# Patient Record
Sex: FEMALE | Race: WHITE | Employment: OTHER | URBAN - METROPOLITAN AREA
[De-identification: names, ages, dates, MRNs, and addresses within clinical notes are randomized per-mention and may not be internally consistent; named-entity substitution may affect disease eponyms.]

---

## 2017-03-01 ENCOUNTER — HOSPITAL ENCOUNTER (EMERGENCY)
Age: 37
Discharge: HOME | End: 2017-03-01
Attending: EMERGENCY MEDICINE
Payer: MEDICARE

## 2017-03-01 ENCOUNTER — APPOINTMENT (OUTPATIENT)
Dept: CT IMAGING | Age: 37
End: 2017-03-01
Payer: MEDICARE

## 2017-03-01 ENCOUNTER — APPOINTMENT (OUTPATIENT)
Dept: GENERAL RADIOLOGY | Age: 37
End: 2017-03-01
Payer: MEDICARE

## 2017-03-01 VITALS
RESPIRATION RATE: 15 BRPM | HEIGHT: 69 IN | OXYGEN SATURATION: 99 % | SYSTOLIC BLOOD PRESSURE: 110 MMHG | HEART RATE: 69 BPM | BODY MASS INDEX: 23.7 KG/M2 | WEIGHT: 160 LBS | TEMPERATURE: 97.6 F | DIASTOLIC BLOOD PRESSURE: 68 MMHG

## 2017-03-01 DIAGNOSIS — R07.9 CHEST PAIN, UNSPECIFIED TYPE: Primary | ICD-10-CM

## 2017-03-01 LAB
ABSOLUTE EOS #: 0 K/UL (ref 0–0.4)
ABSOLUTE LYMPH #: 2.5 K/UL (ref 1–4.8)
ABSOLUTE MONO #: 0.4 K/UL (ref 0.1–1.3)
ANION GAP SERPL CALCULATED.3IONS-SCNC: 12 MMOL/L (ref 9–17)
BASOPHILS # BLD: 1 % (ref 0–2)
BASOPHILS ABSOLUTE: 0 K/UL (ref 0–0.2)
BNP INTERPRETATION: NORMAL
BUN BLDV-MCNC: 8 MG/DL (ref 6–20)
BUN/CREAT BLD: ABNORMAL (ref 9–20)
CALCIUM SERPL-MCNC: 9.5 MG/DL (ref 8.6–10.4)
CHLORIDE BLD-SCNC: 101 MMOL/L (ref 98–107)
CO2: 26 MMOL/L (ref 20–31)
CREAT SERPL-MCNC: 0.44 MG/DL (ref 0.5–0.9)
DIFFERENTIAL TYPE: NORMAL
EOSINOPHILS RELATIVE PERCENT: 1 % (ref 0–4)
GFR AFRICAN AMERICAN: >60 ML/MIN
GFR NON-AFRICAN AMERICAN: >60 ML/MIN
GFR SERPL CREATININE-BSD FRML MDRD: ABNORMAL ML/MIN/{1.73_M2}
GFR SERPL CREATININE-BSD FRML MDRD: ABNORMAL ML/MIN/{1.73_M2}
GLUCOSE BLD-MCNC: 84 MG/DL (ref 70–99)
HCT VFR BLD CALC: 38.8 % (ref 36–46)
HEMOGLOBIN: 12.9 G/DL (ref 12–16)
INR BLD: 1
LYMPHOCYTES # BLD: 33 % (ref 24–44)
MCH RBC QN AUTO: 28.8 PG (ref 26–34)
MCHC RBC AUTO-ENTMCNC: 33.2 G/DL (ref 31–37)
MCV RBC AUTO: 86.6 FL (ref 80–100)
MONOCYTES # BLD: 6 % (ref 1–7)
MYOGLOBIN: 52 NG/ML (ref 25–58)
PARTIAL THROMBOPLASTIN TIME: 26.8 SEC (ref 23–31)
PDW BLD-RTO: 13.6 % (ref 11.5–14.9)
PLATELET # BLD: 327 K/UL (ref 150–450)
PLATELET ESTIMATE: NORMAL
PMV BLD AUTO: 7.7 FL (ref 6–12)
POTASSIUM SERPL-SCNC: 3.7 MMOL/L (ref 3.7–5.3)
PRO-BNP: 78 PG/ML
PROTHROMBIN TIME: 10.4 SEC (ref 9.7–12)
RBC # BLD: 4.47 M/UL (ref 4–5.2)
RBC # BLD: NORMAL 10*6/UL
SEG NEUTROPHILS: 59 % (ref 36–66)
SEGMENTED NEUTROPHILS ABSOLUTE COUNT: 4.5 K/UL (ref 1.3–9.1)
SODIUM BLD-SCNC: 139 MMOL/L (ref 135–144)
TROPONIN INTERP: NORMAL
TROPONIN T: <0.03 NG/ML
WBC # BLD: 7.6 K/UL (ref 3.5–11)
WBC # BLD: NORMAL 10*3/UL

## 2017-03-01 PROCEDURE — 85025 COMPLETE CBC W/AUTO DIFF WBC: CPT

## 2017-03-01 PROCEDURE — 71020 XR CHEST STANDARD TWO VW: CPT

## 2017-03-01 PROCEDURE — 85610 PROTHROMBIN TIME: CPT

## 2017-03-01 PROCEDURE — 99285 EMERGENCY DEPT VISIT HI MDM: CPT

## 2017-03-01 PROCEDURE — 96374 THER/PROPH/DIAG INJ IV PUSH: CPT

## 2017-03-01 PROCEDURE — 85730 THROMBOPLASTIN TIME PARTIAL: CPT

## 2017-03-01 PROCEDURE — 93005 ELECTROCARDIOGRAM TRACING: CPT

## 2017-03-01 PROCEDURE — 80048 BASIC METABOLIC PNL TOTAL CA: CPT

## 2017-03-01 PROCEDURE — 96375 TX/PRO/DX INJ NEW DRUG ADDON: CPT

## 2017-03-01 PROCEDURE — 84484 ASSAY OF TROPONIN QUANT: CPT

## 2017-03-01 PROCEDURE — 83880 ASSAY OF NATRIURETIC PEPTIDE: CPT

## 2017-03-01 PROCEDURE — 6360000002 HC RX W HCPCS: Performed by: EMERGENCY MEDICINE

## 2017-03-01 PROCEDURE — 6360000004 HC RX CONTRAST MEDICATION: Performed by: EMERGENCY MEDICINE

## 2017-03-01 PROCEDURE — 2580000003 HC RX 258: Performed by: EMERGENCY MEDICINE

## 2017-03-01 PROCEDURE — 71260 CT THORAX DX C+: CPT

## 2017-03-01 PROCEDURE — 36415 COLL VENOUS BLD VENIPUNCTURE: CPT

## 2017-03-01 PROCEDURE — 83874 ASSAY OF MYOGLOBIN: CPT

## 2017-03-01 RX ORDER — ONDANSETRON 2 MG/ML
4 INJECTION INTRAMUSCULAR; INTRAVENOUS ONCE
Status: COMPLETED | OUTPATIENT
Start: 2017-03-01 | End: 2017-03-01

## 2017-03-01 RX ORDER — FENTANYL CITRATE 50 UG/ML
100 INJECTION, SOLUTION INTRAMUSCULAR; INTRAVENOUS ONCE
Status: COMPLETED | OUTPATIENT
Start: 2017-03-01 | End: 2017-03-01

## 2017-03-01 RX ORDER — 0.9 % SODIUM CHLORIDE 0.9 %
100 INTRAVENOUS SOLUTION INTRAVENOUS ONCE
Status: COMPLETED | OUTPATIENT
Start: 2017-03-01 | End: 2017-03-01

## 2017-03-01 RX ORDER — SODIUM CHLORIDE 0.9 % (FLUSH) 0.9 %
10 SYRINGE (ML) INJECTION PRN
Status: DISCONTINUED | OUTPATIENT
Start: 2017-03-01 | End: 2017-03-01 | Stop reason: HOSPADM

## 2017-03-01 RX ADMIN — ONDANSETRON 4 MG: 2 INJECTION INTRAMUSCULAR; INTRAVENOUS at 19:25

## 2017-03-01 RX ADMIN — Medication 10 ML: at 18:40

## 2017-03-01 RX ADMIN — SODIUM CHLORIDE 100 ML: 9 INJECTION, SOLUTION INTRAVENOUS at 18:39

## 2017-03-01 RX ADMIN — FENTANYL CITRATE 100 MCG: 50 INJECTION INTRAMUSCULAR; INTRAVENOUS at 19:25

## 2017-03-01 RX ADMIN — IOVERSOL 100 ML: 741 INJECTION INTRA-ARTERIAL; INTRAVENOUS at 18:39

## 2017-03-01 ASSESSMENT — ENCOUNTER SYMPTOMS
COLOR CHANGE: 0
BACK PAIN: 0
RHINORRHEA: 0
ABDOMINAL PAIN: 0
EYE DISCHARGE: 0
FACIAL SWELLING: 0
CHEST TIGHTNESS: 0
CONSTIPATION: 0
NAUSEA: 0
SINUS PRESSURE: 0
SHORTNESS OF BREATH: 0
TROUBLE SWALLOWING: 0
COUGH: 0
EYE PAIN: 0
VOMITING: 0
WHEEZING: 0
BLOOD IN STOOL: 0
EYE REDNESS: 0
SORE THROAT: 0
DIARRHEA: 0

## 2017-03-01 ASSESSMENT — PAIN SCALES - GENERAL
PAINLEVEL_OUTOF10: 7
PAINLEVEL_OUTOF10: 7

## 2017-03-02 LAB
EKG ATRIAL RATE: 52 BPM
EKG P AXIS: 60 DEGREES
EKG P-R INTERVAL: 138 MS
EKG Q-T INTERVAL: 404 MS
EKG QRS DURATION: 80 MS
EKG QTC CALCULATION (BAZETT): 375 MS
EKG R AXIS: 80 DEGREES
EKG T AXIS: 84 DEGREES
EKG VENTRICULAR RATE: 52 BPM

## 2017-04-28 ENCOUNTER — HOSPITAL ENCOUNTER (EMERGENCY)
Age: 37
Discharge: HOME | End: 2017-04-29
Attending: EMERGENCY MEDICINE
Payer: MEDICARE

## 2017-04-28 ENCOUNTER — APPOINTMENT (OUTPATIENT)
Dept: CT IMAGING | Age: 37
End: 2017-04-28
Payer: MEDICARE

## 2017-04-28 DIAGNOSIS — M62.838 CERVICAL PARASPINAL MUSCLE SPASM: Primary | ICD-10-CM

## 2017-04-28 LAB
ABSOLUTE BANDS #: 0.16 K/UL (ref 0–1)
ABSOLUTE EOS #: 0.16 K/UL (ref 0–0.4)
ABSOLUTE LYMPH #: 3.42 K/UL (ref 1–4.8)
ABSOLUTE MONO #: 1.3 K/UL (ref 0.1–1.3)
ALBUMIN SERPL-MCNC: 4.2 G/DL (ref 3.5–5.2)
ALBUMIN/GLOBULIN RATIO: ABNORMAL (ref 1–2.5)
ALP BLD-CCNC: 72 U/L (ref 35–104)
ALT SERPL-CCNC: 13 U/L (ref 5–33)
ANION GAP SERPL CALCULATED.3IONS-SCNC: 11 MMOL/L (ref 9–17)
AST SERPL-CCNC: 9 U/L
ATYPICAL LYMPHOCYTE ABSOLUTE COUNT: 0.16 K/UL
ATYPICAL LYMPHOCYTES: 1 %
BANDS: 1 %
BASOPHILS # BLD: 0 %
BASOPHILS ABSOLUTE: 0 K/UL (ref 0–0.2)
BILIRUB SERPL-MCNC: 0.37 MG/DL (ref 0.3–1.2)
BUN BLDV-MCNC: 20 MG/DL (ref 6–20)
BUN/CREAT BLD: ABNORMAL (ref 9–20)
CALCIUM SERPL-MCNC: 9.8 MG/DL (ref 8.6–10.4)
CHLORIDE BLD-SCNC: 96 MMOL/L (ref 98–107)
CO2: 32 MMOL/L (ref 20–31)
CREAT SERPL-MCNC: 0.77 MG/DL (ref 0.5–0.9)
D-DIMER QUANTITATIVE: <0.19 MG/L FEU
DIFFERENTIAL TYPE: ABNORMAL
EOSINOPHILS RELATIVE PERCENT: 1 %
GFR AFRICAN AMERICAN: >60 ML/MIN
GFR NON-AFRICAN AMERICAN: >60 ML/MIN
GFR SERPL CREATININE-BSD FRML MDRD: ABNORMAL ML/MIN/{1.73_M2}
GFR SERPL CREATININE-BSD FRML MDRD: ABNORMAL ML/MIN/{1.73_M2}
GLUCOSE BLD-MCNC: 83 MG/DL (ref 70–99)
HCG(URINE) PREGNANCY TEST: NEGATIVE
HCT VFR BLD CALC: 40.2 % (ref 36–46)
HEMOGLOBIN: 13.2 G/DL (ref 12–16)
LYMPHOCYTES # BLD: 21 %
MAGNESIUM: 1.9 MG/DL (ref 1.6–2.6)
MCH RBC QN AUTO: 27.9 PG (ref 26–34)
MCHC RBC AUTO-ENTMCNC: 32.9 G/DL (ref 31–37)
MCV RBC AUTO: 84.8 FL (ref 80–100)
MONOCYTES # BLD: 8 %
MORPHOLOGY: ABNORMAL
PDW BLD-RTO: 15.6 % (ref 11.5–14.9)
PLATELET # BLD: 382 K/UL (ref 150–450)
PLATELET ESTIMATE: ABNORMAL
PMV BLD AUTO: 7.4 FL (ref 6–12)
POTASSIUM SERPL-SCNC: 4.4 MMOL/L (ref 3.7–5.3)
RBC # BLD: 4.74 M/UL (ref 4–5.2)
RBC # BLD: ABNORMAL 10*6/UL
SEG NEUTROPHILS: 68 %
SEGMENTED NEUTROPHILS ABSOLUTE COUNT: 11.1 K/UL (ref 1.3–9.1)
SODIUM BLD-SCNC: 139 MMOL/L (ref 135–144)
TOTAL PROTEIN: 6.8 G/DL (ref 6.4–8.3)
WBC # BLD: 16.3 K/UL (ref 3.5–11)
WBC # BLD: ABNORMAL 10*3/UL

## 2017-04-28 PROCEDURE — 70498 CT ANGIOGRAPHY NECK: CPT

## 2017-04-28 PROCEDURE — 80053 COMPREHEN METABOLIC PANEL: CPT

## 2017-04-28 PROCEDURE — 72125 CT NECK SPINE W/O DYE: CPT

## 2017-04-28 PROCEDURE — 6360000004 HC RX CONTRAST MEDICATION: Performed by: EMERGENCY MEDICINE

## 2017-04-28 PROCEDURE — 85379 FIBRIN DEGRADATION QUANT: CPT

## 2017-04-28 PROCEDURE — 36415 COLL VENOUS BLD VENIPUNCTURE: CPT

## 2017-04-28 PROCEDURE — 2580000003 HC RX 258: Performed by: EMERGENCY MEDICINE

## 2017-04-28 PROCEDURE — 70496 CT ANGIOGRAPHY HEAD: CPT

## 2017-04-28 PROCEDURE — 83735 ASSAY OF MAGNESIUM: CPT

## 2017-04-28 PROCEDURE — 99284 EMERGENCY DEPT VISIT MOD MDM: CPT

## 2017-04-28 PROCEDURE — 93005 ELECTROCARDIOGRAM TRACING: CPT

## 2017-04-28 PROCEDURE — 85025 COMPLETE CBC W/AUTO DIFF WBC: CPT

## 2017-04-28 PROCEDURE — 84703 CHORIONIC GONADOTROPIN ASSAY: CPT

## 2017-04-28 RX ORDER — 0.9 % SODIUM CHLORIDE 0.9 %
100 INTRAVENOUS SOLUTION INTRAVENOUS ONCE
Status: COMPLETED | OUTPATIENT
Start: 2017-04-28 | End: 2017-04-29

## 2017-04-28 RX ORDER — SODIUM CHLORIDE 0.9 % (FLUSH) 0.9 %
10 SYRINGE (ML) INJECTION PRN
Status: DISCONTINUED | OUTPATIENT
Start: 2017-04-28 | End: 2017-04-29 | Stop reason: HOSPADM

## 2017-04-28 RX ADMIN — IOVERSOL 100 ML: 741 INJECTION INTRA-ARTERIAL; INTRAVENOUS at 23:25

## 2017-04-28 RX ADMIN — SODIUM CHLORIDE 100 ML: 9 INJECTION, SOLUTION INTRAVENOUS at 23:31

## 2017-04-28 RX ADMIN — Medication 10 ML: at 23:31

## 2017-04-28 ASSESSMENT — PAIN SCALES - GENERAL: PAINLEVEL_OUTOF10: 8

## 2017-04-28 ASSESSMENT — PAIN DESCRIPTION - PAIN TYPE: TYPE: ACUTE PAIN

## 2017-04-28 ASSESSMENT — PAIN DESCRIPTION - LOCATION: LOCATION: NECK

## 2017-04-29 VITALS
TEMPERATURE: 98.2 F | RESPIRATION RATE: 16 BRPM | BODY MASS INDEX: 24.44 KG/M2 | HEIGHT: 69 IN | HEART RATE: 74 BPM | DIASTOLIC BLOOD PRESSURE: 106 MMHG | OXYGEN SATURATION: 93 % | SYSTOLIC BLOOD PRESSURE: 161 MMHG | WEIGHT: 165 LBS

## 2017-04-29 PROCEDURE — 6370000000 HC RX 637 (ALT 250 FOR IP): Performed by: EMERGENCY MEDICINE

## 2017-04-29 RX ORDER — DIAZEPAM 5 MG/1
5 TABLET ORAL EVERY 8 HOURS PRN
Qty: 3 TABLET | Refills: 0 | Status: SHIPPED | OUTPATIENT
Start: 2017-04-29 | End: 2017-07-28

## 2017-04-29 RX ORDER — DIAZEPAM 5 MG/1
5 TABLET ORAL ONCE
Status: COMPLETED | OUTPATIENT
Start: 2017-04-29 | End: 2017-04-29

## 2017-04-29 RX ADMIN — DIAZEPAM 5 MG: 5 TABLET ORAL at 00:59

## 2017-05-02 LAB
EKG ATRIAL RATE: 62 BPM
EKG P AXIS: 54 DEGREES
EKG P-R INTERVAL: 132 MS
EKG Q-T INTERVAL: 404 MS
EKG QRS DURATION: 70 MS
EKG QTC CALCULATION (BAZETT): 410 MS
EKG R AXIS: 73 DEGREES
EKG T AXIS: 81 DEGREES
EKG VENTRICULAR RATE: 62 BPM

## 2017-06-21 ENCOUNTER — HOSPITAL ENCOUNTER (EMERGENCY)
Age: 37
Discharge: HOME | End: 2017-06-21
Attending: EMERGENCY MEDICINE
Payer: MEDICARE

## 2017-06-21 ENCOUNTER — APPOINTMENT (OUTPATIENT)
Dept: GENERAL RADIOLOGY | Age: 37
End: 2017-06-21
Payer: MEDICARE

## 2017-06-21 VITALS
RESPIRATION RATE: 25 BRPM | HEIGHT: 69 IN | HEART RATE: 92 BPM | WEIGHT: 165 LBS | DIASTOLIC BLOOD PRESSURE: 96 MMHG | OXYGEN SATURATION: 100 % | BODY MASS INDEX: 24.44 KG/M2 | TEMPERATURE: 97.9 F | SYSTOLIC BLOOD PRESSURE: 148 MMHG

## 2017-06-21 DIAGNOSIS — J40 BRONCHITIS: Primary | ICD-10-CM

## 2017-06-21 PROCEDURE — 99285 EMERGENCY DEPT VISIT HI MDM: CPT

## 2017-06-21 PROCEDURE — 6370000000 HC RX 637 (ALT 250 FOR IP): Performed by: EMERGENCY MEDICINE

## 2017-06-21 PROCEDURE — 94664 DEMO&/EVAL PT USE INHALER: CPT

## 2017-06-21 PROCEDURE — 71020 XR CHEST STANDARD TWO VW: CPT

## 2017-06-21 PROCEDURE — 94640 AIRWAY INHALATION TREATMENT: CPT

## 2017-06-21 RX ORDER — PREDNISONE 20 MG/1
60 TABLET ORAL DAILY
Qty: 15 TABLET | Refills: 0 | Status: SHIPPED | OUTPATIENT
Start: 2017-06-21 | End: 2017-06-26

## 2017-06-21 RX ORDER — IPRATROPIUM BROMIDE AND ALBUTEROL SULFATE 2.5; .5 MG/3ML; MG/3ML
1 SOLUTION RESPIRATORY (INHALATION) ONCE
Status: COMPLETED | OUTPATIENT
Start: 2017-06-21 | End: 2017-06-21

## 2017-06-21 RX ORDER — PREDNISONE 20 MG/1
60 TABLET ORAL ONCE
Status: COMPLETED | OUTPATIENT
Start: 2017-06-21 | End: 2017-06-21

## 2017-06-21 RX ADMIN — PREDNISONE 60 MG: 20 TABLET ORAL at 11:10

## 2017-06-21 RX ADMIN — IPRATROPIUM BROMIDE AND ALBUTEROL SULFATE 1 AMPULE: .5; 3 SOLUTION RESPIRATORY (INHALATION) at 10:23

## 2017-06-21 ASSESSMENT — ENCOUNTER SYMPTOMS
COUGH: 1
EYE REDNESS: 0
NAUSEA: 0
SHORTNESS OF BREATH: 1
ABDOMINAL PAIN: 0
EYE PAIN: 0
VOMITING: 0
BACK PAIN: 0
WHEEZING: 1
RHINORRHEA: 0

## 2017-07-27 ENCOUNTER — APPOINTMENT (OUTPATIENT)
Dept: GENERAL RADIOLOGY | Age: 37
End: 2017-07-27
Payer: MEDICARE

## 2017-07-27 ENCOUNTER — HOSPITAL ENCOUNTER (EMERGENCY)
Age: 37
Discharge: HOME | End: 2017-07-27
Attending: EMERGENCY MEDICINE
Payer: MEDICARE

## 2017-07-27 VITALS
SYSTOLIC BLOOD PRESSURE: 147 MMHG | BODY MASS INDEX: 23.62 KG/M2 | HEIGHT: 70 IN | HEART RATE: 77 BPM | OXYGEN SATURATION: 98 % | WEIGHT: 165 LBS | DIASTOLIC BLOOD PRESSURE: 78 MMHG | RESPIRATION RATE: 16 BRPM | TEMPERATURE: 98.2 F

## 2017-07-27 DIAGNOSIS — M25.512 LEFT SHOULDER PAIN, UNSPECIFIED CHRONICITY: Primary | ICD-10-CM

## 2017-07-27 PROCEDURE — 6360000002 HC RX W HCPCS: Performed by: NURSE PRACTITIONER

## 2017-07-27 PROCEDURE — 99283 EMERGENCY DEPT VISIT LOW MDM: CPT

## 2017-07-27 PROCEDURE — 73030 X-RAY EXAM OF SHOULDER: CPT

## 2017-07-27 PROCEDURE — 96372 THER/PROPH/DIAG INJ SC/IM: CPT

## 2017-07-27 RX ORDER — CYCLOBENZAPRINE HCL 10 MG
10 TABLET ORAL 3 TIMES DAILY PRN
Qty: 15 TABLET | Refills: 0 | Status: SHIPPED | OUTPATIENT
Start: 2017-07-27 | End: 2017-07-28

## 2017-07-27 RX ORDER — TIZANIDINE 2 MG/1
2 TABLET ORAL EVERY 8 HOURS PRN
Qty: 15 TABLET | Refills: 0 | Status: SHIPPED | OUTPATIENT
Start: 2017-07-27 | End: 2017-07-28

## 2017-07-27 RX ORDER — KETOROLAC TROMETHAMINE 30 MG/ML
30 INJECTION, SOLUTION INTRAMUSCULAR; INTRAVENOUS ONCE
Status: COMPLETED | OUTPATIENT
Start: 2017-07-27 | End: 2017-07-27

## 2017-07-27 RX ORDER — NAPROXEN 500 MG/1
500 TABLET ORAL 2 TIMES DAILY
Qty: 20 TABLET | Refills: 0 | Status: SHIPPED | OUTPATIENT
Start: 2017-07-27 | End: 2017-07-28

## 2017-07-27 RX ADMIN — KETOROLAC TROMETHAMINE 30 MG: 30 INJECTION, SOLUTION INTRAMUSCULAR at 12:55

## 2017-07-27 ASSESSMENT — ENCOUNTER SYMPTOMS
COUGH: 0
VOMITING: 0
SHORTNESS OF BREATH: 0
TROUBLE SWALLOWING: 0
NAUSEA: 0

## 2017-07-27 ASSESSMENT — PAIN SCALES - GENERAL
PAINLEVEL_OUTOF10: 6
PAINLEVEL_OUTOF10: 6

## 2017-08-14 ENCOUNTER — OFFICE VISIT (OUTPATIENT)
Dept: GASTROENTEROLOGY | Age: 37
End: 2017-08-14
Payer: MEDICARE

## 2017-08-14 VITALS
OXYGEN SATURATION: 98 % | WEIGHT: 169.7 LBS | TEMPERATURE: 98.1 F | DIASTOLIC BLOOD PRESSURE: 73 MMHG | HEIGHT: 69 IN | HEART RATE: 55 BPM | BODY MASS INDEX: 25.13 KG/M2 | RESPIRATION RATE: 14 BRPM | SYSTOLIC BLOOD PRESSURE: 117 MMHG

## 2017-08-14 DIAGNOSIS — R09.89 GLOBUS SENSATION: ICD-10-CM

## 2017-08-14 DIAGNOSIS — Q87.40 MARFAN SYNDROME: Primary | ICD-10-CM

## 2017-08-14 DIAGNOSIS — R13.10 DYSPHAGIA, UNSPECIFIED TYPE: ICD-10-CM

## 2017-08-14 DIAGNOSIS — R07.89 ATYPICAL CHEST PAIN: ICD-10-CM

## 2017-08-14 PROCEDURE — 1036F TOBACCO NON-USER: CPT | Performed by: INTERNAL MEDICINE

## 2017-08-14 PROCEDURE — 99204 OFFICE O/P NEW MOD 45 MIN: CPT | Performed by: INTERNAL MEDICINE

## 2017-08-14 PROCEDURE — G8419 CALC BMI OUT NRM PARAM NOF/U: HCPCS | Performed by: INTERNAL MEDICINE

## 2017-08-14 PROCEDURE — G8427 DOCREV CUR MEDS BY ELIG CLIN: HCPCS | Performed by: INTERNAL MEDICINE

## 2017-08-14 ASSESSMENT — ENCOUNTER SYMPTOMS
VOMITING: 0
ANAL BLEEDING: 0
NAUSEA: 0
CONSTIPATION: 0
ALLERGIC/IMMUNOLOGIC NEGATIVE: 1
CHOKING: 1
ABDOMINAL DISTENTION: 0
TROUBLE SWALLOWING: 1
RECTAL PAIN: 0
BLOOD IN STOOL: 0
DIARRHEA: 0
BACK PAIN: 1
ABDOMINAL PAIN: 0

## 2017-08-16 ENCOUNTER — HOSPITAL ENCOUNTER (OUTPATIENT)
Dept: PREADMISSION TESTING | Age: 37
Discharge: HOME | End: 2017-08-16
Payer: MEDICARE

## 2017-08-16 VITALS
TEMPERATURE: 97.2 F | OXYGEN SATURATION: 99 % | RESPIRATION RATE: 14 BRPM | HEART RATE: 60 BPM | DIASTOLIC BLOOD PRESSURE: 81 MMHG | BODY MASS INDEX: 25.18 KG/M2 | SYSTOLIC BLOOD PRESSURE: 110 MMHG | HEIGHT: 69 IN | WEIGHT: 170 LBS

## 2017-08-16 LAB
ABSOLUTE EOS #: 0.1 K/UL (ref 0–0.4)
ABSOLUTE LYMPH #: 1.9 K/UL (ref 1–4.8)
ABSOLUTE MONO #: 0.5 K/UL (ref 0.1–1.3)
ANION GAP SERPL CALCULATED.3IONS-SCNC: 11 MMOL/L (ref 9–17)
BASOPHILS # BLD: 1 %
BASOPHILS ABSOLUTE: 0 K/UL (ref 0–0.2)
BUN BLDV-MCNC: 10 MG/DL (ref 6–20)
BUN/CREAT BLD: NORMAL (ref 9–20)
CALCIUM SERPL-MCNC: 9.6 MG/DL (ref 8.6–10.4)
CHLORIDE BLD-SCNC: 101 MMOL/L (ref 98–107)
CO2: 30 MMOL/L (ref 20–31)
CREAT SERPL-MCNC: 0.52 MG/DL (ref 0.5–0.9)
DIFFERENTIAL TYPE: ABNORMAL
EOSINOPHILS RELATIVE PERCENT: 1 %
GFR AFRICAN AMERICAN: >60 ML/MIN
GFR NON-AFRICAN AMERICAN: >60 ML/MIN
GFR SERPL CREATININE-BSD FRML MDRD: NORMAL ML/MIN/{1.73_M2}
GFR SERPL CREATININE-BSD FRML MDRD: NORMAL ML/MIN/{1.73_M2}
GLUCOSE BLD-MCNC: 87 MG/DL (ref 70–99)
HCT VFR BLD CALC: 42.3 % (ref 36–46)
HEMOGLOBIN: 14.2 G/DL (ref 12–16)
LYMPHOCYTES # BLD: 27 %
MCH RBC QN AUTO: 28.7 PG (ref 26–34)
MCHC RBC AUTO-ENTMCNC: 33.5 G/DL (ref 31–37)
MCV RBC AUTO: 85.7 FL (ref 80–100)
MONOCYTES # BLD: 7 %
PDW BLD-RTO: 15.4 % (ref 11.5–14.9)
PLATELET # BLD: 351 K/UL (ref 150–450)
PLATELET ESTIMATE: ABNORMAL
PMV BLD AUTO: 8.1 FL (ref 6–12)
POTASSIUM SERPL-SCNC: 4.7 MMOL/L (ref 3.7–5.3)
RBC # BLD: 4.93 M/UL (ref 4–5.2)
RBC # BLD: ABNORMAL 10*6/UL
SEG NEUTROPHILS: 64 %
SEGMENTED NEUTROPHILS ABSOLUTE COUNT: 4.4 K/UL (ref 1.3–9.1)
SODIUM BLD-SCNC: 142 MMOL/L (ref 135–144)
WBC # BLD: 6.8 K/UL (ref 3.5–11)
WBC # BLD: ABNORMAL 10*3/UL

## 2017-08-16 PROCEDURE — 36415 COLL VENOUS BLD VENIPUNCTURE: CPT

## 2017-08-16 PROCEDURE — 85025 COMPLETE CBC W/AUTO DIFF WBC: CPT

## 2017-08-16 PROCEDURE — 80048 BASIC METABOLIC PNL TOTAL CA: CPT

## 2017-08-16 RX ORDER — TIZANIDINE 2 MG/1
2 TABLET ORAL NIGHTLY PRN
COMMUNITY
End: 2017-09-05

## 2017-08-17 ENCOUNTER — ANESTHESIA EVENT (OUTPATIENT)
Dept: OPERATING ROOM | Age: 37
End: 2017-08-17
Payer: MEDICARE

## 2017-08-17 RX ORDER — SODIUM CHLORIDE 0.9 % (FLUSH) 0.9 %
10 SYRINGE (ML) INJECTION EVERY 12 HOURS SCHEDULED
Status: CANCELLED | OUTPATIENT
Start: 2017-08-17

## 2017-08-17 RX ORDER — SODIUM CHLORIDE 0.9 % (FLUSH) 0.9 %
10 SYRINGE (ML) INJECTION PRN
Status: CANCELLED | OUTPATIENT
Start: 2017-08-17

## 2017-08-17 RX ORDER — LIDOCAINE HYDROCHLORIDE 10 MG/ML
1 INJECTION, SOLUTION EPIDURAL; INFILTRATION; INTRACAUDAL; PERINEURAL
Status: CANCELLED | OUTPATIENT
Start: 2017-08-17 | End: 2017-08-17

## 2017-08-17 RX ORDER — SODIUM CHLORIDE, SODIUM LACTATE, POTASSIUM CHLORIDE, CALCIUM CHLORIDE 600; 310; 30; 20 MG/100ML; MG/100ML; MG/100ML; MG/100ML
INJECTION, SOLUTION INTRAVENOUS CONTINUOUS
Status: CANCELLED | OUTPATIENT
Start: 2017-08-17

## 2017-08-29 ENCOUNTER — ANESTHESIA (OUTPATIENT)
Dept: OPERATING ROOM | Age: 37
End: 2017-08-29
Payer: MEDICARE

## 2017-08-29 ENCOUNTER — HOSPITAL ENCOUNTER (OUTPATIENT)
Age: 37
Setting detail: OUTPATIENT SURGERY
Discharge: HOME | End: 2017-08-29
Attending: INTERNAL MEDICINE | Admitting: INTERNAL MEDICINE
Payer: MEDICARE

## 2017-08-29 VITALS
WEIGHT: 170 LBS | TEMPERATURE: 97.4 F | HEIGHT: 69 IN | DIASTOLIC BLOOD PRESSURE: 88 MMHG | OXYGEN SATURATION: 99 % | BODY MASS INDEX: 25.18 KG/M2 | SYSTOLIC BLOOD PRESSURE: 136 MMHG | RESPIRATION RATE: 16 BRPM | HEART RATE: 59 BPM

## 2017-08-29 VITALS — SYSTOLIC BLOOD PRESSURE: 140 MMHG | OXYGEN SATURATION: 98 % | DIASTOLIC BLOOD PRESSURE: 93 MMHG

## 2017-08-29 PROCEDURE — 7100000030 HC ASPR PHASE II RECOVERY - FIRST 15 MIN: Performed by: INTERNAL MEDICINE

## 2017-08-29 PROCEDURE — 88305 TISSUE EXAM BY PATHOLOGIST: CPT

## 2017-08-29 PROCEDURE — 2500000003 HC RX 250 WO HCPCS: Performed by: ANESTHESIOLOGY

## 2017-08-29 PROCEDURE — 7100000000 HC PACU RECOVERY - FIRST 15 MIN: Performed by: INTERNAL MEDICINE

## 2017-08-29 PROCEDURE — 2580000003 HC RX 258: Performed by: ANESTHESIOLOGY

## 2017-08-29 PROCEDURE — 3700000001 HC ADD 15 MINUTES (ANESTHESIA): Performed by: INTERNAL MEDICINE

## 2017-08-29 PROCEDURE — 3700000000 HC ANESTHESIA ATTENDED CARE: Performed by: INTERNAL MEDICINE

## 2017-08-29 PROCEDURE — 3609012400 HC EGD TRANSORAL BIOPSY SINGLE/MULTIPLE: Performed by: INTERNAL MEDICINE

## 2017-08-29 PROCEDURE — 6360000002 HC RX W HCPCS: Performed by: ANESTHESIOLOGY

## 2017-08-29 PROCEDURE — 7100000001 HC PACU RECOVERY - ADDTL 15 MIN: Performed by: INTERNAL MEDICINE

## 2017-08-29 RX ORDER — DEXAMETHASONE SODIUM PHOSPHATE 4 MG/ML
INJECTION, SOLUTION INTRA-ARTICULAR; INTRALESIONAL; INTRAMUSCULAR; INTRAVENOUS; SOFT TISSUE PRN
Status: DISCONTINUED | OUTPATIENT
Start: 2017-08-29 | End: 2017-08-29 | Stop reason: SDUPTHER

## 2017-08-29 RX ORDER — DIPHENHYDRAMINE HYDROCHLORIDE 50 MG/ML
12.5 INJECTION INTRAMUSCULAR; INTRAVENOUS
Status: DISCONTINUED | OUTPATIENT
Start: 2017-08-29 | End: 2017-08-29 | Stop reason: HOSPADM

## 2017-08-29 RX ORDER — LIDOCAINE HYDROCHLORIDE 10 MG/ML
1 INJECTION, SOLUTION EPIDURAL; INFILTRATION; INTRACAUDAL; PERINEURAL
Status: DISCONTINUED | OUTPATIENT
Start: 2017-08-29 | End: 2017-08-29 | Stop reason: HOSPADM

## 2017-08-29 RX ORDER — PROPOFOL 10 MG/ML
INJECTION, EMULSION INTRAVENOUS PRN
Status: DISCONTINUED | OUTPATIENT
Start: 2017-08-29 | End: 2017-08-29 | Stop reason: SDUPTHER

## 2017-08-29 RX ORDER — SODIUM CHLORIDE, SODIUM LACTATE, POTASSIUM CHLORIDE, CALCIUM CHLORIDE 600; 310; 30; 20 MG/100ML; MG/100ML; MG/100ML; MG/100ML
INJECTION, SOLUTION INTRAVENOUS CONTINUOUS
Status: DISCONTINUED | OUTPATIENT
Start: 2017-08-29 | End: 2017-08-29 | Stop reason: HOSPADM

## 2017-08-29 RX ORDER — OXYCODONE HYDROCHLORIDE AND ACETAMINOPHEN 5; 325 MG/1; MG/1
1 TABLET ORAL PRN
Status: DISCONTINUED | OUTPATIENT
Start: 2017-08-29 | End: 2017-08-29 | Stop reason: HOSPADM

## 2017-08-29 RX ORDER — MEPERIDINE HYDROCHLORIDE 25 MG/ML
12.5 INJECTION INTRAMUSCULAR; INTRAVENOUS; SUBCUTANEOUS EVERY 5 MIN PRN
Status: DISCONTINUED | OUTPATIENT
Start: 2017-08-29 | End: 2017-08-29 | Stop reason: HOSPADM

## 2017-08-29 RX ORDER — ONDANSETRON 2 MG/ML
INJECTION INTRAMUSCULAR; INTRAVENOUS PRN
Status: DISCONTINUED | OUTPATIENT
Start: 2017-08-29 | End: 2017-08-29 | Stop reason: SDUPTHER

## 2017-08-29 RX ORDER — OXYCODONE HYDROCHLORIDE AND ACETAMINOPHEN 5; 325 MG/1; MG/1
2 TABLET ORAL PRN
Status: DISCONTINUED | OUTPATIENT
Start: 2017-08-29 | End: 2017-08-29 | Stop reason: HOSPADM

## 2017-08-29 RX ORDER — LIDOCAINE HYDROCHLORIDE 10 MG/ML
INJECTION, SOLUTION EPIDURAL; INFILTRATION; INTRACAUDAL; PERINEURAL PRN
Status: DISCONTINUED | OUTPATIENT
Start: 2017-08-29 | End: 2017-08-29 | Stop reason: SDUPTHER

## 2017-08-29 RX ORDER — MIDAZOLAM HYDROCHLORIDE 1 MG/ML
INJECTION INTRAMUSCULAR; INTRAVENOUS PRN
Status: DISCONTINUED | OUTPATIENT
Start: 2017-08-29 | End: 2017-08-29 | Stop reason: SDUPTHER

## 2017-08-29 RX ORDER — LABETALOL HYDROCHLORIDE 5 MG/ML
5 INJECTION, SOLUTION INTRAVENOUS EVERY 10 MIN PRN
Status: DISCONTINUED | OUTPATIENT
Start: 2017-08-29 | End: 2017-08-29 | Stop reason: HOSPADM

## 2017-08-29 RX ORDER — PROMETHAZINE HYDROCHLORIDE 25 MG/ML
6.25 INJECTION, SOLUTION INTRAMUSCULAR; INTRAVENOUS
Status: DISCONTINUED | OUTPATIENT
Start: 2017-08-29 | End: 2017-08-29 | Stop reason: HOSPADM

## 2017-08-29 RX ORDER — SODIUM CHLORIDE 0.9 % (FLUSH) 0.9 %
10 SYRINGE (ML) INJECTION EVERY 12 HOURS SCHEDULED
Status: DISCONTINUED | OUTPATIENT
Start: 2017-08-29 | End: 2017-08-29 | Stop reason: HOSPADM

## 2017-08-29 RX ORDER — SODIUM CHLORIDE 0.9 % (FLUSH) 0.9 %
10 SYRINGE (ML) INJECTION PRN
Status: DISCONTINUED | OUTPATIENT
Start: 2017-08-29 | End: 2017-08-29 | Stop reason: HOSPADM

## 2017-08-29 RX ADMIN — MIDAZOLAM 2 MG: 1 INJECTION INTRAMUSCULAR; INTRAVENOUS at 09:38

## 2017-08-29 RX ADMIN — DEXAMETHASONE SODIUM PHOSPHATE 4 MG: 4 INJECTION, SOLUTION INTRAMUSCULAR; INTRAVENOUS at 09:34

## 2017-08-29 RX ADMIN — LIDOCAINE HYDROCHLORIDE 50 MG: 10 INJECTION, SOLUTION EPIDURAL; INFILTRATION; INTRACAUDAL; PERINEURAL at 09:38

## 2017-08-29 RX ADMIN — ONDANSETRON 4 MG: 2 INJECTION INTRAMUSCULAR; INTRAVENOUS at 09:34

## 2017-08-29 RX ADMIN — PROPOFOL 50 MG: 10 INJECTION, EMULSION INTRAVENOUS at 09:38

## 2017-08-29 RX ADMIN — SODIUM CHLORIDE, POTASSIUM CHLORIDE, SODIUM LACTATE AND CALCIUM CHLORIDE: 600; 310; 30; 20 INJECTION, SOLUTION INTRAVENOUS at 08:49

## 2017-08-29 RX ADMIN — PROPOFOL 20 MG: 10 INJECTION, EMULSION INTRAVENOUS at 09:43

## 2017-08-29 ASSESSMENT — PAIN SCALES - GENERAL
PAINLEVEL_OUTOF10: 0

## 2017-08-29 ASSESSMENT — PAIN - FUNCTIONAL ASSESSMENT: PAIN_FUNCTIONAL_ASSESSMENT: 0-10

## 2017-08-30 LAB — SURGICAL PATHOLOGY REPORT: NORMAL

## 2017-10-10 PROBLEM — M25.359 HIP INSTABILITY: Status: ACTIVE | Noted: 2017-10-10

## 2017-10-10 PROBLEM — M25.311 INSTABILITY OF BOTH SHOULDER JOINTS: Status: ACTIVE | Noted: 2017-10-10

## 2017-10-10 PROBLEM — M25.312 INSTABILITY OF BOTH SHOULDER JOINTS: Status: ACTIVE | Noted: 2017-10-10

## 2017-12-18 ENCOUNTER — APPOINTMENT (OUTPATIENT)
Dept: GENERAL RADIOLOGY | Age: 37
End: 2017-12-18
Payer: MEDICARE

## 2017-12-18 ENCOUNTER — HOSPITAL ENCOUNTER (EMERGENCY)
Age: 37
Discharge: HOME | End: 2017-12-18
Attending: EMERGENCY MEDICINE
Payer: MEDICARE

## 2017-12-18 VITALS
HEART RATE: 66 BPM | BODY MASS INDEX: 26.66 KG/M2 | RESPIRATION RATE: 16 BRPM | HEIGHT: 69 IN | TEMPERATURE: 98.2 F | WEIGHT: 180 LBS | SYSTOLIC BLOOD PRESSURE: 109 MMHG | DIASTOLIC BLOOD PRESSURE: 69 MMHG | OXYGEN SATURATION: 94 %

## 2017-12-18 DIAGNOSIS — J20.9 ACUTE BRONCHITIS, UNSPECIFIED ORGANISM: Primary | ICD-10-CM

## 2017-12-18 PROCEDURE — 71020 XR CHEST STANDARD TWO VW: CPT

## 2017-12-18 PROCEDURE — 94640 AIRWAY INHALATION TREATMENT: CPT

## 2017-12-18 PROCEDURE — 99283 EMERGENCY DEPT VISIT LOW MDM: CPT

## 2017-12-18 PROCEDURE — 6370000000 HC RX 637 (ALT 250 FOR IP): Performed by: NURSE PRACTITIONER

## 2017-12-18 RX ORDER — ALBUTEROL SULFATE 90 UG/1
2 AEROSOL, METERED RESPIRATORY (INHALATION) EVERY 6 HOURS PRN
Qty: 1 INHALER | Refills: 0 | Status: SHIPPED | OUTPATIENT
Start: 2017-12-18 | End: 2018-08-10

## 2017-12-18 RX ORDER — IPRATROPIUM BROMIDE AND ALBUTEROL SULFATE 2.5; .5 MG/3ML; MG/3ML
1 SOLUTION RESPIRATORY (INHALATION) ONCE
Status: COMPLETED | OUTPATIENT
Start: 2017-12-18 | End: 2017-12-18

## 2017-12-18 RX ORDER — PREDNISONE 50 MG/1
50 TABLET ORAL DAILY
Qty: 5 TABLET | Refills: 0 | Status: SHIPPED | OUTPATIENT
Start: 2017-12-18 | End: 2017-12-23

## 2017-12-18 RX ADMIN — IPRATROPIUM BROMIDE AND ALBUTEROL SULFATE 1 AMPULE: .5; 3 SOLUTION RESPIRATORY (INHALATION) at 10:34

## 2017-12-18 ASSESSMENT — ENCOUNTER SYMPTOMS
NAUSEA: 0
VOMITING: 0
TROUBLE SWALLOWING: 0
COUGH: 1
SHORTNESS OF BREATH: 0

## 2017-12-18 ASSESSMENT — PAIN DESCRIPTION - PAIN TYPE: TYPE: ACUTE PAIN

## 2017-12-18 ASSESSMENT — PAIN DESCRIPTION - LOCATION: LOCATION: CHEST

## 2017-12-18 NOTE — ED PROVIDER NOTES
[address]  eMERGENCY dEPARTMENT eNCOUnter      Pt Name: Latha Good  MRN: [id number]  [place] 1980  Date of evaluation: 12/18/2017  Provider: Rupal Alberts 2414       Chief Complaint   Patient presents with    Cough         HISTORY OF PRESENT ILLNESS  (Location/Symptom, Timing/Onset, Context/Setting, Quality, Duration, Modifying Factors, Severity.)   Latha Good is a 30 y.o. female who presents to the emergency department Cough, congestion, runny nose and left ear fullness. Patient relates that symptoms have been present for 1 week. She was seen by PCP and started on Augmentin but did not take the medication. States that she started taking it yesterday. Reports that her left ear feels really full but it does not hurt. Denies sore throat. States that she has nonproductive cough. Denies wheezing or shortness of breath. Was concerned that she may have pneumonia. Nursing Notes were reviewed and I agree. REVIEW OF SYSTEMS    (2-9 systems for level 4, 10 or more for level 5)     Review of Systems   Constitutional: Negative for chills and fever. HENT: Positive for congestion. Negative for trouble swallowing. Respiratory: Positive for cough. Negative for shortness of breath. Cardiovascular: Negative for chest pain and palpitations. Gastrointestinal: Negative for nausea and vomiting. Except as noted above the remainder of the review of systems was reviewed and negative. PAST MEDICAL HISTORY         Diagnosis Date    Anxiety     Arthritis     O. A.    Bipolar depression ([facility] [address]     Dermatitis     Dysuria     Exposure to STD     Folliculitis     Hx of acute bronchitis     Hypertension     Marfan syndrome     MVP (mitral valve prolapse)     Nausea & vomiting     PONV (postoperative nausea and vomiting)     Scoliosis     Seasonal allergies     Staph infection     HX OF ALSO BACTEREMIA    UTI (lower urinary tract infection)

## 2017-12-19 ENCOUNTER — CARE COORDINATION (OUTPATIENT)
Dept: CARE COORDINATION | Age: 37
End: 2017-12-19

## 2018-01-08 ENCOUNTER — HOSPITAL ENCOUNTER (EMERGENCY)
Age: 38
Discharge: HOME | End: 2018-01-08
Attending: EMERGENCY MEDICINE
Payer: MEDICARE

## 2018-01-08 ENCOUNTER — APPOINTMENT (OUTPATIENT)
Dept: GENERAL RADIOLOGY | Age: 38
End: 2018-01-08
Payer: MEDICARE

## 2018-01-08 VITALS
DIASTOLIC BLOOD PRESSURE: 80 MMHG | BODY MASS INDEX: 26.96 KG/M2 | HEART RATE: 84 BPM | SYSTOLIC BLOOD PRESSURE: 166 MMHG | OXYGEN SATURATION: 100 % | WEIGHT: 182 LBS | HEIGHT: 69 IN | TEMPERATURE: 98.2 F | RESPIRATION RATE: 16 BRPM

## 2018-01-08 DIAGNOSIS — S29.019A THORACIC MYOFASCIAL STRAIN, INITIAL ENCOUNTER: Primary | ICD-10-CM

## 2018-01-08 LAB
-: ABNORMAL
AMORPHOUS: ABNORMAL
BACTERIA: ABNORMAL
BILIRUBIN URINE: NEGATIVE
CASTS UA: ABNORMAL /LPF
COLOR: YELLOW
COMMENT UA: ABNORMAL
CRYSTALS, UA: ABNORMAL /HPF
EPITHELIAL CELLS UA: ABNORMAL /HPF
GLUCOSE URINE: NEGATIVE
HCG(URINE) PREGNANCY TEST: NEGATIVE
KETONES, URINE: NEGATIVE
LEUKOCYTE ESTERASE, URINE: ABNORMAL
MUCUS: ABNORMAL
NITRITE, URINE: NEGATIVE
OTHER OBSERVATIONS UA: ABNORMAL
PH UA: 7 (ref 5–8)
PROTEIN UA: NEGATIVE
RBC UA: ABNORMAL /HPF
RENAL EPITHELIAL, UA: ABNORMAL /HPF
SPECIFIC GRAVITY UA: 1.02 (ref 1–1.03)
TRICHOMONAS: ABNORMAL
TURBIDITY: CLEAR
URINE HGB: NEGATIVE
UROBILINOGEN, URINE: NORMAL
WBC UA: ABNORMAL /HPF
YEAST: ABNORMAL

## 2018-01-08 PROCEDURE — 99283 EMERGENCY DEPT VISIT LOW MDM: CPT

## 2018-01-08 PROCEDURE — 84703 CHORIONIC GONADOTROPIN ASSAY: CPT

## 2018-01-08 PROCEDURE — 6360000002 HC RX W HCPCS: Performed by: PHYSICIAN ASSISTANT

## 2018-01-08 PROCEDURE — 87086 URINE CULTURE/COLONY COUNT: CPT

## 2018-01-08 PROCEDURE — 72072 X-RAY EXAM THORAC SPINE 3VWS: CPT

## 2018-01-08 PROCEDURE — 81001 URINALYSIS AUTO W/SCOPE: CPT

## 2018-01-08 PROCEDURE — 96372 THER/PROPH/DIAG INJ SC/IM: CPT

## 2018-01-08 RX ORDER — CYCLOBENZAPRINE HCL 5 MG
5 TABLET ORAL 2 TIMES DAILY PRN
Qty: 10 TABLET | Refills: 0 | Status: SHIPPED | OUTPATIENT
Start: 2018-01-08 | End: 2018-01-13

## 2018-01-08 RX ORDER — ORPHENADRINE CITRATE 30 MG/ML
60 INJECTION INTRAMUSCULAR; INTRAVENOUS ONCE
Status: COMPLETED | OUTPATIENT
Start: 2018-01-08 | End: 2018-01-08

## 2018-01-08 RX ORDER — KETOROLAC TROMETHAMINE 30 MG/ML
60 INJECTION, SOLUTION INTRAMUSCULAR; INTRAVENOUS ONCE
Status: COMPLETED | OUTPATIENT
Start: 2018-01-08 | End: 2018-01-08

## 2018-01-08 RX ORDER — PREDNISONE 20 MG/1
40 TABLET ORAL DAILY
Qty: 5 TABLET | Refills: 0 | Status: SHIPPED | OUTPATIENT
Start: 2018-01-08 | End: 2018-01-13

## 2018-01-08 RX ORDER — PREDNISONE 20 MG/1
40 TABLET ORAL DAILY
Qty: 10 TABLET | Refills: 0 | Status: SHIPPED | OUTPATIENT
Start: 2018-01-08 | End: 2018-01-13

## 2018-01-08 RX ADMIN — KETOROLAC TROMETHAMINE 60 MG: 30 INJECTION, SOLUTION INTRAMUSCULAR at 22:12

## 2018-01-08 RX ADMIN — ORPHENADRINE CITRATE 60 MG: 30 INJECTION INTRAMUSCULAR; INTRAVENOUS at 22:11

## 2018-01-08 ASSESSMENT — ENCOUNTER SYMPTOMS
BACK PAIN: 1
BOWEL INCONTINENCE: 0

## 2018-01-08 ASSESSMENT — PAIN DESCRIPTION - PAIN TYPE: TYPE: ACUTE PAIN

## 2018-01-08 ASSESSMENT — PAIN DESCRIPTION - ORIENTATION: ORIENTATION: LEFT;LOWER

## 2018-01-08 ASSESSMENT — PAIN DESCRIPTION - LOCATION: LOCATION: BACK

## 2018-01-08 ASSESSMENT — PAIN SCALES - GENERAL: PAINLEVEL_OUTOF10: 7

## 2018-01-09 LAB
CULTURE: NORMAL
CULTURE: NORMAL
Lab: NORMAL
SPECIMEN DESCRIPTION: NORMAL
SPECIMEN DESCRIPTION: NORMAL
STATUS: NORMAL

## 2018-01-09 NOTE — ED PROVIDER NOTES
[address]  eMERGENCY dEPARTMENT eNCOUnter      Pt Name: Gay Mccollum  MRN: [id number]  [place] 1980  Date of evaluation: 1/8/18      CHIEF COMPLAINT       Chief Complaint   Patient presents with    Back Pain     lower left side since thursday         Albin [address] Dinora James is a 30 y.o. female who presents complaining of back pain. The history is provided by the patient. Back Pain   Location:  Lumbar spine  Quality:  Aching  Radiates to:  Does not radiate  Pain severity:  Mild  Onset quality:  Gradual  Duration:  4 days  Timing:  Constant (woke up with left lower back pain)  Progression:  Unchanged  Chronicity:  Recurrent  Relieved by:  Nothing  Worsened by:  Bending  Ineffective treatments: went to chiropractor today. Associated symptoms: tingling (left hand and fingers)    Associated symptoms: no bladder incontinence, no bowel incontinence, no dysuria, no headaches, no leg pain, no numbness, no paresthesias and no pelvic pain        REVIEW OF SYSTEMS       Review of Systems   Gastrointestinal: Negative for bowel incontinence. Genitourinary: Negative for bladder incontinence, dysuria and pelvic pain. Musculoskeletal: Positive for back pain. Neurological: Positive for tingling (left hand and fingers). Negative for numbness, headaches and paresthesias. All other systems reviewed and are negative. PAST MEDICAL HISTORY     Past Medical History:   Diagnosis Date    Anxiety     Arthritis     O. A.    Bipolar depression ([facility] [address]     Dermatitis     Dysuria     Exposure to STD     Folliculitis     Hx of acute bronchitis     Hypertension     Marfan syndrome     MVP (mitral valve prolapse)     Nausea & vomiting     PONV (postoperative nausea and vomiting)     Scoliosis     Seasonal allergies     Staph infection     HX OF ALSO BACTEREMIA    UTI (lower urinary tract infection)        SURGICAL HISTORY       Past Surgical History:   Procedure Laterality Date    BREAST REDUCTION SURGERY Bilateral     2012    [facility]    FOOT SURGERY Bilateral     RT HAS 2 SCREWS, LT HAD FUSION IN DEC WITH 2 SCREWS    HIP ARTHROSCOPY Left     KNEE ARTHROSCOPY Right     X 2    [place] EGD TRANSORAL BIOPSY SINGLE/MULTIPLE N/A 8/29/2017    EGD BIOPSY WITH DILATATION WITH MALONEYS performed by Dee Peraza MD at [facility] Bilateral     harware in place bilat    TUBAL LIGATION      clips placed on fallopian tubes    UPPER GASTROINTESTINAL ENDOSCOPY  08/29/2017     EXTENSIVE SPASMATIC CONTRACTIONS; NO LUMINAL OBSTRUCTIONS; CLOSER TO THE DISTAL ESOPHAGUS AND DEVELOPING DIVERTICULUM IS NOTED AND BELIEVED TO BE SEC TO DSYSMOTILITY;  DALEY DIATIONS WERE DONE EMPIRICALLY WITH 56 FR       CURRENT MEDICATIONS       Previous Medications    ALBUTEROL SULFATE HFA (PROAIR HFA) 108 (90 BASE) MCG/ACT INHALER    Inhale 2 puffs into the lungs every 6 hours as needed for Wheezing or Shortness of Breath    ALBUTEROL SULFATE HFA (PROVENTIL HFA) 108 (90 BASE) MCG/ACT INHALER    Inhale 2 puffs into the lungs every 6 hours as needed for Wheezing    METHADONE 10 MG/5ML SOLUTION    Takes 73mg once daily    MULTIPLE VITAMINS-MINERALS (THERAPEUTIC MULTIVITAMIN-MINERALS) TABLET    Take 1 tablet by mouth daily    NEBIVOLOL (BYSTOLIC) 2.5 MG TABLET    Take 2.5 mg by mouth every evening     ONDANSETRON (ZOFRAN) 4 MG TABLET    Take 1 tablet by mouth every 8 hours as needed for Nausea or Vomiting    PREGABALIN (LYRICA) 75 MG CAPSULE    Take 1 capsule by mouth 3 times daily . ALLERGIES     is allergic to morphine; neurontin [gabapentin]; and ultram [tramadol]. FAMILY HISTORY     indicated that her mother is alive. She indicated that her father is alive. She indicated that the status of her brother is unknown. She indicated that the status of her maternal grandmother is unknown.         SOCIAL HISTORY      reports that she quit smoking about 2 years ago. She quit after 1.50 years of use. She has never used smokeless tobacco. She reports that she does not drink alcohol or use drugs. PHYSICAL EXAM     INITIAL VITALS: BP (!) 166/80   Pulse 84   Temp 98.2 °F (36.8 °C) (Oral)   Resp 16   Ht 5' 9\" (1.753 m)   Wt 182 lb (82.6 kg)   SpO2 100%   BMI 26.88 kg/m²      Physical Exam   Constitutional: She is oriented to person, place, and time. She appears well-developed. No distress. HENT:   Head: Normocephalic. Right Ear: Hearing, tympanic membrane, external ear and ear canal normal.   Left Ear: Hearing, tympanic membrane, external ear and ear canal normal.   Mouth/Throat: Uvula is midline, oropharynx is clear and moist and mucous membranes are normal. No oropharyngeal exudate, posterior oropharyngeal edema, posterior oropharyngeal erythema or tonsillar abscesses. Neck: Normal range of motion. Cardiovascular: Normal rate, regular rhythm and normal heart sounds. Pulmonary/Chest: Effort normal. No respiratory distress. Abdominal: Soft. Musculoskeletal: She exhibits no edema, tenderness or deformity. Thoracic back: She exhibits decreased range of motion and pain. She exhibits no tenderness, no bony tenderness, no swelling and no spasm. Back:    Lymphadenopathy:     She has no cervical adenopathy. Neurological: She is alert and oriented to person, place, and time. She has normal strength and normal reflexes. No sensory deficit. Skin: Skin is warm and dry. She is not diaphoretic. Nursing note and vitals reviewed. MEDICAL DECISION MAKING:         DIAGNOSTIC RESULTS     EKG: All EKG's are interpreted by the Emergency Department Physician who either signs or Co-signs this chart in the absence of a cardiologist.      RADIOLOGY:All plain film, CT, MRI, and formal ultrasound images (except ED bedside ultrasound) are read by the radiologist and the images and interpretations are directly viewed by the emergency physician. LABS: All lab results were reviewed by myself, and all abnormals are listed below. Labs Reviewed   UA W/REFLEX CULTURE - Abnormal; Notable for the following:        Result Value    Leukocyte Esterase, Urine SMALL (*)     All other components within normal limits   MICROSCOPIC URINALYSIS - Abnormal; Notable for the following:     Bacteria, UA FEW (*)     All other components within normal limits   URINE CULTURE CLEAN CATCH   PREGNANCY, URINE   POCT URINE PREGNANCY         EMERGENCY DEPARTMENT COURSE:   Vitals:    Vitals:    01/08/18 1945   BP: (!) 166/80   Pulse: 84   Resp: 16   Temp: 98.2 °F (36.8 °C)   TempSrc: Oral   SpO2: 100%   Weight: 182 lb (82.6 kg)   Height: 5' 9\" (1.753 m)       The patient was given the following medications while in the emergency department:  Orders Placed This Encounter   Medications    ketorolac (TORADOL) injection 60 mg    orphenadrine (NORFLEX) injection 60 mg    predniSONE (DELTASONE) 20 MG tablet     Sig: Take 2 tablets by mouth daily for 5 days     Dispense:  5 tablet     Refill:  0    cyclobenzaprine (FLEXERIL) 5 MG tablet     Sig: Take 1 tablet by mouth 2 times daily as needed for Muscle spasms     Dispense:  10 tablet     Refill:  0       -------------------------      CRITICAL CARE:       CONSULTS:  None    PROCEDURES:  Procedures    FINAL IMPRESSION      1. Thoracic myofascial strain, initial encounter          DISPOSITION/PLAN   DISPOSITION Decision To Discharge 01/08/2018 09:55:39 PM      PATIENT REFERRED TO:  No follow-up provider specified.     DISCHARGE MEDICATIONS:  New Prescriptions    CYCLOBENZAPRINE (FLEXERIL) 5 MG TABLET    Take 1 tablet by mouth 2 times daily as needed for Muscle spasms    PREDNISONE (DELTASONE) 20 MG TABLET    Take 2 tablets by mouth daily for 5 days       (Please note that portions of this note were completed with a voice recognition program.  Efforts were made to edit the dictations but occasionally words are

## 2018-01-10 ENCOUNTER — CARE COORDINATION (OUTPATIENT)
Dept: CARE COORDINATION | Age: 38
End: 2018-01-10

## 2018-01-19 ENCOUNTER — APPOINTMENT (OUTPATIENT)
Dept: CT IMAGING | Age: 38
End: 2018-01-19
Payer: MEDICARE

## 2018-01-19 ENCOUNTER — HOSPITAL ENCOUNTER (EMERGENCY)
Age: 38
Discharge: HOME | End: 2018-01-20
Attending: EMERGENCY MEDICINE
Payer: MEDICARE

## 2018-01-19 DIAGNOSIS — R09.81 NASAL SINUS CONGESTION: ICD-10-CM

## 2018-01-19 DIAGNOSIS — R05.3 PERSISTENT COUGH: ICD-10-CM

## 2018-01-19 DIAGNOSIS — R07.9 CHEST PAIN, UNSPECIFIED TYPE: Primary | ICD-10-CM

## 2018-01-19 LAB
ABSOLUTE EOS #: 0 K/UL (ref 0–0.4)
ABSOLUTE IMMATURE GRANULOCYTE: ABNORMAL K/UL (ref 0–0.3)
ABSOLUTE LYMPH #: 2.3 K/UL (ref 1–4.8)
ABSOLUTE MONO #: 0.5 K/UL (ref 0.1–1.3)
ALBUMIN SERPL-MCNC: 3.9 G/DL (ref 3.5–5.2)
ALBUMIN/GLOBULIN RATIO: NORMAL (ref 1–2.5)
ALP BLD-CCNC: 75 U/L (ref 35–104)
ALT SERPL-CCNC: 8 U/L (ref 5–33)
ANION GAP SERPL CALCULATED.3IONS-SCNC: 14 MMOL/L (ref 9–17)
AST SERPL-CCNC: 15 U/L
BASOPHILS # BLD: 1 % (ref 0–2)
BASOPHILS ABSOLUTE: 0.1 K/UL (ref 0–0.2)
BILIRUB SERPL-MCNC: 0.4 MG/DL (ref 0.3–1.2)
BUN BLDV-MCNC: 12 MG/DL (ref 6–20)
BUN/CREAT BLD: NORMAL (ref 9–20)
CALCIUM SERPL-MCNC: 9.4 MG/DL (ref 8.6–10.4)
CHLORIDE BLD-SCNC: 98 MMOL/L (ref 98–107)
CO2: 27 MMOL/L (ref 20–31)
CREAT SERPL-MCNC: 0.55 MG/DL (ref 0.5–0.9)
DIFFERENTIAL TYPE: ABNORMAL
EOSINOPHILS RELATIVE PERCENT: 0 % (ref 0–4)
GFR AFRICAN AMERICAN: >60 ML/MIN
GFR NON-AFRICAN AMERICAN: >60 ML/MIN
GFR SERPL CREATININE-BSD FRML MDRD: NORMAL ML/MIN/{1.73_M2}
GFR SERPL CREATININE-BSD FRML MDRD: NORMAL ML/MIN/{1.73_M2}
GLUCOSE BLD-MCNC: 92 MG/DL (ref 70–99)
HCT VFR BLD CALC: 43 % (ref 36–46)
HEMOGLOBIN: 14.4 G/DL (ref 12–16)
IMMATURE GRANULOCYTES: ABNORMAL %
LYMPHOCYTES # BLD: 22 % (ref 24–44)
MCH RBC QN AUTO: 30.3 PG (ref 26–34)
MCHC RBC AUTO-ENTMCNC: 33.5 G/DL (ref 31–37)
MCV RBC AUTO: 90.6 FL (ref 80–100)
MONOCYTES # BLD: 5 % (ref 1–7)
NRBC AUTOMATED: ABNORMAL PER 100 WBC
PDW BLD-RTO: 12.9 % (ref 11.5–14.9)
PLATELET # BLD: 338 K/UL (ref 150–450)
PLATELET ESTIMATE: ABNORMAL
PMV BLD AUTO: 8.1 FL (ref 6–12)
POTASSIUM SERPL-SCNC: 3.8 MMOL/L (ref 3.7–5.3)
RBC # BLD: 4.75 M/UL (ref 4–5.2)
RBC # BLD: ABNORMAL 10*6/UL
SEG NEUTROPHILS: 72 % (ref 36–66)
SEGMENTED NEUTROPHILS ABSOLUTE COUNT: 7.3 K/UL (ref 1.3–9.1)
SODIUM BLD-SCNC: 139 MMOL/L (ref 135–144)
TOTAL PROTEIN: 7.2 G/DL (ref 6.4–8.3)
TROPONIN INTERP: NORMAL
TROPONIN INTERP: NORMAL
TROPONIN T: <0.03 NG/ML
TROPONIN T: <0.03 NG/ML
WBC # BLD: 10.2 K/UL (ref 3.5–11)
WBC # BLD: ABNORMAL 10*3/UL

## 2018-01-19 PROCEDURE — 93005 ELECTROCARDIOGRAM TRACING: CPT

## 2018-01-19 PROCEDURE — 94640 AIRWAY INHALATION TREATMENT: CPT

## 2018-01-19 PROCEDURE — 6360000004 HC RX CONTRAST MEDICATION: Performed by: EMERGENCY MEDICINE

## 2018-01-19 PROCEDURE — 84484 ASSAY OF TROPONIN QUANT: CPT

## 2018-01-19 PROCEDURE — 99285 EMERGENCY DEPT VISIT HI MDM: CPT

## 2018-01-19 PROCEDURE — 36415 COLL VENOUS BLD VENIPUNCTURE: CPT

## 2018-01-19 PROCEDURE — 71260 CT THORAX DX C+: CPT

## 2018-01-19 PROCEDURE — 80053 COMPREHEN METABOLIC PANEL: CPT

## 2018-01-19 PROCEDURE — 2580000003 HC RX 258: Performed by: EMERGENCY MEDICINE

## 2018-01-19 PROCEDURE — 85025 COMPLETE CBC W/AUTO DIFF WBC: CPT

## 2018-01-19 PROCEDURE — 96374 THER/PROPH/DIAG INJ IV PUSH: CPT

## 2018-01-19 PROCEDURE — 94664 DEMO&/EVAL PT USE INHALER: CPT

## 2018-01-19 PROCEDURE — 6370000000 HC RX 637 (ALT 250 FOR IP): Performed by: EMERGENCY MEDICINE

## 2018-01-19 PROCEDURE — 6360000002 HC RX W HCPCS: Performed by: EMERGENCY MEDICINE

## 2018-01-19 RX ORDER — ALBUTEROL SULFATE 90 UG/1
2 AEROSOL, METERED RESPIRATORY (INHALATION)
Status: DISCONTINUED | OUTPATIENT
Start: 2018-01-19 | End: 2018-01-19

## 2018-01-19 RX ORDER — 0.9 % SODIUM CHLORIDE 0.9 %
100 INTRAVENOUS SOLUTION INTRAVENOUS ONCE
Status: COMPLETED | OUTPATIENT
Start: 2018-01-19 | End: 2018-01-19

## 2018-01-19 RX ORDER — FLUTICASONE PROPIONATE 50 MCG
1 SPRAY, SUSPENSION (ML) NASAL DAILY
Qty: 1 BOTTLE | Refills: 0 | Status: ON HOLD | OUTPATIENT
Start: 2018-01-19 | End: 2018-06-19 | Stop reason: HOSPADM

## 2018-01-19 RX ORDER — ONDANSETRON 2 MG/ML
4 INJECTION INTRAMUSCULAR; INTRAVENOUS ONCE
Status: COMPLETED | OUTPATIENT
Start: 2018-01-19 | End: 2018-01-19

## 2018-01-19 RX ORDER — CETIRIZINE HYDROCHLORIDE 10 MG/1
10 TABLET ORAL ONCE
Status: COMPLETED | OUTPATIENT
Start: 2018-01-19 | End: 2018-01-19

## 2018-01-19 RX ORDER — ALBUTEROL SULFATE 2.5 MG/3ML
5 SOLUTION RESPIRATORY (INHALATION)
Status: DISCONTINUED | OUTPATIENT
Start: 2018-01-19 | End: 2018-01-20 | Stop reason: HOSPADM

## 2018-01-19 RX ORDER — 0.9 % SODIUM CHLORIDE 0.9 %
500 INTRAVENOUS SOLUTION INTRAVENOUS ONCE
Status: COMPLETED | OUTPATIENT
Start: 2018-01-19 | End: 2018-01-19

## 2018-01-19 RX ORDER — SODIUM CHLORIDE 0.9 % (FLUSH) 0.9 %
10 SYRINGE (ML) INJECTION PRN
Status: DISCONTINUED | OUTPATIENT
Start: 2018-01-19 | End: 2018-01-20 | Stop reason: HOSPADM

## 2018-01-19 RX ORDER — IPRATROPIUM BROMIDE AND ALBUTEROL SULFATE 2.5; .5 MG/3ML; MG/3ML
1 SOLUTION RESPIRATORY (INHALATION)
Status: DISCONTINUED | OUTPATIENT
Start: 2018-01-19 | End: 2018-01-20 | Stop reason: HOSPADM

## 2018-01-19 RX ORDER — LORATADINE 10 MG/1
10 TABLET ORAL DAILY
Qty: 30 TABLET | Refills: 0 | Status: ON HOLD | OUTPATIENT
Start: 2018-01-19 | End: 2018-06-19 | Stop reason: HOSPADM

## 2018-01-19 RX ADMIN — ONDANSETRON 4 MG: 2 INJECTION INTRAMUSCULAR; INTRAVENOUS at 20:48

## 2018-01-19 RX ADMIN — SODIUM CHLORIDE 100 ML: 9 INJECTION, SOLUTION INTRAVENOUS at 20:59

## 2018-01-19 RX ADMIN — IOPAMIDOL 100 ML: 755 INJECTION, SOLUTION INTRAVENOUS at 20:59

## 2018-01-19 RX ADMIN — Medication 10 ML: at 20:59

## 2018-01-19 RX ADMIN — SODIUM CHLORIDE 500 ML: 9 INJECTION, SOLUTION INTRAVENOUS at 20:23

## 2018-01-19 RX ADMIN — CETIRIZINE HYDROCHLORIDE 10 MG: 10 TABLET, FILM COATED ORAL at 22:32

## 2018-01-19 RX ADMIN — IPRATROPIUM BROMIDE AND ALBUTEROL SULFATE 1 AMPULE: .5; 3 SOLUTION RESPIRATORY (INHALATION) at 19:51

## 2018-01-19 ASSESSMENT — ENCOUNTER SYMPTOMS
ABDOMINAL PAIN: 0
COUGH: 1
BACK PAIN: 0
SHORTNESS OF BREATH: 1
EYES NEGATIVE: 1
GASTROINTESTINAL NEGATIVE: 1

## 2018-01-19 ASSESSMENT — PAIN DESCRIPTION - LOCATION: LOCATION: CHEST

## 2018-01-19 ASSESSMENT — PAIN DESCRIPTION - PAIN TYPE: TYPE: ACUTE PAIN

## 2018-01-19 ASSESSMENT — PAIN SCALES - GENERAL: PAINLEVEL_OUTOF10: 7

## 2018-01-20 VITALS
SYSTOLIC BLOOD PRESSURE: 135 MMHG | BODY MASS INDEX: 26.96 KG/M2 | WEIGHT: 182 LBS | RESPIRATION RATE: 16 BRPM | HEART RATE: 62 BPM | OXYGEN SATURATION: 98 % | DIASTOLIC BLOOD PRESSURE: 78 MMHG | HEIGHT: 69 IN | TEMPERATURE: 97.8 F

## 2018-01-20 LAB
EKG ATRIAL RATE: 241 BPM
EKG ATRIAL RATE: 65 BPM
EKG P AXIS: 38 DEGREES
EKG P AXIS: 47 DEGREES
EKG P-R INTERVAL: 132 MS
EKG P-R INTERVAL: 144 MS
EKG Q-T INTERVAL: 392 MS
EKG Q-T INTERVAL: 416 MS
EKG QRS DURATION: 68 MS
EKG QRS DURATION: 72 MS
EKG QTC CALCULATION (BAZETT): 420 MS
EKG QTC CALCULATION (BAZETT): 432 MS
EKG R AXIS: 56 DEGREES
EKG R AXIS: 59 DEGREES
EKG T AXIS: 73 DEGREES
EKG T AXIS: 76 DEGREES
EKG VENTRICULAR RATE: 65 BPM
EKG VENTRICULAR RATE: 69 BPM

## 2018-01-20 NOTE — ED NOTES
Writer attempted X2 to straight stick Pt for her Troponin without success. Will have a co-worker draw for same. PCT doing an EKG at this time.       Kelsey Rebolledo RN  01/19/18 4285

## 2018-01-20 NOTE — ED NOTES
Pt said that she's been having respiratory issues/symptoms X1 full month now. Pt said that she did have a chest x-ray that revealed that she had bronchitis on Tuesday.      Rand Mclean RN  01/19/18 4983

## 2018-01-20 NOTE — ED NOTES
Report given to [facility].       Geo Herrmann RN  01/19/18 3716

## 2018-01-20 NOTE — ED PROVIDER NOTES
 PONV (postoperative nausea and vomiting)     Scoliosis     Seasonal allergies     Staph infection     HX OF ALSO BACTEREMIA    UTI (lower urinary tract infection)      SURGICAL HISTORY       Past Surgical History:   Procedure Laterality Date    BREAST REDUCTION SURGERY Bilateral     2012    [facility]    FOOT SURGERY Bilateral     RT HAS 2 SCREWS, LT HAD FUSION IN DEC WITH 2 SCREWS    HIP ARTHROSCOPY Left     KNEE ARTHROSCOPY Right     X 2    [place] EGD TRANSORAL BIOPSY SINGLE/MULTIPLE N/A 8/29/2017    EGD BIOPSY WITH DILATATION WITH MALONEYS performed by Zulema Feliciano MD at [facility] Bilateral     harware in place bilat    TUBAL LIGATION      clips placed on fallopian tubes    UPPER GASTROINTESTINAL ENDOSCOPY  08/29/2017     EXTENSIVE SPASMATIC CONTRACTIONS; NO LUMINAL OBSTRUCTIONS; CLOSER TO THE DISTAL ESOPHAGUS AND DEVELOPING DIVERTICULUM IS NOTED AND BELIEVED TO BE SEC TO DSYSMOTILITY;  DALEY DIATIONS WERE DONE EMPIRICALLY WITH 56 FR     CURRENT MEDICATIONS       Previous Medications    ALBUTEROL SULFATE HFA (PROAIR HFA) 108 (90 BASE) MCG/ACT INHALER    Inhale 2 puffs into the lungs every 6 hours as needed for Wheezing or Shortness of Breath    ALBUTEROL SULFATE HFA (PROVENTIL HFA) 108 (90 BASE) MCG/ACT INHALER    Inhale 2 puffs into the lungs every 6 hours as needed for Wheezing    METHADONE 10 MG/5ML SOLUTION    Takes 73mg once daily    MULTIPLE VITAMINS-MINERALS (THERAPEUTIC MULTIVITAMIN-MINERALS) TABLET    Take 1 tablet by mouth daily    NEBIVOLOL (BYSTOLIC) 2.5 MG TABLET    Take 2.5 mg by mouth every evening     PREDNISONE (DELTASONE) 20 MG TABLET    Take 1 tablet by mouth 2 times daily for 5 days    PREGABALIN (LYRICA) 75 MG CAPSULE    Take 1 capsule by mouth 3 times daily . ALLERGIES     is allergic to morphine. FAMILY HISTORY     indicated that her mother is alive. She indicated that her father is alive.  She indicated that the
Score  History   Highly suspicious    2            Moderately suspicious   1    = 0    Slightly or non suspicious   0      ECG   Significant STD    2        Nonspecific repolarization   1     = 0    Normal (no change from previous)  0      Age   >64      2      > 45 - <65     1     = 0   < 46      0        Risk Factors (Risk factors include: Hypercholest, HTN, DM, Smoking, Family Hx, Obesity)  >2 risk factors    2     I  2 risk factors   1     = 0  No risk factors    0     Troponin   >3times normal limit    2      >1 time - <3 times normal limit  1   = 0    Normal trop     0     -----------------------------------------------------------------------------------------      TOTAL RISK SCORE =  0    Score 0  3 =  2.5% MACE over next 6 wks = Discharge home  Score 4  6 =  20.3% MACE over next 6 wks = Obs admit  Score 7 - 10 = 72.7% MACE over next 6 wks = Early invasive Rx          Disposition     DISPOSITION:    Discharge    CLINICAL IMPRESSION:  1. Chest pain, unspecified type    2. Nasal sinus congestion    3. Persistent cough        [facility]   [address] [place] [address]  [phone]    Call in 3 days  Re-Evaluation      DISCHARGE MEDICATIONS:  New Prescriptions    FLUTICASONE (FLONASE) 50 MCG/ACT NASAL SPRAY    1 spray by Nasal route daily    LORATADINE (CLARITIN) 10 MG TABLET    Take 1 tablet by mouth daily             (Please note that portions of this note were completed with a voice recognition program.  Efforts were made to edit the dictations but occasionally words are mis-transcribed.)          Mariah Jefferson MD  01/20/18 0000

## 2018-01-20 NOTE — ED NOTES
Back from CT Scan. Notified per their staff that the IV infiltrated on the last flush.       Shaylee Tierney RN  01/19/18 4712

## 2018-01-22 ENCOUNTER — CARE COORDINATION (OUTPATIENT)
Dept: CARE COORDINATION | Age: 38
End: 2018-01-22

## 2018-02-20 PROBLEM — I10 ESSENTIAL HYPERTENSION: Status: ACTIVE | Noted: 2018-02-20

## 2018-02-21 ENCOUNTER — HOSPITAL ENCOUNTER (OUTPATIENT)
Age: 38
Discharge: HOME | End: 2018-02-21
Payer: MEDICARE

## 2018-02-21 DIAGNOSIS — Z13.6 ENCOUNTER FOR LIPID SCREENING FOR CARDIOVASCULAR DISEASE: ICD-10-CM

## 2018-02-21 DIAGNOSIS — R63.5 ABNORMAL WEIGHT GAIN: ICD-10-CM

## 2018-02-21 DIAGNOSIS — Z13.220 ENCOUNTER FOR LIPID SCREENING FOR CARDIOVASCULAR DISEASE: ICD-10-CM

## 2018-02-21 DIAGNOSIS — Z13.1 ENCOUNTER FOR SCREENING EXAMINATION FOR IMPAIRED GLUCOSE REGULATION AND DIABETES MELLITUS: ICD-10-CM

## 2018-02-21 LAB
CHOLESTEROL/HDL RATIO: 3.5
CHOLESTEROL: 206 MG/DL
GLUCOSE FASTING: 86 MG/DL (ref 70–99)
HDLC SERPL-MCNC: 59 MG/DL
LDL CHOLESTEROL: 124 MG/DL (ref 0–130)
THYROXINE, FREE: 1.13 NG/DL (ref 0.93–1.7)
TRIGL SERPL-MCNC: 113 MG/DL
TSH SERPL DL<=0.05 MIU/L-ACNC: 1.52 MIU/L (ref 0.3–5)
VLDLC SERPL CALC-MCNC: ABNORMAL MG/DL (ref 1–30)

## 2018-02-21 PROCEDURE — 36415 COLL VENOUS BLD VENIPUNCTURE: CPT

## 2018-02-21 PROCEDURE — 82947 ASSAY GLUCOSE BLOOD QUANT: CPT

## 2018-02-21 PROCEDURE — 84439 ASSAY OF FREE THYROXINE: CPT

## 2018-02-21 PROCEDURE — 84443 ASSAY THYROID STIM HORMONE: CPT

## 2018-02-21 PROCEDURE — 80061 LIPID PANEL: CPT

## 2018-03-15 ENCOUNTER — HOSPITAL ENCOUNTER (EMERGENCY)
Age: 38
Discharge: HOME | End: 2018-03-15
Attending: EMERGENCY MEDICINE
Payer: MEDICARE

## 2018-03-15 ENCOUNTER — APPOINTMENT (OUTPATIENT)
Dept: GENERAL RADIOLOGY | Age: 38
End: 2018-03-15
Payer: MEDICARE

## 2018-03-15 VITALS
HEIGHT: 69 IN | DIASTOLIC BLOOD PRESSURE: 97 MMHG | RESPIRATION RATE: 16 BRPM | SYSTOLIC BLOOD PRESSURE: 139 MMHG | OXYGEN SATURATION: 97 % | TEMPERATURE: 98 F | HEART RATE: 98 BPM | WEIGHT: 182 LBS | BODY MASS INDEX: 26.96 KG/M2

## 2018-03-15 DIAGNOSIS — S39.012A STRAIN OF LUMBAR REGION, INITIAL ENCOUNTER: Primary | ICD-10-CM

## 2018-03-15 DIAGNOSIS — M62.838 SPASM OF MUSCLE: ICD-10-CM

## 2018-03-15 PROCEDURE — 6360000002 HC RX W HCPCS: Performed by: EMERGENCY MEDICINE

## 2018-03-15 PROCEDURE — 99283 EMERGENCY DEPT VISIT LOW MDM: CPT

## 2018-03-15 PROCEDURE — 96374 THER/PROPH/DIAG INJ IV PUSH: CPT

## 2018-03-15 PROCEDURE — 72100 X-RAY EXAM L-S SPINE 2/3 VWS: CPT

## 2018-03-15 RX ORDER — ORPHENADRINE CITRATE 30 MG/ML
60 INJECTION INTRAMUSCULAR; INTRAVENOUS ONCE
Status: DISCONTINUED | OUTPATIENT
Start: 2018-03-15 | End: 2018-03-15 | Stop reason: HOSPADM

## 2018-03-15 RX ORDER — KETOROLAC TROMETHAMINE 30 MG/ML
30 INJECTION, SOLUTION INTRAMUSCULAR; INTRAVENOUS ONCE
Status: DISCONTINUED | OUTPATIENT
Start: 2018-03-15 | End: 2018-03-15

## 2018-03-15 RX ORDER — KETOROLAC TROMETHAMINE 30 MG/ML
30 INJECTION, SOLUTION INTRAMUSCULAR; INTRAVENOUS ONCE
Status: COMPLETED | OUTPATIENT
Start: 2018-03-15 | End: 2018-03-15

## 2018-03-15 RX ORDER — OXYCODONE HYDROCHLORIDE AND ACETAMINOPHEN 5; 325 MG/1; MG/1
1 TABLET ORAL ONCE
Status: DISCONTINUED | OUTPATIENT
Start: 2018-03-15 | End: 2018-03-15

## 2018-03-15 RX ORDER — PREDNISONE 20 MG/1
20 TABLET ORAL 2 TIMES DAILY
Qty: 10 TABLET | Refills: 0 | Status: SHIPPED | OUTPATIENT
Start: 2018-03-15 | End: 2018-03-25

## 2018-03-15 RX ADMIN — KETOROLAC TROMETHAMINE 30 MG: 30 INJECTION, SOLUTION INTRAMUSCULAR; INTRAVENOUS at 19:58

## 2018-03-15 ASSESSMENT — ENCOUNTER SYMPTOMS
BACK PAIN: 1
GASTROINTESTINAL NEGATIVE: 1
RESPIRATORY NEGATIVE: 1
ALLERGIC/IMMUNOLOGIC NEGATIVE: 1
EYES NEGATIVE: 1

## 2018-03-15 ASSESSMENT — PAIN SCALES - GENERAL
PAINLEVEL_OUTOF10: 7
PAINLEVEL_OUTOF10: 7

## 2018-03-15 ASSESSMENT — PAIN DESCRIPTION - DESCRIPTORS: DESCRIPTORS: ACHING

## 2018-03-15 ASSESSMENT — PAIN DESCRIPTION - LOCATION: LOCATION: BACK

## 2018-03-15 ASSESSMENT — PAIN DESCRIPTION - FREQUENCY: FREQUENCY: CONTINUOUS

## 2018-03-15 ASSESSMENT — PAIN DESCRIPTION - ORIENTATION: ORIENTATION: LOWER

## 2018-03-15 NOTE — ED PROVIDER NOTES
[address]  eMERGENCY dEPARTMENT eNCOUnter   Attending Attestation     Pt Name: Shelby Becerril  MRN: [id number]  [place] 1980  Date of evaluation: 3/15/18       Shelby Becerril is a 41 y.o. female who presents with Back Pain      MDM:     This is a 41 showed similar presents with complaints of acute on chronic back pain. The patient states that she has been having worsening back pain over the past few weeks, she states that her symptoms are severe. The patient is a history of Marfan syndrome, she has a previous history also of opioid abuse and abusing prescription pills. She does take methadone. Patient presented to the ER tonight asking for a prescription for narcotic pain medications, I discussed that I was uncomfortable doing this given her history of opioid abuse and her current use of methadone, the patient was happy with this. She denies any urinary incontinence or stool incontinence. She has diminished reflexes bilaterally which she states is chronic for her. We did offer treatments that are nonnarcotic in nature, the patient accepted this. There is nothing to suggest an acute aortic dissection, epidural abscess or epidural hematoma. Impression: Chronic back pain, history of opiate abuse    Vitals:   Vitals:    03/15/18 1907   BP: (!) 139/97   Pulse: 98   Resp: 16   Temp: 98 °F (36.7 °C)   SpO2: 97%   Weight: 182 lb (82.6 kg)   Height: 5' 9\" (1.753 m)         I personally evaluated and examined the patient in conjunction with the resident and agree with the assessment, treatment plan, and disposition of the patient as recorded by the resident. I performed a history and physical examination of the patient and discussed management with the resident. I reviewed the residents note and agree with the documented findings and plan of care. Any areas of disagreement are noted on the chart. I was personally present for the key portions of any procedures.  I have documented in the chart

## 2018-03-16 ENCOUNTER — CARE COORDINATION (OUTPATIENT)
Dept: CARE COORDINATION | Age: 38
End: 2018-03-16

## 2018-03-16 NOTE — ED PROVIDER NOTES
[address]  Emergency Department Encounter  Emergency Medicine Resident     Pt Name: Shelby Becerril  MRN: [id number]  [place] 1980  Date of evaluation: 3/15/18  PCP:  Siobhan Burnett MD    [address] [place] [address]       Chief Complaint   Patient presents with    Back Pain       HISTORY OF PRESENT ILLNESS  (Location/Symptom, Timing/Onset, Context/Setting, Quality, Duration, Modifying Factors, Severity.)      Shelby Becerril is a 41 y.o. female who presents with complaints of back pain. Patient describes back pain as located in the  Lower lumbar region, Sharp in nature, nonradiating. Patient has a history of Marfan's disease, states that she has had chronic back pain but that symptoms have worsened over the past 1 month. Patient reports that back pain has been constant, is relieved by Percocets, worsened by movement of any kind. Patient states that back pain has been worsening since it started and endorses a previous episode of similar back pain. Patient states that she has tried multiple medications including muscle relaxants, chiropractor without much relief. Patient denies any trauma, fever, weight loss, midline back pain, difficulty urinating, urinary incontinence, bowel incontinence, weakness, numbness    PAST MEDICAL / SURGICAL / SOCIAL / FAMILY HISTORY      has a past medical history of Anxiety; Arthritis; Bipolar depression ([address] Dermatitis; Dysuria; Exposure to STD; Folliculitis; Hx of acute bronchitis; Hypertension; Marfan syndrome; MVP (mitral valve prolapse); Nausea & vomiting; PONV (postoperative nausea and vomiting); Scoliosis; Seasonal allergies; Staph infection; and UTI (lower urinary tract infection). has a past surgical history that includes Foot surgery (Bilateral); Hip arthroscopy (Left); Knee arthroscopy (Right); Tubal ligation; Breast reduction surgery (Bilateral); Foot Capsulotomy (Left, 9/17/14);  Shoulder Arthroplasty (Bilateral); pr egd transoral biopsy typical lumbar vertebra present maintain normal height and alignment. Degenerative changes of mild severity predominate in the lower lumbar spine. Mild upper lumbar levorotatory scoliosis. To the extent of visualization, the sacrum appears unremarkable. To the extent of visualization, the sacrum appears unremarkable. Unremarkable SI joints. 1. Degenerative changes lumbar spine as described. 2. No lumbar fracture or listhesis. 3. Mild, upper lumbar levorotatory scoliosis. EMERGENCY DEPARTMENT COURSE:  9:54 PM  Patient rejected Norflex, Toradol, was asking for Percocets. Patient was informed that she will not be prescribed Percocets at this time as she is on methadone at home. FINAL IMPRESSION      1. Strain of lumbar region, initial encounter    2. Spasm of muscle          DISPOSITION / PLAN     DISPOSITION Decision To Discharge 03/15/2018 09:33:53 PM      PATIENT REFERRED TO:  Lonzell Castleman, MD  [address] [facility]  [phone]    Schedule an appointment as soon as possible for a visit   For Follow up appointment    [facility] ED  [place] [address]  [phone]  Go to   As needed, If symptoms worsen      DISCHARGE MEDICATIONS:  Discharge Medication List as of 3/15/2018  9:34 PM      START taking these medications    Details   predniSONE (DELTASONE) 20 MG tablet Take 1 tablet by mouth 2 times daily for 10 days, Disp-10 tablet, R-0Print             . Julio Cesar Monique MD  Emergency Medicine Resident    (Please note that portions of this note were completed with a voice recognition program.  Efforts were made to edit the dictations but occasionally words are mis-transcribed.)       Puma Keller MD  Resident  03/15/18 4242

## 2018-04-28 ENCOUNTER — HOSPITAL ENCOUNTER (EMERGENCY)
Age: 38
Discharge: HOME | End: 2018-04-29
Attending: EMERGENCY MEDICINE
Payer: MEDICARE

## 2018-04-28 DIAGNOSIS — R07.89 ATYPICAL CHEST PAIN: Primary | ICD-10-CM

## 2018-04-28 DIAGNOSIS — F41.1 ANXIETY STATE: ICD-10-CM

## 2018-04-28 LAB
EKG ATRIAL RATE: 73 BPM
EKG P AXIS: 50 DEGREES
EKG P-R INTERVAL: 160 MS
EKG Q-T INTERVAL: 380 MS
EKG QRS DURATION: 72 MS
EKG QTC CALCULATION (BAZETT): 418 MS
EKG R AXIS: 60 DEGREES
EKG T AXIS: 74 DEGREES
EKG VENTRICULAR RATE: 73 BPM

## 2018-04-28 PROCEDURE — 99285 EMERGENCY DEPT VISIT HI MDM: CPT

## 2018-04-29 ENCOUNTER — APPOINTMENT (OUTPATIENT)
Dept: CT IMAGING | Age: 38
End: 2018-04-29
Payer: MEDICARE

## 2018-04-29 ENCOUNTER — APPOINTMENT (OUTPATIENT)
Dept: GENERAL RADIOLOGY | Age: 38
End: 2018-04-29
Payer: MEDICARE

## 2018-04-29 VITALS
HEART RATE: 59 BPM | HEIGHT: 69 IN | OXYGEN SATURATION: 93 % | WEIGHT: 189 LBS | SYSTOLIC BLOOD PRESSURE: 114 MMHG | BODY MASS INDEX: 27.99 KG/M2 | TEMPERATURE: 98 F | DIASTOLIC BLOOD PRESSURE: 81 MMHG | RESPIRATION RATE: 12 BRPM

## 2018-04-29 LAB
ABSOLUTE EOS #: 0.1 K/UL (ref 0–0.4)
ABSOLUTE IMMATURE GRANULOCYTE: ABNORMAL K/UL (ref 0–0.3)
ABSOLUTE LYMPH #: 2.1 K/UL (ref 1–4.8)
ABSOLUTE MONO #: 0.5 K/UL (ref 0.1–1.3)
ANION GAP SERPL CALCULATED.3IONS-SCNC: 6 MMOL/L (ref 9–17)
BASOPHILS # BLD: 0 % (ref 0–2)
BASOPHILS ABSOLUTE: 0 K/UL (ref 0–0.2)
BUN BLDV-MCNC: 14 MG/DL (ref 6–20)
BUN/CREAT BLD: ABNORMAL (ref 9–20)
CALCIUM SERPL-MCNC: 8.8 MG/DL (ref 8.6–10.4)
CHLORIDE BLD-SCNC: 103 MMOL/L (ref 98–107)
CO2: 31 MMOL/L (ref 20–31)
CREAT SERPL-MCNC: 0.5 MG/DL (ref 0.5–0.9)
DIFFERENTIAL TYPE: ABNORMAL
EOSINOPHILS RELATIVE PERCENT: 1 % (ref 0–4)
GFR AFRICAN AMERICAN: >60 ML/MIN
GFR NON-AFRICAN AMERICAN: >60 ML/MIN
GFR SERPL CREATININE-BSD FRML MDRD: ABNORMAL ML/MIN/{1.73_M2}
GFR SERPL CREATININE-BSD FRML MDRD: ABNORMAL ML/MIN/{1.73_M2}
GLUCOSE BLD-MCNC: 100 MG/DL (ref 70–99)
HCG QUALITATIVE: NEGATIVE
HCT VFR BLD CALC: 39.2 % (ref 36–46)
HEMOGLOBIN: 13 G/DL (ref 12–16)
IMMATURE GRANULOCYTES: ABNORMAL %
LYMPHOCYTES # BLD: 36 % (ref 24–44)
MCH RBC QN AUTO: 29.2 PG (ref 26–34)
MCHC RBC AUTO-ENTMCNC: 33.2 G/DL (ref 31–37)
MCV RBC AUTO: 87.8 FL (ref 80–100)
MONOCYTES # BLD: 9 % (ref 1–7)
NRBC AUTOMATED: ABNORMAL PER 100 WBC
PDW BLD-RTO: 13.1 % (ref 11.5–14.9)
PLATELET # BLD: 256 K/UL (ref 150–450)
PLATELET ESTIMATE: ABNORMAL
PMV BLD AUTO: 8.3 FL (ref 6–12)
POTASSIUM SERPL-SCNC: 4 MMOL/L (ref 3.7–5.3)
RBC # BLD: 4.46 M/UL (ref 4–5.2)
RBC # BLD: ABNORMAL 10*6/UL
SEG NEUTROPHILS: 54 % (ref 36–66)
SEGMENTED NEUTROPHILS ABSOLUTE COUNT: 3.1 K/UL (ref 1.3–9.1)
SODIUM BLD-SCNC: 140 MMOL/L (ref 135–144)
TROPONIN INTERP: NORMAL
TROPONIN INTERP: NORMAL
TROPONIN T: <0.03 NG/ML
TROPONIN T: <0.03 NG/ML
WBC # BLD: 5.9 K/UL (ref 3.5–11)
WBC # BLD: ABNORMAL 10*3/UL

## 2018-04-29 PROCEDURE — 96375 TX/PRO/DX INJ NEW DRUG ADDON: CPT

## 2018-04-29 PROCEDURE — 96374 THER/PROPH/DIAG INJ IV PUSH: CPT

## 2018-04-29 PROCEDURE — 2580000003 HC RX 258: Performed by: EMERGENCY MEDICINE

## 2018-04-29 PROCEDURE — 84703 CHORIONIC GONADOTROPIN ASSAY: CPT

## 2018-04-29 PROCEDURE — 93005 ELECTROCARDIOGRAM TRACING: CPT

## 2018-04-29 PROCEDURE — 36415 COLL VENOUS BLD VENIPUNCTURE: CPT

## 2018-04-29 PROCEDURE — 80048 BASIC METABOLIC PNL TOTAL CA: CPT

## 2018-04-29 PROCEDURE — 85025 COMPLETE CBC W/AUTO DIFF WBC: CPT

## 2018-04-29 PROCEDURE — 6360000004 HC RX CONTRAST MEDICATION: Performed by: EMERGENCY MEDICINE

## 2018-04-29 PROCEDURE — 84484 ASSAY OF TROPONIN QUANT: CPT

## 2018-04-29 PROCEDURE — 71045 X-RAY EXAM CHEST 1 VIEW: CPT

## 2018-04-29 PROCEDURE — 6360000002 HC RX W HCPCS: Performed by: EMERGENCY MEDICINE

## 2018-04-29 PROCEDURE — 71275 CT ANGIOGRAPHY CHEST: CPT

## 2018-04-29 RX ORDER — ONDANSETRON 2 MG/ML
4 INJECTION INTRAMUSCULAR; INTRAVENOUS ONCE
Status: COMPLETED | OUTPATIENT
Start: 2018-04-29 | End: 2018-04-29

## 2018-04-29 RX ORDER — LORAZEPAM 1 MG/1
1 TABLET ORAL EVERY 8 HOURS PRN
Qty: 6 TABLET | Refills: 0 | Status: SHIPPED | OUTPATIENT
Start: 2018-04-29 | End: 2018-05-01

## 2018-04-29 RX ORDER — FENTANYL CITRATE 50 UG/ML
100 INJECTION, SOLUTION INTRAMUSCULAR; INTRAVENOUS ONCE
Status: COMPLETED | OUTPATIENT
Start: 2018-04-29 | End: 2018-04-29

## 2018-04-29 RX ORDER — SODIUM CHLORIDE 0.9 % (FLUSH) 0.9 %
10 SYRINGE (ML) INJECTION PRN
Status: DISCONTINUED | OUTPATIENT
Start: 2018-04-29 | End: 2018-04-29 | Stop reason: HOSPADM

## 2018-04-29 RX ORDER — 0.9 % SODIUM CHLORIDE 0.9 %
100 INTRAVENOUS SOLUTION INTRAVENOUS ONCE
Status: COMPLETED | OUTPATIENT
Start: 2018-04-29 | End: 2018-04-29

## 2018-04-29 RX ORDER — LORAZEPAM 2 MG/ML
1 INJECTION INTRAMUSCULAR ONCE
Status: COMPLETED | OUTPATIENT
Start: 2018-04-29 | End: 2018-04-29

## 2018-04-29 RX ADMIN — LORAZEPAM 1 MG: 2 INJECTION, SOLUTION INTRAMUSCULAR; INTRAVENOUS at 00:27

## 2018-04-29 RX ADMIN — ONDANSETRON 4 MG: 2 INJECTION INTRAMUSCULAR; INTRAVENOUS at 00:27

## 2018-04-29 RX ADMIN — Medication 10 ML: at 01:01

## 2018-04-29 RX ADMIN — FENTANYL CITRATE 100 MCG: 50 INJECTION INTRAMUSCULAR; INTRAVENOUS at 00:27

## 2018-04-29 RX ADMIN — IOPAMIDOL 100 ML: 755 INJECTION, SOLUTION INTRAVENOUS at 01:01

## 2018-04-29 RX ADMIN — SODIUM CHLORIDE 100 ML: 9 INJECTION, SOLUTION INTRAVENOUS at 01:01

## 2018-04-29 ASSESSMENT — ENCOUNTER SYMPTOMS
NAUSEA: 1
VOMITING: 0
COUGH: 0
EYE DISCHARGE: 0
SHORTNESS OF BREATH: 0
SORE THROAT: 0
DIARRHEA: 0
WHEEZING: 0
CONSTIPATION: 0
BLURRED VISION: 0
ABDOMINAL PAIN: 0

## 2018-04-29 ASSESSMENT — PAIN SCALES - GENERAL
PAINLEVEL_OUTOF10: 1
PAINLEVEL_OUTOF10: 7
PAINLEVEL_OUTOF10: 5

## 2018-04-29 ASSESSMENT — PAIN DESCRIPTION - DESCRIPTORS: DESCRIPTORS: POUNDING

## 2018-04-29 ASSESSMENT — PAIN DESCRIPTION - ORIENTATION: ORIENTATION: LEFT;UPPER

## 2018-04-29 ASSESSMENT — PAIN DESCRIPTION - LOCATION: LOCATION: CHEST

## 2018-06-09 ENCOUNTER — HOSPITAL ENCOUNTER (INPATIENT)
Age: 38
LOS: 10 days | Discharge: HOME | DRG: 885 | End: 2018-06-19
Attending: EMERGENCY MEDICINE | Admitting: PSYCHIATRY & NEUROLOGY
Payer: MEDICARE

## 2018-06-09 DIAGNOSIS — R45.851 DEPRESSION WITH SUICIDAL IDEATION: Primary | ICD-10-CM

## 2018-06-09 DIAGNOSIS — F32.A DEPRESSION WITH SUICIDAL IDEATION: Primary | ICD-10-CM

## 2018-06-09 DIAGNOSIS — S40.812A ABRASION OF LEFT UPPER EXTREMITY, INITIAL ENCOUNTER: ICD-10-CM

## 2018-06-09 DIAGNOSIS — F11.10 OPIATE ABUSE, EPISODIC (HCC): ICD-10-CM

## 2018-06-09 PROBLEM — F32.2 SEVERE MAJOR DEPRESSION WITHOUT PSYCHOTIC FEATURES (HCC): Status: ACTIVE | Noted: 2018-06-09

## 2018-06-09 LAB
-: ABNORMAL
ABSOLUTE EOS #: 0 K/UL (ref 0–0.4)
ABSOLUTE IMMATURE GRANULOCYTE: NORMAL K/UL (ref 0–0.3)
ABSOLUTE LYMPH #: 1.9 K/UL (ref 1–4.8)
ABSOLUTE MONO #: 0.4 K/UL (ref 0.1–1.3)
ACETAMINOPHEN LEVEL: 15 UG/ML (ref 10–30)
ALBUMIN SERPL-MCNC: 4.2 G/DL (ref 3.5–5.2)
ALBUMIN/GLOBULIN RATIO: NORMAL (ref 1–2.5)
ALP BLD-CCNC: 76 U/L (ref 35–104)
ALT SERPL-CCNC: 14 U/L (ref 5–33)
AMORPHOUS: ABNORMAL
AMPHETAMINE SCREEN URINE: NEGATIVE
ANION GAP SERPL CALCULATED.3IONS-SCNC: 10 MMOL/L (ref 9–17)
AST SERPL-CCNC: 15 U/L
BACTERIA: ABNORMAL
BARBITURATE SCREEN URINE: NEGATIVE
BASOPHILS # BLD: 1 % (ref 0–2)
BASOPHILS ABSOLUTE: 0 K/UL (ref 0–0.2)
BENZODIAZEPINE SCREEN, URINE: NEGATIVE
BILIRUB SERPL-MCNC: 0.69 MG/DL (ref 0.3–1.2)
BILIRUBIN URINE: NEGATIVE
BUN BLDV-MCNC: 10 MG/DL (ref 6–20)
BUN/CREAT BLD: NORMAL (ref 9–20)
BUPRENORPHINE URINE: ABNORMAL
CALCIUM SERPL-MCNC: 9.4 MG/DL (ref 8.6–10.4)
CANNABINOID SCREEN URINE: NEGATIVE
CASTS UA: ABNORMAL /LPF
CHLORIDE BLD-SCNC: 102 MMOL/L (ref 98–107)
CO2: 28 MMOL/L (ref 20–31)
COCAINE METABOLITE, URINE: NEGATIVE
COLOR: YELLOW
COMMENT UA: ABNORMAL
CREAT SERPL-MCNC: 0.5 MG/DL (ref 0.5–0.9)
CRYSTALS, UA: ABNORMAL /HPF
DIFFERENTIAL TYPE: NORMAL
EOSINOPHILS RELATIVE PERCENT: 1 % (ref 0–4)
EPITHELIAL CELLS UA: ABNORMAL /HPF
ETHANOL PERCENT: <0.01 %
ETHANOL: <10 MG/DL
GFR AFRICAN AMERICAN: >60 ML/MIN
GFR NON-AFRICAN AMERICAN: >60 ML/MIN
GFR SERPL CREATININE-BSD FRML MDRD: NORMAL ML/MIN/{1.73_M2}
GFR SERPL CREATININE-BSD FRML MDRD: NORMAL ML/MIN/{1.73_M2}
GLUCOSE BLD-MCNC: 82 MG/DL (ref 70–99)
GLUCOSE URINE: NEGATIVE
HCG(URINE) PREGNANCY TEST: NEGATIVE
HCT VFR BLD CALC: 43.3 % (ref 36–46)
HEMOGLOBIN: 14.8 G/DL (ref 12–16)
IMMATURE GRANULOCYTES: NORMAL %
KETONES, URINE: NEGATIVE
LEUKOCYTE ESTERASE, URINE: NEGATIVE
LYMPHOCYTES # BLD: 30 % (ref 24–44)
MCH RBC QN AUTO: 29.8 PG (ref 26–34)
MCHC RBC AUTO-ENTMCNC: 34.2 G/DL (ref 31–37)
MCV RBC AUTO: 86.9 FL (ref 80–100)
MDMA URINE: ABNORMAL
METHADONE SCREEN, URINE: NEGATIVE
METHAMPHETAMINE, URINE: ABNORMAL
MONOCYTES # BLD: 7 % (ref 1–7)
MUCUS: ABNORMAL
NITRITE, URINE: NEGATIVE
NRBC AUTOMATED: NORMAL PER 100 WBC
OPIATES, URINE: POSITIVE
OTHER OBSERVATIONS UA: ABNORMAL
OXYCODONE SCREEN URINE: POSITIVE
PDW BLD-RTO: 12.5 % (ref 11.5–14.9)
PH UA: 6 (ref 5–8)
PHENCYCLIDINE, URINE: NEGATIVE
PLATELET # BLD: 321 K/UL (ref 150–450)
PLATELET ESTIMATE: NORMAL
PMV BLD AUTO: 8 FL (ref 6–12)
POTASSIUM SERPL-SCNC: 3.8 MMOL/L (ref 3.7–5.3)
PROPOXYPHENE, URINE: ABNORMAL
PROTEIN UA: NEGATIVE
RBC # BLD: 4.98 M/UL (ref 4–5.2)
RBC # BLD: NORMAL 10*6/UL
RBC UA: ABNORMAL /HPF
RENAL EPITHELIAL, UA: ABNORMAL /HPF
SALICYLATE LEVEL: <1 MG/DL (ref 3–10)
SEG NEUTROPHILS: 61 % (ref 36–66)
SEGMENTED NEUTROPHILS ABSOLUTE COUNT: 3.8 K/UL (ref 1.3–9.1)
SODIUM BLD-SCNC: 140 MMOL/L (ref 135–144)
SPECIFIC GRAVITY UA: 1.03 (ref 1–1.03)
TEST INFORMATION: ABNORMAL
TOTAL PROTEIN: 7.3 G/DL (ref 6.4–8.3)
TRICHOMONAS: ABNORMAL
TRICYCLIC ANTIDEP,URINE: NEGATIVE
TRICYCLIC ANTIDEPRESSANTS, UR: ABNORMAL
TURBIDITY: ABNORMAL
URINE HGB: NEGATIVE
UROBILINOGEN, URINE: NORMAL
WBC # BLD: 6.2 K/UL (ref 3.5–11)
WBC # BLD: NORMAL 10*3/UL
WBC UA: ABNORMAL /HPF
YEAST: ABNORMAL

## 2018-06-09 PROCEDURE — 36415 COLL VENOUS BLD VENIPUNCTURE: CPT

## 2018-06-09 PROCEDURE — 6370000000 HC RX 637 (ALT 250 FOR IP): Performed by: EMERGENCY MEDICINE

## 2018-06-09 PROCEDURE — 80307 DRUG TEST PRSMV CHEM ANLYZR: CPT

## 2018-06-09 PROCEDURE — 80053 COMPREHEN METABOLIC PANEL: CPT

## 2018-06-09 PROCEDURE — 81001 URINALYSIS AUTO W/SCOPE: CPT

## 2018-06-09 PROCEDURE — 99285 EMERGENCY DEPT VISIT HI MDM: CPT

## 2018-06-09 PROCEDURE — G0480 DRUG TEST DEF 1-7 CLASSES: HCPCS

## 2018-06-09 PROCEDURE — 1240000000 HC EMOTIONAL WELLNESS R&B

## 2018-06-09 PROCEDURE — 85025 COMPLETE CBC W/AUTO DIFF WBC: CPT

## 2018-06-09 PROCEDURE — 84703 CHORIONIC GONADOTROPIN ASSAY: CPT

## 2018-06-09 RX ORDER — PREGABALIN 50 MG/1
50 CAPSULE ORAL ONCE
Status: COMPLETED | OUTPATIENT
Start: 2018-06-09 | End: 2018-06-09

## 2018-06-09 RX ORDER — NEBIVOLOL 10 MG/1
5 TABLET ORAL ONCE
Status: COMPLETED | OUTPATIENT
Start: 2018-06-09 | End: 2018-06-09

## 2018-06-09 RX ORDER — FENTANYL 75 UG/H
1 PATCH TRANSDERMAL ONCE
Status: DISCONTINUED | OUTPATIENT
Start: 2018-06-09 | End: 2018-06-12

## 2018-06-09 RX ADMIN — PREGABALIN 50 MG: 50 CAPSULE ORAL at 23:20

## 2018-06-09 RX ADMIN — NEBIVOLOL HYDROCHLORIDE 5 MG: 10 TABLET ORAL at 23:20

## 2018-06-09 ASSESSMENT — PAIN SCALES - GENERAL
PAINLEVEL_OUTOF10: 4
PAINLEVEL_OUTOF10: 7

## 2018-06-10 LAB
ABSOLUTE EOS #: 0.1 K/UL (ref 0–0.4)
ABSOLUTE IMMATURE GRANULOCYTE: ABNORMAL K/UL (ref 0–0.3)
ABSOLUTE LYMPH #: 2 K/UL (ref 1–4.8)
ABSOLUTE MONO #: 0.5 K/UL (ref 0.1–1.3)
ALBUMIN SERPL-MCNC: 3.4 G/DL (ref 3.5–5.2)
ALBUMIN/GLOBULIN RATIO: ABNORMAL (ref 1–2.5)
ALP BLD-CCNC: 63 U/L (ref 35–104)
ALT SERPL-CCNC: 11 U/L (ref 5–33)
ANION GAP SERPL CALCULATED.3IONS-SCNC: 9 MMOL/L (ref 9–17)
AST SERPL-CCNC: 12 U/L
BASOPHILS # BLD: 1 % (ref 0–2)
BASOPHILS ABSOLUTE: 0 K/UL (ref 0–0.2)
BILIRUB SERPL-MCNC: 0.58 MG/DL (ref 0.3–1.2)
BUN BLDV-MCNC: 10 MG/DL (ref 6–20)
BUN/CREAT BLD: ABNORMAL (ref 9–20)
CALCIUM SERPL-MCNC: 8.9 MG/DL (ref 8.6–10.4)
CHLORIDE BLD-SCNC: 104 MMOL/L (ref 98–107)
CHOLESTEROL/HDL RATIO: 3.9
CHOLESTEROL: 152 MG/DL
CO2: 28 MMOL/L (ref 20–31)
CREAT SERPL-MCNC: 0.51 MG/DL (ref 0.5–0.9)
DIFFERENTIAL TYPE: ABNORMAL
EOSINOPHILS RELATIVE PERCENT: 1 % (ref 0–4)
GFR AFRICAN AMERICAN: >60 ML/MIN
GFR NON-AFRICAN AMERICAN: >60 ML/MIN
GFR SERPL CREATININE-BSD FRML MDRD: ABNORMAL ML/MIN/{1.73_M2}
GFR SERPL CREATININE-BSD FRML MDRD: ABNORMAL ML/MIN/{1.73_M2}
GLUCOSE BLD-MCNC: 91 MG/DL (ref 70–99)
HCT VFR BLD CALC: 39.2 % (ref 36–46)
HDLC SERPL-MCNC: 39 MG/DL
HEMOGLOBIN: 13.2 G/DL (ref 12–16)
IMMATURE GRANULOCYTES: ABNORMAL %
LDL CHOLESTEROL: 85 MG/DL (ref 0–130)
LYMPHOCYTES # BLD: 35 % (ref 24–44)
MCH RBC QN AUTO: 29.4 PG (ref 26–34)
MCHC RBC AUTO-ENTMCNC: 33.8 G/DL (ref 31–37)
MCV RBC AUTO: 87.2 FL (ref 80–100)
MONOCYTES # BLD: 8 % (ref 1–7)
NRBC AUTOMATED: ABNORMAL PER 100 WBC
PDW BLD-RTO: 12.5 % (ref 11.5–14.9)
PLATELET # BLD: 273 K/UL (ref 150–450)
PLATELET ESTIMATE: ABNORMAL
PMV BLD AUTO: 7.9 FL (ref 6–12)
POTASSIUM SERPL-SCNC: 4.3 MMOL/L (ref 3.7–5.3)
RBC # BLD: 4.49 M/UL (ref 4–5.2)
RBC # BLD: ABNORMAL 10*6/UL
SEG NEUTROPHILS: 55 % (ref 36–66)
SEGMENTED NEUTROPHILS ABSOLUTE COUNT: 3.1 K/UL (ref 1.3–9.1)
SODIUM BLD-SCNC: 141 MMOL/L (ref 135–144)
THYROXINE, FREE: 1.34 NG/DL (ref 0.93–1.7)
TOTAL PROTEIN: 6.1 G/DL (ref 6.4–8.3)
TRIGL SERPL-MCNC: 141 MG/DL
TSH SERPL DL<=0.05 MIU/L-ACNC: 1.6 MIU/L (ref 0.3–5)
VLDLC SERPL CALC-MCNC: ABNORMAL MG/DL (ref 1–30)
WBC # BLD: 5.7 K/UL (ref 3.5–11)
WBC # BLD: ABNORMAL 10*3/UL

## 2018-06-10 PROCEDURE — 83036 HEMOGLOBIN GLYCOSYLATED A1C: CPT

## 2018-06-10 PROCEDURE — 85025 COMPLETE CBC W/AUTO DIFF WBC: CPT

## 2018-06-10 PROCEDURE — 94664 DEMO&/EVAL PT USE INHALER: CPT

## 2018-06-10 PROCEDURE — 6370000000 HC RX 637 (ALT 250 FOR IP): Performed by: NURSE PRACTITIONER

## 2018-06-10 PROCEDURE — 80061 LIPID PANEL: CPT

## 2018-06-10 PROCEDURE — 84443 ASSAY THYROID STIM HORMONE: CPT

## 2018-06-10 PROCEDURE — 80053 COMPREHEN METABOLIC PANEL: CPT

## 2018-06-10 PROCEDURE — 90792 PSYCH DIAG EVAL W/MED SRVCS: CPT | Performed by: PSYCHIATRY & NEUROLOGY

## 2018-06-10 PROCEDURE — 1240000000 HC EMOTIONAL WELLNESS R&B

## 2018-06-10 PROCEDURE — 84439 ASSAY OF FREE THYROXINE: CPT

## 2018-06-10 PROCEDURE — 36415 COLL VENOUS BLD VENIPUNCTURE: CPT

## 2018-06-10 RX ORDER — HYDROXYZINE HYDROCHLORIDE 25 MG/1
50 TABLET, FILM COATED ORAL 3 TIMES DAILY PRN
Status: DISCONTINUED | OUTPATIENT
Start: 2018-06-10 | End: 2018-06-19 | Stop reason: HOSPADM

## 2018-06-10 RX ORDER — CETIRIZINE HYDROCHLORIDE 10 MG/1
10 TABLET ORAL DAILY
Status: DISCONTINUED | OUTPATIENT
Start: 2018-06-10 | End: 2018-06-13

## 2018-06-10 RX ORDER — NEBIVOLOL 10 MG/1
5 TABLET ORAL DAILY
Status: DISCONTINUED | OUTPATIENT
Start: 2018-06-10 | End: 2018-06-19 | Stop reason: HOSPADM

## 2018-06-10 RX ORDER — FENTANYL 75 UG/H
1 PATCH TRANSDERMAL
Status: DISCONTINUED | OUTPATIENT
Start: 2018-06-10 | End: 2018-06-11

## 2018-06-10 RX ORDER — ALBUTEROL SULFATE 90 UG/1
2 AEROSOL, METERED RESPIRATORY (INHALATION) EVERY 6 HOURS PRN
Status: DISCONTINUED | OUTPATIENT
Start: 2018-06-10 | End: 2018-06-19 | Stop reason: SDUPTHER

## 2018-06-10 RX ORDER — BENZTROPINE MESYLATE 1 MG/ML
2 INJECTION INTRAMUSCULAR; INTRAVENOUS 2 TIMES DAILY PRN
Status: DISCONTINUED | OUTPATIENT
Start: 2018-06-10 | End: 2018-06-19 | Stop reason: HOSPADM

## 2018-06-10 RX ORDER — PREGABALIN 50 MG/1
50 CAPSULE ORAL 2 TIMES DAILY
Status: DISCONTINUED | OUTPATIENT
Start: 2018-06-10 | End: 2018-06-19 | Stop reason: HOSPADM

## 2018-06-10 RX ORDER — FLUTICASONE PROPIONATE 50 MCG
1 SPRAY, SUSPENSION (ML) NASAL DAILY
Status: DISCONTINUED | OUTPATIENT
Start: 2018-06-10 | End: 2018-06-13

## 2018-06-10 RX ORDER — ALBUTEROL SULFATE 90 UG/1
2 AEROSOL, METERED RESPIRATORY (INHALATION) EVERY 6 HOURS PRN
Status: DISCONTINUED | OUTPATIENT
Start: 2018-06-10 | End: 2018-06-19 | Stop reason: HOSPADM

## 2018-06-10 RX ORDER — MAGNESIUM HYDROXIDE/ALUMINUM HYDROXICE/SIMETHICONE 120; 1200; 1200 MG/30ML; MG/30ML; MG/30ML
30 SUSPENSION ORAL EVERY 6 HOURS PRN
Status: DISCONTINUED | OUTPATIENT
Start: 2018-06-10 | End: 2018-06-19 | Stop reason: HOSPADM

## 2018-06-10 RX ORDER — LORAZEPAM 1 MG/1
1 TABLET ORAL EVERY 6 HOURS PRN
Status: DISCONTINUED | OUTPATIENT
Start: 2018-06-10 | End: 2018-06-12

## 2018-06-10 RX ORDER — M-VIT,TX,IRON,MINS/CALC/FOLIC 27MG-0.4MG
1 TABLET ORAL DAILY
Status: DISCONTINUED | OUTPATIENT
Start: 2018-06-10 | End: 2018-06-19 | Stop reason: HOSPADM

## 2018-06-10 RX ORDER — TRAZODONE HYDROCHLORIDE 50 MG/1
50 TABLET ORAL NIGHTLY PRN
Status: DISCONTINUED | OUTPATIENT
Start: 2018-06-10 | End: 2018-06-19 | Stop reason: HOSPADM

## 2018-06-10 RX ORDER — PROMETHAZINE HYDROCHLORIDE 25 MG/1
25 TABLET ORAL EVERY 6 HOURS PRN
Status: DISCONTINUED | OUTPATIENT
Start: 2018-06-10 | End: 2018-06-19 | Stop reason: HOSPADM

## 2018-06-10 RX ORDER — ACETAMINOPHEN 325 MG/1
650 TABLET ORAL EVERY 4 HOURS PRN
Status: DISCONTINUED | OUTPATIENT
Start: 2018-06-10 | End: 2018-06-19 | Stop reason: HOSPADM

## 2018-06-10 RX ADMIN — PREGABALIN 50 MG: 50 CAPSULE ORAL at 20:41

## 2018-06-10 RX ADMIN — MULTIPLE VITAMINS W/ MINERALS TAB 1 TABLET: TAB at 08:23

## 2018-06-10 RX ADMIN — LORAZEPAM 1 MG: 1 TABLET ORAL at 08:24

## 2018-06-10 RX ADMIN — NEBIVOLOL HYDROCHLORIDE 5 MG: 10 TABLET ORAL at 08:23

## 2018-06-10 RX ADMIN — LORAZEPAM 1 MG: 1 TABLET ORAL at 02:14

## 2018-06-10 RX ADMIN — CETIRIZINE HYDROCHLORIDE 10 MG: 10 TABLET, FILM COATED ORAL at 08:23

## 2018-06-10 RX ADMIN — LORAZEPAM 1 MG: 1 TABLET ORAL at 18:11

## 2018-06-10 RX ADMIN — TRAZODONE HYDROCHLORIDE 50 MG: 50 TABLET ORAL at 20:41

## 2018-06-10 RX ADMIN — PREGABALIN 50 MG: 50 CAPSULE ORAL at 08:23

## 2018-06-10 ASSESSMENT — SLEEP AND FATIGUE QUESTIONNAIRES
DIFFICULTY STAYING ASLEEP: NO
DO YOU HAVE DIFFICULTY SLEEPING: NO
AVERAGE NUMBER OF SLEEP HOURS: 8
DIFFICULTY ARISING: NO
DIFFICULTY FALLING ASLEEP: YES
DO YOU USE A SLEEP AID: YES
AVERAGE NUMBER OF SLEEP HOURS: 8
DO YOU HAVE DIFFICULTY SLEEPING: NO
DIFFICULTY STAYING ASLEEP: NO
DIFFICULTY ARISING: NO
RESTFUL SLEEP: YES
RESTFUL SLEEP: YES
DIFFICULTY FALLING ASLEEP: YES
DO YOU USE A SLEEP AID: YES

## 2018-06-10 ASSESSMENT — LIFESTYLE VARIABLES
HISTORY_ALCOHOL_USE: NO
HISTORY_ALCOHOL_USE: NO

## 2018-06-10 ASSESSMENT — PAIN SCALES - GENERAL
PAINLEVEL_OUTOF10: 6
PAINLEVEL_OUTOF10: 1

## 2018-06-10 ASSESSMENT — PATIENT HEALTH QUESTIONNAIRE - PHQ9
SUM OF ALL RESPONSES TO PHQ QUESTIONS 1-9: 3
SUM OF ALL RESPONSES TO PHQ QUESTIONS 1-9: 18

## 2018-06-10 ASSESSMENT — PAIN - FUNCTIONAL ASSESSMENT: PAIN_FUNCTIONAL_ASSESSMENT: 0-10

## 2018-06-11 LAB
ESTIMATED AVERAGE GLUCOSE: 94 MG/DL
HBA1C MFR BLD: 4.9 % (ref 4–6)

## 2018-06-11 PROCEDURE — 6370000000 HC RX 637 (ALT 250 FOR IP): Performed by: NURSE PRACTITIONER

## 2018-06-11 PROCEDURE — 99232 SBSQ HOSP IP/OBS MODERATE 35: CPT | Performed by: NURSE PRACTITIONER

## 2018-06-11 PROCEDURE — 1240000000 HC EMOTIONAL WELLNESS R&B

## 2018-06-11 PROCEDURE — 6360000002 HC RX W HCPCS: Performed by: NURSE PRACTITIONER

## 2018-06-11 PROCEDURE — 6370000000 HC RX 637 (ALT 250 FOR IP): Performed by: PSYCHIATRY & NEUROLOGY

## 2018-06-11 RX ORDER — FENTANYL 75 UG/H
1 PATCH TRANSDERMAL
Status: DISCONTINUED | OUTPATIENT
Start: 2018-06-11 | End: 2018-06-12

## 2018-06-11 RX ORDER — FENTANYL 75 UG/H
1 PATCH TRANSDERMAL
Status: DISCONTINUED | OUTPATIENT
Start: 2018-06-12 | End: 2018-06-11

## 2018-06-11 RX ADMIN — PREGABALIN 50 MG: 50 CAPSULE ORAL at 08:34

## 2018-06-11 RX ADMIN — PROMETHAZINE HYDROCHLORIDE 25 MG: 25 TABLET ORAL at 12:51

## 2018-06-11 RX ADMIN — LEVOMILNACIPRAN HYDROCHLORIDE 20 MG: 20 CAPSULE, EXTENDED RELEASE ORAL at 08:34

## 2018-06-11 RX ADMIN — LORAZEPAM 1 MG: 1 TABLET ORAL at 12:51

## 2018-06-11 RX ADMIN — LORAZEPAM 1 MG: 1 TABLET ORAL at 20:40

## 2018-06-11 RX ADMIN — PROMETHAZINE HYDROCHLORIDE 25 MG: 25 TABLET ORAL at 20:40

## 2018-06-11 RX ADMIN — ACETAMINOPHEN 650 MG: 325 TABLET ORAL at 12:51

## 2018-06-11 RX ADMIN — PREGABALIN 50 MG: 50 CAPSULE ORAL at 20:40

## 2018-06-11 RX ADMIN — MULTIPLE VITAMINS W/ MINERALS TAB 1 TABLET: TAB at 08:34

## 2018-06-11 RX ADMIN — NEBIVOLOL HYDROCHLORIDE 5 MG: 10 TABLET ORAL at 08:34

## 2018-06-11 RX ADMIN — ACETAMINOPHEN 650 MG: 325 TABLET ORAL at 20:40

## 2018-06-11 ASSESSMENT — PAIN SCALES - GENERAL
PAINLEVEL_OUTOF10: 0
PAINLEVEL_OUTOF10: 6
PAINLEVEL_OUTOF10: 3
PAINLEVEL_OUTOF10: 6
PAINLEVEL_OUTOF10: 6
PAINLEVEL_OUTOF10: 3
PAINLEVEL_OUTOF10: 0

## 2018-06-11 ASSESSMENT — PAIN DESCRIPTION - LOCATION: LOCATION: BACK;HEAD;HIP

## 2018-06-11 ASSESSMENT — PAIN DESCRIPTION - PAIN TYPE: TYPE: ACUTE PAIN

## 2018-06-12 PROCEDURE — 99232 SBSQ HOSP IP/OBS MODERATE 35: CPT | Performed by: NURSE PRACTITIONER

## 2018-06-12 PROCEDURE — 1240000000 HC EMOTIONAL WELLNESS R&B

## 2018-06-12 PROCEDURE — 6370000000 HC RX 637 (ALT 250 FOR IP): Performed by: NURSE PRACTITIONER

## 2018-06-12 PROCEDURE — 6360000002 HC RX W HCPCS: Performed by: NURSE PRACTITIONER

## 2018-06-12 PROCEDURE — 6370000000 HC RX 637 (ALT 250 FOR IP): Performed by: PSYCHIATRY & NEUROLOGY

## 2018-06-12 RX ORDER — LOPERAMIDE HYDROCHLORIDE 2 MG/1
2 CAPSULE ORAL 4 TIMES DAILY PRN
Status: DISPENSED | OUTPATIENT
Start: 2018-06-12 | End: 2018-06-13

## 2018-06-12 RX ORDER — LORAZEPAM 1 MG/1
1 TABLET ORAL EVERY 8 HOURS PRN
Status: DISCONTINUED | OUTPATIENT
Start: 2018-06-12 | End: 2018-06-19 | Stop reason: HOSPADM

## 2018-06-12 RX ORDER — FENTANYL 75 UG/H
1 PATCH TRANSDERMAL
Status: DISCONTINUED | OUTPATIENT
Start: 2018-06-13 | End: 2018-06-19

## 2018-06-12 RX ADMIN — PROMETHAZINE HYDROCHLORIDE 25 MG: 25 TABLET ORAL at 16:21

## 2018-06-12 RX ADMIN — LOPERAMIDE HYDROCHLORIDE 2 MG: 2 CAPSULE ORAL at 16:21

## 2018-06-12 RX ADMIN — PREGABALIN 50 MG: 50 CAPSULE ORAL at 08:59

## 2018-06-12 RX ADMIN — ACETAMINOPHEN 650 MG: 325 TABLET ORAL at 21:48

## 2018-06-12 RX ADMIN — PREGABALIN 50 MG: 50 CAPSULE ORAL at 21:41

## 2018-06-12 RX ADMIN — LOPERAMIDE HYDROCHLORIDE 2 MG: 2 CAPSULE ORAL at 21:45

## 2018-06-12 RX ADMIN — PROMETHAZINE HYDROCHLORIDE 25 MG: 25 TABLET ORAL at 07:03

## 2018-06-12 RX ADMIN — LEVOMILNACIPRAN HYDROCHLORIDE 20 MG: 20 CAPSULE, EXTENDED RELEASE ORAL at 08:59

## 2018-06-12 RX ADMIN — LORAZEPAM 1 MG: 1 TABLET ORAL at 21:41

## 2018-06-12 RX ADMIN — LORAZEPAM 1 MG: 1 TABLET ORAL at 13:22

## 2018-06-12 RX ADMIN — ACETAMINOPHEN 650 MG: 325 TABLET ORAL at 16:21

## 2018-06-12 RX ADMIN — NEBIVOLOL HYDROCHLORIDE 5 MG: 10 TABLET ORAL at 08:59

## 2018-06-12 ASSESSMENT — PAIN SCALES - GENERAL
PAINLEVEL_OUTOF10: 1
PAINLEVEL_OUTOF10: 0
PAINLEVEL_OUTOF10: 6
PAINLEVEL_OUTOF10: 3

## 2018-06-13 PROCEDURE — 6370000000 HC RX 637 (ALT 250 FOR IP): Performed by: NURSE PRACTITIONER

## 2018-06-13 PROCEDURE — 1240000000 HC EMOTIONAL WELLNESS R&B

## 2018-06-13 PROCEDURE — 99222 1ST HOSP IP/OBS MODERATE 55: CPT | Performed by: INTERNAL MEDICINE

## 2018-06-13 PROCEDURE — 6360000002 HC RX W HCPCS: Performed by: NURSE PRACTITIONER

## 2018-06-13 PROCEDURE — 99232 SBSQ HOSP IP/OBS MODERATE 35: CPT | Performed by: NURSE PRACTITIONER

## 2018-06-13 RX ORDER — VENLAFAXINE HYDROCHLORIDE 75 MG/1
75 CAPSULE, EXTENDED RELEASE ORAL
Status: DISCONTINUED | OUTPATIENT
Start: 2018-06-14 | End: 2018-06-19 | Stop reason: HOSPADM

## 2018-06-13 RX ORDER — LAMOTRIGINE 25 MG/1
25 TABLET ORAL DAILY
Status: DISCONTINUED | OUTPATIENT
Start: 2018-06-13 | End: 2018-06-19 | Stop reason: HOSPADM

## 2018-06-13 RX ADMIN — NEBIVOLOL HYDROCHLORIDE 5 MG: 10 TABLET ORAL at 08:35

## 2018-06-13 RX ADMIN — LOPERAMIDE HYDROCHLORIDE 2 MG: 2 CAPSULE ORAL at 08:32

## 2018-06-13 RX ADMIN — LAMOTRIGINE 25 MG: 25 TABLET ORAL at 12:54

## 2018-06-13 RX ADMIN — LORAZEPAM 1 MG: 1 TABLET ORAL at 16:39

## 2018-06-13 RX ADMIN — ACETAMINOPHEN 650 MG: 325 TABLET ORAL at 16:39

## 2018-06-13 RX ADMIN — PREGABALIN 50 MG: 50 CAPSULE ORAL at 08:32

## 2018-06-13 RX ADMIN — PROMETHAZINE HYDROCHLORIDE 25 MG: 25 TABLET ORAL at 08:32

## 2018-06-13 RX ADMIN — LORAZEPAM 1 MG: 1 TABLET ORAL at 08:32

## 2018-06-13 RX ADMIN — PREGABALIN 50 MG: 50 CAPSULE ORAL at 22:45

## 2018-06-13 RX ADMIN — PROMETHAZINE HYDROCHLORIDE 25 MG: 25 TABLET ORAL at 16:40

## 2018-06-13 ASSESSMENT — PAIN SCALES - GENERAL
PAINLEVEL_OUTOF10: 5
PAINLEVEL_OUTOF10: 6
PAINLEVEL_OUTOF10: 0

## 2018-06-14 PROCEDURE — 6370000000 HC RX 637 (ALT 250 FOR IP): Performed by: NURSE PRACTITIONER

## 2018-06-14 PROCEDURE — 99232 SBSQ HOSP IP/OBS MODERATE 35: CPT | Performed by: NURSE PRACTITIONER

## 2018-06-14 PROCEDURE — 6360000002 HC RX W HCPCS: Performed by: NURSE PRACTITIONER

## 2018-06-14 PROCEDURE — 97161 PT EVAL LOW COMPLEX 20 MIN: CPT

## 2018-06-14 PROCEDURE — G8979 MOBILITY GOAL STATUS: HCPCS

## 2018-06-14 PROCEDURE — 6370000000 HC RX 637 (ALT 250 FOR IP): Performed by: INTERNAL MEDICINE

## 2018-06-14 PROCEDURE — G8978 MOBILITY CURRENT STATUS: HCPCS

## 2018-06-14 PROCEDURE — 1240000000 HC EMOTIONAL WELLNESS R&B

## 2018-06-14 PROCEDURE — 97110 THERAPEUTIC EXERCISES: CPT

## 2018-06-14 PROCEDURE — 99231 SBSQ HOSP IP/OBS SF/LOW 25: CPT | Performed by: INTERNAL MEDICINE

## 2018-06-14 RX ORDER — PANTOPRAZOLE SODIUM 40 MG/1
40 TABLET, DELAYED RELEASE ORAL
Status: DISCONTINUED | OUTPATIENT
Start: 2018-06-14 | End: 2018-06-19 | Stop reason: HOSPADM

## 2018-06-14 RX ADMIN — LORAZEPAM 1 MG: 1 TABLET ORAL at 00:56

## 2018-06-14 RX ADMIN — VENLAFAXINE HYDROCHLORIDE 75 MG: 75 CAPSULE, EXTENDED RELEASE ORAL at 08:46

## 2018-06-14 RX ADMIN — NEBIVOLOL HYDROCHLORIDE 5 MG: 10 TABLET ORAL at 08:50

## 2018-06-14 RX ADMIN — PANTOPRAZOLE SODIUM 40 MG: 40 TABLET, DELAYED RELEASE ORAL at 13:06

## 2018-06-14 RX ADMIN — PREGABALIN 50 MG: 50 CAPSULE ORAL at 08:46

## 2018-06-14 RX ADMIN — TRAZODONE HYDROCHLORIDE 50 MG: 50 TABLET ORAL at 21:21

## 2018-06-14 RX ADMIN — LAMOTRIGINE 25 MG: 25 TABLET ORAL at 08:46

## 2018-06-14 RX ADMIN — ACETAMINOPHEN 650 MG: 325 TABLET ORAL at 19:28

## 2018-06-14 RX ADMIN — PROMETHAZINE HYDROCHLORIDE 25 MG: 25 TABLET ORAL at 08:46

## 2018-06-14 RX ADMIN — HYDROXYZINE HYDROCHLORIDE 50 MG: 25 TABLET, FILM COATED ORAL at 21:21

## 2018-06-14 RX ADMIN — PREGABALIN 50 MG: 50 CAPSULE ORAL at 21:21

## 2018-06-14 RX ADMIN — LORAZEPAM 1 MG: 1 TABLET ORAL at 19:28

## 2018-06-14 RX ADMIN — LORAZEPAM 1 MG: 1 TABLET ORAL at 08:47

## 2018-06-14 ASSESSMENT — PAIN DESCRIPTION - LOCATION
LOCATION: SHOULDER
LOCATION: SHOULDER;HIP

## 2018-06-14 ASSESSMENT — PAIN SCALES - GENERAL
PAINLEVEL_OUTOF10: 3
PAINLEVEL_OUTOF10: 5
PAINLEVEL_OUTOF10: 4
PAINLEVEL_OUTOF10: 0
PAINLEVEL_OUTOF10: 3

## 2018-06-14 ASSESSMENT — PAIN DESCRIPTION - PAIN TYPE
TYPE: ACUTE PAIN
TYPE: ACUTE PAIN

## 2018-06-14 ASSESSMENT — PAIN DESCRIPTION - ORIENTATION
ORIENTATION: RIGHT;LEFT
ORIENTATION: RIGHT;LEFT

## 2018-06-15 LAB
LV EF: 60 %
LVEF MODALITY: NORMAL

## 2018-06-15 PROCEDURE — 1240000000 HC EMOTIONAL WELLNESS R&B

## 2018-06-15 PROCEDURE — 6370000000 HC RX 637 (ALT 250 FOR IP): Performed by: INTERNAL MEDICINE

## 2018-06-15 PROCEDURE — 6370000000 HC RX 637 (ALT 250 FOR IP): Performed by: NURSE PRACTITIONER

## 2018-06-15 PROCEDURE — 99231 SBSQ HOSP IP/OBS SF/LOW 25: CPT | Performed by: INTERNAL MEDICINE

## 2018-06-15 PROCEDURE — 93306 TTE W/DOPPLER COMPLETE: CPT

## 2018-06-15 PROCEDURE — 99232 SBSQ HOSP IP/OBS MODERATE 35: CPT | Performed by: NURSE PRACTITIONER

## 2018-06-15 RX ADMIN — HYDROXYZINE HYDROCHLORIDE 50 MG: 25 TABLET, FILM COATED ORAL at 22:51

## 2018-06-15 RX ADMIN — NEBIVOLOL HYDROCHLORIDE 5 MG: 10 TABLET ORAL at 08:42

## 2018-06-15 RX ADMIN — LAMOTRIGINE 25 MG: 25 TABLET ORAL at 08:42

## 2018-06-15 RX ADMIN — VENLAFAXINE HYDROCHLORIDE 75 MG: 75 CAPSULE, EXTENDED RELEASE ORAL at 08:42

## 2018-06-15 RX ADMIN — LORAZEPAM 1 MG: 1 TABLET ORAL at 08:42

## 2018-06-15 RX ADMIN — LORAZEPAM 1 MG: 1 TABLET ORAL at 17:29

## 2018-06-15 RX ADMIN — PREGABALIN 50 MG: 50 CAPSULE ORAL at 08:42

## 2018-06-15 RX ADMIN — TRAZODONE HYDROCHLORIDE 50 MG: 50 TABLET ORAL at 22:51

## 2018-06-15 RX ADMIN — PANTOPRAZOLE SODIUM 40 MG: 40 TABLET, DELAYED RELEASE ORAL at 08:42

## 2018-06-15 RX ADMIN — PREGABALIN 50 MG: 50 CAPSULE ORAL at 22:51

## 2018-06-15 ASSESSMENT — PAIN SCALES - GENERAL
PAINLEVEL_OUTOF10: 5
PAINLEVEL_OUTOF10: 5

## 2018-06-16 PROCEDURE — 6370000000 HC RX 637 (ALT 250 FOR IP): Performed by: NURSE PRACTITIONER

## 2018-06-16 PROCEDURE — 1240000000 HC EMOTIONAL WELLNESS R&B

## 2018-06-16 PROCEDURE — 6370000000 HC RX 637 (ALT 250 FOR IP): Performed by: INTERNAL MEDICINE

## 2018-06-16 RX ADMIN — MULTIPLE VITAMINS W/ MINERALS TAB 1 TABLET: TAB at 08:20

## 2018-06-16 RX ADMIN — NEBIVOLOL HYDROCHLORIDE 5 MG: 10 TABLET ORAL at 08:19

## 2018-06-16 RX ADMIN — ACETAMINOPHEN 650 MG: 325 TABLET ORAL at 11:11

## 2018-06-16 RX ADMIN — LORAZEPAM 1 MG: 1 TABLET ORAL at 13:28

## 2018-06-16 RX ADMIN — VENLAFAXINE HYDROCHLORIDE 75 MG: 75 CAPSULE, EXTENDED RELEASE ORAL at 08:20

## 2018-06-16 RX ADMIN — PREGABALIN 50 MG: 50 CAPSULE ORAL at 08:19

## 2018-06-16 RX ADMIN — PANTOPRAZOLE SODIUM 40 MG: 40 TABLET, DELAYED RELEASE ORAL at 08:19

## 2018-06-16 RX ADMIN — LAMOTRIGINE 25 MG: 25 TABLET ORAL at 08:19

## 2018-06-16 RX ADMIN — PREGABALIN 50 MG: 50 CAPSULE ORAL at 22:13

## 2018-06-16 RX ADMIN — TRAZODONE HYDROCHLORIDE 50 MG: 50 TABLET ORAL at 22:13

## 2018-06-16 ASSESSMENT — PAIN DESCRIPTION - PAIN TYPE: TYPE: ACUTE PAIN

## 2018-06-16 ASSESSMENT — PAIN SCALES - GENERAL
PAINLEVEL_OUTOF10: 6
PAINLEVEL_OUTOF10: 4

## 2018-06-16 ASSESSMENT — PAIN DESCRIPTION - DESCRIPTORS: DESCRIPTORS: ACHING

## 2018-06-16 ASSESSMENT — PAIN DESCRIPTION - LOCATION: LOCATION: HEAD

## 2018-06-17 PROCEDURE — 6370000000 HC RX 637 (ALT 250 FOR IP): Performed by: NURSE PRACTITIONER

## 2018-06-17 PROCEDURE — 6370000000 HC RX 637 (ALT 250 FOR IP): Performed by: INTERNAL MEDICINE

## 2018-06-17 PROCEDURE — 1240000000 HC EMOTIONAL WELLNESS R&B

## 2018-06-17 PROCEDURE — 99232 SBSQ HOSP IP/OBS MODERATE 35: CPT | Performed by: PSYCHIATRY & NEUROLOGY

## 2018-06-17 RX ADMIN — LORAZEPAM 1 MG: 1 TABLET ORAL at 07:40

## 2018-06-17 RX ADMIN — NEBIVOLOL HYDROCHLORIDE 5 MG: 10 TABLET ORAL at 07:41

## 2018-06-17 RX ADMIN — PANTOPRAZOLE SODIUM 40 MG: 40 TABLET, DELAYED RELEASE ORAL at 07:40

## 2018-06-17 RX ADMIN — HYDROXYZINE HYDROCHLORIDE 50 MG: 25 TABLET, FILM COATED ORAL at 21:10

## 2018-06-17 RX ADMIN — PREGABALIN 50 MG: 50 CAPSULE ORAL at 07:40

## 2018-06-17 RX ADMIN — VENLAFAXINE HYDROCHLORIDE 75 MG: 75 CAPSULE, EXTENDED RELEASE ORAL at 07:41

## 2018-06-17 RX ADMIN — LAMOTRIGINE 25 MG: 25 TABLET ORAL at 07:40

## 2018-06-17 RX ADMIN — PREGABALIN 50 MG: 50 CAPSULE ORAL at 21:10

## 2018-06-17 RX ADMIN — MULTIPLE VITAMINS W/ MINERALS TAB 1 TABLET: TAB at 07:41

## 2018-06-17 RX ADMIN — LORAZEPAM 1 MG: 1 TABLET ORAL at 16:33

## 2018-06-17 ASSESSMENT — PAIN DESCRIPTION - LOCATION
LOCATION: BACK
LOCATION: BACK

## 2018-06-17 ASSESSMENT — PAIN DESCRIPTION - PAIN TYPE
TYPE: CHRONIC PAIN
TYPE: CHRONIC PAIN

## 2018-06-17 ASSESSMENT — PAIN SCALES - GENERAL
PAINLEVEL_OUTOF10: 0
PAINLEVEL_OUTOF10: 5
PAINLEVEL_OUTOF10: 5

## 2018-06-18 PROCEDURE — 6370000000 HC RX 637 (ALT 250 FOR IP): Performed by: NURSE PRACTITIONER

## 2018-06-18 PROCEDURE — 1240000000 HC EMOTIONAL WELLNESS R&B

## 2018-06-18 PROCEDURE — 6370000000 HC RX 637 (ALT 250 FOR IP): Performed by: INTERNAL MEDICINE

## 2018-06-18 PROCEDURE — 99232 SBSQ HOSP IP/OBS MODERATE 35: CPT | Performed by: PSYCHIATRY & NEUROLOGY

## 2018-06-18 RX ADMIN — PREGABALIN 50 MG: 50 CAPSULE ORAL at 08:17

## 2018-06-18 RX ADMIN — LORAZEPAM 1 MG: 1 TABLET ORAL at 15:41

## 2018-06-18 RX ADMIN — NEBIVOLOL HYDROCHLORIDE 5 MG: 10 TABLET ORAL at 08:18

## 2018-06-18 RX ADMIN — MULTIPLE VITAMINS W/ MINERALS TAB 1 TABLET: TAB at 08:18

## 2018-06-18 RX ADMIN — ACETAMINOPHEN 650 MG: 325 TABLET ORAL at 03:21

## 2018-06-18 RX ADMIN — HYDROXYZINE HYDROCHLORIDE 50 MG: 25 TABLET, FILM COATED ORAL at 21:35

## 2018-06-18 RX ADMIN — PREGABALIN 50 MG: 50 CAPSULE ORAL at 21:35

## 2018-06-18 RX ADMIN — VENLAFAXINE HYDROCHLORIDE 75 MG: 75 CAPSULE, EXTENDED RELEASE ORAL at 08:18

## 2018-06-18 RX ADMIN — LAMOTRIGINE 25 MG: 25 TABLET ORAL at 08:18

## 2018-06-18 RX ADMIN — PANTOPRAZOLE SODIUM 40 MG: 40 TABLET, DELAYED RELEASE ORAL at 07:03

## 2018-06-18 ASSESSMENT — PAIN DESCRIPTION - LOCATION: LOCATION: BACK

## 2018-06-18 ASSESSMENT — PAIN DESCRIPTION - PAIN TYPE: TYPE: CHRONIC PAIN

## 2018-06-18 ASSESSMENT — PAIN SCALES - GENERAL
PAINLEVEL_OUTOF10: 0
PAINLEVEL_OUTOF10: 3

## 2018-06-19 VITALS
HEART RATE: 95 BPM | HEIGHT: 69 IN | WEIGHT: 180 LBS | OXYGEN SATURATION: 98 % | RESPIRATION RATE: 14 BRPM | BODY MASS INDEX: 26.66 KG/M2 | DIASTOLIC BLOOD PRESSURE: 73 MMHG | SYSTOLIC BLOOD PRESSURE: 127 MMHG | TEMPERATURE: 98 F

## 2018-06-19 PROCEDURE — 5130000000 HC BRIDGE APPOINTMENT: Performed by: COUNSELOR

## 2018-06-19 PROCEDURE — 99238 HOSP IP/OBS DSCHRG MGMT 30/<: CPT | Performed by: PSYCHIATRY & NEUROLOGY

## 2018-06-19 PROCEDURE — 6370000000 HC RX 637 (ALT 250 FOR IP): Performed by: PSYCHIATRY & NEUROLOGY

## 2018-06-19 PROCEDURE — 6370000000 HC RX 637 (ALT 250 FOR IP): Performed by: NURSE PRACTITIONER

## 2018-06-19 PROCEDURE — 6370000000 HC RX 637 (ALT 250 FOR IP): Performed by: INTERNAL MEDICINE

## 2018-06-19 RX ORDER — VENLAFAXINE HYDROCHLORIDE 75 MG/1
75 CAPSULE, EXTENDED RELEASE ORAL
Qty: 30 CAPSULE | Refills: 0 | Status: SHIPPED | OUTPATIENT
Start: 2018-06-20 | End: 2018-07-16 | Stop reason: SDUPTHER

## 2018-06-19 RX ORDER — FENTANYL 75 UG/H
1 PATCH TRANSDERMAL
Status: DISCONTINUED | OUTPATIENT
Start: 2018-06-19 | End: 2018-06-19 | Stop reason: HOSPADM

## 2018-06-19 RX ORDER — LAMOTRIGINE 25 MG/1
25 TABLET ORAL DAILY
Qty: 30 TABLET | Refills: 0 | Status: SHIPPED | OUTPATIENT
Start: 2018-06-20 | End: 2018-07-16 | Stop reason: SDUPTHER

## 2018-06-19 RX ADMIN — NEBIVOLOL HYDROCHLORIDE 5 MG: 10 TABLET ORAL at 08:41

## 2018-06-19 RX ADMIN — PANTOPRAZOLE SODIUM 40 MG: 40 TABLET, DELAYED RELEASE ORAL at 08:42

## 2018-06-19 RX ADMIN — MULTIPLE VITAMINS W/ MINERALS TAB 1 TABLET: TAB at 08:42

## 2018-06-19 RX ADMIN — VENLAFAXINE HYDROCHLORIDE 75 MG: 75 CAPSULE, EXTENDED RELEASE ORAL at 08:42

## 2018-06-19 RX ADMIN — LAMOTRIGINE 25 MG: 25 TABLET ORAL at 08:42

## 2018-06-19 RX ADMIN — LORAZEPAM 1 MG: 1 TABLET ORAL at 15:58

## 2018-06-19 RX ADMIN — LORAZEPAM 1 MG: 1 TABLET ORAL at 08:41

## 2018-06-19 RX ADMIN — PREGABALIN 50 MG: 50 CAPSULE ORAL at 08:42

## 2018-06-19 ASSESSMENT — PAIN SCALES - GENERAL
PAINLEVEL_OUTOF10: 5
PAINLEVEL_OUTOF10: 8

## 2018-07-30 ENCOUNTER — APPOINTMENT (OUTPATIENT)
Dept: CT IMAGING | Age: 38
End: 2018-07-30
Payer: MEDICARE

## 2018-07-30 ENCOUNTER — HOSPITAL ENCOUNTER (EMERGENCY)
Age: 38
Discharge: HOME | End: 2018-07-30
Attending: EMERGENCY MEDICINE
Payer: MEDICARE

## 2018-07-30 VITALS
BODY MASS INDEX: 25.77 KG/M2 | WEIGHT: 174 LBS | HEIGHT: 69 IN | DIASTOLIC BLOOD PRESSURE: 99 MMHG | OXYGEN SATURATION: 100 % | TEMPERATURE: 97.5 F | SYSTOLIC BLOOD PRESSURE: 158 MMHG | RESPIRATION RATE: 16 BRPM | HEART RATE: 59 BPM

## 2018-07-30 DIAGNOSIS — R07.9 CHEST PAIN, UNSPECIFIED TYPE: Primary | ICD-10-CM

## 2018-07-30 LAB
ABSOLUTE EOS #: 0.1 K/UL (ref 0–0.4)
ABSOLUTE IMMATURE GRANULOCYTE: ABNORMAL K/UL (ref 0–0.3)
ABSOLUTE LYMPH #: 1.9 K/UL (ref 1–4.8)
ABSOLUTE MONO #: 0.6 K/UL (ref 0.1–1.3)
ALBUMIN SERPL-MCNC: 4.4 G/DL (ref 3.5–5.2)
ALBUMIN/GLOBULIN RATIO: NORMAL (ref 1–2.5)
ALP BLD-CCNC: 86 U/L (ref 35–104)
ALT SERPL-CCNC: 12 U/L (ref 5–33)
ANION GAP SERPL CALCULATED.3IONS-SCNC: 11 MMOL/L (ref 9–17)
AST SERPL-CCNC: 17 U/L
BASOPHILS # BLD: 1 % (ref 0–2)
BASOPHILS ABSOLUTE: 0 K/UL (ref 0–0.2)
BILIRUB SERPL-MCNC: 0.58 MG/DL (ref 0.3–1.2)
BUN BLDV-MCNC: 8 MG/DL (ref 6–20)
BUN/CREAT BLD: NORMAL (ref 9–20)
CALCIUM SERPL-MCNC: 9.6 MG/DL (ref 8.6–10.4)
CHLORIDE BLD-SCNC: 100 MMOL/L (ref 98–107)
CO2: 30 MMOL/L (ref 20–31)
CREAT SERPL-MCNC: 0.51 MG/DL (ref 0.5–0.9)
DIFFERENTIAL TYPE: ABNORMAL
EKG ATRIAL RATE: 67 BPM
EKG P AXIS: 48 DEGREES
EKG P-R INTERVAL: 140 MS
EKG Q-T INTERVAL: 382 MS
EKG QRS DURATION: 74 MS
EKG QTC CALCULATION (BAZETT): 403 MS
EKG R AXIS: 57 DEGREES
EKG T AXIS: 71 DEGREES
EKG VENTRICULAR RATE: 67 BPM
EOSINOPHILS RELATIVE PERCENT: 1 % (ref 0–4)
GFR AFRICAN AMERICAN: >60 ML/MIN
GFR NON-AFRICAN AMERICAN: >60 ML/MIN
GFR SERPL CREATININE-BSD FRML MDRD: NORMAL ML/MIN/{1.73_M2}
GFR SERPL CREATININE-BSD FRML MDRD: NORMAL ML/MIN/{1.73_M2}
GLUCOSE BLD-MCNC: 76 MG/DL (ref 70–99)
HCT VFR BLD CALC: 41.4 % (ref 36–46)
HEMOGLOBIN: 14.3 G/DL (ref 12–16)
IMMATURE GRANULOCYTES: ABNORMAL %
INR BLD: 1
LYMPHOCYTES # BLD: 30 % (ref 24–44)
MCH RBC QN AUTO: 29.9 PG (ref 26–34)
MCHC RBC AUTO-ENTMCNC: 34.6 G/DL (ref 31–37)
MCV RBC AUTO: 86.6 FL (ref 80–100)
MONOCYTES # BLD: 9 % (ref 1–7)
NRBC AUTOMATED: ABNORMAL PER 100 WBC
PARTIAL THROMBOPLASTIN TIME: 28.5 SEC (ref 23–31)
PDW BLD-RTO: 13.1 % (ref 11.5–14.9)
PLATELET # BLD: 327 K/UL (ref 150–450)
PLATELET ESTIMATE: ABNORMAL
PMV BLD AUTO: 8.4 FL (ref 6–12)
POTASSIUM SERPL-SCNC: 4.5 MMOL/L (ref 3.7–5.3)
PROTHROMBIN TIME: 10.1 SEC (ref 9.7–12)
RBC # BLD: 4.78 M/UL (ref 4–5.2)
RBC # BLD: ABNORMAL 10*6/UL
SEG NEUTROPHILS: 59 % (ref 36–66)
SEGMENTED NEUTROPHILS ABSOLUTE COUNT: 3.7 K/UL (ref 1.3–9.1)
SODIUM BLD-SCNC: 141 MMOL/L (ref 135–144)
TOTAL PROTEIN: 7.4 G/DL (ref 6.4–8.3)
TROPONIN INTERP: NORMAL
TROPONIN T: <0.03 NG/ML
WBC # BLD: 6.3 K/UL (ref 3.5–11)
WBC # BLD: ABNORMAL 10*3/UL

## 2018-07-30 PROCEDURE — 84484 ASSAY OF TROPONIN QUANT: CPT

## 2018-07-30 PROCEDURE — 93005 ELECTROCARDIOGRAM TRACING: CPT

## 2018-07-30 PROCEDURE — 36415 COLL VENOUS BLD VENIPUNCTURE: CPT

## 2018-07-30 PROCEDURE — 96374 THER/PROPH/DIAG INJ IV PUSH: CPT

## 2018-07-30 PROCEDURE — 6360000002 HC RX W HCPCS: Performed by: EMERGENCY MEDICINE

## 2018-07-30 PROCEDURE — 80053 COMPREHEN METABOLIC PANEL: CPT

## 2018-07-30 PROCEDURE — 99285 EMERGENCY DEPT VISIT HI MDM: CPT

## 2018-07-30 PROCEDURE — 85025 COMPLETE CBC W/AUTO DIFF WBC: CPT

## 2018-07-30 PROCEDURE — 6360000004 HC RX CONTRAST MEDICATION: Performed by: EMERGENCY MEDICINE

## 2018-07-30 PROCEDURE — 85610 PROTHROMBIN TIME: CPT

## 2018-07-30 PROCEDURE — 96375 TX/PRO/DX INJ NEW DRUG ADDON: CPT

## 2018-07-30 PROCEDURE — 2580000003 HC RX 258: Performed by: EMERGENCY MEDICINE

## 2018-07-30 PROCEDURE — 85730 THROMBOPLASTIN TIME PARTIAL: CPT

## 2018-07-30 PROCEDURE — 74174 CTA ABD&PLVS W/CONTRAST: CPT

## 2018-07-30 PROCEDURE — 71275 CT ANGIOGRAPHY CHEST: CPT

## 2018-07-30 RX ORDER — SODIUM CHLORIDE 0.9 % (FLUSH) 0.9 %
10 SYRINGE (ML) INJECTION PRN
Status: DISCONTINUED | OUTPATIENT
Start: 2018-07-30 | End: 2018-07-31 | Stop reason: HOSPADM

## 2018-07-30 RX ORDER — 0.9 % SODIUM CHLORIDE 0.9 %
1000 INTRAVENOUS SOLUTION INTRAVENOUS ONCE
Status: COMPLETED | OUTPATIENT
Start: 2018-07-30 | End: 2018-07-30

## 2018-07-30 RX ORDER — LORAZEPAM 2 MG/ML
1 INJECTION INTRAMUSCULAR ONCE
Status: COMPLETED | OUTPATIENT
Start: 2018-07-30 | End: 2018-07-30

## 2018-07-30 RX ORDER — 0.9 % SODIUM CHLORIDE 0.9 %
80 INTRAVENOUS SOLUTION INTRAVENOUS ONCE
Status: COMPLETED | OUTPATIENT
Start: 2018-07-30 | End: 2018-07-30

## 2018-07-30 RX ORDER — FENTANYL CITRATE 50 UG/ML
50 INJECTION, SOLUTION INTRAMUSCULAR; INTRAVENOUS ONCE
Status: COMPLETED | OUTPATIENT
Start: 2018-07-30 | End: 2018-07-30

## 2018-07-30 RX ADMIN — FENTANYL CITRATE 50 MCG: 50 INJECTION, SOLUTION INTRAMUSCULAR; INTRAVENOUS at 21:22

## 2018-07-30 RX ADMIN — SODIUM CHLORIDE 80 ML: 9 INJECTION, SOLUTION INTRAVENOUS at 22:30

## 2018-07-30 RX ADMIN — LORAZEPAM 1 MG: 2 INJECTION INTRAMUSCULAR; INTRAVENOUS at 22:11

## 2018-07-30 RX ADMIN — Medication 10 ML: at 22:30

## 2018-07-30 RX ADMIN — SODIUM CHLORIDE 1000 ML: 9 INJECTION, SOLUTION INTRAVENOUS at 21:21

## 2018-07-30 RX ADMIN — IOPAMIDOL 100 ML: 755 INJECTION, SOLUTION INTRAVENOUS at 22:30

## 2018-07-30 ASSESSMENT — PAIN SCALES - GENERAL
PAINLEVEL_OUTOF10: 6
PAINLEVEL_OUTOF10: 6

## 2018-07-30 ASSESSMENT — PAIN DESCRIPTION - LOCATION
LOCATION: CHEST
LOCATION: CHEST

## 2018-07-30 ASSESSMENT — ENCOUNTER SYMPTOMS
COUGH: 0
SHORTNESS OF BREATH: 0
RESPIRATORY NEGATIVE: 1
EYES NEGATIVE: 1
ABDOMINAL PAIN: 0
GASTROINTESTINAL NEGATIVE: 1
BACK PAIN: 0

## 2018-07-30 ASSESSMENT — PAIN DESCRIPTION - PAIN TYPE
TYPE: ACUTE PAIN
TYPE: ACUTE PAIN

## 2018-07-31 ENCOUNTER — CARE COORDINATION (OUTPATIENT)
Dept: CARE COORDINATION | Age: 38
End: 2018-07-31

## 2018-07-31 NOTE — ED PROVIDER NOTES
Past Surgical History:   Procedure Laterality Date    BREAST REDUCTION SURGERY Bilateral     2012    [facility]    FOOT SURGERY Bilateral     RT HAS 2 SCREWS, LT HAD FUSION IN DEC WITH 2 SCREWS    HIP ARTHROSCOPY Left     KNEE ARTHROSCOPY Right     X 2    [place] EGD TRANSORAL BIOPSY SINGLE/MULTIPLE N/A 8/29/2017    EGD BIOPSY WITH DILATATION WITH MALONEYS performed by Rich Arreola MD at [address] [facility] Bilateral     harware in place bilat    TUBAL LIGATION      clips placed on fallopian tubes    UPPER GASTROINTESTINAL ENDOSCOPY  08/29/2017     EXTENSIVE SPASMATIC CONTRACTIONS; NO LUMINAL OBSTRUCTIONS; CLOSER TO THE DISTAL ESOPHAGUS AND DEVELOPING DIVERTICULUM IS NOTED AND BELIEVED TO BE SEC TO DSYSMOTILITY;  DALEY DIATIONS WERE DONE EMPIRICALLY WITH 56 FR     CURRENT MEDICATIONS       Previous Medications    ALBUTEROL SULFATE HFA (PROVENTIL HFA) 108 (90 BASE) MCG/ACT INHALER    Inhale 2 puffs into the lungs every 6 hours as needed for Wheezing    FLUCONAZOLE (DIFLUCAN) 150 MG TABLET    Take 1 tablet by mouth once a week    HYDROXYZINE (VISTARIL) 25 MG CAPSULE    Take 1 capsule by mouth 4 times daily as needed for Anxiety    LAMOTRIGINE (LAMICTAL) 25 MG TABLET    Take 1 tablet by mouth daily    MULTIPLE VITAMINS-MINERALS (THERAPEUTIC MULTIVITAMIN-MINERALS) TABLET    Take 1 tablet by mouth daily    NEBIVOLOL (BYSTOLIC) 2.5 MG TABLET    take 1 tablet by mouth once daily    PREGABALIN (LYRICA) 50 MG CAPSULE    Take 1 capsule by mouth 2 times daily for 30 days. .    VENLAFAXINE (EFFEXOR XR) 75 MG EXTENDED RELEASE CAPSULE    Take 1 capsule by mouth daily (with breakfast)     ALLERGIES     is allergic to morphine. FAMILY HISTORY     indicated that her mother is alive. She indicated that her father is alive. She indicated that the status of her brother is unknown. She indicated that the status of her maternal grandmother is unknown.       SOCIAL HISTORY reports that she quit smoking about 3 years ago. She quit after 1.50 years of use. She has never used smokeless tobacco. She reports that she does not drink alcohol or use drugs. PHYSICAL EXAM     INITIAL VITALS: BP (!) 144/78   Pulse 71   Temp 97.8 °F (36.6 °C) (Oral)   Resp 14   Ht 5' 9\" (1.753 m)   Wt 174 lb (78.9 kg)   SpO2 100%   BMI 25.70 kg/m²    Physical Exam   Constitutional: She is oriented to person, place, and time. No distress. HENT:   Head: Normocephalic and atraumatic. Eyes: Conjunctivae are normal.   Neck: Normal range of motion. Neck supple. Cardiovascular: Normal rate, regular rhythm and normal heart sounds. No murmur heard. Pulmonary/Chest: Effort normal and breath sounds normal.   Abdominal: Soft. There is no tenderness. Musculoskeletal: She exhibits no deformity. Neurological: She is alert and oriented to person, place, and time. Skin: Skin is warm and dry. No rash noted. She is not diaphoretic. Nursing note and vitals reviewed. MEDICAL DECISION MAKING:   Chest pain. Given risk, ordered labs and imaging, will continue to monitor closely. Reviewed results, CTA is unchanged from baseline.  ekg and trop negative. Labs otherwise nonacute. Symptoms resolved with ativan. Overall, I do not suspect acs, dissection, pneumo, pna, pe or other emergency pathology. Discussed results and plan with the pt. They expressed appropriate understanding. Pt given close follow up, supportive care instructions and strict return instructions at the bedside. CRITICAL CARE:       PROCEDURES:    Procedures    DIAGNOSTIC RESULTS   EKG: All EKG's are interpreted by the Emergency Department Physician who either signs or Co-signs this chart in the absence of a cardiologist.  Ekg, rate of 67, nsr, no st seg changes, normal pr and qrs, no acute ischemic changes.     RADIOLOGY:All plain film, CT, MRI, and formal ultrasound images (except ED bedside ultrasound) are read by the Catrina Friend  [facility] [address]  [phone]    Call in 1 day      DISCHARGE MEDICATIONS:  New Prescriptions    No medications on file     Funmilayo Gonzalez MD  Attending Emergency Physician  Andrew voice recognition software used in portions of this document.                     Darren Shannon MD  07/30/18 5479

## 2018-07-31 NOTE — CARE COORDINATION
Patient was called to follow up with most recent ER visit. There was no answer. A message was left on voicemail to have patient call back regarding ER visit. Office number given [phone].

## 2018-08-10 ENCOUNTER — APPOINTMENT (OUTPATIENT)
Dept: CT IMAGING | Age: 38
End: 2018-08-10
Payer: MEDICARE

## 2018-08-10 ENCOUNTER — HOSPITAL ENCOUNTER (EMERGENCY)
Age: 38
Discharge: HOME | End: 2018-08-10
Attending: EMERGENCY MEDICINE
Payer: MEDICARE

## 2018-08-10 ENCOUNTER — APPOINTMENT (OUTPATIENT)
Dept: GENERAL RADIOLOGY | Age: 38
End: 2018-08-10
Payer: MEDICARE

## 2018-08-10 VITALS
RESPIRATION RATE: 12 BRPM | BODY MASS INDEX: 25.18 KG/M2 | OXYGEN SATURATION: 98 % | TEMPERATURE: 97.4 F | DIASTOLIC BLOOD PRESSURE: 104 MMHG | HEIGHT: 69 IN | HEART RATE: 77 BPM | SYSTOLIC BLOOD PRESSURE: 149 MMHG | WEIGHT: 170 LBS

## 2018-08-10 DIAGNOSIS — S01.81XA FACIAL LACERATION, INITIAL ENCOUNTER: ICD-10-CM

## 2018-08-10 DIAGNOSIS — T40.1X1A ACCIDENTAL HEROIN OVERDOSE, INITIAL ENCOUNTER (HCC): Primary | ICD-10-CM

## 2018-08-10 PROCEDURE — 70450 CT HEAD/BRAIN W/O DYE: CPT

## 2018-08-10 PROCEDURE — 71046 X-RAY EXAM CHEST 2 VIEWS: CPT

## 2018-08-10 PROCEDURE — 73030 X-RAY EXAM OF SHOULDER: CPT

## 2018-08-10 PROCEDURE — 99285 EMERGENCY DEPT VISIT HI MDM: CPT

## 2018-08-10 PROCEDURE — 72125 CT NECK SPINE W/O DYE: CPT

## 2018-08-10 PROCEDURE — 6360000002 HC RX W HCPCS: Performed by: EMERGENCY MEDICINE

## 2018-08-10 RX ORDER — NALOXONE HYDROCHLORIDE 1 MG/ML
INJECTION INTRAMUSCULAR; INTRAVENOUS; SUBCUTANEOUS
Status: DISCONTINUED
Start: 2018-08-10 | End: 2018-08-10 | Stop reason: HOSPADM

## 2018-08-10 RX ORDER — FENTANYL 75 UG/H
1 PATCH TRANSDERMAL
Status: ON HOLD | COMMUNITY
End: 2018-08-15 | Stop reason: HOSPADM

## 2018-08-10 RX ORDER — ONDANSETRON 4 MG/1
4 TABLET, ORALLY DISINTEGRATING ORAL ONCE
Status: COMPLETED | OUTPATIENT
Start: 2018-08-10 | End: 2018-08-10

## 2018-08-10 RX ADMIN — ONDANSETRON 4 MG: 4 TABLET, ORALLY DISINTEGRATING ORAL at 02:43

## 2018-08-10 ASSESSMENT — ENCOUNTER SYMPTOMS
EYE PAIN: 0
SORE THROAT: 0
NAUSEA: 0
BACK PAIN: 0
DIARRHEA: 0
VOMITING: 0
ABDOMINAL PAIN: 0
COUGH: 0
SHORTNESS OF BREATH: 0

## 2018-08-10 ASSESSMENT — PAIN DESCRIPTION - LOCATION: LOCATION: HEAD;SHOULDER;HIP

## 2018-08-10 ASSESSMENT — PAIN DESCRIPTION - DESCRIPTORS: DESCRIPTORS: ACHING;THROBBING;SORE

## 2018-08-10 ASSESSMENT — PAIN SCALES - GENERAL: PAINLEVEL_OUTOF10: 6

## 2018-08-10 ASSESSMENT — PAIN DESCRIPTION - PAIN TYPE: TYPE: ACUTE PAIN

## 2018-08-10 ASSESSMENT — PAIN DESCRIPTION - ORIENTATION: ORIENTATION: RIGHT

## 2018-08-10 NOTE — ED PROVIDER NOTES
vomiting; PONV (postoperative nausea and vomiting); Scoliosis; Seasonal allergies; Staph infection; and UTI (lower urinary tract infection). Surgical History:  has a past surgical history that includes Foot surgery (Bilateral); Hip arthroscopy (Left); Knee arthroscopy (Right); Tubal ligation; Breast reduction surgery (Bilateral); Foot Capsulotomy (Left, 9/17/14); Shoulder Arthroplasty (Bilateral); pr egd transoral biopsy single/multiple (N/A, 8/29/2017); and Upper gastrointestinal endoscopy (08/29/2017). Social History:  reports that she quit smoking about 3 years ago. She quit after 1.50 years of use. She has never used smokeless tobacco. She reports that she does not drink alcohol or use drugs. Family History: Noncontributory at this time  Psychiatric History: Noncontributory at this time  Allergies:is allergic to morphine. PHYSICAL EXAM     INITIAL VITALS: BP (!) 149/104   Pulse 77   Temp 97.4 °F (36.3 °C) (Oral)   Resp 12   Ht 5' 9\" (1.753 m)   Wt 170 lb (77.1 kg)   SpO2 98%   BMI 25.10 kg/m²     Physical Exam   Constitutional: She is oriented to person, place, and time. She appears well-developed and well-nourished. HENT:   Head: Normocephalic. Right Ear: External ear normal.   Left Ear: External ear normal.   Nose: Nose normal.   Mouth/Throat: Oropharynx is clear and moist.   Eyes: Conjunctivae and EOM are normal. Pupils are equal, round, and reactive to light. Right eye exhibits no discharge. Left eye exhibits no discharge. Neck: Normal range of motion. Neck supple. No tracheal deviation present. Cardiovascular: Normal rate, regular rhythm, normal heart sounds and intact distal pulses. Exam reveals no gallop and no friction rub. No murmur heard. Pulmonary/Chest: Effort normal and breath sounds normal. No respiratory distress. She has no wheezes. She has no rales. She exhibits no tenderness. Abdominal: Soft. Bowel sounds are normal. She exhibits no distension and no mass. Anesthesia method:  None  Laceration details:     Location:  Face    Length (cm):  1    Depth (mm):  2  Repair type:     Repair type:  Simple  Pre-procedure details:     Preparation:  Imaging obtained to evaluate for foreign bodies  Exploration:     Hemostasis achieved with:  Direct pressure    Wound exploration: entire depth of wound probed and visualized      Wound extent: no areolar tissue violation noted, no fascia violation noted, no foreign bodies/material noted, no muscle damage noted, no nerve damage noted, no tendon damage noted, no underlying fracture noted and no vascular damage noted      Contaminated: no    Treatment:     Area cleansed with:  Saline    Amount of cleaning:  Standard    Irrigation solution:  Sterile saline    Irrigation volume:  10    Irrigation method:  Syringe    Visualized foreign bodies/material removed: no    Skin repair:     Repair method:  Tissue adhesive  Approximation:     Approximation:  Close    Vermilion border: well-aligned    Post-procedure details:     Dressing:  Open (no dressing)    Patient tolerance of procedure: Tolerated well, no immediate complications  Comments: There is superficial laceration, that has minimal separation, no skin adhesive used. CONSULTS:  None      FINAL IMPRESSION      1. Accidental heroin overdose, initial encounter    2. Facial laceration, initial encounter          DISPOSITION/PLAN:  DISCHARGE    PATIENT REFERRED TO:  Daniela Macias MD  [address]   [address] [facility] [address]  [phone]    Call today  Re-Evaluation    [address] [phone]  Call today  Re-Evaluation    [facility] ED  [place] [id number]  [phone]  Go to   If symptoms worsen, As needed      DISCHARGE MEDICATIONS:  Discharge Medication List as of 8/10/2018  3:07 AM          (Please note that portions of this note were completed with a voice recognition program. Efforts were made to edit the dictations but occasionally words are mis-transcribed.)    Hiren Multani MD  Attending Emergency Physician              Hiren Multani MD  08/10/18 5976

## 2018-08-12 ENCOUNTER — HOSPITAL ENCOUNTER (INPATIENT)
Age: 38
LOS: 3 days | Discharge: HOME | DRG: 885 | End: 2018-08-15
Attending: EMERGENCY MEDICINE | Admitting: PSYCHIATRY & NEUROLOGY
Payer: MEDICARE

## 2018-08-12 DIAGNOSIS — Z87.898 HISTORY OF CHRONIC PAIN: ICD-10-CM

## 2018-08-12 DIAGNOSIS — R45.851 SUICIDAL IDEATION: Primary | ICD-10-CM

## 2018-08-12 DIAGNOSIS — F11.93 OPIATE WITHDRAWAL (HCC): ICD-10-CM

## 2018-08-12 PROBLEM — F33.3 SEVERE RECURRENT MAJOR DEPRESSION WITH PSYCHOTIC FEATURES (HCC): Status: ACTIVE | Noted: 2018-08-12

## 2018-08-12 LAB
-: ABNORMAL
ABSOLUTE EOS #: 0 K/UL (ref 0–0.4)
ABSOLUTE IMMATURE GRANULOCYTE: ABNORMAL K/UL (ref 0–0.3)
ABSOLUTE LYMPH #: 1.1 K/UL (ref 1–4.8)
ABSOLUTE MONO #: 0.4 K/UL (ref 0.1–1.3)
ALBUMIN SERPL-MCNC: 4.6 G/DL (ref 3.5–5.2)
ALBUMIN/GLOBULIN RATIO: ABNORMAL (ref 1–2.5)
ALP BLD-CCNC: 83 U/L (ref 35–104)
ALT SERPL-CCNC: 10 U/L (ref 5–33)
AMORPHOUS: ABNORMAL
AMPHETAMINE SCREEN URINE: NEGATIVE
ANION GAP SERPL CALCULATED.3IONS-SCNC: 13 MMOL/L (ref 9–17)
AST SERPL-CCNC: 12 U/L
BACTERIA: ABNORMAL
BARBITURATE SCREEN URINE: NEGATIVE
BASOPHILS # BLD: 0 % (ref 0–2)
BASOPHILS ABSOLUTE: 0 K/UL (ref 0–0.2)
BENZODIAZEPINE SCREEN, URINE: NEGATIVE
BILIRUB SERPL-MCNC: 0.71 MG/DL (ref 0.3–1.2)
BILIRUBIN URINE: NEGATIVE
BUN BLDV-MCNC: 8 MG/DL (ref 6–20)
BUN/CREAT BLD: ABNORMAL (ref 9–20)
BUPRENORPHINE URINE: NORMAL
CALCIUM SERPL-MCNC: 9.7 MG/DL (ref 8.6–10.4)
CANNABINOID SCREEN URINE: NEGATIVE
CASTS UA: ABNORMAL /LPF
CHLORIDE BLD-SCNC: 102 MMOL/L (ref 98–107)
CO2: 26 MMOL/L (ref 20–31)
COCAINE METABOLITE, URINE: NEGATIVE
COLOR: YELLOW
COMMENT UA: ABNORMAL
CREAT SERPL-MCNC: 0.56 MG/DL (ref 0.5–0.9)
CRYSTALS, UA: ABNORMAL /HPF
DIFFERENTIAL TYPE: ABNORMAL
EOSINOPHILS RELATIVE PERCENT: 0 % (ref 0–4)
EPITHELIAL CELLS UA: ABNORMAL /HPF
GFR AFRICAN AMERICAN: >60 ML/MIN
GFR NON-AFRICAN AMERICAN: >60 ML/MIN
GFR SERPL CREATININE-BSD FRML MDRD: ABNORMAL ML/MIN/{1.73_M2}
GFR SERPL CREATININE-BSD FRML MDRD: ABNORMAL ML/MIN/{1.73_M2}
GLUCOSE BLD-MCNC: 102 MG/DL (ref 70–99)
GLUCOSE URINE: ABNORMAL
HCG(URINE) PREGNANCY TEST: NEGATIVE
HCT VFR BLD CALC: 45.8 % (ref 36–46)
HEMOGLOBIN: 15.6 G/DL (ref 12–16)
IMMATURE GRANULOCYTES: ABNORMAL %
KETONES, URINE: NEGATIVE
LEUKOCYTE ESTERASE, URINE: NEGATIVE
LYMPHOCYTES # BLD: 12 % (ref 24–44)
MCH RBC QN AUTO: 28.9 PG (ref 26–34)
MCHC RBC AUTO-ENTMCNC: 34 G/DL (ref 31–37)
MCV RBC AUTO: 84.8 FL (ref 80–100)
MDMA URINE: NORMAL
METHADONE SCREEN, URINE: NEGATIVE
METHAMPHETAMINE, URINE: NORMAL
MONOCYTES # BLD: 4 % (ref 1–7)
MUCUS: ABNORMAL
NITRITE, URINE: NEGATIVE
NRBC AUTOMATED: ABNORMAL PER 100 WBC
OPIATES, URINE: NEGATIVE
OTHER OBSERVATIONS UA: ABNORMAL
OXYCODONE SCREEN URINE: NEGATIVE
PDW BLD-RTO: 13.5 % (ref 11.5–14.9)
PH UA: 6 (ref 5–8)
PHENCYCLIDINE, URINE: NEGATIVE
PLATELET # BLD: 336 K/UL (ref 150–450)
PLATELET ESTIMATE: ABNORMAL
PMV BLD AUTO: 8 FL (ref 6–12)
POTASSIUM SERPL-SCNC: 4.1 MMOL/L (ref 3.7–5.3)
PROPOXYPHENE, URINE: NORMAL
PROTEIN UA: NEGATIVE
RBC # BLD: 5.4 M/UL (ref 4–5.2)
RBC # BLD: ABNORMAL 10*6/UL
RBC UA: ABNORMAL /HPF
RENAL EPITHELIAL, UA: ABNORMAL /HPF
SEG NEUTROPHILS: 84 % (ref 36–66)
SEGMENTED NEUTROPHILS ABSOLUTE COUNT: 7.4 K/UL (ref 1.3–9.1)
SODIUM BLD-SCNC: 141 MMOL/L (ref 135–144)
SPECIFIC GRAVITY UA: 1.01 (ref 1–1.03)
TEST INFORMATION: NORMAL
TOTAL PROTEIN: 7.6 G/DL (ref 6.4–8.3)
TRICHOMONAS: ABNORMAL
TRICYCLIC ANTIDEPRESSANTS, UR: NORMAL
TURBIDITY: ABNORMAL
URINE HGB: NEGATIVE
UROBILINOGEN, URINE: NORMAL
WBC # BLD: 8.9 K/UL (ref 3.5–11)
WBC # BLD: ABNORMAL 10*3/UL
WBC UA: ABNORMAL /HPF
YEAST: ABNORMAL

## 2018-08-12 PROCEDURE — 84703 CHORIONIC GONADOTROPIN ASSAY: CPT

## 2018-08-12 PROCEDURE — 85025 COMPLETE CBC W/AUTO DIFF WBC: CPT

## 2018-08-12 PROCEDURE — 6370000000 HC RX 637 (ALT 250 FOR IP): Performed by: PSYCHIATRY & NEUROLOGY

## 2018-08-12 PROCEDURE — 80053 COMPREHEN METABOLIC PANEL: CPT

## 2018-08-12 PROCEDURE — 99285 EMERGENCY DEPT VISIT HI MDM: CPT

## 2018-08-12 PROCEDURE — 80307 DRUG TEST PRSMV CHEM ANLYZR: CPT

## 2018-08-12 PROCEDURE — 87086 URINE CULTURE/COLONY COUNT: CPT

## 2018-08-12 PROCEDURE — 81001 URINALYSIS AUTO W/SCOPE: CPT

## 2018-08-12 PROCEDURE — 36415 COLL VENOUS BLD VENIPUNCTURE: CPT

## 2018-08-12 PROCEDURE — 1240000000 HC EMOTIONAL WELLNESS R&B

## 2018-08-12 PROCEDURE — 6370000000 HC RX 637 (ALT 250 FOR IP): Performed by: EMERGENCY MEDICINE

## 2018-08-12 RX ORDER — LAMOTRIGINE 25 MG/1
25 TABLET ORAL DAILY
Status: DISCONTINUED | OUTPATIENT
Start: 2018-08-13 | End: 2018-08-15 | Stop reason: HOSPADM

## 2018-08-12 RX ORDER — CLONIDINE HYDROCHLORIDE 0.1 MG/1
0.1 TABLET ORAL 3 TIMES DAILY
Qty: 6 TABLET | Refills: 0 | Status: SHIPPED | OUTPATIENT
Start: 2018-08-12 | End: 2018-08-28

## 2018-08-12 RX ORDER — BENZTROPINE MESYLATE 1 MG/ML
2 INJECTION INTRAMUSCULAR; INTRAVENOUS 2 TIMES DAILY PRN
Status: DISCONTINUED | OUTPATIENT
Start: 2018-08-12 | End: 2018-08-15 | Stop reason: HOSPADM

## 2018-08-12 RX ORDER — NEBIVOLOL 2.5 MG/1
2.5 TABLET ORAL DAILY
Status: DISCONTINUED | OUTPATIENT
Start: 2018-08-13 | End: 2018-08-15 | Stop reason: HOSPADM

## 2018-08-12 RX ORDER — ACETAMINOPHEN 325 MG/1
650 TABLET ORAL EVERY 4 HOURS PRN
Status: DISCONTINUED | OUTPATIENT
Start: 2018-08-12 | End: 2018-08-15 | Stop reason: HOSPADM

## 2018-08-12 RX ORDER — CLONIDINE HYDROCHLORIDE 0.1 MG/1
0.1 TABLET ORAL ONCE
Status: COMPLETED | OUTPATIENT
Start: 2018-08-12 | End: 2018-08-12

## 2018-08-12 RX ORDER — HYDROXYZINE HYDROCHLORIDE 25 MG/1
50 TABLET, FILM COATED ORAL 3 TIMES DAILY PRN
Status: DISCONTINUED | OUTPATIENT
Start: 2018-08-12 | End: 2018-08-15 | Stop reason: HOSPADM

## 2018-08-12 RX ORDER — VENLAFAXINE HYDROCHLORIDE 75 MG/1
75 CAPSULE, EXTENDED RELEASE ORAL
Status: DISCONTINUED | OUTPATIENT
Start: 2018-08-13 | End: 2018-08-15 | Stop reason: HOSPADM

## 2018-08-12 RX ORDER — NICOTINE 21 MG/24HR
1 PATCH, TRANSDERMAL 24 HOURS TRANSDERMAL DAILY
Status: DISCONTINUED | OUTPATIENT
Start: 2018-08-13 | End: 2018-08-13

## 2018-08-12 RX ORDER — TRAZODONE HYDROCHLORIDE 50 MG/1
50 TABLET ORAL NIGHTLY PRN
Status: DISCONTINUED | OUTPATIENT
Start: 2018-08-13 | End: 2018-08-12

## 2018-08-12 RX ORDER — ONDANSETRON 4 MG/1
4 TABLET, ORALLY DISINTEGRATING ORAL EVERY 8 HOURS PRN
Qty: 8 TABLET | Refills: 0 | Status: SHIPPED | OUTPATIENT
Start: 2018-08-12 | End: 2018-08-15 | Stop reason: HOSPADM

## 2018-08-12 RX ORDER — MAGNESIUM HYDROXIDE/ALUMINUM HYDROXICE/SIMETHICONE 120; 1200; 1200 MG/30ML; MG/30ML; MG/30ML
30 SUSPENSION ORAL EVERY 6 HOURS PRN
Status: DISCONTINUED | OUTPATIENT
Start: 2018-08-12 | End: 2018-08-15 | Stop reason: HOSPADM

## 2018-08-12 RX ORDER — PREGABALIN 50 MG/1
50 CAPSULE ORAL 2 TIMES DAILY
Status: DISCONTINUED | OUTPATIENT
Start: 2018-08-12 | End: 2018-08-15 | Stop reason: HOSPADM

## 2018-08-12 RX ORDER — TRAZODONE HYDROCHLORIDE 50 MG/1
50 TABLET ORAL NIGHTLY PRN
Status: DISCONTINUED | OUTPATIENT
Start: 2018-08-12 | End: 2018-08-15 | Stop reason: HOSPADM

## 2018-08-12 RX ORDER — DICYCLOMINE HYDROCHLORIDE 10 MG/1
10 CAPSULE ORAL EVERY 6 HOURS PRN
Qty: 12 CAPSULE | Refills: 0 | Status: SHIPPED | OUTPATIENT
Start: 2018-08-12 | End: 2018-08-28

## 2018-08-12 RX ORDER — DICYCLOMINE HYDROCHLORIDE 10 MG/1
10 CAPSULE ORAL ONCE
Status: COMPLETED | OUTPATIENT
Start: 2018-08-12 | End: 2018-08-12

## 2018-08-12 RX ADMIN — CLONIDINE HYDROCHLORIDE 0.1 MG: 0.1 TABLET ORAL at 21:27

## 2018-08-12 RX ADMIN — PREGABALIN 50 MG: 50 CAPSULE ORAL at 23:52

## 2018-08-12 RX ADMIN — DICYCLOMINE HYDROCHLORIDE 10 MG: 10 CAPSULE ORAL at 21:27

## 2018-08-12 RX ADMIN — TRAZODONE HYDROCHLORIDE 50 MG: 50 TABLET ORAL at 23:52

## 2018-08-12 ASSESSMENT — ENCOUNTER SYMPTOMS
DIARRHEA: 1
COUGH: 0
SHORTNESS OF BREATH: 0
BACK PAIN: 0
EYE PAIN: 0
VOMITING: 0
ABDOMINAL PAIN: 0
NAUSEA: 1
SORE THROAT: 0

## 2018-08-12 ASSESSMENT — PAIN SCALES - GENERAL
PAINLEVEL_OUTOF10: 8
PAINLEVEL_OUTOF10: 0

## 2018-08-12 ASSESSMENT — SLEEP AND FATIGUE QUESTIONNAIRES
DO YOU USE A SLEEP AID: NO
SLEEP PATTERN: DIFFICULTY FALLING ASLEEP;INSOMNIA;RESTLESSNESS
DO YOU HAVE DIFFICULTY SLEEPING: YES
AVERAGE NUMBER OF SLEEP HOURS: 4
DIFFICULTY ARISING: NO
DIFFICULTY STAYING ASLEEP: YES
DIFFICULTY FALLING ASLEEP: YES
RESTFUL SLEEP: NO

## 2018-08-12 ASSESSMENT — PAIN DESCRIPTION - PAIN TYPE: TYPE: CHRONIC PAIN

## 2018-08-12 ASSESSMENT — LIFESTYLE VARIABLES: HISTORY_ALCOHOL_USE: NO

## 2018-08-12 ASSESSMENT — PATIENT HEALTH QUESTIONNAIRE - PHQ9: SUM OF ALL RESPONSES TO PHQ QUESTIONS 1-9: 20

## 2018-08-12 ASSESSMENT — PAIN DESCRIPTION - DESCRIPTORS: DESCRIPTORS: ACHING

## 2018-08-12 NOTE — ED NOTES
Phone call to Macy Renteria at [facility] who reports that she has beds available. SW to fax all paperwork when resulted.

## 2018-08-12 NOTE — ED NOTES
Pt states she was in the the ED 2 nights ago d/t a heroine OD. When asked if pt intended to OD to end her life, pt states \"I didn't do heroine to try to kill myself, but if it happened, I wouldn't have mind. \"     Jose De Jesus Griffith RN  08/12/18 2281

## 2018-08-12 NOTE — ED PROVIDER NOTES
HISTORY   PMH:  has a past medical history of Anxiety; Arthritis; Bipolar depression ([address] Dermatitis; Dysuria; Exposure to STD; Folliculitis; Hx of acute bronchitis; Hypertension; Marfan syndrome; MVP (mitral valve prolapse); Nausea & vomiting; PONV (postoperative nausea and vomiting); Scoliosis; Seasonal allergies; Staph infection; and UTI (lower urinary tract infection). Surgical History:  has a past surgical history that includes Foot surgery (Bilateral); Hip arthroscopy (Left); Knee arthroscopy (Right); Tubal ligation; Breast reduction surgery (Bilateral); Foot Capsulotomy (Left, 9/17/14); Shoulder Arthroplasty (Bilateral); pr egd transoral biopsy single/multiple (N/A, 8/29/2017); and Upper gastrointestinal endoscopy (08/29/2017). Social History:  reports that she quit smoking about 3 years ago. She quit after 1.50 years of use. She has never used smokeless tobacco. She reports that she uses drugs, including Opiates . She reports that she does not drink alcohol. Family History: Noncontributory at this time  Psychiatric History: Noncontributory at this time  Allergies:is allergic to morphine. PHYSICAL EXAM     INITIAL VITALS: BP (!) 131/100   Pulse 101   Temp 98.2 °F (36.8 °C) (Oral)   Resp 14   Ht 5' 9\" (1.753 m)   Wt 166 lb 11.2 oz (75.6 kg)   SpO2 100%   BMI 24.62 kg/m²     Physical Exam   Constitutional: She is oriented to person, place, and time. She appears well-developed and well-nourished. HENT:   Head: Normocephalic and atraumatic. Right Ear: External ear normal.   Left Ear: External ear normal.   Nose: Nose normal.   Mouth/Throat: Oropharynx is clear and moist.   Eyes: Pupils are equal, round, and reactive to light. Conjunctivae and EOM are normal. Right eye exhibits no discharge. Left eye exhibits no discharge. Neck: Normal range of motion. Neck supple. No tracheal deviation present. Cardiovascular: Normal rate, regular rhythm, normal heart sounds and intact distal pulses. URINE   HEMOGLOBIN A1C   LIPID PANEL   T4, FREE   TSH WITHOUT REFLEX         EMERGENCY DEPARTMENT COURSE:   Medical Decision Makin-year-old female with opiate withdrawal symptoms of nausea and diarrhea. She has chronic pain and therefore has been taking chronic opiates for years, however recently she is resorted to heroin abuse that she claims is for her pain. Patient complains of suicidal ideation, and does have plan to overdose on heroin. Patient denies medical complaints, she is medically stable for further psychiatric inpatient evaluation and treatment. CRITICAL CARE:       CONSULTS:  IP CONSULT TO HOSPITALIST      FINAL IMPRESSION      1. Suicidal ideation    2. Opiate withdrawal (HCC)    3. History of chronic pain          DISPOSITION/PLAN:  DISPOSITION Admitted 2018 10:22:32 PM      PATIENT REFERRED TO:  Marlin Castellano MD  [address]   [address] [place] [phone]    Call in 1 day  Re-Evaluation    [facility] ED   [address] [id number]  [phone]  Go to   If symptoms worsen, As needed      DISCHARGE MEDICATIONS:  Current Discharge Medication List      START taking these medications    Details   cloNIDine (CATAPRES) 0.1 MG tablet Take 1 tablet by mouth 3 times daily  Qty: 6 tablet, Refills: 0      dicyclomine (BENTYL) 10 MG capsule Take 1 capsule by mouth every 6 hours as needed (cramps)  Qty: 12 capsule, Refills: 0      ondansetron (ZOFRAN ODT) 4 MG disintegrating tablet Take 1 tablet by mouth every 8 hours as needed for Nausea  Qty: 8 tablet, Refills: 0             (Please note that portions of this note were completed with a voice recognition program.  Efforts were made to edit the dictations but occasionally words are mis-transcribed.)    Tamara West MD  Attending Emergency Physician            Tamara West MD  18 [phone]

## 2018-08-13 LAB
CULTURE: NORMAL
Lab: NORMAL
SPECIMEN DESCRIPTION: NORMAL
STATUS: NORMAL

## 2018-08-13 PROCEDURE — 6370000000 HC RX 637 (ALT 250 FOR IP): Performed by: PSYCHIATRY & NEUROLOGY

## 2018-08-13 PROCEDURE — 90792 PSYCH DIAG EVAL W/MED SRVCS: CPT | Performed by: REGISTERED NURSE

## 2018-08-13 PROCEDURE — 6370000000 HC RX 637 (ALT 250 FOR IP): Performed by: REGISTERED NURSE

## 2018-08-13 PROCEDURE — 6360000002 HC RX W HCPCS: Performed by: REGISTERED NURSE

## 2018-08-13 PROCEDURE — 1240000000 HC EMOTIONAL WELLNESS R&B

## 2018-08-13 RX ORDER — CLONIDINE HYDROCHLORIDE 0.1 MG/1
0.1 TABLET ORAL 3 TIMES DAILY
Status: DISCONTINUED | OUTPATIENT
Start: 2018-08-13 | End: 2018-08-15 | Stop reason: HOSPADM

## 2018-08-13 RX ORDER — CYCLOBENZAPRINE HCL 10 MG
10 TABLET ORAL 3 TIMES DAILY PRN
Status: DISCONTINUED | OUTPATIENT
Start: 2018-08-13 | End: 2018-08-15 | Stop reason: HOSPADM

## 2018-08-13 RX ORDER — DICYCLOMINE HCL 20 MG
20 TABLET ORAL 4 TIMES DAILY PRN
Status: DISCONTINUED | OUTPATIENT
Start: 2018-08-13 | End: 2018-08-15 | Stop reason: HOSPADM

## 2018-08-13 RX ORDER — LOPERAMIDE HYDROCHLORIDE 2 MG/1
2 CAPSULE ORAL 4 TIMES DAILY PRN
Status: DISCONTINUED | OUTPATIENT
Start: 2018-08-13 | End: 2018-08-15 | Stop reason: HOSPADM

## 2018-08-13 RX ORDER — PROMETHAZINE HYDROCHLORIDE 25 MG/1
25 TABLET ORAL EVERY 4 HOURS PRN
Status: DISCONTINUED | OUTPATIENT
Start: 2018-08-13 | End: 2018-08-15 | Stop reason: HOSPADM

## 2018-08-13 RX ORDER — IBUPROFEN 800 MG/1
800 TABLET ORAL EVERY 8 HOURS PRN
Status: DISCONTINUED | OUTPATIENT
Start: 2018-08-13 | End: 2018-08-15 | Stop reason: HOSPADM

## 2018-08-13 RX ADMIN — CLONIDINE HYDROCHLORIDE 0.1 MG: 0.1 TABLET ORAL at 14:29

## 2018-08-13 RX ADMIN — LOPERAMIDE HYDROCHLORIDE 2 MG: 2 CAPSULE ORAL at 22:15

## 2018-08-13 RX ADMIN — LOPERAMIDE HYDROCHLORIDE 2 MG: 2 CAPSULE ORAL at 16:35

## 2018-08-13 RX ADMIN — HYDROXYZINE HYDROCHLORIDE 50 MG: 25 TABLET, FILM COATED ORAL at 12:23

## 2018-08-13 RX ADMIN — LOPERAMIDE HYDROCHLORIDE 2 MG: 2 CAPSULE ORAL at 12:23

## 2018-08-13 RX ADMIN — LAMOTRIGINE 25 MG: 25 TABLET ORAL at 08:01

## 2018-08-13 RX ADMIN — ALUMINUM HYDROXIDE, MAGNESIUM HYDROXIDE, AND SIMETHICONE 30 ML: 200; 200; 20 SUSPENSION ORAL at 08:04

## 2018-08-13 RX ADMIN — CLONIDINE HYDROCHLORIDE 0.1 MG: 0.1 TABLET ORAL at 21:34

## 2018-08-13 RX ADMIN — DICYCLOMINE HYDROCHLORIDE 20 MG: 20 TABLET ORAL at 12:23

## 2018-08-13 RX ADMIN — PREGABALIN 50 MG: 50 CAPSULE ORAL at 21:34

## 2018-08-13 RX ADMIN — CYCLOBENZAPRINE HYDROCHLORIDE 10 MG: 10 TABLET, FILM COATED ORAL at 21:34

## 2018-08-13 RX ADMIN — HYDROXYZINE HYDROCHLORIDE 50 MG: 25 TABLET, FILM COATED ORAL at 05:26

## 2018-08-13 RX ADMIN — HYDROXYZINE HYDROCHLORIDE 50 MG: 25 TABLET, FILM COATED ORAL at 21:34

## 2018-08-13 RX ADMIN — PROMETHAZINE HYDROCHLORIDE 25 MG: 25 TABLET ORAL at 18:27

## 2018-08-13 RX ADMIN — PROMETHAZINE HYDROCHLORIDE 25 MG: 25 TABLET ORAL at 12:23

## 2018-08-13 RX ADMIN — IBUPROFEN 800 MG: 800 TABLET ORAL at 21:34

## 2018-08-13 RX ADMIN — PREGABALIN 50 MG: 50 CAPSULE ORAL at 08:01

## 2018-08-13 RX ADMIN — ACETAMINOPHEN 650 MG: 325 TABLET, FILM COATED ORAL at 08:03

## 2018-08-13 RX ADMIN — TRAZODONE HYDROCHLORIDE 50 MG: 50 TABLET ORAL at 21:34

## 2018-08-13 RX ADMIN — CYCLOBENZAPRINE HYDROCHLORIDE 10 MG: 10 TABLET, FILM COATED ORAL at 12:23

## 2018-08-13 RX ADMIN — VENLAFAXINE HYDROCHLORIDE 75 MG: 75 CAPSULE, EXTENDED RELEASE ORAL at 08:01

## 2018-08-13 ASSESSMENT — PAIN SCALES - GENERAL
PAINLEVEL_OUTOF10: 0
PAINLEVEL_OUTOF10: 3
PAINLEVEL_OUTOF10: 3

## 2018-08-13 ASSESSMENT — PAIN DESCRIPTION - LOCATION: LOCATION: GENERALIZED

## 2018-08-13 ASSESSMENT — PAIN - FUNCTIONAL ASSESSMENT
PAIN_FUNCTIONAL_ASSESSMENT: 0-10
PAIN_FUNCTIONAL_ASSESSMENT: 0-10

## 2018-08-13 ASSESSMENT — PAIN DESCRIPTION - PAIN TYPE: TYPE: CHRONIC PAIN

## 2018-08-13 NOTE — ED NOTES
Update given to sister Matthew Coto [phone], with pt permission.       Linda Briscoe RN  08/12/18 5842

## 2018-08-13 NOTE — ED NOTES
Writer contacted [facility] and spoke with Rhys Keene who states that an intake needs to be done and that patient would not be able to do that until tomorrow.

## 2018-08-13 NOTE — PROGRESS NOTES
Psychiatric Admission Note         Jagruti Adhikari is a 41 y.o. female who was admitted from [address] [place] [address] ED with suicidal ideations to shoot herself due to her chronic pain. She states she continues with SI due to pain and withdrawal from opiates. She has been prescribed Fentanyl patches for pain from OA. She reports her boyfriend threw away three patches and so she has been without the pain medication. She was seen in the ED two days prior to admission following OD after snorting heroin. According to notes she has a history of misuse of opiates. She is very adamant that she requires opiates for pain control. She is advised that none will be prescribed as she has a history of misuse. She has been treated for bipolar disorder in the past.    Past Psychiatric History   Patient Reports noncompliance with outpatient psychiatric care. Reported history of psychiatric inpatient hospitalizations. Reported history of suicide attempts. History of Substance Abuse     She denies alcohol or orther drug use. Family History of psychiatric disorders    Family history: unable to obtain. .      Medical History   Allergies:  Morphine   Past Medical History:   Diagnosis Date    Anxiety     Arthritis     O. A.    Bipolar depression ([facility]     Dermatitis     Dysuria     Exposure to STD     Folliculitis     Hx of acute bronchitis     Hypertension     Marfan syndrome     MVP (mitral valve prolapse)     Nausea & vomiting     PONV (postoperative nausea and vomiting)     Scoliosis     Seasonal allergies     Staph infection     HX OF ALSO BACTEREMIA    UTI (lower urinary tract infection)       Past Surgical History:   Procedure Laterality Date    BREAST REDUCTION SURGERY Bilateral     2012    [facility]17[facility]    FOOT SURGERY Bilateral     RT HAS 2 SCREWS, LT HAD FUSION IN DEC WITH 2 SCREWS    HIP ARTHROSCOPY Left     KNEE ARTHROSCOPY Right     X 2    [place] EGD TRANSORAL BIOPSY

## 2018-08-13 NOTE — CARE COORDINATION
BHI Biopsychosocial Assessment    Current Level of Psychosocial Functioning     Independent   Dependent  X   Minimal Assist     Comments:  Pt reports that prior to admission she was living in a home in [place] prior to hospitalization, she reports a plan to return there following discharge. PT presents on admission with suicidal ideation with a plan to shoot herself with a gun. Psychosocial High Risk Factors (check all that apply)    Unable to obtain meds   Chronic illness/pain    Substance abuse X Opiates, Fentanyl,  Heroin   Lack of Family Support X   Financial stress X  Isolation X  Inadequate Community Resources  Suicide attempt(s)  Not taking medications X  Victim of crime   Developmental Delay  Unable to manage personal needs    Age 72 or older   Homeless  No transportation X  Readmission within 30 days  Unemployment  Traumatic Event    Psychiatric Advanced Directives: none reported     Family to Involve in Treatment: lack of family support     Sexual Orientation:  Heterosexual     Patient Strengths:adequate housing, income, insurance     Patient Barriers: chronic pain, abuses opiates including Fentanylpercocet, and Heroin. , poor judgement and coping skills. Opiate Education Provided:PT was provided with opiate addiction and relapse information     CMHC/mental health history: PT reports that she was linked to [facility] after her last hospitalization and did not attend her appointment, she is requesting to be re-linked with [facility] following discharge. Plan of Care   medication management, group/individual therapies, family meetings, psycho -education, treatment team meetings to assist with stabilization, referral to community resources. Initial Discharge Plan:  PT reports a plan to return to her home in [place] [place] following discharge. She reports a plan to follow up with [facility] for outpatient treatment.        Clinical Summary:  Pt is a 41year old  female who presents on admission with suicidal ideation with a plan to shoot herself with a gun. Pt reports that she wants to shoot herself with a gun because she is in withdrawal from opiates. Pt doesn't have access to a gun. Pt reports that she would not be suicidal if her pain was under control. Pt has been abusing Fentnyal patches, Percocet's and heroin, last use was yesterday morning  and was 2 Percocet 10's. Pt reports a history of Bi-Polar and is taking psychiatric medications prescribed by her PCP. Pt was suppose to link with [facility] at last [facility] discharge and never followed up. Pt reports medical issues which gives her a great deal of pain. Pt would like a Fentanyl patch. Pt reports poor sleep and appetite. Pt denies homicidal ideations. Pt denies all hallucinations.

## 2018-08-13 NOTE — ED NOTES
Writer consulted with Dr. Clem Bowers. Patient to be admitted to [facility] for safety and stabilization.

## 2018-08-13 NOTE — BH NOTE
`[facility]  Admission Note     Admission Type:   Admission Type: Voluntary    Reason for admission:  Reason for Admission: Suicidal thoughts to shoot self due to fentanyl withdrawal     PATIENT STRENGTHS:  Strengths: Motivated, Communication    Patient Strengths and Limitations:  Limitations: Inappropriate/potentially harmful leisure interests, General negative or hopeless attitude about future/recovery, Difficulty problem solving/relies on others to help solve problems    Addictive Behavior:   Addictive Behavior  In the past 3 months, have you felt or has someone told you that you have a problem with:  : None  Do you have a history of Chemical Use?: No  Do you have a history of Alcohol Use?: No  Do you have a history of Street Drug Abuse?: Yes  Histroy of Prescripton Drug Abuse?: No    Medical Problems:   Past Medical History:   Diagnosis Date    Anxiety     Arthritis     O. A.    B[facility] depression ([facility]     Dermatitis     Dysuria     Exposure to STD     Folliculitis     Hx of acute bronchitis     Hypertension     Marfan syndrome     MVP (mitral valve prolapse)     Nausea & vomiting     PONV (postoperative nausea and vomiting)     Scoliosis     Seasonal allergies     Staph infection     HX OF ALSO BACTEREMIA    UTI (lower urinary tract infection)        Status EXAM:  Status and Exam  Normal: No  Facial Expression: Avoids Gaze, Flat  Affect: Blunt  Level of Consciousness: Alert  Mood:Normal: No  Mood: Depressed, Anxious, Helpless, Sad, Guilty  Motor Activity:Normal: No  Motor Activity: Decreased  Interview Behavior: Cooperative, Evasive  Preception: [place] to Person, [place] to Time, [place] to Place, [place] to Situation  Attention:Normal: No  Attention: Distractible  Thought Processes: Circumstantial  Thought Content:Normal: No  Thought Content: Preoccupations  Hallucinations: None  Delusions: No  Memory:Normal: No  Memory: Poor Recent  Insight and Judgment: No  Insight and Judgment: Poor Judgment, Poor Insight  Present Suicidal Ideation: Yes  Present Homicidal Ideation: No    Tobacco Screening:  Practical Counseling, on admission, dimple X, if applicable and completed (first 3 are required if patient doesn't refuse):            ( )  Recognizing danger situations (included triggers and roadblocks)                    ( )  Coping skills (new ways to manage stress, exercise, relaxation techniques, changing routine, distraction)                                                           ( )  Basic information about quitting (benefits of quitting, techniques in how to quit, available resources  ( ) Referral for counseling faxed to [facility]                                           ( ) Patient refused counseling  (X ) Patient has not smoked in the last 30 days    Metabolic Screening:    Lab Results   Component Value Date    LABA1C 4.9 06/10/2018       No results for input(s): CHOL, TRIG, HDL, LDLCALC, LABVLDL in the last 72 hours. Body mass index is 24.62 kg/m². BP Readings from Last 2 Encounters:   08/12/18 (!) 131/100   08/10/18 (!) 149/104           Pt admitted with followings belongings:  Dentures: None  Vision - Corrective Lenses: None  Hearing Aid: None  Jewelry: None  Body Piercings Removed: N/A  Clothing: Footwear, Pants, Shirt  Were All Patient Medications Collected?: Not Applicable  Other Valuables: None     Valuables sent home with NA. Valuables placed in safe in security envelope, number:  NA. Patient's home medications were verified. Patient oriented to surroundings and program expectations and copy of patient rights given. Received admission packet:  yes. Consents reviewed, signed yes. Refused NA. Patient verbalize understanding:  yes.     Patient education on precautions: yes                   Gia Capone RN

## 2018-08-13 NOTE — ED NOTES
Writer contacted [facility] and they have availability. Writer will faxed over labs for consideration for admission.

## 2018-08-13 NOTE — BH NOTE
Patient did not attend 24 hour recovery group at 1600 due to refusal, despite many attempts from this staff.

## 2018-08-14 PROCEDURE — 6370000000 HC RX 637 (ALT 250 FOR IP): Performed by: PSYCHIATRY & NEUROLOGY

## 2018-08-14 PROCEDURE — 6360000002 HC RX W HCPCS: Performed by: REGISTERED NURSE

## 2018-08-14 PROCEDURE — 1240000000 HC EMOTIONAL WELLNESS R&B

## 2018-08-14 PROCEDURE — 6370000000 HC RX 637 (ALT 250 FOR IP): Performed by: REGISTERED NURSE

## 2018-08-14 PROCEDURE — 99232 SBSQ HOSP IP/OBS MODERATE 35: CPT | Performed by: REGISTERED NURSE

## 2018-08-14 RX ADMIN — CLONIDINE HYDROCHLORIDE 0.1 MG: 0.1 TABLET ORAL at 08:58

## 2018-08-14 RX ADMIN — HYDROXYZINE HYDROCHLORIDE 50 MG: 25 TABLET, FILM COATED ORAL at 12:20

## 2018-08-14 RX ADMIN — TRAZODONE HYDROCHLORIDE 50 MG: 50 TABLET ORAL at 21:11

## 2018-08-14 RX ADMIN — CYCLOBENZAPRINE HYDROCHLORIDE 10 MG: 10 TABLET, FILM COATED ORAL at 13:22

## 2018-08-14 RX ADMIN — PROMETHAZINE HYDROCHLORIDE 25 MG: 25 TABLET ORAL at 12:20

## 2018-08-14 RX ADMIN — CYCLOBENZAPRINE HYDROCHLORIDE 10 MG: 10 TABLET, FILM COATED ORAL at 21:12

## 2018-08-14 RX ADMIN — VENLAFAXINE HYDROCHLORIDE 75 MG: 75 CAPSULE, EXTENDED RELEASE ORAL at 08:58

## 2018-08-14 RX ADMIN — PROMETHAZINE HYDROCHLORIDE 25 MG: 25 TABLET ORAL at 07:12

## 2018-08-14 RX ADMIN — HYDROXYZINE HYDROCHLORIDE 50 MG: 25 TABLET, FILM COATED ORAL at 21:11

## 2018-08-14 RX ADMIN — DICYCLOMINE HYDROCHLORIDE 20 MG: 20 TABLET ORAL at 21:11

## 2018-08-14 RX ADMIN — HYDROXYZINE HYDROCHLORIDE 50 MG: 25 TABLET, FILM COATED ORAL at 05:25

## 2018-08-14 RX ADMIN — LAMOTRIGINE 25 MG: 25 TABLET ORAL at 08:58

## 2018-08-14 RX ADMIN — NEBIVOLOL 2.5 MG: 2.5 TABLET ORAL at 08:59

## 2018-08-14 RX ADMIN — CYCLOBENZAPRINE HYDROCHLORIDE 10 MG: 10 TABLET, FILM COATED ORAL at 05:25

## 2018-08-14 RX ADMIN — CLONIDINE HYDROCHLORIDE 0.1 MG: 0.1 TABLET ORAL at 14:36

## 2018-08-14 RX ADMIN — PREGABALIN 50 MG: 50 CAPSULE ORAL at 21:11

## 2018-08-14 RX ADMIN — IBUPROFEN 800 MG: 800 TABLET ORAL at 19:03

## 2018-08-14 RX ADMIN — CLONIDINE HYDROCHLORIDE 0.1 MG: 0.1 TABLET ORAL at 21:12

## 2018-08-14 RX ADMIN — ACETAMINOPHEN 650 MG: 325 TABLET, FILM COATED ORAL at 05:25

## 2018-08-14 RX ADMIN — PREGABALIN 50 MG: 50 CAPSULE ORAL at 08:58

## 2018-08-14 ASSESSMENT — PAIN SCALES - GENERAL
PAINLEVEL_OUTOF10: 3
PAINLEVEL_OUTOF10: 3
PAINLEVEL_OUTOF10: 0

## 2018-08-14 ASSESSMENT — PAIN - FUNCTIONAL ASSESSMENT
PAIN_FUNCTIONAL_ASSESSMENT: 0-10

## 2018-08-14 ASSESSMENT — LIFESTYLE VARIABLES: HISTORY_ALCOHOL_USE: NO

## 2018-08-14 NOTE — PROGRESS NOTES
PSYCHOEDUCATION GROUP NOTE    Date:   8/14/2018 Start Time: 1330  End Time: 1415    Number Participants in Group:  9    Goal:  Patient will demonstrate increased interpersonal interaction   Topic: skills group    Discipline Responsible:   OT  AT  [facility]. X RT [facility] Other       Participation Level:     None  Minimal   X Active Listener X Interactive    Monopolizing         Participation Quality:  X Appropriate  Inappropriate   X       Attentive        Intrusive   X       Sharing        Resistant   X       Supportive        Lethargic       Affective:    Congruent  Incongruent  Blunted  Flat    Constricted  Anxious  Elated  Angry    Labile  Depressed  Other x bright       Cognitive:  X Alert X Oriented PPTP     Concentration X G  F  P   Attention Span X G  F  P   Short-Term Memory  G  F  P   Long-Term Memory  G  F  P   ProblemSolving/  Decision Making X G  F  P   Ability to Process  Information X G  F  P      Contributing Factors             Delusional             Hallucinating             Flight of Ideas             Other:       Modes of Intervention:  x Education x Support x Exploration    Clarifying x Problem Solving  Confrontation   x Socialization  Limit Setting  Reality Testing   x Activity  Movement  Media    Other:            Response to Learning:  X Able to verbalize current knowledge/experience   X Able to verbalize/acknowledge new learning   X Able to retain information   X Capable of insight    Able to change behavior   X Progressing to goal    Other:

## 2018-08-14 NOTE — H&P
HISTORY and Treinta HASMUKH [address]       NAME:  Tramaine Manning  MRN: [id number]   YOB: 1980   Date: 8/14/2018   Age: 41 y.o. Gender: female     COMPLAINT AND PRESENT HISTORY:      Tramaine Manning is 41 y.o.,  female, admitted because of depression/ Bipolar Disorder. Pt has suicidal ideation, Pt has plans to Shoot self. Patient overdosed on heroin, patient also abuses fentanyl patches and the opioid pills such as Percocet. Patient was intoxicated and fell and hit her face, has a laceration over the bridge of the nose and multiple contusions over the arms and bilateral black eyes. Pt denies any homicidal ideation. This was her 1st suicide attempt. Pt feels hopeless, helpless, worthless, lack of interest, loss of energy. Poor insight. Pt has fair sleep, feeling that appetite, poor concentration and memory. No current auditory, visual or tactile hallucinations. Patient lives with her boyfriend  Pt has been compliant with the psychiatric medications. DIAGNOSTIC RESULTS   Labs:  CBC:   Recent Labs      08/12/18   1740   WBC  8.9   HGB  15.6   PLT  336     BMP:    Recent Labs      08/12/18   1740   NA  141   K  4.1   CL  102   CO2  26   BUN  8   CREATININE  0.56   GLUCOSE  102*     Hepatic:   Recent Labs      08/12/18   1740   AST  12   ALT  10   BILITOT  0.71   ALKPHOS  83     U/A:  Lab Results   Component Value Date    NITRITE Negative 12/01/2016    COLORU YELLOW 08/12/2018    WBCUA 5 TO 10 08/12/2018    RBCUA 2 TO 5 08/12/2018    MUCUS NOT REPORTED 08/12/2018    BACTERIA MODERATE 08/12/2018    CLARITYU clear 12/01/2016    SPECGRAV 1.009 08/12/2018    LEUKOCYTESUR NEGATIVE 08/12/2018    BLOODU Negative 12/01/2016    GLUCOSEU 1+ 08/12/2018    AMORPHOUS NOT REPORTED 08/12/2018       PAST MEDICAL HISTORY     Past Medical History:   Diagnosis Date    Anxiety     Arthritis     O. A.    Bipolar depression [address] [address]     Dermatitis     Dysuria     Exposure to STD     Folliculitis     Hx of acute bronchitis     Hypertension     Marfan syndrome     MVP (mitral valve prolapse)     Nausea & vomiting     PONV (postoperative nausea and vomiting)     Scoliosis     Seasonal allergies     Staph infection     HX OF ALSO BACTEREMIA    UTI (lower urinary tract infection)        Pt denies any history of Diabetes mellitus type 2, stroke, COPD, Asthma, GERD, HLD, Cancer, Seizures,Thyroid disease, Kidney Disease, Hepatitis, TB.    SURGICAL HISTORY       Past Surgical History:   Procedure Laterality Date    BREAST REDUCTION SURGERY Bilateral     2012    FOOT CAPSULOTOMY Left 9/17/14    1st mppj    FOOT SURGERY Bilateral     RT HAS 2 SCREWS, LT HAD FUSION IN DEC WITH 2 SCREWS    HIP ARTHROSCOPY Left     KNEE ARTHROSCOPY Right     X 2    SC EGD TRANSORAL BIOPSY SINGLE/MULTIPLE N/A 8/29/2017    EGD BIOPSY WITH DILATATION WITH MALONEYS performed by Kimber Agosto MD at [facility] [address] Bilateral     harware in place bilat    TUBAL LIGATION      clips placed on fallopian tubes    UPPER GASTROINTESTINAL ENDOSCOPY  08/29/2017     EXTENSIVE SPASMATIC CONTRACTIONS; NO LUMINAL OBSTRUCTIONS; CLOSER TO THE DISTAL ESOPHAGUS AND DEVELOPING DIVERTICULUM IS NOTED AND BELIEVED TO BE SEC TO DSYSMOTILITY;  DALEY DIATIONS WERE DONE EMPIRICALLY WITH 64 FR       FAMILY HISTORY       Family History   Problem Relation Age of Onset    Cancer Brother         testicular    Breast Cancer Maternal Grandmother     Hypertension Mother     Thyroid Disease Mother        SOCIAL HISTORY       Social History     Social History    Marital status: Legally      Spouse name: N/A    Number of children: N/A    Years of education: N/A     Social History Main Topics    Smoking status: Former Smoker     Years: 1.50     Quit date: 3/26/2015    Smokeless tobacco: Never Used    Alcohol use No    Drug use: Yes     Types: Opiates       Comment: hector     Sexual activity: Yes Partners: Male     Other Topics Concern    None     Social History Narrative    None           REVIEW OF SYSTEMS      Allergies   Allergen Reactions    Morphine Hives       No current facility-administered medications on file prior to encounter. Current Outpatient Prescriptions on File Prior to Encounter   Medication Sig Dispense Refill    fentaNYL (DURAGESIC) 75 MCG/HR Place 1 patch onto the skin every 72 hours. [place] venlafaxine (EFFEXOR XR) 75 MG extended release capsule Take 1 capsule by mouth daily (with breakfast) 30 capsule 0    lamoTRIgine (LAMICTAL) 25 MG tablet Take 1 tablet by mouth daily 30 tablet 0    nebivolol (BYSTOLIC) 2.5 MG tablet take 1 tablet by mouth once daily 30 tablet 3    pregabalin (LYRICA) 50 MG capsule Take 1 capsule by mouth 2 times daily for 30 days. . 60 capsule 2                      General health:  Fairly good. No fever or chills. Skin:  No itching, redness or rash. Head, eyes, ears, nose, throat:  No headache, epistaxis, rhinorrhea hearing loss or sore throat. Neck:  No pain, stiffness or masses. Cardiovascular/Respiratory system:  No chest pain, palpitation, shortness of breath, coughing or expectoration. Gastrointestinal tract: No abdominal pain, nausea, vomiting, diarrhea or constipation. Genitourinary:  No burning on micturition. No hesitancy, urgency, frequency or discoloration of urine. Locomotor:  No bone or joint pains. No swelling or deformities. Neuropsychiatric:  See HPI. GENERAL PHYSICAL EXAM:     Vitals: BP (!) 149/93   Pulse 68   Temp 98.1 °F (36.7 °C) (Oral)   Resp 14   Ht 5' 9\" (1.753 m)   Wt 166 lb 11.2 oz (75.6 kg)   SpO2 100%   BMI 24.62 kg/m²  Body mass index is 24.62 kg/m². Pt was examined with a nurse present in the room. GENERAL APPEARANCE:  Khris Yan is 41 y.o.,  female, mildly obese, nourished, conscious, alert.   Does not appear to be distress or pain

## 2018-08-14 NOTE — PROGRESS NOTES
Department of Psychiatry  Attending Progress Note  Chief Complaint: Severe recurrent major depression with psychotic features ([address]     SUBJECTIVE:  Mindy Quispe is seen in the treatment team meeting and for a lengthy visit in the day area. She reports she does not want to restart opiates and that she realizes she uses appropriately for awhile but will then abuse the Fentanyl. She did attend groups today and does not appear to be in terrific pain. She reports pain but again declines starting Tramadol. She continues with some depression, feeling hopeless and helpless. She reports she may be ready for discharge tomorrow.      OBJECTIVE    Physical  BP (!) 149/93   Pulse 68   Temp 98.1 °F (36.7 °C) (Oral)   Resp 14   Ht 5' 9\" (1.753 m)   Wt 166 lb 11.2 oz (75.6 kg)   SpO2 100%   BMI 24.62 kg/m²      Mental Status Evaluation:  Orientation: alertness: alert   Mood:. constricted      Affect:  constricted      Appearance:  disheveled   Activity:  Psychomotor Retardation   Gait/Posture: Limp   Speech:  normal pitch and normal volume   Thought Process:  circumstantial and goal directed   Thought Content:  WDL   Sensorium:  person, place and time/date   Cognition:  grossly intact   Memory: intact   Insight:  limited   Judgment: limited   Suicidal Ideations: denies suicidal ideation   Homicidal Ideations: Negative for homicidal ideation      Medication Side Effects: absent       Attention Span attention span appeared shorter than expected for age     Medications  Current Facility-Administered Medications   Medication Dose Route Frequency Provider Last Rate Last Dose    loperamide (IMODIUM) capsule 2 mg  2 mg Oral 4x Daily PRN James Butt, APRN - CNS   2 mg at 08/13/18 2215    dicyclomine (BENTYL) tablet 20 mg  20 mg Oral 4x Daily PRN James Butt, APRN - CNS   20 mg at 08/13/18 1223    cyclobenzaprine (FLEXERIL) tablet 10 mg  10 mg Oral TID PRN James Butt, APRN - CNS   10 mg at 08/14/18 1322    promethazine (PHENERGAN) groups  3.  Follow up at [facility] after symptoms stabilized      Electronically signed by GUIDO Raymundo on 8/14/2018 at 4:48 PM.

## 2018-08-15 VITALS
WEIGHT: 166.7 LBS | OXYGEN SATURATION: 100 % | RESPIRATION RATE: 14 BRPM | HEIGHT: 69 IN | HEART RATE: 112 BPM | SYSTOLIC BLOOD PRESSURE: 126 MMHG | BODY MASS INDEX: 24.69 KG/M2 | TEMPERATURE: 98.1 F | DIASTOLIC BLOOD PRESSURE: 89 MMHG

## 2018-08-15 PROCEDURE — 6370000000 HC RX 637 (ALT 250 FOR IP): Performed by: REGISTERED NURSE

## 2018-08-15 PROCEDURE — 5130000000 HC BRIDGE APPOINTMENT

## 2018-08-15 PROCEDURE — 99239 HOSP IP/OBS DSCHRG MGMT >30: CPT | Performed by: REGISTERED NURSE

## 2018-08-15 PROCEDURE — 6370000000 HC RX 637 (ALT 250 FOR IP): Performed by: PSYCHIATRY & NEUROLOGY

## 2018-08-15 PROCEDURE — 6360000002 HC RX W HCPCS: Performed by: REGISTERED NURSE

## 2018-08-15 RX ORDER — CLONIDINE HYDROCHLORIDE 0.1 MG/1
0.1 TABLET ORAL 2 TIMES DAILY
Qty: 28 TABLET | Refills: 0 | Status: SHIPPED | OUTPATIENT
Start: 2018-08-15 | End: 2018-08-28

## 2018-08-15 RX ORDER — HYDROXYZINE 50 MG/1
50 TABLET, FILM COATED ORAL 3 TIMES DAILY PRN
Qty: 30 TABLET | Refills: 0 | Status: SHIPPED | OUTPATIENT
Start: 2018-08-15 | End: 2018-08-25

## 2018-08-15 RX ORDER — VENLAFAXINE HYDROCHLORIDE 75 MG/1
75 CAPSULE, EXTENDED RELEASE ORAL
Qty: 14 CAPSULE | Refills: 0 | Status: SHIPPED | OUTPATIENT
Start: 2018-08-16 | End: 2018-09-04 | Stop reason: SDUPTHER

## 2018-08-15 RX ORDER — LAMOTRIGINE 25 MG/1
25 TABLET ORAL DAILY
Qty: 14 TABLET | Refills: 0 | Status: SHIPPED | OUTPATIENT
Start: 2018-08-16 | End: 2018-08-28 | Stop reason: SDUPTHER

## 2018-08-15 RX ADMIN — LAMOTRIGINE 25 MG: 25 TABLET ORAL at 08:37

## 2018-08-15 RX ADMIN — ACETAMINOPHEN 650 MG: 325 TABLET, FILM COATED ORAL at 05:43

## 2018-08-15 RX ADMIN — CLONIDINE HYDROCHLORIDE 0.1 MG: 0.1 TABLET ORAL at 08:41

## 2018-08-15 RX ADMIN — PROMETHAZINE HYDROCHLORIDE 25 MG: 25 TABLET ORAL at 07:47

## 2018-08-15 RX ADMIN — PREGABALIN 50 MG: 50 CAPSULE ORAL at 08:37

## 2018-08-15 RX ADMIN — CYCLOBENZAPRINE HYDROCHLORIDE 10 MG: 10 TABLET, FILM COATED ORAL at 05:43

## 2018-08-15 RX ADMIN — NEBIVOLOL 2.5 MG: 2.5 TABLET ORAL at 08:41

## 2018-08-15 RX ADMIN — VENLAFAXINE HYDROCHLORIDE 75 MG: 75 CAPSULE, EXTENDED RELEASE ORAL at 08:37

## 2018-08-15 RX ADMIN — HYDROXYZINE HYDROCHLORIDE 50 MG: 25 TABLET, FILM COATED ORAL at 05:43

## 2018-08-15 RX ADMIN — HYDROXYZINE HYDROCHLORIDE 50 MG: 25 TABLET, FILM COATED ORAL at 13:00

## 2018-08-15 RX ADMIN — DICYCLOMINE HYDROCHLORIDE 20 MG: 20 TABLET ORAL at 07:47

## 2018-08-15 RX ADMIN — CYCLOBENZAPRINE HYDROCHLORIDE 10 MG: 10 TABLET, FILM COATED ORAL at 13:00

## 2018-08-15 ASSESSMENT — PAIN SCALES - GENERAL: PAINLEVEL_OUTOF10: 3

## 2018-08-15 NOTE — PLAN OF CARE
Problem: Altered Mood, Depressive Behavior:  Goal: Able to verbalize and/or display a decrease in depressive symptoms  Able to verbalize and/or display a decrease in depressive symptoms   Outcome: Ongoing  Patient admits to  Depression and anxiety, over current medical condition. States she has been dealing with pain from her condition, but can't find anything non narcotic  that works. History of drug addiction. denies SI and HI,  Reports no audio or visual hallucinations, medication compliant,  Flat, helpless, affect, attending a couple groups out social with select peers. remains safe on 15 min checks.
Problem: Altered Mood, Depressive Behavior:  Goal: Able to verbalize and/or display a decrease in depressive symptoms  Able to verbalize and/or display a decrease in depressive symptoms   Outcome: Ongoing  Pt did not participate in Recreational Therapy group at 1430 despite staff encouragement to attend.
Problem: Altered Mood, Depressive Behavior:  Goal: Absence of self-harm  Absence of self-harm   Outcome: Ongoing  PT denies all. PT rates anx 9/10 but denies dep. PT states she maybe discharged tomorrow and states she is ready. Unsure if she has a f/u yet. States she is still w/d and having shakes and is yawning.
Problem: Substance Abuse:  Goal: Absence of drug withdrawal signs and symptoms  Absence of drug withdrawal signs and symptoms   Outcome: Ongoing  Pt appeared absent of current drug withdrawal signs and symptoms. Pt did report continued chronic pain due to her marfan's syndrome. Pt appeared brightened and reported doing well emotionally. Pt was encouraged to communicate with staff if symptoms worsen or needs arise. Pt independence was promoted. Pt did not verbalize thoughts to hurt self or others. Pt did not verbalize hallucinations.
Decreased  Interview Behavior: Cooperative, Evasive  Preception: [place] to Person, [place] to Time, [place] to Place, [place] to Situation  Attention:Normal: No  Attention: Distractible  Thought Processes: Circumstantial  Thought Content:Normal: No  Thought Content: Preoccupations  Hallucinations: None  Delusions: No  Memory:Normal: No  Memory: Poor Recent  Insight and Judgment: No  Insight and Judgment: Poor Judgment, Poor Insight  Present Suicidal Ideation: Yes  Present Homicidal Ideation: No    EDUCATION:   Learner Progress Toward Treatment Goals: reviewed group plans and strategies for care    Method:group therapy, medication compliance, individualized assessments and care planning    Outcome: needs reinforcement    PATIENT GOALS: to be discussed with patient within 72 hours    PLAN/TREATMENT RECOMMENDATIONS:     continue group therapy , medications compliance, goal setting, individualized assessments and care, continue to monitor pt on unit      SHORT-TERM GOALS:   Time frame for Short-Term Goals: 5-7 days    LONG-TERM GOALS:  Time frame for Long-Term Goals: 6 months  Members Present in Team Meeting: See Signature Sheet    SUSANNE Zazueta

## 2018-08-15 NOTE — BH NOTE
Psychoeducation Group Note    Date: 8/14  Start Time: 8:30  End Time: 9:00    Number Participants in Group:  13/18    Goal:  group  Topic:     Discipline Responsible:   OT  AT  [facility].  RT BHT2 Other       Participation Level:     None  Minimal   x Active Listener x Interactive    Monopolizing         Participation Quality:  x Appropriate  Inappropriate   x       Attentive        Intrusive   x       Sharing        Resistant   x       Supportive        Lethargic       Affective:   x Congruent  Incongruent  Blunted  Flat    Constricted  Anxious  Elated  Angry    Labile  Depressed  Other         Cognitive:  x Alert  Oriented PPTP     Concentration x G  F  P   Attention Span x G  F  P   Short-Term Memory x G  F  P   Long-Term Memory x G  F  P   ProblemSolving/  Decision Making x G  F  P   Ability to Process  Information x G  F  P      Contributing Factors             Delusional             Hallucinating             Flight of Ideas             Other:       Modes of Intervention:   Education  Support  Exploration    Clarifying  Problem Solving  Confrontation   x Socialization  Limit Setting  Reality Testing   x Activity  Movement  Media    Other:            Response to Learning:  x Able to verbalize current knowledge/experience   x Able to verbalize/acknowledge new learning   x Able to retain information   x Capable of insight   x Able to change behavior   x Progressing to goal    Other:        Comments:

## 2018-08-15 NOTE — DISCHARGE SUMMARY
DISCHARGE SUMMARY  Patient ID:  Jagruti Adhikari  [id number]  18 y.o.  1980    Admit date: 8/12/2018    Discharge date and time: 8/15/2018  3:34 PM     Admitting Physician: Zulma Raymond MD     Discharge Physician:  Sacha Fernández APRN - CNS    Admission Diagnoses: Severe recurrent major depression with psychotic features [facility]) [F33.3]    Discharge Diagnoses:   Severe recurrent major depression with psychotic features [facility])     Patient Active Problem List   Diagnosis Code    Bipolar disorder, mixed ([address] F31.60    Affective bipolar disorder ([address] F31.9    Polyarthritis M13.0    Scoliosis M41.9    Shoulder pain M25.519    H/O long-term treatment with high-risk medication Z79.899    Encounter for routine gynecological examination Z01.419    Chronic pain syndrome G89.4    Marfan's syndrome Q87.40    Dysphagia R13.10    Instability of both shoulder joints M25.311, M25.312    Hip instability M25.359    Benign essential HTN I10    Severe major depression without psychotic features ([address] F32.2    Depression with suicidal ideation F32.9, R45.851    Severe recurrent major depression with psychotic features ([address] F33.3    Bipolar disorder with moderate depression ([address] F31.32    Opiate withdrawal ([address] F11.23        Admission Condition: poor    Discharged Condition: stable    Indication for Admission: threat to self    History of Present Illnes (present tense wording indicates findings from admission exam on 8/12/2018 and are not necessarily indicative of current findings): Hospital Course:   Upon admission, Jagruti Adhikari was provided a safe secure environment, introduced to unit milieu. Patient participated in groups and individual therapies. Meds were adjusted. After few days of hospital care, patient began to feel improvement. Depression lifted, thoughts to harm self ceased. Sleep improved, appetite was good. On morning rounds 8/15/2018, patient endorsed feeling ready for discharge.   Patient denies suicidal or homicidal ideations, denies hallucinations or delusions. Denies SE's from meds. It was decided that pt had achieved maximum benefit from hospital care and can be discharged     Consults:   None    Significant Diagnostic Studies: Routine labs and diagnostics    Treatments: Psychotropic medications, therapy with group, milieu, and 1:1 with nurses, social workers, KAILEY/CNP, and Attending physician. Discharge Medications:  Discharge Medication List as of 8/15/2018  1:56 PM      START taking these medications    Details   hydrOXYzine (ATARAX) 50 MG tablet Take 1 tablet by mouth 3 times daily as needed for Anxiety, Disp-30 tablet, R-0Meds to bedsNormal      !! cloNIDine (CATAPRES) 0.1 MG tablet Take 1 tablet by mouth 2 times daily, Disp-28 tablet, R-0Meds to bedsNormal      !! cloNIDine (CATAPRES) 0.1 MG tablet Take 1 tablet by mouth 3 times daily, Disp-6 tablet, R-0Print      dicyclomine (BENTYL) 10 MG capsule Take 1 capsule by mouth every 6 hours as needed (cramps), Disp-12 capsule, R-0Print       !! - Potential duplicate medications found. Please discuss with provider. CONTINUE these medications which have CHANGED    Details   lamoTRIgine (LAMICTAL) 25 MG tablet Take 1 tablet by mouth daily, Disp-14 tablet, R-0Meds to bedsNormal      venlafaxine (EFFEXOR XR) 75 MG extended release capsule Take 1 capsule by mouth daily (with breakfast), Disp-14 capsule, R-0Meds to bedsNormal         CONTINUE these medications which have NOT CHANGED    Details   nebivolol (BYSTOLIC) 2.5 MG tablet take 1 tablet by mouth once daily, Disp-30 tablet, R-3Normal      pregabalin (LYRICA) 50 MG capsule Take 1 capsule by mouth 2 times daily for 30 days. ., Disp-60 capsule, R-2Normal         STOP taking these medications       cloNIDine (CATAPRES) 0.1 MG/24HR Comments:   Reason for Stopping:         fentaNYL (DURAGESIC) 75 MCG/HR Comments:   Reason for Stopping:              Discharge Exam:  Level of consciousness: Within normal limits  Appearance: Street clothes, seated, with good grooming  Behavior/Motor: No abnormalities noted  Attitude toward examiner:  Cooperative, attentive, good eye contact  Speech:  spontaneous, normal rate, normal volume and well articulated  Mood:  euthymic  Affect:  mood congruent  Thought processes:  linear, goal directed and coherent  Thought content:  Homocidal ideation denies  Suicidal Ideation:  denies suicidal ideation  Delusions:  no evidence of delusions  Perceptual Disturbance:  denies any perceptual disturbance  Cognition:  In tact  Memory: age appropriate  Insight & Judgement: fair  Medication side effects:  denies     Disposition: home    Patient Instructions: Activity: activity as tolerated    Follow-up as scheduled with [facility]. Time Spent: 35 minutes    Engagement: Patient displayed a good level of engagement with the treatments offered during this admission.        Discharge planning, findings, and recommendations were discussed with    Signed:  Navneet Mathias   8/15/2018  3:34 PM

## 2018-08-15 NOTE — CARE COORDINATION
Bridge Appointment completed: Reviewed Discharge Instructions with patient. Patient verbalizes understanding and agreement with the discharge plan using the teachback method. Discharge Arrangements:  [address], [address]  [phone]  fax [phone]   You have a scheduled appointment on Friday August 17th at 8:30 am for a diagnostic assessment.    Guardian notified: PT is her own guardian   Discharge destination/address:[address] [place] [address], [address]  Transported by:  Hesham Sapp

## 2018-08-15 NOTE — PROGRESS NOTES
Pt did not attend 1000 cognitive skills group d/t resting in room despite staff invitation to attend.

## 2018-08-15 NOTE — FLOWSHEET NOTE
*Patient participated in the [facility]       08/15/18 1411   Encounter Summary   Services provided to: Patient   Referral/Consult From: Rounding   Continue Visiting (8/15/18)   Complexity of Encounter Low   Length of Encounter 30 minutes   Spiritual Assessment Completed Yes   Spiritual/Protestant   Type Spiritual support   Assessment Calm   Intervention Active listening   Outcome Receptive

## 2018-11-10 ENCOUNTER — APPOINTMENT (OUTPATIENT)
Dept: GENERAL RADIOLOGY | Age: 38
End: 2018-11-10
Payer: MEDICARE

## 2018-11-10 ENCOUNTER — HOSPITAL ENCOUNTER (EMERGENCY)
Age: 38
Discharge: HOME | End: 2018-11-10
Attending: EMERGENCY MEDICINE
Payer: MEDICARE

## 2018-11-10 VITALS
BODY MASS INDEX: 23.25 KG/M2 | HEART RATE: 99 BPM | WEIGHT: 157 LBS | RESPIRATION RATE: 16 BRPM | HEIGHT: 69 IN | DIASTOLIC BLOOD PRESSURE: 92 MMHG | SYSTOLIC BLOOD PRESSURE: 142 MMHG | TEMPERATURE: 97.7 F | OXYGEN SATURATION: 99 %

## 2018-11-10 DIAGNOSIS — Q87.40 MARFAN'S SYNDROME: ICD-10-CM

## 2018-11-10 DIAGNOSIS — M25.551 RIGHT HIP PAIN: Primary | ICD-10-CM

## 2018-11-10 PROCEDURE — 99283 EMERGENCY DEPT VISIT LOW MDM: CPT

## 2018-11-10 PROCEDURE — 73502 X-RAY EXAM HIP UNI 2-3 VIEWS: CPT

## 2018-11-10 PROCEDURE — 6370000000 HC RX 637 (ALT 250 FOR IP): Performed by: PHYSICIAN ASSISTANT

## 2018-11-10 RX ORDER — HYDROCODONE BITARTRATE AND ACETAMINOPHEN 5; 325 MG/1; MG/1
1 TABLET ORAL EVERY 6 HOURS PRN
Qty: 10 TABLET | Refills: 0 | Status: SHIPPED | OUTPATIENT
Start: 2018-11-10 | End: 2019-09-01 | Stop reason: SDUPTHER

## 2018-11-10 RX ORDER — HYDROCODONE BITARTRATE AND ACETAMINOPHEN 5; 325 MG/1; MG/1
1 TABLET ORAL ONCE
Status: COMPLETED | OUTPATIENT
Start: 2018-11-10 | End: 2018-11-10

## 2018-11-10 RX ADMIN — HYDROCODONE BITARTRATE AND ACETAMINOPHEN 1 TABLET: 5; 325 TABLET ORAL at 12:19

## 2018-11-10 ASSESSMENT — PAIN DESCRIPTION - LOCATION
LOCATION: HIP
LOCATION: HIP

## 2018-11-10 ASSESSMENT — PAIN DESCRIPTION - ORIENTATION
ORIENTATION: RIGHT
ORIENTATION: RIGHT

## 2018-11-10 ASSESSMENT — PAIN DESCRIPTION - DESCRIPTORS: DESCRIPTORS: ACHING

## 2018-11-10 ASSESSMENT — PAIN DESCRIPTION - PAIN TYPE: TYPE: CHRONIC PAIN

## 2018-11-10 ASSESSMENT — PAIN SCALES - GENERAL
PAINLEVEL_OUTOF10: 8
PAINLEVEL_OUTOF10: 7
PAINLEVEL_OUTOF10: 3

## 2018-11-10 NOTE — ED PROVIDER NOTES
[address] ED  eMERGENCY dEPARTMENT eNCOUnter   Independent Attestation     Pt Name: Eva Hankins  MRN: [id number]  [place] 1980  Date of evaluation: 11/10/18       Eva Hankins is a 41 y.o. female who presents with Hip Pain        Based on the medical record, the care appears appropriate. I was personally available for consultation in the Emergency Department.     Carolyn Amado MD  Attending Emergency  Physician                  Carolyn Amado MD  11/10/18 0312 [id number]
place, and time and well-developed, well-nourished. Head: Normocephalic and atraumatic. Ear: External ears normal.   Nose: Nose normal and midline. Eyes: Conjunctivae and EOM are normal. Pupils are equal, round, and reactive to light. Neck: Normal range of motion. Neck supple. Throat: Posterior pharynx is without erythema or exudates, airway is patent, no swelling  Cardiovascular: Normal rate, regular rhythm, normal heart sounds and intact distal pulses. Pulmonary/Chest: Effort normal and breath sounds normal. No respiratory distress. No wheezes. No rales. No chest tenderness. Abdominal: Soft. Bowel sounds are normal. No distension and no mass. There is no tenderness. There is no rebound and no guarding. Musculoskeletal:  Examination of right hip reveals no visible deformities, swelling, bruising, abrasions. Tenderness over lateral side of hip. No buttock or groin pain. 5/5 strength. DROM. 2/2 dp and pt pulses. Distal sensation intact. Pt ambulatory. Neurological: Alert and oriented to person, place, and time. GCS score is 15. Skin: Skin is warm and dry. No rash noted. No erythema. No pallor. Psychiatric: Mood, memory, affect and judgment normal.           DIAGNOSTIC RESULTS     EKG: All EKG's are interpreted by the Emergency Department Physician who either signs or Co-signs this chart in the absence of a cardiologist.        RADIOLOGY:   All plain film, CT, MRI, and formal ultrasound images (except ED bedside ultrasound) are read by the radiologist, see reports below, unless otherwise noted in MDM or here. XR HIP RIGHT (2-3 VIEWS)   Final Result   1. No acute osseous abnormality involving the right hip. No results found. LABS:  Labs Reviewed - No data to display    All other labs were within normal range or not returned as of this dictation.     EMERGENCY DEPARTMENT COURSE and DIFFERENTIAL DIAGNOSIS/MDM:   Vitals:    Vitals:    11/10/18 1141   BP: (!) 142/92

## 2018-11-12 ENCOUNTER — CARE COORDINATION (OUTPATIENT)
Dept: CARE COORDINATION | Age: 38
End: 2018-11-12

## 2018-11-30 ENCOUNTER — OFFICE VISIT (OUTPATIENT)
Dept: PRIMARY CARE CLINIC | Age: 38
End: 2018-11-30
Payer: MEDICARE

## 2018-11-30 VITALS
BODY MASS INDEX: 23.36 KG/M2 | OXYGEN SATURATION: 97 % | TEMPERATURE: 98 F | RESPIRATION RATE: 16 BRPM | SYSTOLIC BLOOD PRESSURE: 130 MMHG | HEART RATE: 91 BPM | WEIGHT: 158.2 LBS | DIASTOLIC BLOOD PRESSURE: 98 MMHG

## 2018-11-30 DIAGNOSIS — I10 BENIGN ESSENTIAL HTN: ICD-10-CM

## 2018-11-30 DIAGNOSIS — F31.60 BIPOLAR DISORDER, MIXED (HCC): ICD-10-CM

## 2018-11-30 DIAGNOSIS — M13.0 POLYARTHRITIS: ICD-10-CM

## 2018-11-30 DIAGNOSIS — R11.2 NAUSEA AND VOMITING, INTRACTABILITY OF VOMITING NOT SPECIFIED, UNSPECIFIED VOMITING TYPE: Primary | ICD-10-CM

## 2018-11-30 PROCEDURE — 1036F TOBACCO NON-USER: CPT | Performed by: FAMILY MEDICINE

## 2018-11-30 PROCEDURE — G8482 FLU IMMUNIZE ORDER/ADMIN: HCPCS | Performed by: FAMILY MEDICINE

## 2018-11-30 PROCEDURE — 99213 OFFICE O/P EST LOW 20 MIN: CPT | Performed by: FAMILY MEDICINE

## 2018-11-30 PROCEDURE — G8420 CALC BMI NORM PARAMETERS: HCPCS | Performed by: FAMILY MEDICINE

## 2018-11-30 PROCEDURE — G8427 DOCREV CUR MEDS BY ELIG CLIN: HCPCS | Performed by: FAMILY MEDICINE

## 2018-11-30 RX ORDER — PROMETHAZINE HYDROCHLORIDE 25 MG/1
25 TABLET ORAL 4 TIMES DAILY PRN
Qty: 20 TABLET | Refills: 0 | Status: SHIPPED | OUTPATIENT
Start: 2018-11-30 | End: 2018-12-04 | Stop reason: SDUPTHER

## 2018-11-30 ASSESSMENT — ENCOUNTER SYMPTOMS
SHORTNESS OF BREATH: 0
SORE THROAT: 0
ABDOMINAL PAIN: 1
NAUSEA: 1
DIARRHEA: 1
VOMITING: 1
COUGH: 0
RHINORRHEA: 0

## 2018-11-30 NOTE — PROGRESS NOTES
History:   Procedure Laterality Date    BREAST REDUCTION SURGERY Bilateral     2012    [facility]    FOOT SURGERY Bilateral     RT HAS 2 SCREWS, LT HAD FUSION IN DEC WITH 2 SCREWS    HIP ARTHROSCOPY Left     KNEE ARTHROSCOPY Right     X 2    SD EGD TRANSORAL BIOPSY SINGLE/MULTIPLE N/A 8/29/2017    EGD BIOPSY WITH DILATATION WITH MALONEYS performed by Gabrielle Dunn MD at [facility] Bilateral     harware in place bilat    TUBAL LIGATION      clips placed on fallopian tubes    UPPER GASTROINTESTINAL ENDOSCOPY  08/29/2017     EXTENSIVE SPASMATIC CONTRACTIONS; NO LUMINAL OBSTRUCTIONS; CLOSER TO THE DISTAL ESOPHAGUS AND DEVELOPING DIVERTICULUM IS NOTED AND BELIEVED TO BE SEC TO DSYSMOTILITY;  Areatha Joselin WERE DONE EMPIRICALLY WITH 64 FR       Family History   Problem Relation Age of Onset    Cancer Brother         testicular    Breast Cancer Maternal Grandmother     Hypertension Mother     Thyroid Disease Mother        Social History   Substance Use Topics    Smoking status: Former Smoker     Years: 1.50     Quit date: 3/26/2015    Smokeless tobacco: Never Used    Alcohol use No      Current Outpatient Prescriptions   Medication Sig Dispense Refill    promethazine (PHENERGAN) 25 MG tablet Take 1 tablet by mouth 4 times daily as needed for Nausea 20 tablet 0    buprenorphine (BUTRANS) 5 MCG/HR PTWK Place 1 patch onto the skin once a week for 30 days. . 4 patch 2    LORazepam (ATIVAN) 1 MG tablet Take 1 tablet by mouth every 8 hours as needed for Anxiety for up to 30 days. . 90 tablet 2    venlafaxine (EFFEXOR XR) 75 MG extended release capsule Take 1 capsule by mouth daily 30 capsule 0    lamoTRIgine (LAMICTAL) 25 MG tablet Take 1 tablet by mouth 2 times daily 60 tablet 5    nebivolol (BYSTOLIC) 2.5 MG tablet take 1 tablet by mouth once daily 30 tablet 3     No current facility-administered medications for this visit.       Allergies   Allergen behavior is normal. Thought content normal.   Nursing note and vitals reviewed. Assessment:       Diagnosis Orders   1. Nausea and vomiting, intractability of vomiting not specified, unspecified vomiting type     2. Bipolar disorder, mixed ([address]     3. Polyarthritis     4. Benign essential HTN          Plan:      Return if symptoms worsen or fail to improve. No orders of the defined types were placed in this encounter. Orders Placed This Encounter   Medications    promethazine (PHENERGAN) 25 MG tablet     Sig: Take 1 tablet by mouth 4 times daily as needed for Nausea     Dispense:  20 tablet     Refill:  0       Patient given educationalmaterials - see patient instructions. Discussed use, benefit, and side effectsof prescribed medications. All patient questions answered. Pt voiced understanding. Reviewed health maintenance. Instructed to continue current medications, diet andexercise. Patient agreed with treatment plan. Follow up as directed.      Electronicallysigned by Kusum Ennis MD on 11/30/2018 at 12:03 PM

## 2018-12-04 DIAGNOSIS — M41.00 INFANTILE IDIOPATHIC SCOLIOSIS, UNSPECIFIED SPINAL REGION: ICD-10-CM

## 2018-12-04 RX ORDER — PROMETHAZINE HYDROCHLORIDE 25 MG/1
25 TABLET ORAL 4 TIMES DAILY PRN
Qty: 20 TABLET | Refills: 0 | Status: SHIPPED | OUTPATIENT
Start: 2018-12-04 | End: 2018-12-21 | Stop reason: SDUPTHER

## 2018-12-04 RX ORDER — BUPRENORPHINE 5 UG/H
1 PATCH TRANSDERMAL WEEKLY
Qty: 4 PATCH | Refills: 2 | Status: SHIPPED | OUTPATIENT
Start: 2018-12-04 | End: 2019-01-03

## 2018-12-05 ENCOUNTER — TELEPHONE (OUTPATIENT)
Dept: PRIMARY CARE CLINIC | Age: 38
End: 2018-12-05

## 2018-12-13 ENCOUNTER — TELEPHONE (OUTPATIENT)
Dept: PRIMARY CARE CLINIC | Age: 38
End: 2018-12-13

## 2018-12-21 RX ORDER — PROMETHAZINE HYDROCHLORIDE 25 MG/1
25 TABLET ORAL 4 TIMES DAILY PRN
Qty: 60 TABLET | Refills: 1 | Status: SHIPPED | OUTPATIENT
Start: 2018-12-21 | End: 2019-01-14

## 2019-01-08 ENCOUNTER — HOSPITAL ENCOUNTER (EMERGENCY)
Facility: CLINIC | Age: 39
Discharge: HOME | End: 2019-01-08
Attending: EMERGENCY MEDICINE
Payer: MEDICARE

## 2019-01-08 ENCOUNTER — APPOINTMENT (OUTPATIENT)
Dept: CT IMAGING | Facility: CLINIC | Age: 39
End: 2019-01-08
Payer: MEDICARE

## 2019-01-08 VITALS
RESPIRATION RATE: 18 BRPM | DIASTOLIC BLOOD PRESSURE: 97 MMHG | HEART RATE: 88 BPM | BODY MASS INDEX: 23.7 KG/M2 | SYSTOLIC BLOOD PRESSURE: 168 MMHG | WEIGHT: 160 LBS | OXYGEN SATURATION: 99 % | TEMPERATURE: 97.9 F | HEIGHT: 69 IN

## 2019-01-08 DIAGNOSIS — M54.2 NECK PAIN: Primary | ICD-10-CM

## 2019-01-08 PROCEDURE — 99283 EMERGENCY DEPT VISIT LOW MDM: CPT

## 2019-01-08 PROCEDURE — 72125 CT NECK SPINE W/O DYE: CPT

## 2019-01-08 RX ORDER — BUPRENORPHINE 5 UG/H
1 PATCH TRANSDERMAL WEEKLY
COMMUNITY
End: 2019-02-06

## 2019-01-08 RX ORDER — METAXALONE 800 MG/1
800 TABLET ORAL 3 TIMES DAILY PRN
Qty: 21 TABLET | Refills: 0 | Status: SHIPPED | OUTPATIENT
Start: 2019-01-08 | End: 2019-01-14

## 2019-01-08 ASSESSMENT — ENCOUNTER SYMPTOMS: SHORTNESS OF BREATH: 0

## 2019-01-08 ASSESSMENT — PAIN DESCRIPTION - DESCRIPTORS: DESCRIPTORS: SHARP

## 2019-01-08 ASSESSMENT — PAIN DESCRIPTION - ORIENTATION: ORIENTATION: MID;LOWER

## 2019-01-08 ASSESSMENT — PAIN DESCRIPTION - FREQUENCY: FREQUENCY: CONTINUOUS

## 2019-01-08 ASSESSMENT — PAIN SCALES - GENERAL: PAINLEVEL_OUTOF10: 7

## 2019-01-08 ASSESSMENT — PAIN DESCRIPTION - PAIN TYPE: TYPE: ACUTE PAIN

## 2019-01-08 ASSESSMENT — PAIN DESCRIPTION - LOCATION: LOCATION: NECK

## 2019-01-08 ASSESSMENT — PAIN DESCRIPTION - PROGRESSION: CLINICAL_PROGRESSION: NOT CHANGED

## 2019-01-14 ENCOUNTER — OFFICE VISIT (OUTPATIENT)
Dept: PRIMARY CARE CLINIC | Age: 39
End: 2019-01-14
Payer: MEDICARE

## 2019-01-14 ENCOUNTER — TELEPHONE (OUTPATIENT)
Dept: PRIMARY CARE CLINIC | Age: 39
End: 2019-01-14

## 2019-01-14 VITALS
BODY MASS INDEX: 23.49 KG/M2 | DIASTOLIC BLOOD PRESSURE: 78 MMHG | OXYGEN SATURATION: 98 % | HEIGHT: 69 IN | SYSTOLIC BLOOD PRESSURE: 128 MMHG | WEIGHT: 158.6 LBS | HEART RATE: 140 BPM

## 2019-01-14 DIAGNOSIS — F32.2 SEVERE MAJOR DEPRESSION WITHOUT PSYCHOTIC FEATURES (HCC): ICD-10-CM

## 2019-01-14 DIAGNOSIS — F33.3 SEVERE RECURRENT MAJOR DEPRESSION WITH PSYCHOTIC FEATURES (HCC): ICD-10-CM

## 2019-01-14 DIAGNOSIS — R45.851 DEPRESSION WITH SUICIDAL IDEATION: ICD-10-CM

## 2019-01-14 DIAGNOSIS — F31.32 BIPOLAR DISORDER WITH MODERATE DEPRESSION (HCC): ICD-10-CM

## 2019-01-14 DIAGNOSIS — F32.A DEPRESSION WITH SUICIDAL IDEATION: ICD-10-CM

## 2019-01-14 DIAGNOSIS — I10 BENIGN ESSENTIAL HTN: ICD-10-CM

## 2019-01-14 DIAGNOSIS — Z79.899 HIGH RISK MEDICATION USE: ICD-10-CM

## 2019-01-14 DIAGNOSIS — G89.4 CHRONIC PAIN SYNDROME: ICD-10-CM

## 2019-01-14 DIAGNOSIS — Q87.40 MARFAN'S SYNDROME: ICD-10-CM

## 2019-01-14 DIAGNOSIS — F31.70 BIPOLAR AFFECTIVE DISORDER IN REMISSION (HCC): Primary | ICD-10-CM

## 2019-01-14 PROCEDURE — 1036F TOBACCO NON-USER: CPT | Performed by: FAMILY MEDICINE

## 2019-01-14 PROCEDURE — 99214 OFFICE O/P EST MOD 30 MIN: CPT | Performed by: FAMILY MEDICINE

## 2019-01-14 PROCEDURE — G8420 CALC BMI NORM PARAMETERS: HCPCS | Performed by: FAMILY MEDICINE

## 2019-01-14 PROCEDURE — G8482 FLU IMMUNIZE ORDER/ADMIN: HCPCS | Performed by: FAMILY MEDICINE

## 2019-01-14 PROCEDURE — G8427 DOCREV CUR MEDS BY ELIG CLIN: HCPCS | Performed by: FAMILY MEDICINE

## 2019-01-14 RX ORDER — LORAZEPAM 1 MG/1
TABLET ORAL
Refills: 2 | COMMUNITY
Start: 2018-12-17 | End: 2019-01-14 | Stop reason: SDUPTHER

## 2019-01-14 RX ORDER — CYCLOBENZAPRINE HCL 10 MG
10 TABLET ORAL 3 TIMES DAILY PRN
COMMUNITY
Start: 2019-01-08 | End: 2019-04-19 | Stop reason: SDUPTHER

## 2019-01-14 RX ORDER — LORAZEPAM 1 MG/1
1 TABLET ORAL EVERY 8 HOURS PRN
Qty: 90 TABLET | Refills: 2 | Status: SHIPPED | OUTPATIENT
Start: 2019-01-14 | End: 2019-02-13

## 2019-01-14 RX ORDER — CYCLOBENZAPRINE HCL 10 MG
10 TABLET ORAL 3 TIMES DAILY PRN
Qty: 60 TABLET | Refills: 1 | Status: SHIPPED | OUTPATIENT
Start: 2019-01-14 | End: 2019-03-20 | Stop reason: SDUPTHER

## 2019-01-14 RX ORDER — LORAZEPAM 1 MG/1
1 TABLET ORAL EVERY 8 HOURS PRN
Qty: 90 TABLET | Refills: 2 | Status: SHIPPED | OUTPATIENT
Start: 2019-01-14 | End: 2019-01-14 | Stop reason: SDUPTHER

## 2019-01-14 RX ORDER — BUPRENORPHINE 7.5 UG/H
1 PATCH TRANSDERMAL WEEKLY
Qty: 5 PATCH | Refills: 0 | Status: SHIPPED | OUTPATIENT
Start: 2019-01-14 | End: 2019-01-14 | Stop reason: SDUPTHER

## 2019-01-14 RX ORDER — BUPRENORPHINE 7.5 UG/H
1 PATCH TRANSDERMAL WEEKLY
Qty: 5 PATCH | Refills: 0 | Status: SHIPPED | OUTPATIENT
Start: 2019-01-14 | End: 2019-02-11 | Stop reason: SDUPTHER

## 2019-01-14 ASSESSMENT — ENCOUNTER SYMPTOMS
DIARRHEA: 0
EYE DISCHARGE: 0
SHORTNESS OF BREATH: 0
COUGH: 0
WHEEZING: 0
NAUSEA: 0
ABDOMINAL PAIN: 0
EYE REDNESS: 0
SORE THROAT: 0
VOMITING: 0
RHINORRHEA: 0

## 2019-01-15 ENCOUNTER — TELEPHONE (OUTPATIENT)
Dept: PRIMARY CARE CLINIC | Age: 39
End: 2019-01-15

## 2019-01-15 DIAGNOSIS — R41.3 SHORT-TERM MEMORY LOSS: Primary | ICD-10-CM

## 2019-01-18 DIAGNOSIS — Z79.899 HIGH RISK MEDICATION USE: ICD-10-CM

## 2019-01-26 ENCOUNTER — TELEPHONE (OUTPATIENT)
Dept: PRIMARY CARE CLINIC | Age: 39
End: 2019-01-26

## 2019-01-26 DIAGNOSIS — R06.2 WHEEZING: ICD-10-CM

## 2019-01-26 DIAGNOSIS — J20.9 ACUTE BRONCHITIS, UNSPECIFIED ORGANISM: Primary | ICD-10-CM

## 2019-01-26 RX ORDER — ALBUTEROL SULFATE 90 UG/1
2 AEROSOL, METERED RESPIRATORY (INHALATION) EVERY 6 HOURS PRN
Qty: 1 INHALER | Refills: 0 | Status: SHIPPED | OUTPATIENT
Start: 2019-01-26 | End: 2019-09-12

## 2019-01-26 RX ORDER — PREDNISONE 20 MG/1
40 TABLET ORAL DAILY
Qty: 10 TABLET | Refills: 0 | Status: SHIPPED | OUTPATIENT
Start: 2019-01-26 | End: 2019-01-31

## 2019-02-06 ENCOUNTER — APPOINTMENT (OUTPATIENT)
Dept: GENERAL RADIOLOGY | Age: 39
End: 2019-02-06
Payer: MEDICARE

## 2019-02-06 ENCOUNTER — HOSPITAL ENCOUNTER (EMERGENCY)
Age: 39
Discharge: HOME | End: 2019-02-06
Attending: EMERGENCY MEDICINE
Payer: MEDICARE

## 2019-02-06 ENCOUNTER — APPOINTMENT (OUTPATIENT)
Dept: CT IMAGING | Age: 39
End: 2019-02-06
Payer: MEDICARE

## 2019-02-06 VITALS
SYSTOLIC BLOOD PRESSURE: 106 MMHG | DIASTOLIC BLOOD PRESSURE: 74 MMHG | TEMPERATURE: 97.7 F | BODY MASS INDEX: 21.62 KG/M2 | WEIGHT: 146 LBS | HEIGHT: 69 IN | HEART RATE: 69 BPM | OXYGEN SATURATION: 100 % | RESPIRATION RATE: 17 BRPM

## 2019-02-06 DIAGNOSIS — R07.9 CHEST PAIN, UNSPECIFIED TYPE: Primary | ICD-10-CM

## 2019-02-06 DIAGNOSIS — R03.0 ELEVATED BLOOD PRESSURE READING: ICD-10-CM

## 2019-02-06 DIAGNOSIS — R09.1 PLEURISY: ICD-10-CM

## 2019-02-06 LAB
ABSOLUTE EOS #: 0 K/UL (ref 0–0.4)
ABSOLUTE IMMATURE GRANULOCYTE: ABNORMAL K/UL (ref 0–0.3)
ABSOLUTE LYMPH #: 0.8 K/UL (ref 1–4.8)
ABSOLUTE MONO #: 0.2 K/UL (ref 0.2–0.8)
ALBUMIN SERPL-MCNC: 3.9 G/DL (ref 3.5–5.2)
ALBUMIN/GLOBULIN RATIO: ABNORMAL (ref 1–2.5)
ALP BLD-CCNC: 66 U/L (ref 35–104)
ALT SERPL-CCNC: 7 U/L (ref 5–33)
ANION GAP SERPL CALCULATED.3IONS-SCNC: 14 MMOL/L (ref 9–17)
APPEARANCE: CLEAR
AST SERPL-CCNC: 9 U/L
BASOPHILS # BLD: 0 % (ref 0–2)
BASOPHILS ABSOLUTE: 0 K/UL (ref 0–0.2)
BILIRUB SERPL-MCNC: 0.53 MG/DL (ref 0.3–1.2)
BILIRUBIN, POC: NORMAL
BLOOD URINE, POC: NORMAL
BUN BLDV-MCNC: 6 MG/DL (ref 6–20)
BUN/CREAT BLD: 12 (ref 9–20)
CALCIUM SERPL-MCNC: 9.3 MG/DL (ref 8.6–10.4)
CHLORIDE BLD-SCNC: 103 MMOL/L (ref 98–107)
CHP ED QC CHECK: NORMAL
CO2: 25 MMOL/L (ref 20–31)
COLOR, POC: YELLOW
CREAT SERPL-MCNC: 0.5 MG/DL (ref 0.5–0.9)
DIFFERENTIAL TYPE: ABNORMAL
EOSINOPHILS RELATIVE PERCENT: 0 % (ref 1–4)
GFR AFRICAN AMERICAN: >60 ML/MIN
GFR NON-AFRICAN AMERICAN: >60 ML/MIN
GFR SERPL CREATININE-BSD FRML MDRD: ABNORMAL ML/MIN/{1.73_M2}
GFR SERPL CREATININE-BSD FRML MDRD: ABNORMAL ML/MIN/{1.73_M2}
GLUCOSE BLD-MCNC: 114 MG/DL (ref 70–99)
GLUCOSE URINE, POC: NORMAL
HCG QUALITATIVE: NEGATIVE
HCT VFR BLD CALC: 39.2 % (ref 36–46)
HEMOGLOBIN: 13.5 G/DL (ref 12–16)
IMMATURE GRANULOCYTES: ABNORMAL %
KETONES, POC: NORMAL
LEUKOCYTE EST, POC: NORMAL
LIPASE: 12 U/L (ref 13–60)
LYMPHOCYTES # BLD: 6 % (ref 24–44)
MCH RBC QN AUTO: 30 PG (ref 26–34)
MCHC RBC AUTO-ENTMCNC: 34.4 G/DL (ref 31–37)
MCV RBC AUTO: 87 FL (ref 80–100)
MONOCYTES # BLD: 2 % (ref 1–7)
MYOGLOBIN: 32 NG/ML (ref 25–58)
MYOGLOBIN: <21 NG/ML (ref 25–58)
NITRITE, POC: NORMAL
NRBC AUTOMATED: ABNORMAL PER 100 WBC
PDW BLD-RTO: 12.8 % (ref 11.5–14.5)
PH, POC: 7
PLATELET # BLD: 380 K/UL (ref 130–400)
PLATELET ESTIMATE: ABNORMAL
PMV BLD AUTO: 7.2 FL (ref 6–12)
POTASSIUM SERPL-SCNC: 4 MMOL/L (ref 3.7–5.3)
PREGNANCY TEST URINE, POC: NORMAL
PROTEIN, POC: NORMAL
RBC # BLD: 4.5 M/UL (ref 4–5.2)
RBC # BLD: ABNORMAL 10*6/UL
SEG NEUTROPHILS: 92 % (ref 36–66)
SEGMENTED NEUTROPHILS ABSOLUTE COUNT: 11 K/UL (ref 1.8–7.7)
SODIUM BLD-SCNC: 142 MMOL/L (ref 135–144)
SPECIFIC GRAVITY, POC: 1.02
TOTAL PROTEIN: 6.8 G/DL (ref 6.4–8.3)
TROPONIN INTERP: ABNORMAL
TROPONIN INTERP: NORMAL
TROPONIN T: ABNORMAL NG/ML
TROPONIN T: NORMAL NG/ML
TROPONIN, HIGH SENSITIVITY: <6 NG/L (ref 0–14)
TROPONIN, HIGH SENSITIVITY: <6 NG/L (ref 0–14)
UROBILINOGEN, POC: 0.2
WBC # BLD: 12 K/UL (ref 3.5–11)
WBC # BLD: ABNORMAL 10*3/UL

## 2019-02-06 PROCEDURE — 84703 CHORIONIC GONADOTROPIN ASSAY: CPT

## 2019-02-06 PROCEDURE — 2580000003 HC RX 258: Performed by: PHYSICIAN ASSISTANT

## 2019-02-06 PROCEDURE — 83874 ASSAY OF MYOGLOBIN: CPT

## 2019-02-06 PROCEDURE — 80053 COMPREHEN METABOLIC PANEL: CPT

## 2019-02-06 PROCEDURE — 96374 THER/PROPH/DIAG INJ IV PUSH: CPT

## 2019-02-06 PROCEDURE — 84484 ASSAY OF TROPONIN QUANT: CPT

## 2019-02-06 PROCEDURE — 99285 EMERGENCY DEPT VISIT HI MDM: CPT

## 2019-02-06 PROCEDURE — 71046 X-RAY EXAM CHEST 2 VIEWS: CPT

## 2019-02-06 PROCEDURE — 85025 COMPLETE CBC W/AUTO DIFF WBC: CPT

## 2019-02-06 PROCEDURE — 81003 URINALYSIS AUTO W/O SCOPE: CPT

## 2019-02-06 PROCEDURE — 6370000000 HC RX 637 (ALT 250 FOR IP): Performed by: PHYSICIAN ASSISTANT

## 2019-02-06 PROCEDURE — 93005 ELECTROCARDIOGRAM TRACING: CPT

## 2019-02-06 PROCEDURE — 83690 ASSAY OF LIPASE: CPT

## 2019-02-06 PROCEDURE — 71275 CT ANGIOGRAPHY CHEST: CPT

## 2019-02-06 PROCEDURE — 6360000002 HC RX W HCPCS: Performed by: PHYSICIAN ASSISTANT

## 2019-02-06 PROCEDURE — 6360000004 HC RX CONTRAST MEDICATION: Performed by: PHYSICIAN ASSISTANT

## 2019-02-06 RX ORDER — SODIUM CHLORIDE 0.9 % (FLUSH) 0.9 %
10 SYRINGE (ML) INJECTION PRN
Status: DISCONTINUED | OUTPATIENT
Start: 2019-02-06 | End: 2019-02-06 | Stop reason: HOSPADM

## 2019-02-06 RX ORDER — OMEPRAZOLE 20 MG/1
20 CAPSULE, DELAYED RELEASE ORAL DAILY
Qty: 14 CAPSULE | Refills: 0 | Status: ON HOLD | OUTPATIENT
Start: 2019-02-06 | End: 2019-08-21 | Stop reason: DRUGHIGH

## 2019-02-06 RX ORDER — NAPROXEN 500 MG/1
500 TABLET ORAL 2 TIMES DAILY WITH MEALS
Qty: 20 TABLET | Refills: 0 | Status: SHIPPED | OUTPATIENT
Start: 2019-02-06 | End: 2019-08-21 | Stop reason: DRUGHIGH

## 2019-02-06 RX ORDER — KETOROLAC TROMETHAMINE 30 MG/ML
30 INJECTION, SOLUTION INTRAMUSCULAR; INTRAVENOUS ONCE
Status: COMPLETED | OUTPATIENT
Start: 2019-02-06 | End: 2019-02-06

## 2019-02-06 RX ORDER — ACETAMINOPHEN 500 MG
1000 TABLET ORAL ONCE
Status: COMPLETED | OUTPATIENT
Start: 2019-02-06 | End: 2019-02-06

## 2019-02-06 RX ORDER — ONDANSETRON 2 MG/ML
4 INJECTION INTRAMUSCULAR; INTRAVENOUS ONCE
Status: DISCONTINUED | OUTPATIENT
Start: 2019-02-06 | End: 2019-02-06

## 2019-02-06 RX ORDER — 0.9 % SODIUM CHLORIDE 0.9 %
80 INTRAVENOUS SOLUTION INTRAVENOUS ONCE
Status: COMPLETED | OUTPATIENT
Start: 2019-02-06 | End: 2019-02-06

## 2019-02-06 RX ORDER — 0.9 % SODIUM CHLORIDE 0.9 %
1000 INTRAVENOUS SOLUTION INTRAVENOUS ONCE
Status: COMPLETED | OUTPATIENT
Start: 2019-02-06 | End: 2019-02-06

## 2019-02-06 RX ADMIN — SODIUM CHLORIDE, PRESERVATIVE FREE 10 ML: 5 INJECTION INTRAVENOUS at 15:51

## 2019-02-06 RX ADMIN — IOPAMIDOL 100 ML: 755 INJECTION, SOLUTION INTRAVENOUS at 15:51

## 2019-02-06 RX ADMIN — KETOROLAC TROMETHAMINE 30 MG: 30 INJECTION, SOLUTION INTRAMUSCULAR at 16:56

## 2019-02-06 RX ADMIN — ACETAMINOPHEN 1000 MG: 500 TABLET ORAL at 14:51

## 2019-02-06 RX ADMIN — SODIUM CHLORIDE 1000 ML: 9 INJECTION, SOLUTION INTRAVENOUS at 14:46

## 2019-02-06 RX ADMIN — SODIUM CHLORIDE 80 ML: 0.9 INJECTION, SOLUTION INTRAVENOUS at 15:51

## 2019-02-06 ASSESSMENT — PAIN DESCRIPTION - LOCATION: LOCATION: CHEST

## 2019-02-06 ASSESSMENT — PAIN SCALES - GENERAL
PAINLEVEL_OUTOF10: 7
PAINLEVEL_OUTOF10: 7
PAINLEVEL_OUTOF10: 8

## 2019-02-06 ASSESSMENT — HEART SCORE: ECG: 0

## 2019-02-06 ASSESSMENT — PAIN DESCRIPTION - PAIN TYPE: TYPE: ACUTE PAIN

## 2019-02-06 ASSESSMENT — PAIN DESCRIPTION - ORIENTATION: ORIENTATION: LEFT;ANTERIOR

## 2019-02-07 LAB
EKG ATRIAL RATE: 104 BPM
EKG P AXIS: 62 DEGREES
EKG P-R INTERVAL: 136 MS
EKG Q-T INTERVAL: 326 MS
EKG QRS DURATION: 66 MS
EKG QTC CALCULATION (BAZETT): 428 MS
EKG R AXIS: 66 DEGREES
EKG T AXIS: 95 DEGREES
EKG VENTRICULAR RATE: 104 BPM
HCG, PREGNANCY URINE (POC): NEGATIVE

## 2019-02-11 DIAGNOSIS — Q87.40 MARFAN'S SYNDROME: ICD-10-CM

## 2019-02-11 DIAGNOSIS — G89.4 CHRONIC PAIN SYNDROME: ICD-10-CM

## 2019-02-11 RX ORDER — BUPRENORPHINE 7.5 UG/H
PATCH TRANSDERMAL
Qty: 4 PATCH | Refills: 1 | Status: SHIPPED | OUTPATIENT
Start: 2019-02-11 | End: 2019-02-12 | Stop reason: SDUPTHER

## 2019-02-11 RX ORDER — PROMETHAZINE HYDROCHLORIDE 25 MG/1
TABLET ORAL
Qty: 60 TABLET | Refills: 0 | Status: SHIPPED | OUTPATIENT
Start: 2019-02-11 | End: 2019-03-04 | Stop reason: SDUPTHER

## 2019-02-12 DIAGNOSIS — I10 BENIGN ESSENTIAL HTN: Primary | ICD-10-CM

## 2019-02-12 DIAGNOSIS — Q87.40 MARFAN'S SYNDROME: ICD-10-CM

## 2019-02-12 DIAGNOSIS — G89.4 CHRONIC PAIN SYNDROME: ICD-10-CM

## 2019-02-13 RX ORDER — NEBIVOLOL HYDROCHLORIDE 2.5 MG/1
TABLET ORAL
Qty: 30 TABLET | Refills: 2 | Status: SHIPPED | OUTPATIENT
Start: 2019-02-13 | End: 2019-04-10 | Stop reason: SDUPTHER

## 2019-02-13 RX ORDER — BUPRENORPHINE 7.5 UG/H
1 PATCH TRANSDERMAL WEEKLY
Qty: 4 PATCH | Refills: 1 | Status: SHIPPED | OUTPATIENT
Start: 2019-02-13 | End: 2019-03-13

## 2019-03-05 RX ORDER — PROMETHAZINE HYDROCHLORIDE 25 MG/1
TABLET ORAL
Qty: 60 TABLET | Refills: 2 | Status: SHIPPED | OUTPATIENT
Start: 2019-03-05 | End: 2019-04-15 | Stop reason: SDUPTHER

## 2019-03-12 ENCOUNTER — TELEPHONE (OUTPATIENT)
Dept: PRIMARY CARE CLINIC | Age: 39
End: 2019-03-12

## 2019-03-20 DIAGNOSIS — G89.4 CHRONIC PAIN SYNDROME: ICD-10-CM

## 2019-03-20 DIAGNOSIS — Q87.40 MARFAN'S SYNDROME: ICD-10-CM

## 2019-03-20 RX ORDER — BUPRENORPHINE 7.5 UG/H
PATCH TRANSDERMAL
Qty: 4 PATCH | Refills: 0 | Status: SHIPPED | OUTPATIENT
Start: 2019-03-20 | End: 2019-04-10 | Stop reason: SDUPTHER

## 2019-03-20 RX ORDER — CYCLOBENZAPRINE HCL 10 MG
TABLET ORAL
Qty: 60 TABLET | Refills: 0 | Status: SHIPPED | OUTPATIENT
Start: 2019-03-20 | End: 2019-04-19 | Stop reason: SDUPTHER

## 2019-04-09 DIAGNOSIS — F31.32 BIPOLAR DISORDER WITH MODERATE DEPRESSION (HCC): Primary | ICD-10-CM

## 2019-04-09 DIAGNOSIS — Q87.40 MARFAN'S SYNDROME: ICD-10-CM

## 2019-04-09 DIAGNOSIS — G89.4 CHRONIC PAIN SYNDROME: ICD-10-CM

## 2019-04-09 DIAGNOSIS — I10 BENIGN ESSENTIAL HTN: ICD-10-CM

## 2019-04-10 RX ORDER — NEBIVOLOL 2.5 MG/1
TABLET ORAL
Qty: 30 TABLET | Refills: 2 | Status: SHIPPED | OUTPATIENT
Start: 2019-04-10 | End: 2019-08-21

## 2019-04-10 RX ORDER — LORAZEPAM 1 MG/1
TABLET ORAL
Qty: 90 TABLET | Refills: 0 | Status: SHIPPED | OUTPATIENT
Start: 2019-04-10 | End: 2019-05-10 | Stop reason: SDUPTHER

## 2019-04-10 RX ORDER — BUPRENORPHINE 7.5 UG/H
1 PATCH TRANSDERMAL WEEKLY
Qty: 4 PATCH | Refills: 0 | Status: SHIPPED | OUTPATIENT
Start: 2019-04-10 | End: 2019-05-10 | Stop reason: SDUPTHER

## 2019-04-10 NOTE — TELEPHONE ENCOUNTER
Last visit: 1/14/19  Last Med refill: 1/14/19  Does patient have enough medication for 72 hours:   Next Visit Date:  No future appointments.     Health Maintenance   Topic Date Due    Varicella Vaccine (1 of 2 - 13+ 2-dose series) 03/13/1995    DTaP/Tdap/Td vaccine (1 - Tdap) 03/13/2001    Potassium monitoring  02/06/2020    Creatinine monitoring  02/06/2020    Cervical cancer screen  06/30/2021    Flu vaccine  Completed    HIV screen  Completed    Pneumococcal 0-64 years Vaccine  Aged Out       Hemoglobin A1C (%)   Date Value   06/10/2018 4.9             ( goal A1C is < 7)   No results found for: LABMICR  LDL Cholesterol (mg/dL)   Date Value   06/10/2018 85   02/21/2018 124       (goal LDL is <100)   AST (U/L)   Date Value   02/06/2019 9     ALT (U/L)   Date Value   02/06/2019 7     BUN (mg/dL)   Date Value   02/06/2019 6     BP Readings from Last 3 Encounters:   02/06/19 106/74   01/14/19 128/78   01/08/19 (!) 168/97          (goal 120/80)    All Future Testing planned in [facility]  Lab Frequency Next Occurrence               Patient Active Problem List:     Polyarthritis     Scoliosis     H/O long-term treatment with high-risk medication     Chronic pain syndrome     Marfan's syndrome     Dysphagia     Instability of both shoulder joints     Hip instability     Benign essential HTN     Severe major depression without psychotic features ([address]     Depression with suicidal ideation     Bipolar disorder with moderate depression ([address]     Opiate withdrawal ([address]

## 2019-04-15 RX ORDER — PROMETHAZINE HYDROCHLORIDE 25 MG/1
25 TABLET ORAL EVERY 8 HOURS PRN
Qty: 60 TABLET | Refills: 1 | Status: SHIPPED | OUTPATIENT
Start: 2019-04-15 | End: 2019-05-03

## 2019-04-19 ENCOUNTER — TELEPHONE (OUTPATIENT)
Dept: PRIMARY CARE CLINIC | Age: 39
End: 2019-04-19

## 2019-04-20 RX ORDER — CYCLOBENZAPRINE HCL 10 MG
10 TABLET ORAL 3 TIMES DAILY PRN
Qty: 60 TABLET | Refills: 0 | Status: SHIPPED | OUTPATIENT
Start: 2019-04-20 | End: 2019-05-26 | Stop reason: SDUPTHER

## 2019-04-20 RX ORDER — FLUCONAZOLE 150 MG/1
150 TABLET ORAL ONCE
Qty: 1 TABLET | Refills: 0 | Status: SHIPPED | OUTPATIENT
Start: 2019-04-20 | End: 2019-04-20

## 2019-05-03 ENCOUNTER — OFFICE VISIT (OUTPATIENT)
Dept: PRIMARY CARE CLINIC | Age: 39
End: 2019-05-03
Payer: COMMERCIAL

## 2019-05-03 VITALS
HEART RATE: 77 BPM | SYSTOLIC BLOOD PRESSURE: 128 MMHG | WEIGHT: 151.1 LBS | BODY MASS INDEX: 22.31 KG/M2 | OXYGEN SATURATION: 98 % | DIASTOLIC BLOOD PRESSURE: 78 MMHG

## 2019-05-03 DIAGNOSIS — G89.4 CHRONIC PAIN SYNDROME: ICD-10-CM

## 2019-05-03 DIAGNOSIS — Q87.40 MARFAN'S SYNDROME: ICD-10-CM

## 2019-05-03 DIAGNOSIS — F31.32 BIPOLAR DISORDER WITH MODERATE DEPRESSION (HCC): ICD-10-CM

## 2019-05-03 PROCEDURE — G8427 DOCREV CUR MEDS BY ELIG CLIN: HCPCS | Performed by: FAMILY MEDICINE

## 2019-05-03 PROCEDURE — 1036F TOBACCO NON-USER: CPT | Performed by: FAMILY MEDICINE

## 2019-05-03 PROCEDURE — G8420 CALC BMI NORM PARAMETERS: HCPCS | Performed by: FAMILY MEDICINE

## 2019-05-03 PROCEDURE — 99213 OFFICE O/P EST LOW 20 MIN: CPT | Performed by: FAMILY MEDICINE

## 2019-05-03 RX ORDER — PROMETHAZINE HYDROCHLORIDE 25 MG/1
25 TABLET ORAL EVERY 6 HOURS PRN
Qty: 120 TABLET | Refills: 1 | Status: SHIPPED | OUTPATIENT
Start: 2019-05-03 | End: 2019-07-03 | Stop reason: SDUPTHER

## 2019-05-03 ASSESSMENT — ENCOUNTER SYMPTOMS
SHORTNESS OF BREATH: 0
WHEEZING: 0
EYE REDNESS: 0
BACK PAIN: 1
VOMITING: 0
EYE DISCHARGE: 0
COUGH: 0
SORE THROAT: 0
DIARRHEA: 0
RHINORRHEA: 0
NAUSEA: 1
ABDOMINAL PAIN: 0

## 2019-05-03 NOTE — PROGRESS NOTES
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 female who presents today for her medical conditions/complaintsas noted below. Chief Complaint   Patient presents with    Medication Check       HPI:     HPI   Pt presents today for medication check. Pt is using buprenorphine patches for pain caused by Marfan's syndrome in her knees, shoulders, and neck. She states it is doing a decent job at controlling pain. Pt currently taking promethazine for nausea caused by pain. She states she has been taking 4 pills per day despite being prescribed 3x/day because her symptoms have been overwhelming. Pt currently taking flexeril for mid/low back spasms. She is taking 3 per day and states it does a good job of controlling symptoms. Pt notes the ativan is helping the anxiety but she notes that every time she is in a car she gets overly anxious. Pt has upcoming appt with pain management. Using marijuana couple times a week. LDL Cholesterol (mg/dL)   Date Value   06/10/2018 85   02/21/2018 124   09/14/2015 100       (goal LDL is <100)   AST (U/L)   Date Value   02/06/2019 9     ALT (U/L)   Date Value   02/06/2019 7     BUN (mg/dL)   Date Value   02/06/2019 6     BP Readings from Last 3 Encounters:   05/03/19 128/78   02/06/19 106/74   01/14/19 128/78          (goal 120/80)    Past Medical History:   Diagnosis Date    Anxiety     Arthritis     O. A.    Bipolar depression [facility]     Dermatitis     Dysuria     Exposure to STD     Folliculitis     Hx of acute bronchitis     Hypertension     Marfan syndrome     MVP (mitral valve prolapse)     Nausea & vomiting     PONV (postoperative nausea and vomiting)     Scoliosis     Seasonal allergies     Staph infection     HX OF ALSO BACTEREMIA    UTI (lower urinary tract infection)       Past Surgical History:   Procedure Laterality Date    BREAST REDUCTION SURGERY Bilateral         [facility]    FOOT SURGERY Bilateral     RT HAS 2 SCREWS, LT HAD FUSION IN DEC WITH 2 SCREWS    HIP ARTHROSCOPY Left     KNEE ARTHROSCOPY Right     X 2    [place] EGD TRANSORAL BIOPSY SINGLE/MULTIPLE N/A 2017    EGD BIOPSY WITH DILATATION WITH MALONEYS performed by Blaise Lorenz MD at [facility] Bilateral     harware in place bilat    TUBAL LIGATION      clips placed on fallopian tubes    UPPER GASTROINTESTINAL ENDOSCOPY  2017     EXTENSIVE SPASMATIC CONTRACTIONS; NO LUMINAL OBSTRUCTIONS; CLOSER TO THE DISTAL ESOPHAGUS AND DEVELOPING DIVERTICULUM IS NOTED AND BELIEVED TO BE SEC TO DSYSMOTILITY;  Zondra Quarry WERE DONE EMPIRICALLY WITH 64 FR       Family History   Problem Relation Age of Onset    Cancer Brother         testicular    Breast Cancer Maternal Grandmother     Hypertension Mother     Thyroid Disease Mother        Social History     Tobacco Use    Smoking status: Former Smoker     Years: 1.50     Last attempt to quit: 3/26/2015     Years since quittin.1    Smokeless tobacco: Never Used   Substance Use Topics    Alcohol use: No      Current Outpatient Medications   Medication Sig Dispense Refill    cyclobenzaprine (FLEXERIL) 10 MG tablet Take 1 tablet by mouth 3 times daily as needed for Muscle spasms 60 tablet 0    promethazine (PHENERGAN) 25 MG tablet Take 1 tablet by mouth every 8 hours as needed for Nausea 60 tablet 1    nebivolol (BYSTOLIC) 2.5 MG tablet TAKE 1 TABLET BY MOUTH ONE TIME A DAY 30 tablet 2    buprenorphine (BUPRENEX) 7.5 MCG/HR PTWK Place 1 patch onto the skin once a week for 30 days.  4 patch 0    naproxen (NAPROSYN) 500 MG tablet Take 1 tablet by mouth 2 times daily (with meals) 20 tablet 0    omeprazole (PRILOSEC) 20 MG delayed release capsule Take 1 capsule by mouth daily 14 capsule 0    albuterol sulfate  (90 Base) MCG/ACT inhaler Inhale 2 puffs into the lungs every 6 hours as needed for Wheezing or Shortness of Breath 1 Inhaler 0     No current facility-administered medications for this visit. Allergies   Allergen Reactions    Ketamine Other (See Comments)    Morphine Hives       Health Maintenance   Topic Date Due    Varicella Vaccine (1 of 2 - 13+ 2-dose series) 03/13/1995    DTaP/Tdap/Td vaccine (1 - Tdap) 03/13/2001    Potassium monitoring  02/06/2020    Creatinine monitoring  02/06/2020    Cervical cancer screen  06/30/2021    Flu vaccine  Completed    HIV screen  Completed    Pneumococcal 0-64 years Vaccine  Aged Out       Subjective:      Review of Systems   Constitutional: Negative for chills and fever. HENT: Negative for rhinorrhea and sore throat. Eyes: Negative for discharge and redness. Respiratory: Negative for cough, shortness of breath and wheezing. Cardiovascular: Negative for chest pain and palpitations. Gastrointestinal: Positive for nausea. Negative for abdominal pain, diarrhea and vomiting. Genitourinary: Negative for dysuria and frequency. Musculoskeletal: Positive for back pain and neck pain. Negative for arthralgias and myalgias. Neurological: Negative for dizziness, light-headedness and headaches. Psychiatric/Behavioral: Negative for sleep disturbance. The patient is not nervous/anxious. Objective:     /78   Pulse 77   Wt 151 lb 1.6 oz (68.5 kg)   SpO2 98%   BMI 22.31 kg/m²   Physical Exam   Constitutional: She is oriented to person, place, and time. She appears well-developed and well-nourished. No distress. HENT:   Head: Normocephalic and atraumatic. Mouth/Throat: Oropharynx is clear and moist.   Eyes: Pupils are equal, round, and reactive to light. Conjunctivae are normal. Right eye exhibits no discharge. Left eye exhibits no discharge. No scleral icterus. Neck: No tracheal deviation present. No thyromegaly present.    Cardiovascular: Normal rate, regular rhythm and normal heart sounds. No carotid bruits   Pulmonary/Chest: Effort normal and breath sounds normal. No respiratory distress. She has no wheezes. Abdominal: She exhibits no distension. There is no tenderness. Musculoskeletal: She exhibits no edema. Lymphadenopathy:     She has no cervical adenopathy. Neurological: She is alert and oriented to person, place, and time. Skin: Skin is warm. No rash noted. Psychiatric: She has a normal mood and affect. Her behavior is normal. Thought content normal.   Nursing note and vitals reviewed. Assessment:       Diagnosis Orders   1. Chronic pain syndrome     2. Marfan's syndrome     3. Bipolar disorder with moderate depression ([facility]          Plan:    keep appt with pain mangement  Pt to see psychiatry  Refill of phenergan. Believe nausea anxiety induced. Return in about 3 months (around 8/3/2019). No orders of the defined types were placed in this encounter. No orders of the defined types were placed in this encounter. Patient given educationalmaterials - see patient instructions. Discussed use, benefit, and side effectsof prescribed medications. All patient questions answered. Pt voiced understanding. Reviewed health maintenance. Instructed to continue current medications, diet andexercise. Patient agreed with treatment plan. Follow up as directed.      Electronicallysigned by Bianca Odell MD on 5/3/2019 at 1:27 PM

## 2019-05-10 DIAGNOSIS — G89.4 CHRONIC PAIN SYNDROME: ICD-10-CM

## 2019-05-10 DIAGNOSIS — Q87.40 MARFAN'S SYNDROME: ICD-10-CM

## 2019-05-10 DIAGNOSIS — F31.32 BIPOLAR DISORDER WITH MODERATE DEPRESSION (HCC): ICD-10-CM

## 2019-05-10 RX ORDER — LORAZEPAM 1 MG/1
TABLET ORAL
Qty: 90 TABLET | Refills: 0 | Status: SHIPPED | OUTPATIENT
Start: 2019-05-10 | End: 2019-06-11 | Stop reason: SDUPTHER

## 2019-05-10 RX ORDER — BUPRENORPHINE 7.5 UG/H
PATCH, EXTENDED RELEASE TRANSDERMAL
Qty: 4 PATCH | Refills: 1 | Status: SHIPPED | OUTPATIENT
Start: 2019-05-10 | End: 2019-07-05 | Stop reason: SDUPTHER

## 2019-05-28 ENCOUNTER — TELEPHONE (OUTPATIENT)
Dept: PRIMARY CARE CLINIC | Age: 39
End: 2019-05-28

## 2019-05-28 RX ORDER — CYCLOBENZAPRINE HCL 10 MG
TABLET ORAL
Qty: 60 TABLET | Refills: 0 | Status: SHIPPED | OUTPATIENT
Start: 2019-05-28 | End: 2019-07-03 | Stop reason: SDUPTHER

## 2019-06-11 DIAGNOSIS — F31.32 BIPOLAR DISORDER WITH MODERATE DEPRESSION (HCC): ICD-10-CM

## 2019-06-12 RX ORDER — LORAZEPAM 1 MG/1
TABLET ORAL
Qty: 90 TABLET | Refills: 0 | Status: SHIPPED | OUTPATIENT
Start: 2019-06-12 | End: 2019-07-11 | Stop reason: SDUPTHER

## 2019-07-03 RX ORDER — CYCLOBENZAPRINE HCL 10 MG
10 TABLET ORAL 2 TIMES DAILY PRN
Qty: 60 TABLET | Refills: 0 | Status: SHIPPED | OUTPATIENT
Start: 2019-07-03 | End: 2019-07-30 | Stop reason: SDUPTHER

## 2019-07-03 RX ORDER — PROMETHAZINE HYDROCHLORIDE 25 MG/1
25 TABLET ORAL EVERY 6 HOURS PRN
Qty: 120 TABLET | Refills: 1 | Status: SHIPPED | OUTPATIENT
Start: 2019-07-03 | End: 2019-08-02

## 2019-07-05 DIAGNOSIS — G89.4 CHRONIC PAIN SYNDROME: ICD-10-CM

## 2019-07-05 DIAGNOSIS — Q87.40 MARFAN'S SYNDROME: ICD-10-CM

## 2019-07-05 RX ORDER — BUPRENORPHINE 7.5 UG/H
1 PATCH TRANSDERMAL WEEKLY
Qty: 4 PATCH | Refills: 1 | Status: SHIPPED | OUTPATIENT
Start: 2019-07-05 | End: 2019-08-04

## 2019-07-11 DIAGNOSIS — F31.32 BIPOLAR DISORDER WITH MODERATE DEPRESSION (HCC): ICD-10-CM

## 2019-07-11 RX ORDER — LORAZEPAM 1 MG/1
TABLET ORAL
Qty: 90 TABLET | Refills: 0 | Status: SHIPPED | OUTPATIENT
Start: 2019-07-11 | End: 2019-08-08 | Stop reason: SDUPTHER

## 2019-07-31 RX ORDER — CYCLOBENZAPRINE HCL 10 MG
TABLET ORAL
Qty: 60 TABLET | Refills: 0 | Status: SHIPPED | OUTPATIENT
Start: 2019-07-31 | End: 2019-08-28 | Stop reason: SDUPTHER

## 2019-08-08 DIAGNOSIS — F31.32 BIPOLAR DISORDER WITH MODERATE DEPRESSION (HCC): ICD-10-CM

## 2019-08-09 RX ORDER — LORAZEPAM 1 MG/1
TABLET ORAL
Qty: 90 TABLET | Refills: 0 | Status: SHIPPED | OUTPATIENT
Start: 2019-08-09 | End: 2019-09-08

## 2019-08-17 ENCOUNTER — HOSPITAL ENCOUNTER (EMERGENCY)
Age: 39
Discharge: HOME | End: 2019-08-17
Attending: EMERGENCY MEDICINE
Payer: COMMERCIAL

## 2019-08-17 ENCOUNTER — APPOINTMENT (OUTPATIENT)
Dept: CT IMAGING | Age: 39
End: 2019-08-17
Payer: COMMERCIAL

## 2019-08-17 ENCOUNTER — TELEPHONE (OUTPATIENT)
Dept: PRIMARY CARE CLINIC | Age: 39
End: 2019-08-17

## 2019-08-17 VITALS
SYSTOLIC BLOOD PRESSURE: 139 MMHG | OXYGEN SATURATION: 100 % | RESPIRATION RATE: 16 BRPM | TEMPERATURE: 98.1 F | DIASTOLIC BLOOD PRESSURE: 91 MMHG | HEIGHT: 69 IN | WEIGHT: 135 LBS | HEART RATE: 108 BPM | BODY MASS INDEX: 19.99 KG/M2

## 2019-08-17 DIAGNOSIS — R44.3 HALLUCINATIONS: ICD-10-CM

## 2019-08-17 DIAGNOSIS — F11.29 OPIOID DEPENDENCE WITH OPIOID-INDUCED DISORDER (HCC): Primary | ICD-10-CM

## 2019-08-17 LAB
ABSOLUTE EOS #: 0.1 K/UL (ref 0–0.4)
ABSOLUTE IMMATURE GRANULOCYTE: ABNORMAL K/UL (ref 0–0.3)
ABSOLUTE LYMPH #: 1.7 K/UL (ref 1–4.8)
ABSOLUTE MONO #: 0.5 K/UL (ref 0.1–1.3)
ACETAMINOPHEN LEVEL: <5 UG/ML (ref 10–30)
ALBUMIN SERPL-MCNC: 3.9 G/DL (ref 3.5–5.2)
ALBUMIN/GLOBULIN RATIO: ABNORMAL (ref 1–2.5)
ALP BLD-CCNC: 75 U/L (ref 35–104)
ALT SERPL-CCNC: 7 U/L (ref 5–33)
AMPHETAMINE SCREEN URINE: NEGATIVE
ANION GAP SERPL CALCULATED.3IONS-SCNC: 12 MMOL/L (ref 9–17)
AST SERPL-CCNC: 10 U/L
BARBITURATE SCREEN URINE: NEGATIVE
BASOPHILS # BLD: 1 % (ref 0–2)
BASOPHILS ABSOLUTE: 0.1 K/UL (ref 0–0.2)
BENZODIAZEPINE SCREEN, URINE: NEGATIVE
BILIRUB SERPL-MCNC: 0.32 MG/DL (ref 0.3–1.2)
BUN BLDV-MCNC: 11 MG/DL (ref 6–20)
BUN/CREAT BLD: ABNORMAL (ref 9–20)
BUPRENORPHINE URINE: ABNORMAL
CALCIUM SERPL-MCNC: 9.2 MG/DL (ref 8.6–10.4)
CANNABINOID SCREEN URINE: POSITIVE
CHLORIDE BLD-SCNC: 103 MMOL/L (ref 98–107)
CO2: 24 MMOL/L (ref 20–31)
COCAINE METABOLITE, URINE: NEGATIVE
CREAT SERPL-MCNC: 0.49 MG/DL (ref 0.5–0.9)
DIFFERENTIAL TYPE: ABNORMAL
EOSINOPHILS RELATIVE PERCENT: 1 % (ref 0–4)
ETHANOL PERCENT: <0.01 %
ETHANOL: <10 MG/DL
GFR AFRICAN AMERICAN: >60 ML/MIN
GFR NON-AFRICAN AMERICAN: >60 ML/MIN
GFR SERPL CREATININE-BSD FRML MDRD: ABNORMAL ML/MIN/{1.73_M2}
GFR SERPL CREATININE-BSD FRML MDRD: ABNORMAL ML/MIN/{1.73_M2}
GLUCOSE BLD-MCNC: 123 MG/DL (ref 70–99)
HCT VFR BLD CALC: 38.5 % (ref 36–46)
HEMOGLOBIN: 12.9 G/DL (ref 12–16)
IMMATURE GRANULOCYTES: ABNORMAL %
LYMPHOCYTES # BLD: 16 % (ref 24–44)
MCH RBC QN AUTO: 28.6 PG (ref 26–34)
MCHC RBC AUTO-ENTMCNC: 33.5 G/DL (ref 31–37)
MCV RBC AUTO: 85.3 FL (ref 80–100)
MDMA URINE: ABNORMAL
METHADONE SCREEN, URINE: NEGATIVE
METHAMPHETAMINE, URINE: ABNORMAL
MONOCYTES # BLD: 5 % (ref 1–7)
NRBC AUTOMATED: ABNORMAL PER 100 WBC
OPIATES, URINE: POSITIVE
OXYCODONE SCREEN URINE: NEGATIVE
PDW BLD-RTO: 14 % (ref 11.5–14.9)
PHENCYCLIDINE, URINE: NEGATIVE
PLATELET # BLD: 498 K/UL (ref 150–450)
PLATELET ESTIMATE: ABNORMAL
PMV BLD AUTO: 6.8 FL (ref 6–12)
POTASSIUM SERPL-SCNC: 4.4 MMOL/L (ref 3.7–5.3)
PROPOXYPHENE, URINE: ABNORMAL
RBC # BLD: 4.51 M/UL (ref 4–5.2)
RBC # BLD: ABNORMAL 10*6/UL
SALICYLATE LEVEL: <1 MG/DL (ref 3–10)
SEG NEUTROPHILS: 77 % (ref 36–66)
SEGMENTED NEUTROPHILS ABSOLUTE COUNT: 7.9 K/UL (ref 1.3–9.1)
SODIUM BLD-SCNC: 139 MMOL/L (ref 135–144)
TEST INFORMATION: ABNORMAL
TOTAL PROTEIN: 7 G/DL (ref 6.4–8.3)
TOXIC TRICYCLIC SC,BLOOD: ABNORMAL
TRICYCLIC ANTIDEP,URINE: POSITIVE
TRICYCLIC ANTIDEPRESSANTS, UR: ABNORMAL
WBC # BLD: 10.3 K/UL (ref 3.5–11)
WBC # BLD: ABNORMAL 10*3/UL

## 2019-08-17 PROCEDURE — 80053 COMPREHEN METABOLIC PANEL: CPT

## 2019-08-17 PROCEDURE — 70450 CT HEAD/BRAIN W/O DYE: CPT

## 2019-08-17 PROCEDURE — 80307 DRUG TEST PRSMV CHEM ANLYZR: CPT

## 2019-08-17 PROCEDURE — 99285 EMERGENCY DEPT VISIT HI MDM: CPT

## 2019-08-17 PROCEDURE — 85025 COMPLETE CBC W/AUTO DIFF WBC: CPT

## 2019-08-17 PROCEDURE — G0480 DRUG TEST DEF 1-7 CLASSES: HCPCS

## 2019-08-17 PROCEDURE — 36415 COLL VENOUS BLD VENIPUNCTURE: CPT

## 2019-08-17 ASSESSMENT — ENCOUNTER SYMPTOMS
COUGH: 0
BACK PAIN: 1
ABDOMINAL PAIN: 0

## 2019-08-17 ASSESSMENT — PAIN DESCRIPTION - PAIN TYPE: TYPE: ACUTE PAIN

## 2019-08-21 ENCOUNTER — HOSPITAL ENCOUNTER (INPATIENT)
Age: 39
LOS: 5 days | Discharge: HOME | DRG: 885 | End: 2019-08-26
Attending: EMERGENCY MEDICINE | Admitting: PSYCHIATRY & NEUROLOGY
Payer: COMMERCIAL

## 2019-08-21 DIAGNOSIS — F32.A DEPRESSION WITH SUICIDAL IDEATION: Primary | ICD-10-CM

## 2019-08-21 DIAGNOSIS — R45.851 DEPRESSION WITH SUICIDAL IDEATION: Primary | ICD-10-CM

## 2019-08-21 PROBLEM — F33.2 SEVERE RECURRENT MAJOR DEPRESSION WITHOUT PSYCHOTIC FEATURES (HCC): Status: ACTIVE | Noted: 2019-08-21

## 2019-08-21 PROCEDURE — 99285 EMERGENCY DEPT VISIT HI MDM: CPT

## 2019-08-21 PROCEDURE — 1240000000 HC EMOTIONAL WELLNESS R&B

## 2019-08-21 RX ORDER — PROMETHAZINE HYDROCHLORIDE 25 MG/1
25 TABLET ORAL EVERY 6 HOURS PRN
Status: DISCONTINUED | OUTPATIENT
Start: 2019-08-21 | End: 2019-08-26 | Stop reason: HOSPADM

## 2019-08-21 RX ORDER — ACETAMINOPHEN 325 MG/1
650 TABLET ORAL EVERY 4 HOURS PRN
Status: DISCONTINUED | OUTPATIENT
Start: 2019-08-21 | End: 2019-08-26 | Stop reason: HOSPADM

## 2019-08-21 RX ORDER — NAPROXEN SODIUM 220 MG
220 TABLET ORAL 2 TIMES DAILY PRN
COMMUNITY
End: 2019-09-12

## 2019-08-21 RX ORDER — DICYCLOMINE HCL 20 MG
20 TABLET ORAL 4 TIMES DAILY PRN
Status: DISCONTINUED | OUTPATIENT
Start: 2019-08-21 | End: 2019-08-26 | Stop reason: HOSPADM

## 2019-08-21 RX ORDER — IBUPROFEN 800 MG/1
800 TABLET ORAL EVERY 8 HOURS PRN
Status: DISCONTINUED | OUTPATIENT
Start: 2019-08-21 | End: 2019-08-26 | Stop reason: HOSPADM

## 2019-08-21 RX ORDER — LOPERAMIDE HYDROCHLORIDE 2 MG/1
2 CAPSULE ORAL 4 TIMES DAILY PRN
Status: DISCONTINUED | OUTPATIENT
Start: 2019-08-21 | End: 2019-08-26 | Stop reason: HOSPADM

## 2019-08-21 RX ORDER — BUPRENORPHINE 7.5 UG/H
1 PATCH TRANSDERMAL WEEKLY
COMMUNITY
End: 2019-08-30 | Stop reason: SDUPTHER

## 2019-08-21 RX ORDER — PROMETHAZINE HYDROCHLORIDE 25 MG/1
25 TABLET ORAL EVERY 6 HOURS PRN
COMMUNITY
End: 2019-09-10 | Stop reason: SDUPTHER

## 2019-08-21 RX ORDER — TRAZODONE HYDROCHLORIDE 50 MG/1
50 TABLET ORAL NIGHTLY PRN
Status: DISCONTINUED | OUTPATIENT
Start: 2019-08-21 | End: 2019-08-26 | Stop reason: HOSPADM

## 2019-08-21 RX ORDER — HYDROXYZINE HYDROCHLORIDE 25 MG/1
25 TABLET, FILM COATED ORAL 3 TIMES DAILY PRN
Status: DISCONTINUED | OUTPATIENT
Start: 2019-08-21 | End: 2019-08-26 | Stop reason: HOSPADM

## 2019-08-21 RX ORDER — MAGNESIUM HYDROXIDE/ALUMINUM HYDROXICE/SIMETHICONE 120; 1200; 1200 MG/30ML; MG/30ML; MG/30ML
30 SUSPENSION ORAL EVERY 6 HOURS PRN
Status: DISCONTINUED | OUTPATIENT
Start: 2019-08-21 | End: 2019-08-26 | Stop reason: HOSPADM

## 2019-08-21 RX ORDER — M-VIT,TX,IRON,MINS/CALC/FOLIC 27MG-0.4MG
1 TABLET ORAL DAILY
COMMUNITY
End: 2019-09-12

## 2019-08-21 RX ORDER — CYCLOBENZAPRINE HCL 10 MG
10 TABLET ORAL 3 TIMES DAILY PRN
Status: DISCONTINUED | OUTPATIENT
Start: 2019-08-21 | End: 2019-08-26 | Stop reason: HOSPADM

## 2019-08-21 RX ORDER — CLONIDINE HYDROCHLORIDE 0.1 MG/1
0.1 TABLET ORAL 3 TIMES DAILY
Status: DISCONTINUED | OUTPATIENT
Start: 2019-08-21 | End: 2019-08-26 | Stop reason: HOSPADM

## 2019-08-21 RX ORDER — BENZTROPINE MESYLATE 1 MG/ML
2 INJECTION INTRAMUSCULAR; INTRAVENOUS 2 TIMES DAILY PRN
Status: DISCONTINUED | OUTPATIENT
Start: 2019-08-21 | End: 2019-08-26 | Stop reason: HOSPADM

## 2019-08-21 RX ORDER — NICOTINE 21 MG/24HR
1 PATCH, TRANSDERMAL 24 HOURS TRANSDERMAL DAILY
Status: DISCONTINUED | OUTPATIENT
Start: 2019-08-21 | End: 2019-08-26 | Stop reason: HOSPADM

## 2019-08-21 RX ORDER — OMEPRAZOLE 20 MG/1
20 CAPSULE, DELAYED RELEASE ORAL DAILY PRN
COMMUNITY
End: 2019-09-12

## 2019-08-21 ASSESSMENT — ENCOUNTER SYMPTOMS
SHORTNESS OF BREATH: 0
COUGH: 0
BACK PAIN: 0
GASTROINTESTINAL NEGATIVE: 1
EYES NEGATIVE: 1
RESPIRATORY NEGATIVE: 1
ABDOMINAL PAIN: 0

## 2019-08-21 ASSESSMENT — SLEEP AND FATIGUE QUESTIONNAIRES
SLEEP PATTERN: NORMAL
AVERAGE NUMBER OF SLEEP HOURS: 6
DIFFICULTY STAYING ASLEEP: NO
DIFFICULTY ARISING: NO
DO YOU HAVE DIFFICULTY SLEEPING: NO
DO YOU USE A SLEEP AID: YES
DIFFICULTY FALLING ASLEEP: NO
RESTFUL SLEEP: YES

## 2019-08-21 ASSESSMENT — PAIN SCALES - GENERAL
PAINLEVEL_OUTOF10: 6
PAINLEVEL_OUTOF10: 6

## 2019-08-21 ASSESSMENT — PATIENT HEALTH QUESTIONNAIRE - PHQ9: SUM OF ALL RESPONSES TO PHQ QUESTIONS 1-9: 21

## 2019-08-21 ASSESSMENT — LIFESTYLE VARIABLES: HISTORY_ALCOHOL_USE: NO

## 2019-08-21 NOTE — ED NOTES
Provisional Diagnosis:   Bipolar Disorder     Psychosocial and Contextual Factors:     Patient has Marfan syndrome. Patient has substance abuse issues. Patient has relationship issues.        C-SSRS Summary:       Patient: X  Family:   Agency:         Present Suicidal Behavior: X     Verbal: Patient reports a plan to shoot herself with her parents gun.      Attempt:     Past Suicidal Behavior: Patient denies.     Verbal:     Attempt:       Substance Abuse: Patient reports heroin use. Self-Injurious/Self-Mutilation: Patient denies.     Trauma Identified:  Patient reports unintentional overdose was traumatic for her.        Protective Factors:    Patient has housing. Patient has insurance. Patient has an income.     Risk Factors:    Patient is not currently linked with outpatient mental health services. Patient has substance abuse issues.      Clinical Summary:    Patient is a 42year old  female that is brought to the ER by a friend today for suicidal ideations. Patient reports she has she had an unintentional overdose 9 months ago in which she fell and was not found for 2 hours. Patient states since then she has not felt the same. Patient states she has been having difficulty remember things that she has done as well as having some visual hallucinations. Patient reports initially after her overdose her family was able to spend a lot of time with her to keep her safe and from using again. Patient reports over time that has lessened and she feels worse now. Patient reports she is now having suicidal ideations with a plan to shoot herself with her parents gun. Patient denies any previous attempts. Patient denies H/I at this time. Patient reports having auditory and visual hallucinations. Patient reports the hallucinations are not constant but did start after her overdose 9 months ago.     Faraz Kingsley states she has Patient has Marfan syndrome which leads to a lot of pain.  Patient reports in the past when she was in too much pain she would supplement her pain medications with heroin. Patient reports it started out by buying extra percocet pills but that lead to the heroin use. Patient states she had an unintentional overdose 9 months ago on heroin. Patient states after she had her unintentional overdose it has now been difficulty for doctors to help treat her for her pain. Patient reports she was diagnosed with Bipolar several years ago and briefly took medications. Patient reports she has not been linked with a mental health provider or taken any psych medications for several years. Patient reports her symptoms have got worse over the last month. Kimberleeent reports she has been using heroin daily in order to manage her pain. Patient states she attempted to detox from heroin about a week ago and states her symptoms got worse. Patient now reports her last use was yesterday and has minimal withdrawal symptoms at this time. Patient reports she does have safe housing with her boyfriend. Patient reports their relationship has been difficult since her overdose because he also has a history of substance abuse. Patient reports she is currently on disability. Patient reports poor sleep and poor appetite. Patient denies any current legal issues. Patient is voluntary.         Level of Care Disposition:    Writer consulted with Arti Hugo CNP, [facility]. Patient to be admitted under Dr. Carlos Xiong to the [facility] for safety and stabilization.

## 2019-08-21 NOTE — ED PROVIDER NOTES
EMERGENCY DEPARTMENT ENCOUNTER    Pt Name: Prabhakar Mcmahon  MRN: [id number]  [place] 1980  Date of evaluation: 8/21/19  CHIEF COMPLAINT       Chief Complaint   Patient presents with    Suicidal     HISTORY OF PRESENT ILLNESS   The pt presents for evaluation of mental health. She is depressed, suicidal.  No attempts at self harm. Pt denies any new medical complaints. She does admit to drug abuse. The history is provided by the patient. Mental Health Problem   Presenting symptoms: depression and suicidal thoughts    Degree of incapacity (severity): Moderate  Onset quality:  Gradual  Timing:  Constant  Progression:  Worsening  Chronicity:  Chronic  Context: drug abuse    Treatment compliance:  Untreated  Relieved by:  Nothing  Worsened by:  Drugs  Associated symptoms: no abdominal pain, no chest pain and no headaches        REVIEW OF SYSTEMS     Review of Systems   Constitutional: Negative. Negative for chills and fever. HENT: Negative. Negative for congestion. Eyes: Negative. Respiratory: Negative. Negative for cough and shortness of breath. Cardiovascular: Negative. Negative for chest pain. Gastrointestinal: Negative. Negative for abdominal pain. Genitourinary: Negative. Musculoskeletal: Negative. Negative for back pain. Skin: Negative. Negative for rash. Neurological: Negative. Negative for headaches. Psychiatric/Behavioral: Positive for suicidal ideas. All other systems reviewed and are negative. PASTMEDICAL HISTORY     Past Medical History:   Diagnosis Date    Anxiety     Arthritis     O. A.    Bipolar depression ([address]     Dermatitis     Dysuria     Exposure to STD     Folliculitis     Hx of acute bronchitis     Hypertension     Marfan syndrome     MVP (mitral valve prolapse)     Nausea & vomiting     PONV (postoperative nausea and vomiting)     Scoliosis     Seasonal allergies     Staph infection     HX OF ALSO BACTEREMIA    UTI (lower urinary hours as needed for Wheezing or Shortness of Breath  Qty: 1 Inhaler, Refills: 0    Associated Diagnoses: Acute bronchitis, unspecified organism; Wheezing           ALLERGIES     is allergic to ketamine and morphine. FAMILY HISTORY     She indicated that her mother is alive. She indicated that her father is alive. She indicated that the status of her brother is unknown. She indicated that the status of her maternal grandmother is unknown. SOCIAL HISTORY       Social History     Tobacco Use    Smoking status: Former Smoker     Years: 1.50     Last attempt to quit: 3/26/2015     Years since quittin.4    Smokeless tobacco: Never Used   Substance Use Topics    Alcohol use: No    Drug use: Yes     Types: Opiates , Marijuana, Cocaine     Comment: hector      PHYSICAL EXAM     INITIAL VITALS: BP 99/73   Pulse 90   Temp 98.6 °F (37 °C) (Oral)   Resp 14   Ht 5' 9\" (1.753 m)   Wt 130 lb (59 kg)   LMP 2019 (Approximate)   SpO2 98%   BMI 19.20 kg/m²    Physical Exam   Constitutional: She is oriented to person, place, and time. No distress. HENT:   Head: Normocephalic and atraumatic. Eyes: Conjunctivae are normal.   Neck: Normal range of motion. Neck supple. Cardiovascular: Normal rate, regular rhythm and normal heart sounds. No murmur heard. Pulmonary/Chest: Effort normal and breath sounds normal.   Abdominal: Soft. There is no tenderness. Musculoskeletal: She exhibits no deformity. Neurological: She is alert and oriented to person, place, and time. Skin: Skin is warm and dry. Capillary refill takes less than 2 seconds. No rash noted. She is not diaphoretic. Psychiatric: She exhibits a depressed mood. She expresses suicidal ideation. She expresses suicidal plans. Nursing note and vitals reviewed. MEDICAL DECISION MAKING:     Medically cleared. Seen by psych, will admit for further therapy and evaluation. Pt is ready for admission at this time.        CRITICAL CARE: MD  [address]   [address]  [place] [facility] [address]  [phone]          DISCHARGE MEDICATIONS:  Current Discharge Medication List        Gerhardt Burlington, MD  Attending Emergency Physician                    Les Mccollum MD  08/21/19 [id number]

## 2019-08-21 NOTE — BH NOTE
No  Memory: Poor Recent  Insight and Judgment: No  Insight and Judgment: Poor Judgment, Poor Insight  Present Suicidal Ideation: Yes  Present Homicidal Ideation: No    Tobacco Screening:  Practical Counseling, on admission, dimple X, if applicable and completed (first 3 are required if patient doesn't refuse):            ( )  Recognizing danger situations (included triggers and roadblocks)                    ( )  Coping skills (new ways to manage stress, exercise, relaxation techniques, changing routine, distraction)                                                           ( )  Basic information about quitting (benefits of quitting, techniques in how to quit, available resources  ( ) Referral for counseling faxed to [facility]                                           ( ) Patient refused counseling  (x ) Patient has not smoked in the last 30 days    Metabolic Screening:    Lab Results   Component Value Date    LABA1C 4.9 06/10/2018       Lab Results   Component Value Date    CHOL 152 06/10/2018    CHOL 206 (H) 02/21/2018    CHOL 182 09/14/2015    CHOL 132 09/07/2013     Lab Results   Component Value Date    TRIG 141 06/10/2018    TRIG 113 02/21/2018    TRIG 54 09/14/2015    TRIG 88 09/07/2013     Lab Results   Component Value Date    HDL 39 (L) 06/10/2018    HDL 59 02/21/2018    HDL 71 09/14/2015    HDL 53 09/07/2013     No components found for: LDLCAL  No results found for: LABVLDL      Body mass index is 19.2 kg/m². BP Readings from Last 2 Encounters:   08/21/19 112/82   08/17/19 (!) 139/91           Pt admitted with followings belongings:  Dentures: None  Vision - Corrective Lenses: None  Hearing Aid: None  Jewelry: Necklace  Body Piercings Removed: N/A  Clothing: Footwear, Shirt, Pants, Undergarments (Comment), Sweater  Were All Patient Medications Collected?: Yes  Other Valuables: Purse      Valuables placed in safe in security envelope, number:  [id number].  Patient's home medications were locked in safe.  Patient oriented to surroundings and program expectations and copy of patient rights given. Received admission packet:  yes. Consents reviewed, signed yes. Patient verbalize understanding:  yes. Patient education on precautions: yes    Pt [facility], pt has been using heroin and reports she smoked crack with her friend yesterday. Pt reports suicidal thoughts with a plan to shoot herself with her parent's gun, contracts for safety while on the unit. Pt reports difficulty with dealing with chronic pain issues. Will monitor for safety.                    Nafisa Martines RN

## 2019-08-21 NOTE — ED NOTES

## 2019-08-22 LAB
CHOLESTEROL/HDL RATIO: 3.2
CHOLESTEROL: 168 MG/DL
ESTIMATED AVERAGE GLUCOSE: 100 MG/DL
HBA1C MFR BLD: 5.1 % (ref 4–6)
HDLC SERPL-MCNC: 53 MG/DL
LDL CHOLESTEROL: 99 MG/DL (ref 0–130)
THYROXINE, FREE: 1.34 NG/DL (ref 0.93–1.7)
TRIGL SERPL-MCNC: 79 MG/DL
TSH SERPL DL<=0.05 MIU/L-ACNC: 1.35 MIU/L (ref 0.3–5)
VLDLC SERPL CALC-MCNC: NORMAL MG/DL (ref 1–30)

## 2019-08-22 PROCEDURE — 90833 PSYTX W PT W E/M 30 MIN: CPT | Performed by: REGISTERED NURSE

## 2019-08-22 PROCEDURE — 1240000000 HC EMOTIONAL WELLNESS R&B

## 2019-08-22 PROCEDURE — 36415 COLL VENOUS BLD VENIPUNCTURE: CPT

## 2019-08-22 PROCEDURE — 6370000000 HC RX 637 (ALT 250 FOR IP): Performed by: REGISTERED NURSE

## 2019-08-22 PROCEDURE — 99222 1ST HOSP IP/OBS MODERATE 55: CPT | Performed by: REGISTERED NURSE

## 2019-08-22 PROCEDURE — 80061 LIPID PANEL: CPT

## 2019-08-22 PROCEDURE — 84443 ASSAY THYROID STIM HORMONE: CPT

## 2019-08-22 PROCEDURE — 83036 HEMOGLOBIN GLYCOSYLATED A1C: CPT

## 2019-08-22 PROCEDURE — 84439 ASSAY OF FREE THYROXINE: CPT

## 2019-08-22 RX ORDER — M-VIT,TX,IRON,MINS/CALC/FOLIC 27MG-0.4MG
1 TABLET ORAL DAILY
Status: DISCONTINUED | OUTPATIENT
Start: 2019-08-22 | End: 2019-08-26 | Stop reason: HOSPADM

## 2019-08-22 RX ORDER — BUPRENORPHINE 7.5 UG/H
1 PATCH TRANSDERMAL WEEKLY
Status: DISCONTINUED | OUTPATIENT
Start: 2019-08-22 | End: 2019-08-26 | Stop reason: HOSPADM

## 2019-08-22 RX ORDER — PANTOPRAZOLE SODIUM 40 MG/1
40 TABLET, DELAYED RELEASE ORAL
Status: DISCONTINUED | OUTPATIENT
Start: 2019-08-23 | End: 2019-08-26

## 2019-08-22 RX ORDER — NAPROXEN 500 MG/1
500 TABLET ORAL 2 TIMES DAILY WITH MEALS
Status: DISCONTINUED | OUTPATIENT
Start: 2019-08-22 | End: 2019-08-26 | Stop reason: HOSPADM

## 2019-08-22 RX ORDER — LORAZEPAM 0.5 MG/1
0.5 TABLET ORAL EVERY 8 HOURS PRN
Status: DISCONTINUED | OUTPATIENT
Start: 2019-08-22 | End: 2019-08-26 | Stop reason: HOSPADM

## 2019-08-22 RX ORDER — ALBUTEROL SULFATE 90 UG/1
2 AEROSOL, METERED RESPIRATORY (INHALATION) EVERY 6 HOURS PRN
Status: DISCONTINUED | OUTPATIENT
Start: 2019-08-22 | End: 2019-08-26 | Stop reason: HOSPADM

## 2019-08-22 RX ORDER — NAPROXEN SODIUM 220 MG
220 TABLET ORAL 2 TIMES DAILY WITH MEALS
Status: DISCONTINUED | OUTPATIENT
Start: 2019-08-22 | End: 2019-08-22 | Stop reason: ALTCHOICE

## 2019-08-22 RX ORDER — DULOXETIN HYDROCHLORIDE 30 MG/1
30 CAPSULE, DELAYED RELEASE ORAL DAILY
Status: DISCONTINUED | OUTPATIENT
Start: 2019-08-22 | End: 2019-08-26 | Stop reason: HOSPADM

## 2019-08-22 RX ADMIN — CYCLOBENZAPRINE HYDROCHLORIDE 10 MG: 10 TABLET, FILM COATED ORAL at 20:37

## 2019-08-22 RX ADMIN — NAPROXEN 500 MG: 500 TABLET ORAL at 17:40

## 2019-08-22 RX ADMIN — DULOXETINE HYDROCHLORIDE 30 MG: 30 CAPSULE, DELAYED RELEASE ORAL at 12:19

## 2019-08-22 RX ADMIN — LORAZEPAM 0.5 MG: 0.5 TABLET ORAL at 12:19

## 2019-08-22 RX ADMIN — PROMETHAZINE HYDROCHLORIDE 25 MG: 25 TABLET ORAL at 16:55

## 2019-08-22 RX ADMIN — TRAZODONE HYDROCHLORIDE 50 MG: 50 TABLET ORAL at 20:37

## 2019-08-22 RX ADMIN — CYCLOBENZAPRINE HYDROCHLORIDE 10 MG: 10 TABLET, FILM COATED ORAL at 09:18

## 2019-08-22 RX ADMIN — PROMETHAZINE HYDROCHLORIDE 25 MG: 25 TABLET ORAL at 07:53

## 2019-08-22 RX ADMIN — ACETAMINOPHEN 650 MG: 325 TABLET, FILM COATED ORAL at 07:53

## 2019-08-22 RX ADMIN — PROMETHAZINE HYDROCHLORIDE 25 MG: 25 TABLET ORAL at 01:56

## 2019-08-22 RX ADMIN — CYCLOBENZAPRINE HYDROCHLORIDE 10 MG: 10 TABLET, FILM COATED ORAL at 01:56

## 2019-08-22 RX ADMIN — LURASIDONE HYDROCHLORIDE 60 MG: 60 TABLET, FILM COATED ORAL at 17:40

## 2019-08-22 RX ADMIN — MULTIPLE VITAMINS W/ MINERALS TAB 1 TABLET: TAB at 12:19

## 2019-08-22 RX ADMIN — LORAZEPAM 0.5 MG: 0.5 TABLET ORAL at 20:37

## 2019-08-22 RX ADMIN — CLONIDINE HYDROCHLORIDE 0.1 MG: 0.1 TABLET ORAL at 20:37

## 2019-08-22 RX ADMIN — HYDROXYZINE HYDROCHLORIDE 25 MG: 25 TABLET, FILM COATED ORAL at 01:56

## 2019-08-22 ASSESSMENT — ENCOUNTER SYMPTOMS
CHEST TIGHTNESS: 0
ABDOMINAL PAIN: 0
WHEEZING: 0
DIARRHEA: 0
TROUBLE SWALLOWING: 0
COUGH: 0
NAUSEA: 0
COLOR CHANGE: 0
CONSTIPATION: 0
SINUS PRESSURE: 0
SORE THROAT: 0
BACK PAIN: 1

## 2019-08-22 ASSESSMENT — PAIN SCALES - GENERAL
PAINLEVEL_OUTOF10: 6
PAINLEVEL_OUTOF10: 0
PAINLEVEL_OUTOF10: 5
PAINLEVEL_OUTOF10: 7
PAINLEVEL_OUTOF10: 7

## 2019-08-22 ASSESSMENT — LIFESTYLE VARIABLES: HISTORY_ALCOHOL_USE: NO

## 2019-08-22 NOTE — H&P
HISTORY and [facility] [address]       NAME:  Blanka Villasenor  MRN: [id number]   YOB: 1980   Date: 8/22/2019   Age: 42 y.o. Gender: female     COMPLAINT AND PRESENT HISTORY:      Blanka Villasenor is 42 y.o.,  female, admitted because of depression. Pt admitted after due to SI, plan to shoot self or overdose. Pt reports overdose last year. She has history of heroin abuse after becoming addicted to pain pills after a surgery at the age of 14. Pt states she has chronic pain due to orthopedic surgeries and her Marfan's Syndrome. She was snorting a gram of heroin per day. She was living with her boyfriend in [place], [place] but he made her leave to seek assistance getting off heroin. Pt states she has been depressed and very anxious because she has not been on her Ativan. Pt feels hopeless, helpless, worthless, lack of interest, loss of energy. Poor insight. Pt has poor sleep. Reports loss of appetite when using heroin and has had significant weight loss. She has poor concentration and memory. States she has voices in her head telling her to harm herself. She states she has been seeing men around her and they are \"hitting on me. \" She states she is afraid she will scare the neighbors because she does not know if the hallucinations are real people. Reports feelings of hypersexual nature after stopping one of her psych medications. Patients current stressors include heroin addiction, mental illness. Patient denies any current alcohol abuse. Patient has family in the [facility] area, and has been staying there intermittently. Pt states she has been intermittently taking her meds. She reports bilateral shoulder pain s/p shoulder surgeries. She states she gets chest pain with anxiety attacks, and becomes very paranoid. She has been vaping THC oil and states this helps with her pain.      DIAGNOSTIC RESULTS     PAST MEDICAL HISTORY     Past Medical History:   Diagnosis Date    Anxiety  (90 Base) MCG/ACT inhaler Inhale 2 puffs into the lungs every 6 hours as needed for Wheezing or Shortness of Breath 1 Inhaler 0        Review of Systems   Constitutional: Positive for appetite change and fatigue. Negative for chills and fever. HENT: Negative for congestion, sinus pressure, sore throat and trouble swallowing. Eyes: Negative for visual disturbance. Respiratory: Negative for cough, chest tightness and wheezing. Cardiovascular: Negative for chest pain, palpitations and leg swelling. Gastrointestinal: Negative for abdominal pain, constipation, diarrhea and nausea. Genitourinary: Negative for difficulty urinating and dysuria. Musculoskeletal: Positive for arthralgias (bilateral shoulders since surgery), back pain and myalgias. Skin: Negative for color change and pallor. Neurological: Negative for dizziness, seizures and weakness. Psychiatric/Behavioral: Positive for decreased concentration, hallucinations, sleep disturbance and suicidal ideas. The patient is nervous/anxious. GENERAL PHYSICAL EXAM:     Vitals: BP 99/73   Pulse 90   Temp 98.6 °F (37 °C) (Oral)   Resp 14   Ht 5' 9\" (1.753 m)   Wt 130 lb (59 kg)   LMP 08/04/2019 (Approximate)   SpO2 98%   BMI 19.20 kg/m²  Body mass index is 19.2 kg/m². Pt was examined with a nurse present in the room. GENERAL APPEARANCE:  Elmer Daugherty is 42 y.o.,  female, thin, nourished, conscious, alert. Does not appear to be distress or pain at this time. SKIN:  Warm, dry, no cyanosis or jaundice. HEAD:  Normocephalic, atraumatic, no swelling or tenderness. EYES:  Pupils equal, reactive to light, Conjunctiva is clear, EOMs intact galileo. eyelids WNL. EARS:  No discharge, no marked hearing loss. NOSE:  No rhinorrhea, epistaxis or septal deformity. THROAT: poor dentition. Not congested. No ulceration bleeding or discharge. NECK:  2 <2cm scars to the right medial anterior neck.  No

## 2019-08-22 NOTE — BH NOTE
valve prolapse)     Nausea & vomiting     PONV (postoperative nausea and vomiting)     Scoliosis     Seasonal allergies     Staph infection     HX OF ALSO BACTEREMIA    UTI (lower urinary tract infection)       Past Surgical History:   Procedure Laterality Date    BREAST REDUCTION SURGERY Bilateral     2012    FOOT CAPSULOTOMY Left 9/17/14    1st mppj    FOOT SURGERY Bilateral     RT HAS 2 SCREWS, LT HAD FUSION IN DEC WITH 2 SCREWS    HIP ARTHROSCOPY Left     KNEE ARTHROSCOPY Right     X 2    NC EGD TRANSORAL BIOPSY SINGLE/MULTIPLE N/A 8/29/2017    EGD BIOPSY WITH DILATATION WITH MALONEYS performed by Katie Mackey MD at [facility] Bilateral     harware in place bilat    TUBAL LIGATION      clips placed on fallopian tubes    UPPER GASTROINTESTINAL ENDOSCOPY  08/29/2017     EXTENSIVE SPASMATIC CONTRACTIONS; NO LUMINAL OBSTRUCTIONS; CLOSER TO THE DISTAL ESOPHAGUS AND DEVELOPING DIVERTICULUM IS NOTED AND BELIEVED TO BE SEC TO DSYSMOTILITY;  DALEY DIATIONS WERE DONE EMPIRICALLY WITH 56 FR     Neurologic Exam     Cranial Nerves   Cranial nerves II through XII intact. See H&P for further physical examination. SOCIAL HISTORY   She reports she was raised by both parents. She states she is supported by her mom and stepdad and also her sister and her biological father. She is single but is living with her boyfriend in [facility] [address]. She states he kicked her out due to her heroin use but once she gets help she will be able to come back. She denies any legal issues. She states she is on SSDI. She has an 25year-old son who is in his grandmother's custody. She reports she graduated high school and attended some college.   Social History     Socioeconomic History    Marital status: Legally      Spouse name: Not on file    Number of children: Not on file    Years of education: Not on file    Highest education level: Not on file   Occupational History    Not Nurse  Dragon voice recognition software used in portions of this document.  Occasionally words are mis-transcribed

## 2019-08-22 NOTE — GROUP NOTE
Group Therapy Note    Date: August 22    Group Start Time: 1100  Group End Time: 1130  Group Topic: Psychotherapy    [facility]    Álvaro Braden        Group Therapy Note    Attendees: 5/8           Patient's Goal:  PT will demonstrate increased interpersonal interaction and a clear understanding on multiple types of coping skills relating to the here-and-now therapeutic practice    Notes:  Patient is making progress, AEB participating in group discussion, actively listening, and supporting other group members    Status After Intervention:  Improved    Participation Level: Active Listener and Interactive    Participation Quality: Appropriate, Attentive, Sharing and Supportive      Speech:  normal      Thought Process/Content: Logical      Affective Functioning: Congruent      Mood: depressed      Level of consciousness:  Alert, Oriented x4 and Attentive      Response to Learning: Able to verbalize current knowledge/experience, Able to verbalize/acknowledge new learning and Progressing to goal      Endings: None Reported    Modes of Intervention: Support, Socialization, Exploration, Clarifying and Problem-solving      Discipline Responsible: /Counselor      Signature:   Álvaro Braden

## 2019-08-22 NOTE — PLAN OF CARE
[facility]  Initial Interdisciplinary Treatment Plan NO      Original treatment plan Date & Time: 8/22/2019  0815    Admission Type:  Admission Type: Voluntary    Reason for admission:   Reason for Admission: sucidal with a plan to shoot herself with parents gun    Estimated Length of Stay:  5-7days  Estimated Discharge Date: to be determined by physician    PATIENT STRENGTHS:  Patient Strengths:Strengths: Positive Support, Medication Compliance  Patient Strengths and Limitations:Limitations: Difficult relationships / poor social skills, General negative or hopeless attitude about future/recovery, Multiple barriers to leisure interests, Hopeless about future, Difficulty problem solving/relies on others to help solve problems, Lacks leisure interests  Addictive Behavior: Addictive Behavior  In the past 3 months, have you felt or has someone told you that you have a problem with:  : None  Do you have a history of Chemical Use?: No  Do you have a history of Alcohol Use?: No  Do you have a history of Street Drug Abuse?: Yes  Histroy of Prescripton Drug Abuse?: No  Medical Problems:  Past Medical History:   Diagnosis Date    Anxiety     Arthritis     O. A.     Bipolar depression ([facility] [address]     Dermatitis     Dysuria     Exposure to STD     Folliculitis     Hx of acute bronchitis     Hypertension     Marfan syndrome     MVP (mitral valve prolapse)     Nausea & vomiting     PONV (postoperative nausea and vomiting)     Scoliosis     Seasonal allergies     Staph infection     HX OF ALSO BACTEREMIA    UTI (lower urinary tract infection)      Status EXAM:Status and Exam  Normal: No  Facial Expression: Avoids Gaze, Expressionless  Affect: Blunt  Level of Consciousness: Alert  Mood:Normal: No  Mood: Sad, Empty  Motor Activity:Normal: No  Motor Activity: Decreased  Interview Behavior: Evasive  Preception: [place] to Person, [place] to Time, [place] to Place, [place] to Situation  Attention:Normal: No  Attention: Unable to

## 2019-08-23 PROBLEM — E43 SEVERE MALNUTRITION (HCC): Status: ACTIVE | Noted: 2019-08-23

## 2019-08-23 PROCEDURE — 6370000000 HC RX 637 (ALT 250 FOR IP): Performed by: REGISTERED NURSE

## 2019-08-23 PROCEDURE — 1240000000 HC EMOTIONAL WELLNESS R&B

## 2019-08-23 RX ADMIN — NAPROXEN 500 MG: 500 TABLET ORAL at 08:23

## 2019-08-23 RX ADMIN — PROMETHAZINE HYDROCHLORIDE 25 MG: 25 TABLET ORAL at 13:45

## 2019-08-23 RX ADMIN — CYCLOBENZAPRINE HYDROCHLORIDE 10 MG: 10 TABLET, FILM COATED ORAL at 21:43

## 2019-08-23 RX ADMIN — ACETAMINOPHEN 650 MG: 325 TABLET, FILM COATED ORAL at 21:43

## 2019-08-23 RX ADMIN — MULTIPLE VITAMINS W/ MINERALS TAB 1 TABLET: TAB at 08:23

## 2019-08-23 RX ADMIN — LORAZEPAM 0.5 MG: 0.5 TABLET ORAL at 21:44

## 2019-08-23 RX ADMIN — LURASIDONE HYDROCHLORIDE 60 MG: 60 TABLET, FILM COATED ORAL at 17:31

## 2019-08-23 RX ADMIN — PROMETHAZINE HYDROCHLORIDE 25 MG: 25 TABLET ORAL at 04:38

## 2019-08-23 RX ADMIN — PROMETHAZINE HYDROCHLORIDE 25 MG: 25 TABLET ORAL at 21:43

## 2019-08-23 RX ADMIN — PANTOPRAZOLE SODIUM 40 MG: 40 TABLET, DELAYED RELEASE ORAL at 06:11

## 2019-08-23 RX ADMIN — LORAZEPAM 0.5 MG: 0.5 TABLET ORAL at 13:45

## 2019-08-23 RX ADMIN — NAPROXEN 500 MG: 500 TABLET ORAL at 17:31

## 2019-08-23 RX ADMIN — IBUPROFEN 800 MG: 800 TABLET ORAL at 04:38

## 2019-08-23 RX ADMIN — CYCLOBENZAPRINE HYDROCHLORIDE 10 MG: 10 TABLET, FILM COATED ORAL at 05:23

## 2019-08-23 RX ADMIN — CLONIDINE HYDROCHLORIDE 0.1 MG: 0.1 TABLET ORAL at 21:44

## 2019-08-23 RX ADMIN — CYCLOBENZAPRINE HYDROCHLORIDE 10 MG: 10 TABLET, FILM COATED ORAL at 13:45

## 2019-08-23 RX ADMIN — LORAZEPAM 0.5 MG: 0.5 TABLET ORAL at 04:36

## 2019-08-23 RX ADMIN — DULOXETINE HYDROCHLORIDE 30 MG: 30 CAPSULE, DELAYED RELEASE ORAL at 08:23

## 2019-08-23 ASSESSMENT — PAIN SCALES - GENERAL
PAINLEVEL_OUTOF10: 0
PAINLEVEL_OUTOF10: 7
PAINLEVEL_OUTOF10: 6
PAINLEVEL_OUTOF10: 2
PAINLEVEL_OUTOF10: 3
PAINLEVEL_OUTOF10: 7
PAINLEVEL_OUTOF10: 7

## 2019-08-23 ASSESSMENT — PAIN DESCRIPTION - PAIN TYPE
TYPE: CHRONIC PAIN

## 2019-08-23 ASSESSMENT — PAIN DESCRIPTION - LOCATION
LOCATION: GENERALIZED
LOCATION: GENERALIZED

## 2019-08-23 NOTE — BH NOTE
BPA:    Patient has a BPA for suicide risk. Writer spoke with doctor who voices she does not need 1:1 precautions and our routine suicide precaution is sufficient.

## 2019-08-23 NOTE — PLAN OF CARE
Problem: Altered Mood, Depressive Behavior:  Goal: Able to verbalize acceptance of life and situations over which he or she has no control  Description  Able to verbalize acceptance of life and situations over which he or she has no control  8/23/2019 1121 by Marcelo Reynoso RN  Outcome: Ongoing  8/22/2019 2206 by Arleen Howe LPN  Outcome: Ongoing  Note:   Patient denies suicidal and homicidal ideations at this time. Reports anxiety and depression but does not elaborate. Social with peers. Medication compliant. Problem: Depressive Behavior With or Without Suicide Precautions:  Goal: Able to verbalize and/or display a decrease in depressive symptoms  Description  Able to verbalize and/or display a decrease in depressive symptoms  8/23/2019 1121 by Marcelo Reynoso RN  Outcome: Ongoing  8/22/2019 2206 by Arleen Howe LPN  Outcome: Ongoing     Problem: Substance Abuse:  Goal: Absence of drug withdrawal signs and symptoms  Description  Absence of drug withdrawal signs and symptoms  8/23/2019 1121 by Marcelo Reynoso RN  Outcome: Ongoing  8/22/2019 2206 by Arleen Howe LPN  Outcome: Ongoing  Note:   Patient denies withdrawal signs but reports anxiety. Problem: Pain:  Goal: Pain level will decrease  Description  Pain level will decrease  8/23/2019 1121 by Marcelo Reynoso RN  Outcome: Ongoing  8/22/2019 2206 by Arleen Howe LPN  Outcome: Ongoing  Note:   Patient reports muscle spasms and generalized discomfort. Reports medications are effective at times.   Goal: Control of acute pain  Description  Control of acute pain  8/23/2019 1121 by Marcelo Reynoso RN  Outcome: Ongoing  8/22/2019 2206 by Arleen Howe LPN  Outcome: Ongoing  Goal: Control of chronic pain  Description  Control of chronic pain  8/23/2019 1121 by Marcelo Reynoso RN  Outcome: Ongoing  8/22/2019 2206 by Arleen Howe LPN  Outcome: Ongoing     Problem: Suicide risk  Goal:

## 2019-08-23 NOTE — PLAN OF CARE
Problem: Altered Mood, Depressive Behavior:  Goal: Able to verbalize acceptance of life and situations over which he or she has no control  Description  Able to verbalize acceptance of life and situations over which he or she has no control  8/22/2019 2206 by Brannon Harman LPN  Outcome: Ongoing  Note:   Patient denies suicidal and homicidal ideations at this time. Reports anxiety and depression but does not elaborate. Social with peers. Medication compliant. Problem: Substance Abuse:  Goal: Absence of drug withdrawal signs and symptoms  Description  Absence of drug withdrawal signs and symptoms  8/22/2019 2206 by Brannon Harman LPN  Outcome: Ongoing  Note:   Patient denies withdrawal signs but reports anxiety. Problem: Pain:  Goal: Pain level will decrease  Description  Pain level will decrease  8/22/2019 2206 by Brannon Harman LPN  Outcome: Ongoing  Note:   Patient reports muscle spasms and generalized discomfort. Reports medications are effective at times.

## 2019-08-23 NOTE — PLAN OF CARE
Nutrition Problem: Inadequate oral intake  Intervention: Food and/or Nutrient Delivery: Continue current diet, Start ONS  Nutritional Goals: PO intakes are greater than 75% at meals

## 2019-08-24 PROCEDURE — 6370000000 HC RX 637 (ALT 250 FOR IP): Performed by: REGISTERED NURSE

## 2019-08-24 PROCEDURE — 99232 SBSQ HOSP IP/OBS MODERATE 35: CPT | Performed by: PSYCHIATRY & NEUROLOGY

## 2019-08-24 PROCEDURE — 1240000000 HC EMOTIONAL WELLNESS R&B

## 2019-08-24 RX ADMIN — CYCLOBENZAPRINE HYDROCHLORIDE 10 MG: 10 TABLET, FILM COATED ORAL at 07:12

## 2019-08-24 RX ADMIN — NAPROXEN 500 MG: 500 TABLET ORAL at 08:22

## 2019-08-24 RX ADMIN — PROMETHAZINE HYDROCHLORIDE 25 MG: 25 TABLET ORAL at 07:12

## 2019-08-24 RX ADMIN — IBUPROFEN 800 MG: 800 TABLET ORAL at 11:24

## 2019-08-24 RX ADMIN — CYCLOBENZAPRINE HYDROCHLORIDE 10 MG: 10 TABLET, FILM COATED ORAL at 23:37

## 2019-08-24 RX ADMIN — LORAZEPAM 0.5 MG: 0.5 TABLET ORAL at 23:37

## 2019-08-24 RX ADMIN — PROMETHAZINE HYDROCHLORIDE 25 MG: 25 TABLET ORAL at 23:42

## 2019-08-24 RX ADMIN — LORAZEPAM 0.5 MG: 0.5 TABLET ORAL at 15:31

## 2019-08-24 RX ADMIN — LORAZEPAM 0.5 MG: 0.5 TABLET ORAL at 07:12

## 2019-08-24 RX ADMIN — ACETAMINOPHEN 650 MG: 325 TABLET, FILM COATED ORAL at 07:12

## 2019-08-24 RX ADMIN — PANTOPRAZOLE SODIUM 40 MG: 40 TABLET, DELAYED RELEASE ORAL at 07:10

## 2019-08-24 RX ADMIN — MULTIPLE VITAMINS W/ MINERALS TAB 1 TABLET: TAB at 08:22

## 2019-08-24 RX ADMIN — DULOXETINE HYDROCHLORIDE 30 MG: 30 CAPSULE, DELAYED RELEASE ORAL at 08:22

## 2019-08-24 RX ADMIN — LURASIDONE HYDROCHLORIDE 60 MG: 60 TABLET, FILM COATED ORAL at 18:02

## 2019-08-24 RX ADMIN — PROMETHAZINE HYDROCHLORIDE 25 MG: 25 TABLET ORAL at 14:47

## 2019-08-24 RX ADMIN — NAPROXEN 500 MG: 500 TABLET ORAL at 18:02

## 2019-08-24 RX ADMIN — CLONIDINE HYDROCHLORIDE 0.1 MG: 0.1 TABLET ORAL at 22:22

## 2019-08-24 RX ADMIN — CYCLOBENZAPRINE HYDROCHLORIDE 10 MG: 10 TABLET, FILM COATED ORAL at 14:47

## 2019-08-24 ASSESSMENT — PAIN SCALES - GENERAL
PAINLEVEL_OUTOF10: 6
PAINLEVEL_OUTOF10: 3
PAINLEVEL_OUTOF10: 1
PAINLEVEL_OUTOF10: 3
PAINLEVEL_OUTOF10: 3
PAINLEVEL_OUTOF10: 2
PAINLEVEL_OUTOF10: 2

## 2019-08-24 ASSESSMENT — PAIN DESCRIPTION - PAIN TYPE: TYPE: CHRONIC PAIN

## 2019-08-24 ASSESSMENT — PAIN DESCRIPTION - LOCATION: LOCATION: GENERALIZED

## 2019-08-24 NOTE — PLAN OF CARE
Problem: Substance Abuse:  Goal: Absence of drug withdrawal signs and symptoms  Description  Absence of drug withdrawal signs and symptoms  Outcome: Ongoing  Note:   Patient denies any withdrawal symptoms. Problem: Pain:  Goal: Pain level will decrease  Description  Pain level will decrease  Outcome: Ongoing  Note:   Patient continues to report continuous pain, instructed on purpose/action of medication for relief and provided with non pharmalogical  interventions.

## 2019-08-24 NOTE — GROUP NOTE
Group Therapy Note    Date: August 23    Group Start Time: 2030  Group End Time: 2100  Group Topic: Wrap-Up    [facility]    Xiagn Peterson RN      Group Therapy Note    Attendees: 7/10       Patient's Goal:    1. To be able to reflect on daily unit activities/experiences. 2.  To review accomplished daily goals and encourage patient to set a new goal for the next day. 3.  To demonstrate increased interpersonal interaction      Notes:  Pt attended and actively participated in Wrap-Up/Relaxation groups this evening. Status After Intervention:  Improved     Participation Level:  Active Listener and Interactive     Participation Quality: Appropriate, Attentive and Sharing     Speech:  normal     Thought Process/Content: Logical     Affective Functioning: Congruent     Mood: euthymic     Level of consciousness:  Alert, Oriented x4 and Attentive     Response to Learning: Able to verbalize current knowledge/experience, Able to verbalize/acknowledge new learning, Able to retain information and Capable of insight     Endings: None Reported     Modes of Intervention: Education, Support and Socialization      Discipline Responsible: Registered Nurse        Signature:  Xiang Peterson RN

## 2019-08-24 NOTE — GROUP NOTE
Group Therapy Note     Date: August 24  Group Start Time: 900am  Group End Time: 930am  Group Topic: Community meeting     [address] [facility] RICHA MCMILLAN    [place], [place]           Group Therapy Note     Attendees: 8/13        Patient's Goal:  To increase social interaction and to explore and identify short term goals r/t wellness and recovery. RT discussed group schedule and unit structure/resources and encouraged pts to be engaged in their treatment. Pts were given opportunities to ask questions. Notes: Pt participated in group task and was able to identify short term goals r/t wellness and recovery . Pt was pleasant on approach and supportive of peers. Pt is focused on pain management        Status After Intervention:  Improved     Participation Level:  Active Listener and Interactive     Participation Quality: Appropriate, Attentive, Sharing         Speech:  Normal     Thought Process/Content: Logical, linear     Affective Functioning: Congruent     Mood: euthymic        Level of consciousness:  Alert, and Attentive        Response to Learning: Able to verbalize current knowledge/experience, Able to verbalize/acknowledge new learning, and Progressing to goal        Endings: None Reported     Modes of Intervention: Education, Support, Socialization, Exploration, Clarifying and Problem-solving        Discipline Responsible: Psychoeducational Specialist        Signature:  Ngoc Guidry

## 2019-08-24 NOTE — PLAN OF CARE
Problem: Altered Mood, Depressive Behavior:  Goal: Able to verbalize acceptance of life and situations over which he or she has no control  Description  Able to verbalize acceptance of life and situations over which he or she has no control  8/24/2019 0103 by Renato Acevedo RN  Outcome: Ongoing     Problem: Depressive Behavior With or Without Suicide Precautions:  Goal: Able to verbalize and/or display a decrease in depressive symptoms  Description  Able to verbalize and/or display a decrease in depressive symptoms  8/24/2019 0103 by Renato Acevedo RN  Outcome: Ongoing   Pt denies suicidal ideations at this time. Pt states \"I am done with drugs and that life. I want to be sober. \" pt denies withdrawal symptoms at this time. Pt is med compliant and attends unit programming.

## 2019-08-25 PROCEDURE — 99232 SBSQ HOSP IP/OBS MODERATE 35: CPT | Performed by: PSYCHIATRY & NEUROLOGY

## 2019-08-25 PROCEDURE — 1240000000 HC EMOTIONAL WELLNESS R&B

## 2019-08-25 PROCEDURE — 6370000000 HC RX 637 (ALT 250 FOR IP): Performed by: REGISTERED NURSE

## 2019-08-25 RX ADMIN — DULOXETINE HYDROCHLORIDE 30 MG: 30 CAPSULE, DELAYED RELEASE ORAL at 07:58

## 2019-08-25 RX ADMIN — PROMETHAZINE HYDROCHLORIDE 25 MG: 25 TABLET ORAL at 07:58

## 2019-08-25 RX ADMIN — PANTOPRAZOLE SODIUM 40 MG: 40 TABLET, DELAYED RELEASE ORAL at 07:58

## 2019-08-25 RX ADMIN — LORAZEPAM 0.5 MG: 0.5 TABLET ORAL at 16:48

## 2019-08-25 RX ADMIN — CYCLOBENZAPRINE HYDROCHLORIDE 10 MG: 10 TABLET, FILM COATED ORAL at 15:45

## 2019-08-25 RX ADMIN — LURASIDONE HYDROCHLORIDE 60 MG: 60 TABLET, FILM COATED ORAL at 16:48

## 2019-08-25 RX ADMIN — LORAZEPAM 0.5 MG: 0.5 TABLET ORAL at 07:58

## 2019-08-25 RX ADMIN — NAPROXEN 500 MG: 500 TABLET ORAL at 07:58

## 2019-08-25 RX ADMIN — NAPROXEN 500 MG: 500 TABLET ORAL at 16:48

## 2019-08-25 RX ADMIN — MULTIPLE VITAMINS W/ MINERALS TAB 1 TABLET: TAB at 07:58

## 2019-08-25 RX ADMIN — PROMETHAZINE HYDROCHLORIDE 25 MG: 25 TABLET ORAL at 15:45

## 2019-08-25 RX ADMIN — CYCLOBENZAPRINE HYDROCHLORIDE 10 MG: 10 TABLET, FILM COATED ORAL at 07:58

## 2019-08-25 ASSESSMENT — PAIN SCALES - GENERAL
PAINLEVEL_OUTOF10: 3
PAINLEVEL_OUTOF10: 4

## 2019-08-25 NOTE — GROUP NOTE
Group Therapy Note     Date: August 25  Group Start Time: 900am  Group End Time: 940am  Group Topic: Community meeting     ANYI [place]           Group Therapy Note     Attendees: 9/16        Patient's Goal:  To increase social interaction and to explore and identify short term goals r/t wellness and recovery. RT discussed group schedule and unit structure/resources and encouraged pts to be engaged in their treatment. Pts were given opportunities to ask questions. Notes: Pt participated in group task and was able to identify short term goals r/t wellness and recovery with encouragement. Pt was pleasant on approach and supportive of peers. Status After Intervention:  Improved     Participation Level:  Active Listener and Interactive     Participation Quality: Appropriate, Attentive, Sharing         Speech:   Normal    Thought Process/Content: Logical,linear     Affective Functioning: Congruent     Mood: euthymic        Level of consciousness:  Alert, and Attentive        Response to Learning: Able to verbalize current knowledge/experience, Able to verbalize/acknowledge new learning, and Progressing to goal        Endings: None Reported     Modes of Intervention: Education, Support, Socialization, Exploration, Clarifying and Problem-solving        Discipline Responsible: Psychoeducational Specialist        Signature:  Rose Mary Silva

## 2019-08-25 NOTE — BH NOTE
Department of Psychiatry  Attending Progress Note      SUBJECTIVE:  Found her in her room. Reports gradual improvement in her mood symptoms. Reports some improvement in her pain symptoms. Did sleep goo dlast night. Going to some groups. OBJECTIVE    Physical  VITALS:  /81   Pulse 82   Temp 98.6 °F (37 °C)   Resp 16   Ht 5' 9\" (1.753 m)   Wt 130 lb (59 kg)   LMP 08/04/2019 (Approximate)   SpO2 100%   BMI 19.20 kg/m²     Mental Status Examination:      Pt. was alert, fully oriented, and cooperative. Appearance and hygiene weredisheveled, poor hygiene . Mood was depressed. Affect was blunted\". Thought process was linear and well-organized. Patient Visual hallucinations . In sight and judgement poor.         Medications  Current Facility-Administered Medications: albuterol sulfate  (90 Base) MCG/ACT inhaler 2 puff, 2 puff, Inhalation, Q6H PRN  buprenorphine (BUPRENEX) 7.5 MCG/HR 1 patch, 1 patch, Transdermal, Weekly  LORazepam (ATIVAN) tablet 0.5 mg, 0.5 mg, Oral, Q8H PRN  therapeutic multivitamin-minerals 1 tablet, 1 tablet, Oral, Daily  pantoprazole (PROTONIX) tablet 40 mg, 40 mg, Oral, QAM AC  lurasidone (LATUDA) tablet 60 mg, 60 mg, Oral, Dinner  DULoxetine (CYMBALTA) extended release capsule 30 mg, 30 mg, Oral, Daily  naproxen (NAPROSYN) tablet 500 mg, 500 mg, Oral, BID WC  hydrOXYzine (ATARAX) tablet 25 mg, 25 mg, Oral, TID PRN  acetaminophen (TYLENOL) tablet 650 mg, 650 mg, Oral, Q4H PRN  traZODone (DESYREL) tablet 50 mg, 50 mg, Oral, Nightly PRN  benztropine mesylate (COGENTIN) injection 2 mg, 2 mg, Intramuscular, BID PRN  magnesium hydroxide (MILK OF MAGNESIA) 400 MG/5ML suspension 30 mL, 30 mL, Oral, Daily PRN  aluminum & magnesium hydroxide-simethicone (MAALOX) 200-200-20 MG/5ML suspension 30 mL, 30 mL, Oral, Q6H PRN  nicotine (NICODERM CQ) 14 MG/24HR 1 patch, 1 patch, Transdermal, Daily  nicotine polacrilex (NICORETTE) gum 2 mg, 2 mg, Oral, Q2H PRN  cyclobenzaprine (FLEXERIL) tablet 10

## 2019-08-26 VITALS
WEIGHT: 130 LBS | HEIGHT: 69 IN | BODY MASS INDEX: 19.26 KG/M2 | HEART RATE: 89 BPM | TEMPERATURE: 98.2 F | DIASTOLIC BLOOD PRESSURE: 80 MMHG | SYSTOLIC BLOOD PRESSURE: 127 MMHG | RESPIRATION RATE: 17 BRPM | OXYGEN SATURATION: 100 %

## 2019-08-26 PROCEDURE — 6370000000 HC RX 637 (ALT 250 FOR IP): Performed by: REGISTERED NURSE

## 2019-08-26 PROCEDURE — 5130000000 HC BRIDGE APPOINTMENT

## 2019-08-26 PROCEDURE — 6370000000 HC RX 637 (ALT 250 FOR IP): Performed by: PSYCHIATRY & NEUROLOGY

## 2019-08-26 PROCEDURE — 99239 HOSP IP/OBS DSCHRG MGMT >30: CPT | Performed by: NURSE PRACTITIONER

## 2019-08-26 RX ORDER — DULOXETIN HYDROCHLORIDE 30 MG/1
30 CAPSULE, DELAYED RELEASE ORAL DAILY
Qty: 15 CAPSULE | Refills: 0 | Status: SHIPPED | OUTPATIENT
Start: 2019-08-27 | End: 2019-09-09 | Stop reason: SDUPTHER

## 2019-08-26 RX ORDER — CLONIDINE HYDROCHLORIDE 0.1 MG/1
0.1 TABLET ORAL 3 TIMES DAILY
Qty: 45 TABLET | Refills: 0 | Status: SHIPPED | OUTPATIENT
Start: 2019-08-26 | End: 2021-05-25

## 2019-08-26 RX ORDER — PANTOPRAZOLE SODIUM 40 MG/1
40 TABLET, DELAYED RELEASE ORAL
Status: DISCONTINUED | OUTPATIENT
Start: 2019-08-26 | End: 2019-08-26 | Stop reason: HOSPADM

## 2019-08-26 RX ADMIN — LORAZEPAM 0.5 MG: 0.5 TABLET ORAL at 01:04

## 2019-08-26 RX ADMIN — LORAZEPAM 0.5 MG: 0.5 TABLET ORAL at 09:24

## 2019-08-26 RX ADMIN — MULTIPLE VITAMINS W/ MINERALS TAB 1 TABLET: TAB at 07:40

## 2019-08-26 RX ADMIN — PROMETHAZINE HYDROCHLORIDE 25 MG: 25 TABLET ORAL at 00:17

## 2019-08-26 RX ADMIN — PANTOPRAZOLE SODIUM 40 MG: 40 TABLET, DELAYED RELEASE ORAL at 07:40

## 2019-08-26 RX ADMIN — CYCLOBENZAPRINE HYDROCHLORIDE 10 MG: 10 TABLET, FILM COATED ORAL at 00:18

## 2019-08-26 RX ADMIN — CYCLOBENZAPRINE HYDROCHLORIDE 10 MG: 10 TABLET, FILM COATED ORAL at 07:41

## 2019-08-26 RX ADMIN — NAPROXEN 500 MG: 500 TABLET ORAL at 07:40

## 2019-08-26 RX ADMIN — CYCLOBENZAPRINE HYDROCHLORIDE 10 MG: 10 TABLET, FILM COATED ORAL at 15:38

## 2019-08-26 RX ADMIN — DULOXETINE HYDROCHLORIDE 30 MG: 30 CAPSULE, DELAYED RELEASE ORAL at 07:40

## 2019-08-26 RX ADMIN — PROMETHAZINE HYDROCHLORIDE 25 MG: 25 TABLET ORAL at 07:40

## 2019-08-26 ASSESSMENT — PAIN SCALES - GENERAL: PAINLEVEL_OUTOF10: 2

## 2019-08-26 NOTE — FLOWSHEET NOTE
*Patient participated in the Spirituality Group       08/26/19 1431   Encounter Summary   Services provided to: Patient   Referral/Consult From: Rounding   Continue Visiting   (8/26/19)   Complexity of Encounter Low   Length of Encounter 30 minutes   Spiritual/[facility]aritan   Type Spiritual support   Assessment Calm; Approachable   Intervention Active listening;Prayer;Sustaining presence/ Ministry of presence   Outcome Receptive; Expressed feelings/needs/concerns;Engaged in conversation;Expressed gratitude

## 2019-08-26 NOTE — CARE COORDINATION
Bridge Appointment completed: Reviewed Discharge Instructions with patient. Patient verbalizes understanding and agreement with the discharge plan using the teachback method. Discharge Arrangements: [facility] 8/29    Guardian notified: N/A  Discharge destination/address: home address  Transported by:   Insurance cab (Edita Nuno Dual)

## 2019-08-26 NOTE — GROUP NOTE
Group Therapy Note    Date: August 26    Group Start Time: 0900  Group End Time: 9246  Group Topic: Community Meeting    ANYI [place], [place]        Group Therapy Note    Attendees: 11/19         Patient's Goal:   Improve goal setting skills     Notes: pt is pleasant and participated well    Status After Intervention:  Improved    Participation Level:  Active Listener    Participation Quality: Appropriate and Attentive      Speech:  normal      Thought Process/Content: Logical      Affective Functioning: Congruent        Level of consciousness:  Alert and Oriented x4      Response to Learning: Able to verbalize current knowledge/experience, Able to verbalize/acknowledge new learning and Progressing to goal      Endings: none reported    Modes of Intervention: Education, Support and Problem-solving      Discipline Responsible: Psychoeducational Specialist      Signature:  Khloe Loco

## 2019-08-26 NOTE — DISCHARGE INSTR - COC
Continuity of Care Form    Patient Name: Tiara Davidson   :  1980  MRN:  [id number]    Admit date:  2019  Discharge date:  ***    Code Status Order: Full Code   Advance Directives:   Advance Care Flowsheet Documentation     Date/Time Healthcare Directive Type of Healthcare Directive Copy in [address] Agent's Name Healthcare Agent's Phone Number    19 1843  No, patient does not have an advance directive for healthcare treatment -- -- -- -- --          Admitting Physician:  Luther Domínguez MD  PCP: Kirti Reyna MD    Discharging Nurse: [facility] Unit/Room#: 7963/3672-01  Discharging Unit Phone Number: ***    Emergency Contact:   Extended Emergency Contact Information  Primary Emergency Contact: Bianca Dos Santos  Address: [address] [place] [address] [place]  Phone: [phone]  Relation: Parent  Hearing or visual needs: None  Other needs: None  Preferred language: English   needed?  No  Secondary Emergency Contact: [facility] [address] [place] [address] Phone: [phone]  Relation: Other    Past Surgical History:  Past Surgical History:   Procedure Laterality Date    BREAST REDUCTION SURGERY Bilateral     2012    [facility]    FOOT SURGERY Bilateral     RT HAS 2 SCREWS, LT HAD FUSION IN DEC WITH 2 SCREWS    HIP ARTHROSCOPY Left     KNEE ARTHROSCOPY Right     X 2    MS EGD TRANSORAL BIOPSY SINGLE/MULTIPLE N/A 2017    EGD BIOPSY WITH DILATATION WITH MALONEYS performed by Lindsey Sandhu MD at [facility] Bilateral     harware in place bilat    TUBAL LIGATION      clips placed on fallopian tubes    UPPER GASTROINTESTINAL ENDOSCOPY  2017     EXTENSIVE SPASMATIC CONTRACTIONS; NO LUMINAL OBSTRUCTIONS; CLOSER TO THE DISTAL ESOPHAGUS AND DEVELOPING DIVERTICULUM IS NOTED AND BELIEVED TO BE [address] TO DSYSMOTILITY;  Wilhemena Pina Catheter: {Urinary Catheter:068846528}   Colostomy/Ileostomy/Ileal Conduit: {YES / FP:17527}       Date of Last BM: ***  No intake or output data in the 24 hours ending 19 1606  No intake/output data recorded.     Safety Concerns:     [address] Safety Concerns:845830763}    Impairments/Disabilities:      [address] Impairments/Disabilities:424042591}    Nutrition Therapy:  Current Nutrition Therapy:   [address] [facility] Diet List:667905628}    Routes of Feeding: {CHP DME Other Feedings:532078822}  Liquids: {Slp liquid thickness:96523}  Daily Fluid Restriction: {CHP DME Yes amt example:435907308}  Last Modified Barium Swallow with Video (Video Swallowing Test): {Done Not Done GOGL:350313388}    Treatments at the Time of Hospital Discharge:   Respiratory Treatments: ***  Oxygen Therapy:  {Therapy; copd oxygen:56405}  Ventilator:    { CC Vent HGGV:738355513}    Rehab Therapies: {THERAPEUTIC INTERVENTION:2009577603}  Weight Bearing Status/Restrictions: [address]  Weight Bearin}  Other Medical Equipment (for information only, NOT a DME order):  {EQUIPMENT:894058490}  Other Treatments: ***    Patient's personal belongings (please select all that are sent with patient):  {[facility] DME Belongings:413814643}    RN SIGNATURE:  {Esignature:748707448}    CASE MANAGEMENT/SOCIAL WORK SECTION    Inpatient Status Date: ***    Readmission Risk Assessment Score:  Readmission Risk              Risk of Unplanned Readmission:        11           Discharging to Facility/ Agency   · Name:   · Address:  · Phone:  · Fax:    Dialysis Facility (if applicable)   · Name:  · Address:  · Dialysis Schedule:  · Phone:  · Fax:    / signature: {Esignature:526424276}    PHYSICIAN SECTION    Prognosis: {Prognosis:0327182127}    Condition at Discharge: [address] Patient Condition:069395792}    Rehab Potential (if transferring to Rehab): {Prognosis:5901304856}    Recommended Labs or Other Treatments After Discharge: ***    Physician

## 2019-08-26 NOTE — DISCHARGE SUMMARY
DISCHARGE SUMMARY  Patient ID:  Halina Jones  [id number]  30 y.o.  1980    Admit date: 8/21/2019    Discharge date and time: 8/26/2019  4:58 PM     Admitting Physician: Priti Schreiber MD     Discharge Physician:  GUIDO Newell CNP    Admission Diagnoses: Severe recurrent major depression without psychotic features [facility]) [F33.2]    Discharge Diagnoses:   Bipolar disorder with moderate depression ([address]     Patient Active Problem List   Diagnosis Code    Polyarthritis M13.0    Scoliosis M41.9    H/O long-term treatment with high-risk medication Z79.899    Chronic pain syndrome G89.4    Marfan's syndrome Q87.40    Dysphagia R13.10    Instability of both shoulder joints M25.311, M25.312    Hip instability M25.359    Benign essential HTN I10    Severe major depression without psychotic features ([address] F32.2    Depression with suicidal ideation F32.9, R45.851    Bipolar disorder with moderate depression ([address] F31.32    Opiate withdrawal ([address] F11.23    Severe recurrent major depression without psychotic features ([address] F33.2    Severe malnutrition ([address] E43        Admission Condition: poor    Discharged Condition: stable    Indication for Admission: threat to self    History of Present Illnes (present tense wording indicates findings from admission exam on 8/21/2019 and are not necessarily indicative of current findings): This is a 46-year-old female who was admitted to the [facility] for the purpose of safety and stabilization. The [facility] with suicidal ideation and worsening depression. The patient had been using 1 g of heroin daily. She had not been taking any psychotropic medications. In the past she had been on [place] which she reported helped stabilize her mood. She stated she stopped taking Latuda regularly as she did not get her period. In the past the patient had been diagnosed with bipolar disorder.   Since stopping her Latuda the

## 2019-08-26 NOTE — BH NOTE
[address] [facility]  Discharge Note     Pt belongings: Retrieved from room/safe, reviewed and packed to take with. Dentures: None  Vision - Corrective Lenses: None  Hearing Aid: None  Jewelry: Necklace  Body Piercings Removed: N/A  Clothing: Footwear, Shirt, Pants, Undergarments (Comment), Sweater  Were All Patient Medications Collected?: Yes  Other Valuables: Purse       Patient discharged to moms' home. Instructed on discharge instructions, pt verbalizes understanding and signs AVS. Pt in control at time of discharge. Pt ambulates to [facility] main entrance picked up by mom with psych staff. Rx meds filled and sent with. No complaints voiced at this time.         Status EXAM upon discharge:  Status and Exam  Normal: Yes  Facial Expression: Brightened  Affect: Appropriate  Level of Consciousness: Alert  Mood:Normal: Yes  Mood: Depressed, Sad  Motor Activity:Normal: Yes  Motor Activity: Other(See Comments)  Interview Behavior: Cooperative  Preception: [place] to Person, [place] to Time, [place] to Place  Attention:Normal: Yes  Attention: Distractible  Thought Processes: Blocking  Thought Content:Normal: Yes  Thought Content: Preoccupations  Hallucinations: None  Delusions: No  Memory:Normal: Yes  Memory: Poor Remote  Insight and Judgment: Yes  Insight and Judgment: Poor Judgment, Poor Insight  Present Suicidal Ideation: No  Present Homicidal Ideation: No    Ari Marti LPN

## 2019-08-26 NOTE — GROUP NOTE
Group Therapy Note    Date: August 26    Group Start Time: 1100  Group End Time: 0059  Group Topic: Psychotherapy    [facility]    Shannan Chappell        Group Therapy Note    Attendees: 7/9         Patient's Goal:  PT will demonstrate increased interpersonal interaction and a clear understanding on multiple types of coping skills relating to the here-and-now therapeutic practice. Notes: Patient is making progress, AEB participating in group discussion, actively listening, and supporting other group members. PT participates in group and encourages others to participate . Status After Intervention:  Improved    Participation Level: Active Listener and Interactive    Participation Quality: Appropriate, Attentive, Sharing and Supportive      Speech:  normal      Thought Process/Content: Logical      Affective Functioning: Congruent      Mood: stable      Level of consciousness:  Alert, Oriented x4 and Attentive      Response to Learning: Able to verbalize current knowledge/experience and Progressing to goal      Endings: None Reported    Modes of Intervention: Support, Socialization, Exploration and Clarifying      Discipline Responsible: /Counselor      Signature:   Shannan Chappell

## 2019-08-26 NOTE — PROGRESS NOTES
CLINICAL PHARMACY NOTE: MEDS TO [address] [place] [address] Drive Select Patient?: No  Total # of Prescriptions Filled: 3   The following medications were delivered to the patient:  · Clonidine  · Latuda  · Duloxetine  ·   Total # of Interventions Completed: 0  Time Spent (min): 30    Additional Documentation:
Medication History completed:    New medications: Butrans patch, multivitamin, promethazine    Medications discontinued: Bystolic    Changes to dosing:   Naproxen changed to OTC strength twice daily as needed  Omeprazole changed to 20 mg daily as needed    Stated allergies: As listed    Other pertinent information: Medications confirmed with [facility]. The patient states she uses medical marijuana, however I cannot confirm the product she uses with [facility]. She also uses kratom as a supplement to help manage pain. She states she used heroin yesterday.      Thank you,  Sabina Blackwell, PharmD, BCPS  [phone]
Information:  · Nutrition-Focused Physical Findings: Anxiety, withdrawn   · Current Nutrition Therapies:  · Oral Diet Orders: General   · Oral Diet intake: 26-50%, 1-25%  · Oral Nutrition Supplement (ONS) Orders: None  · Anthropometric Measures:  · Ht: 5' 9\" (175.3 cm)   · Current Body Wt: 130 lb (59 kg)  · Admission Body Wt: 130 lb (59 kg)  · Usual Body Wt: 160 lb (72.6 kg)  · % Weight Change:  ,  18.8% loss in 2 months  · Ideal Body Wt: 145 lb (65.8 kg), % Ideal Body 90%  · BMI Classification: BMI 18.5 - 24.9 Normal Weight    Nutrition Interventions:   Continue current diet, Start ONS  Continued Inpatient Monitoring    Nutrition Evaluation:   · Evaluation: Goals set   · Goals: PO intakes are greater than 75% at meals    · Monitoring: Weight, Pertinent Labs, Monitor Bowel Function, Diet Tolerance, Skin Integrity, I&O      Prabhu AYON, R.D, L.D,  Clinical Dietitian  Cell # [phone]  Office # [phone]

## 2019-08-26 NOTE — PLAN OF CARE
Problem: Depressive Behavior With or Without Suicide Precautions:  Goal: Able to verbalize and/or display a decrease in depressive symptoms  Description  Able to verbalize and/or display a decrease in depressive symptoms  8/26/2019 1019 by Gretel Villeda LPN  Outcome: Ongoing  Note:   Patient alert with a euphoric mood, good eye contact and positive thoughts for the future while denying any depression or suicidal/homicidal ideations. Patient is also able to identify 4 to 5 positive coping skills.

## 2019-08-28 ENCOUNTER — TELEPHONE (OUTPATIENT)
Dept: PRIMARY CARE CLINIC | Age: 39
End: 2019-08-28

## 2019-08-28 RX ORDER — CYCLOBENZAPRINE HCL 10 MG
TABLET ORAL
Qty: 30 TABLET | Refills: 0 | Status: SHIPPED | OUTPATIENT
Start: 2019-08-28 | End: 2019-09-10 | Stop reason: SDUPTHER

## 2019-08-28 RX ORDER — CYCLOBENZAPRINE HCL 10 MG
TABLET ORAL
Qty: 60 TABLET | Refills: 0 | Status: CANCELLED | OUTPATIENT
Start: 2019-08-28

## 2019-08-30 DIAGNOSIS — G89.4 CHRONIC PAIN SYNDROME: Primary | ICD-10-CM

## 2019-08-30 RX ORDER — BUPRENORPHINE 7.5 UG/H
1 PATCH TRANSDERMAL WEEKLY
Qty: 2 PATCH | Refills: 0 | Status: SHIPPED | OUTPATIENT
Start: 2019-08-30 | End: 2019-08-31

## 2019-08-30 RX ORDER — BUPRENORPHINE 7.5 UG/H
1 PATCH TRANSDERMAL WEEKLY
Qty: 4 PATCH | Refills: 0 | OUTPATIENT
Start: 2019-08-30 | End: 2019-09-29

## 2019-08-31 ENCOUNTER — TELEPHONE (OUTPATIENT)
Dept: PRIMARY CARE CLINIC | Age: 39
End: 2019-08-31

## 2019-09-01 ENCOUNTER — TELEPHONE (OUTPATIENT)
Dept: PRIMARY CARE CLINIC | Age: 39
End: 2019-09-01

## 2019-09-01 ENCOUNTER — HOSPITAL ENCOUNTER (EMERGENCY)
Age: 39
Discharge: HOME | End: 2019-09-01
Attending: EMERGENCY MEDICINE
Payer: COMMERCIAL

## 2019-09-01 VITALS
OXYGEN SATURATION: 99 % | HEIGHT: 69 IN | DIASTOLIC BLOOD PRESSURE: 100 MMHG | RESPIRATION RATE: 16 BRPM | HEART RATE: 105 BPM | SYSTOLIC BLOOD PRESSURE: 140 MMHG | WEIGHT: 130 LBS | TEMPERATURE: 98.2 F | BODY MASS INDEX: 19.26 KG/M2

## 2019-09-01 DIAGNOSIS — Q87.40 MARFAN'S SYNDROME: ICD-10-CM

## 2019-09-01 DIAGNOSIS — M25.551 RIGHT HIP PAIN: ICD-10-CM

## 2019-09-01 DIAGNOSIS — Z76.0 ENCOUNTER FOR MEDICATION REFILL: Primary | ICD-10-CM

## 2019-09-01 PROCEDURE — 99283 EMERGENCY DEPT VISIT LOW MDM: CPT

## 2019-09-01 RX ORDER — HYDROCODONE BITARTRATE AND ACETAMINOPHEN 5; 325 MG/1; MG/1
1 TABLET ORAL EVERY 6 HOURS PRN
Qty: 12 TABLET | Refills: 0 | Status: SHIPPED | OUTPATIENT
Start: 2019-09-01 | End: 2019-09-04

## 2019-09-01 ASSESSMENT — PAIN SCALES - GENERAL: PAINLEVEL_OUTOF10: 8

## 2019-09-01 ASSESSMENT — ENCOUNTER SYMPTOMS
COUGH: 0
VOMITING: 0
NAUSEA: 0
TROUBLE SWALLOWING: 0
SHORTNESS OF BREATH: 0
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
## 2019-09-04 DIAGNOSIS — G89.4 CHRONIC PAIN SYNDROME: ICD-10-CM

## 2019-09-04 DIAGNOSIS — F31.32 BIPOLAR DISORDER WITH MODERATE DEPRESSION (HCC): ICD-10-CM

## 2019-09-04 RX ORDER — LORAZEPAM 1 MG/1
1 TABLET ORAL EVERY 8 HOURS PRN
Qty: 90 TABLET | Refills: 0 | OUTPATIENT
Start: 2019-09-04 | End: 2019-10-04

## 2019-09-04 RX ORDER — BUPRENORPHINE 7.5 UG/H
1 PATCH TRANSDERMAL WEEKLY
Qty: 2 PATCH | Refills: 0 | OUTPATIENT
Start: 2019-09-04 | End: 2019-09-18

## 2019-09-05 ENCOUNTER — TELEPHONE (OUTPATIENT)
Dept: PRIMARY CARE CLINIC | Age: 39
End: 2019-09-05

## 2019-09-06 ENCOUNTER — TELEPHONE (OUTPATIENT)
Dept: PRIMARY CARE CLINIC | Age: 39
End: 2019-09-06

## 2019-09-09 RX ORDER — DULOXETIN HYDROCHLORIDE 30 MG/1
30 CAPSULE, DELAYED RELEASE ORAL DAILY
Qty: 15 CAPSULE | Refills: 0 | Status: SHIPPED | OUTPATIENT
Start: 2019-09-09 | End: 2021-05-25 | Stop reason: DRUGHIGH

## 2019-09-10 RX ORDER — CYCLOBENZAPRINE HCL 10 MG
TABLET ORAL
Qty: 30 TABLET | Refills: 0 | Status: SHIPPED | OUTPATIENT
Start: 2019-09-10 | End: 2019-09-12

## 2019-09-10 RX ORDER — PROMETHAZINE HYDROCHLORIDE 25 MG/1
25 TABLET ORAL EVERY 6 HOURS PRN
Qty: 30 TABLET | Refills: 0 | Status: SHIPPED | OUTPATIENT
Start: 2019-09-10 | End: 2019-09-12

## 2019-09-12 ENCOUNTER — OFFICE VISIT (OUTPATIENT)
Dept: PRIMARY CARE CLINIC | Age: 39
End: 2019-09-12
Payer: COMMERCIAL

## 2019-09-12 VITALS
SYSTOLIC BLOOD PRESSURE: 116 MMHG | OXYGEN SATURATION: 95 % | BODY MASS INDEX: 19.7 KG/M2 | WEIGHT: 133 LBS | HEART RATE: 130 BPM | DIASTOLIC BLOOD PRESSURE: 70 MMHG | HEIGHT: 69 IN

## 2019-09-12 DIAGNOSIS — Q87.40 MARFAN'S SYNDROME: ICD-10-CM

## 2019-09-12 DIAGNOSIS — F31.32 BIPOLAR DISORDER WITH MODERATE DEPRESSION (HCC): ICD-10-CM

## 2019-09-12 DIAGNOSIS — Z79.899 HIGH RISK MEDICATION USE: Primary | ICD-10-CM

## 2019-09-12 DIAGNOSIS — G89.4 CHRONIC PAIN SYNDROME: ICD-10-CM

## 2019-09-12 DIAGNOSIS — F11.93 OPIATE WITHDRAWAL (HCC): ICD-10-CM

## 2019-09-12 PROCEDURE — 1111F DSCHRG MED/CURRENT MED MERGE: CPT | Performed by: FAMILY MEDICINE

## 2019-09-12 PROCEDURE — 99214 OFFICE O/P EST MOD 30 MIN: CPT | Performed by: FAMILY MEDICINE

## 2019-09-12 PROCEDURE — G8427 DOCREV CUR MEDS BY ELIG CLIN: HCPCS | Performed by: FAMILY MEDICINE

## 2019-09-12 PROCEDURE — 1036F TOBACCO NON-USER: CPT | Performed by: FAMILY MEDICINE

## 2019-09-12 PROCEDURE — G8420 CALC BMI NORM PARAMETERS: HCPCS | Performed by: FAMILY MEDICINE

## 2019-09-12 RX ORDER — LORAZEPAM 1 MG/1
1 TABLET ORAL EVERY 8 HOURS PRN
Qty: 45 TABLET | Refills: 0 | Status: SHIPPED | OUTPATIENT
Start: 2019-09-12 | End: 2019-09-27

## 2019-09-12 RX ORDER — FENTANYL 50 UG/H
1 PATCH TRANSDERMAL
Qty: 4 PATCH | Refills: 0 | Status: SHIPPED | OUTPATIENT
Start: 2019-09-12 | End: 2019-10-12

## 2019-09-12 ASSESSMENT — ENCOUNTER SYMPTOMS
ABDOMINAL PAIN: 0
NAUSEA: 0
DIARRHEA: 0
SORE THROAT: 0
WHEEZING: 0
RHINORRHEA: 0
VOMITING: 0
EYE REDNESS: 0
SHORTNESS OF BREATH: 0
COUGH: 0
EYE DISCHARGE: 0
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
## 2019-09-19 DIAGNOSIS — Z79.899 HIGH RISK MEDICATION USE: ICD-10-CM

## 2019-09-20 DIAGNOSIS — G89.4 CHRONIC PAIN SYNDROME: ICD-10-CM

## 2019-09-20 DIAGNOSIS — Q87.40 MARFAN'S SYNDROME: ICD-10-CM

## 2019-09-20 RX ORDER — FENTANYL 50 UG/H
1 PATCH TRANSDERMAL
Qty: 4 PATCH | Refills: 0 | OUTPATIENT
Start: 2019-09-20 | End: 2019-10-20
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
## 2019-09-21 ENCOUNTER — HOSPITAL ENCOUNTER (EMERGENCY)
Age: 39
Discharge: HOME | End: 2019-09-21
Attending: EMERGENCY MEDICINE
Payer: COMMERCIAL

## 2019-09-21 VITALS
WEIGHT: 164 LBS | SYSTOLIC BLOOD PRESSURE: 124 MMHG | TEMPERATURE: 97.3 F | HEIGHT: 69 IN | DIASTOLIC BLOOD PRESSURE: 84 MMHG | RESPIRATION RATE: 10 BRPM | OXYGEN SATURATION: 94 % | HEART RATE: 87 BPM | BODY MASS INDEX: 24.29 KG/M2

## 2019-09-21 DIAGNOSIS — F11.10 OPIOID ABUSE (HCC): ICD-10-CM

## 2019-09-21 DIAGNOSIS — F32.A DEPRESSION, UNSPECIFIED DEPRESSION TYPE: Primary | ICD-10-CM

## 2019-09-21 PROCEDURE — 99285 EMERGENCY DEPT VISIT HI MDM: CPT

## 2019-09-21 ASSESSMENT — ENCOUNTER SYMPTOMS
EYE REDNESS: 0
EYE PAIN: 0
BACK PAIN: 0
NAUSEA: 0
SHORTNESS OF BREATH: 0
DIARRHEA: 0
COUGH: 0
VOMITING: 0
CHEST TIGHTNESS: 0
CONSTIPATION: 0
ABDOMINAL PAIN: 0
SORE THROAT: 0

## 2019-09-22 NOTE — ED NOTES
Pt given belongings to change into. Pt given instructions for follow-up and discharge. Pt verbalizes understanding. Pt is A&O x4, PWD, eupneic, and ambulatory with steady, even gait upon discharge.       Ami Hitchcock RN  09/21/19 1000

## 2019-10-01 ENCOUNTER — HOSPITAL ENCOUNTER (OUTPATIENT)
Age: 39
Discharge: HOME | End: 2019-10-01
Payer: COMMERCIAL

## 2019-10-01 LAB
ABSOLUTE EOS #: 0.03 K/UL (ref 0–0.44)
ABSOLUTE IMMATURE GRANULOCYTE: <0.03 K/UL (ref 0–0.3)
ABSOLUTE LYMPH #: 1.63 K/UL (ref 1.1–3.7)
ABSOLUTE MONO #: 0.37 K/UL (ref 0.1–1.2)
ALBUMIN SERPL-MCNC: 4.5 G/DL (ref 3.5–5.2)
ALBUMIN/GLOBULIN RATIO: 1.7 (ref 1–2.5)
ALP BLD-CCNC: 84 U/L (ref 35–104)
ALT SERPL-CCNC: 20 U/L (ref 5–33)
ANION GAP SERPL CALCULATED.3IONS-SCNC: 15 MMOL/L (ref 9–17)
AST SERPL-CCNC: 12 U/L
BASOPHILS # BLD: 1 % (ref 0–2)
BASOPHILS ABSOLUTE: 0.05 K/UL (ref 0–0.2)
BILIRUB SERPL-MCNC: 0.54 MG/DL (ref 0.3–1.2)
BILIRUBIN DIRECT: 0.14 MG/DL
BILIRUBIN, INDIRECT: 0.4 MG/DL (ref 0–1)
BUN BLDV-MCNC: 9 MG/DL (ref 6–20)
BUN/CREAT BLD: ABNORMAL (ref 9–20)
CALCIUM SERPL-MCNC: 9.6 MG/DL (ref 8.6–10.4)
CHLORIDE BLD-SCNC: 96 MMOL/L (ref 98–107)
CO2: 28 MMOL/L (ref 20–31)
CREAT SERPL-MCNC: 0.51 MG/DL (ref 0.5–0.9)
DIFFERENTIAL TYPE: ABNORMAL
EOSINOPHILS RELATIVE PERCENT: 0 % (ref 1–4)
GFR AFRICAN AMERICAN: >60 ML/MIN
GFR NON-AFRICAN AMERICAN: >60 ML/MIN
GFR SERPL CREATININE-BSD FRML MDRD: ABNORMAL ML/MIN/{1.73_M2}
GFR SERPL CREATININE-BSD FRML MDRD: ABNORMAL ML/MIN/{1.73_M2}
GLOBULIN: NORMAL G/DL (ref 1.5–3.8)
GLUCOSE BLD-MCNC: 90 MG/DL (ref 70–99)
HAV IGM SER IA-ACNC: NONREACTIVE
HCG QUANTITATIVE: <1 IU/L
HCT VFR BLD CALC: 40.2 % (ref 36.3–47.1)
HEMOGLOBIN: 12.9 G/DL (ref 11.9–15.1)
HEPATITIS B CORE IGM ANTIBODY: NONREACTIVE
HEPATITIS B SURFACE ANTIGEN: NONREACTIVE
HEPATITIS C ANTIBODY: NONREACTIVE
HIV AG/AB: NONREACTIVE
IMMATURE GRANULOCYTES: 0 %
LYMPHOCYTES # BLD: 18 % (ref 24–43)
MCH RBC QN AUTO: 28.5 PG (ref 25.2–33.5)
MCHC RBC AUTO-ENTMCNC: 32.1 G/DL (ref 28.4–34.8)
MCV RBC AUTO: 88.7 FL (ref 82.6–102.9)
MONOCYTES # BLD: 4 % (ref 3–12)
NRBC AUTOMATED: 0 PER 100 WBC
PDW BLD-RTO: 13.9 % (ref 11.8–14.4)
PLATELET # BLD: 466 K/UL (ref 138–453)
PLATELET ESTIMATE: ABNORMAL
PMV BLD AUTO: 9.5 FL (ref 8.1–13.5)
POTASSIUM SERPL-SCNC: 4.4 MMOL/L (ref 3.7–5.3)
RBC # BLD: 4.53 M/UL (ref 3.95–5.11)
RBC # BLD: ABNORMAL 10*6/UL
SEG NEUTROPHILS: 77 % (ref 36–65)
SEGMENTED NEUTROPHILS ABSOLUTE COUNT: 7.09 K/UL (ref 1.5–8.1)
SODIUM BLD-SCNC: 139 MMOL/L (ref 135–144)
TOTAL PROTEIN: 7.2 G/DL (ref 6.4–8.3)
WBC # BLD: 9.2 K/UL (ref 3.5–11.3)
WBC # BLD: ABNORMAL 10*3/UL

## 2019-10-01 PROCEDURE — 86480 TB TEST CELL IMMUN MEASURE: CPT

## 2019-10-01 PROCEDURE — 85025 COMPLETE CBC W/AUTO DIFF WBC: CPT

## 2019-10-01 PROCEDURE — 87389 HIV-1 AG W/HIV-1&-2 AB AG IA: CPT

## 2019-10-01 PROCEDURE — 80074 ACUTE HEPATITIS PANEL: CPT

## 2019-10-01 PROCEDURE — 80076 HEPATIC FUNCTION PANEL: CPT

## 2019-10-01 PROCEDURE — 36415 COLL VENOUS BLD VENIPUNCTURE: CPT

## 2019-10-01 PROCEDURE — 80048 BASIC METABOLIC PNL TOTAL CA: CPT

## 2019-10-01 PROCEDURE — 84702 CHORIONIC GONADOTROPIN TEST: CPT

## 2019-10-04 LAB
QUANTI TB GOLD PLUS: NEGATIVE
QUANTI TB1 MINUS NIL: 0.02 IU/ML (ref 0–0.34)
QUANTI TB2 MINUS NIL: 0.01 IU/ML (ref 0–0.34)
QUANTIFERON MITOGEN: >10 IU/ML
QUANTIFERON NIL: 0.02 IU/ML

## 2019-10-12 ENCOUNTER — APPOINTMENT (OUTPATIENT)
Dept: CT IMAGING | Age: 39
End: 2019-10-12
Payer: COMMERCIAL

## 2019-10-12 ENCOUNTER — HOSPITAL ENCOUNTER (EMERGENCY)
Age: 39
Discharge: HOME | End: 2019-10-12
Attending: EMERGENCY MEDICINE
Payer: COMMERCIAL

## 2019-10-12 ENCOUNTER — APPOINTMENT (OUTPATIENT)
Dept: GENERAL RADIOLOGY | Age: 39
End: 2019-10-12
Payer: COMMERCIAL

## 2019-10-12 VITALS
BODY MASS INDEX: 19.18 KG/M2 | HEIGHT: 70 IN | SYSTOLIC BLOOD PRESSURE: 127 MMHG | OXYGEN SATURATION: 97 % | TEMPERATURE: 97.9 F | RESPIRATION RATE: 18 BRPM | WEIGHT: 134 LBS | DIASTOLIC BLOOD PRESSURE: 88 MMHG | HEART RATE: 77 BPM

## 2019-10-12 DIAGNOSIS — M25.551 RIGHT HIP PAIN: Primary | ICD-10-CM

## 2019-10-12 PROCEDURE — 73502 X-RAY EXAM HIP UNI 2-3 VIEWS: CPT

## 2019-10-12 PROCEDURE — 73700 CT LOWER EXTREMITY W/O DYE: CPT

## 2019-10-12 PROCEDURE — 99284 EMERGENCY DEPT VISIT MOD MDM: CPT

## 2019-10-12 RX ORDER — IBUPROFEN 800 MG/1
800 TABLET ORAL EVERY 8 HOURS PRN
Qty: 20 TABLET | Refills: 0 | Status: SHIPPED | OUTPATIENT
Start: 2019-10-12 | End: 2021-05-25

## 2019-10-12 RX ORDER — BUPRENORPHINE AND NALOXONE 8; 2 MG/1; MG/1
1 FILM, SOLUBLE BUCCAL; SUBLINGUAL 2 TIMES DAILY
COMMUNITY
End: 2021-05-25 | Stop reason: DRUGHIGH

## 2019-10-12 RX ORDER — QUETIAPINE FUMARATE 100 MG/1
100 TABLET, FILM COATED ORAL NIGHTLY
COMMUNITY
End: 2021-05-25

## 2019-10-12 ASSESSMENT — ENCOUNTER SYMPTOMS
COUGH: 0
SHORTNESS OF BREATH: 0
TROUBLE SWALLOWING: 0
NAUSEA: 0
VOMITING: 0

## 2019-10-12 ASSESSMENT — PAIN SCALES - GENERAL: PAINLEVEL_OUTOF10: 7

## 2020-02-13 ENCOUNTER — HOSPITAL ENCOUNTER (EMERGENCY)
Age: 40
Discharge: HOME | End: 2020-02-13
Attending: EMERGENCY MEDICINE
Payer: COMMERCIAL

## 2020-02-13 ENCOUNTER — APPOINTMENT (OUTPATIENT)
Dept: CT IMAGING | Age: 40
End: 2020-02-13
Payer: COMMERCIAL

## 2020-02-13 VITALS
DIASTOLIC BLOOD PRESSURE: 85 MMHG | TEMPERATURE: 98.1 F | SYSTOLIC BLOOD PRESSURE: 118 MMHG | OXYGEN SATURATION: 90 % | BODY MASS INDEX: 21.76 KG/M2 | WEIGHT: 152 LBS | RESPIRATION RATE: 12 BRPM | HEIGHT: 70 IN | HEART RATE: 88 BPM

## 2020-02-13 LAB
ABSOLUTE EOS #: 0.29 K/UL (ref 0–0.44)
ABSOLUTE IMMATURE GRANULOCYTE: <0.03 K/UL (ref 0–0.3)
ABSOLUTE LYMPH #: 1.84 K/UL (ref 1.1–3.7)
ABSOLUTE MONO #: 0.43 K/UL (ref 0.1–1.2)
ANION GAP SERPL CALCULATED.3IONS-SCNC: 10 MMOL/L (ref 9–17)
BASOPHILS # BLD: 1 % (ref 0–2)
BASOPHILS ABSOLUTE: 0.04 K/UL (ref 0–0.2)
BUN BLDV-MCNC: 7 MG/DL (ref 6–20)
BUN/CREAT BLD: ABNORMAL (ref 9–20)
CALCIUM SERPL-MCNC: 9.2 MG/DL (ref 8.6–10.4)
CHLORIDE BLD-SCNC: 99 MMOL/L (ref 98–107)
CO2: 28 MMOL/L (ref 20–31)
CREAT SERPL-MCNC: 0.41 MG/DL (ref 0.5–0.9)
DIFFERENTIAL TYPE: ABNORMAL
EOSINOPHILS RELATIVE PERCENT: 5 % (ref 1–4)
GFR AFRICAN AMERICAN: >60 ML/MIN
GFR NON-AFRICAN AMERICAN: >60 ML/MIN
GFR SERPL CREATININE-BSD FRML MDRD: ABNORMAL ML/MIN/{1.73_M2}
GFR SERPL CREATININE-BSD FRML MDRD: ABNORMAL ML/MIN/{1.73_M2}
GLUCOSE BLD-MCNC: 79 MG/DL (ref 70–99)
HCG QUALITATIVE: NEGATIVE
HCT VFR BLD CALC: 39.2 % (ref 36.3–47.1)
HEMOGLOBIN: 12 G/DL (ref 11.9–15.1)
IMMATURE GRANULOCYTES: 0 %
LYMPHOCYTES # BLD: 33 % (ref 24–43)
MCH RBC QN AUTO: 27.9 PG (ref 25.2–33.5)
MCHC RBC AUTO-ENTMCNC: 30.6 G/DL (ref 28.4–34.8)
MCV RBC AUTO: 91.2 FL (ref 82.6–102.9)
MONOCYTES # BLD: 8 % (ref 3–12)
NRBC AUTOMATED: 0 PER 100 WBC
PDW BLD-RTO: 15.6 % (ref 11.8–14.4)
PLATELET # BLD: 248 K/UL (ref 138–453)
PLATELET ESTIMATE: ABNORMAL
PMV BLD AUTO: 10.2 FL (ref 8.1–13.5)
POTASSIUM SERPL-SCNC: 3.9 MMOL/L (ref 3.7–5.3)
RBC # BLD: 4.3 M/UL (ref 3.95–5.11)
RBC # BLD: ABNORMAL 10*6/UL
SEG NEUTROPHILS: 53 % (ref 36–65)
SEGMENTED NEUTROPHILS ABSOLUTE COUNT: 3.06 K/UL (ref 1.5–8.1)
SODIUM BLD-SCNC: 137 MMOL/L (ref 135–144)
TROPONIN INTERP: NORMAL
TROPONIN INTERP: NORMAL
TROPONIN T: NORMAL NG/ML
TROPONIN T: NORMAL NG/ML
TROPONIN, HIGH SENSITIVITY: 11 NG/L (ref 0–14)
TROPONIN, HIGH SENSITIVITY: 7 NG/L (ref 0–14)
WBC # BLD: 5.7 K/UL (ref 3.5–11.3)
WBC # BLD: ABNORMAL 10*3/UL

## 2020-02-13 PROCEDURE — 85025 COMPLETE CBC W/AUTO DIFF WBC: CPT

## 2020-02-13 PROCEDURE — 80048 BASIC METABOLIC PNL TOTAL CA: CPT

## 2020-02-13 PROCEDURE — 6360000002 HC RX W HCPCS: Performed by: EMERGENCY MEDICINE

## 2020-02-13 PROCEDURE — 6360000004 HC RX CONTRAST MEDICATION: Performed by: GENERAL PRACTICE

## 2020-02-13 PROCEDURE — 96374 THER/PROPH/DIAG INJ IV PUSH: CPT

## 2020-02-13 PROCEDURE — 84703 CHORIONIC GONADOTROPIN ASSAY: CPT

## 2020-02-13 PROCEDURE — 71275 CT ANGIOGRAPHY CHEST: CPT

## 2020-02-13 PROCEDURE — 99285 EMERGENCY DEPT VISIT HI MDM: CPT

## 2020-02-13 PROCEDURE — 84484 ASSAY OF TROPONIN QUANT: CPT

## 2020-02-13 PROCEDURE — 93005 ELECTROCARDIOGRAM TRACING: CPT | Performed by: GENERAL PRACTICE

## 2020-02-13 RX ORDER — LORAZEPAM 2 MG/ML
1 INJECTION INTRAMUSCULAR ONCE
Status: DISCONTINUED | OUTPATIENT
Start: 2020-02-13 | End: 2020-02-13

## 2020-02-13 RX ORDER — LORAZEPAM 2 MG/ML
1 INJECTION INTRAMUSCULAR ONCE
Status: COMPLETED | OUTPATIENT
Start: 2020-02-13 | End: 2020-02-13

## 2020-02-13 RX ADMIN — LORAZEPAM 1 MG: 2 INJECTION INTRAMUSCULAR; INTRAVENOUS at 17:05

## 2020-02-13 RX ADMIN — IOVERSOL 100 ML: 741 INJECTION INTRA-ARTERIAL; INTRAVENOUS at 17:24

## 2020-02-13 ASSESSMENT — PAIN DESCRIPTION - FREQUENCY: FREQUENCY: CONTINUOUS

## 2020-02-13 ASSESSMENT — ENCOUNTER SYMPTOMS
VOMITING: 0
BACK PAIN: 1
ABDOMINAL PAIN: 0
DIARRHEA: 0
NAUSEA: 0
EYES NEGATIVE: 1
SHORTNESS OF BREATH: 0
CHEST TIGHTNESS: 0
ABDOMINAL DISTENTION: 0

## 2020-02-13 ASSESSMENT — PAIN DESCRIPTION - DESCRIPTORS: DESCRIPTORS: TIGHTNESS;CRUSHING

## 2020-02-13 ASSESSMENT — PAIN DESCRIPTION - PAIN TYPE: TYPE: ACUTE PAIN

## 2020-02-13 ASSESSMENT — PAIN DESCRIPTION - PROGRESSION: CLINICAL_PROGRESSION: NOT CHANGED

## 2020-02-13 ASSESSMENT — PAIN DESCRIPTION - ONSET: ONSET: ON-GOING

## 2020-02-13 ASSESSMENT — PAIN SCALES - GENERAL: PAINLEVEL_OUTOF10: 6

## 2020-02-13 ASSESSMENT — PAIN DESCRIPTION - ORIENTATION: ORIENTATION: LEFT

## 2020-02-13 ASSESSMENT — PAIN DESCRIPTION - LOCATION: LOCATION: CHEST

## 2020-02-13 NOTE — ED NOTES
Pt. Ambulatory with steady gait to ER room 43 from triage  Pt. Presents with (L) side chest pain that began earlier today; pt has history of Marfan's and states she is concerned about an aortic dissection  Pt. Arrives A/Ox4, RR even and unlabored, NAD  Pt.  Vitals obtained; vitals stable; pt initially in sinus tach on monitor; now 86bpm while resting on stretcher  EKG obtained, IV access established  Call light given  Awaiting further orders  Will continue to monitor and reassess     Flako Guillen RN  02/13/20 0244

## 2020-02-13 NOTE — ED NOTES
Bed: 43  Expected date:   Expected time:   Means of arrival:   Comments:     Will Rosas RN  02/13/20 8786

## 2020-02-13 NOTE — ED PROVIDER NOTES
[facility]  ED  Emergency Department Encounter  EmergencyMedicine Resident     Pt Gershon Nyhan  MRN: [id number]  Rhiannongfdesean 1980  Date of evaluation: 2/13/20  PCP:  Denia Bledsoe MD    [address] [place] [address]       Chief Complaint   Patient presents with    Chest Pain     hx Marfans       HISTORY OF PRESENT ILLNESS  (Location/Symptom, Timing/Onset, Context/Setting, Quality, Duration, Modifying Factors, Severity.)      Rivas Vila is a 42 y.o. female who presents with 48-hour history of left-sided sharp chest pain radiating to her left side and down her left arm and into her scapula posteriorly. Intermittent palpitations. No nausea, no vomiting. History of Marfan's syndrome, verbalized concern for dissection as her father recently had a dissection secondary to his Marfan syndrome. Also reports significant home stressors and anxiety. No personal cardiovascular disease history, with only risk factors being Marfan syndrome and smoking history. 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 Vehemently denies any recent illicit drug use. Of note, patient states she has missed her last menstrual cycle for unknown reasons. No abdominal pain, no vaginal discharge    PAST MEDICAL / SURGICAL / SOCIAL / FAMILY HISTORY      has a past medical history of Anxiety, Arthritis, Bipolar depression ([address] Dermatitis, Dysuria, Exposure to STD, Folliculitis, Hx of acute bronchitis, Hypertension, Marfan syndrome, MVP (mitral valve prolapse), Nausea & vomiting, PONV (postoperative nausea and vomiting), Scoliosis, Seasonal allergies, Staph infection, and UTI (lower urinary tract infection). has a past surgical history that includes Foot surgery (Bilateral); Hip arthroscopy (Left); Knee arthroscopy (Right); Tubal ligation; Breast reduction surgery (Bilateral); Foot Capsulotomy (Left, 9/17/14);  Shoulder Arthroplasty (Bilateral); pr egd transoral biopsy single/multiple (N/A, 8/29/2017); and Upper gastrointestinal endoscopy (2017). Social History     Socioeconomic History    Marital status:      Spouse name: Not on file    Number of children: Not on file    Years of education: Not on file    Highest education level: Not on file   Occupational History    Not on file   Social Needs    Financial resource strain: Not on file    Food insecurity:     Worry: Not on file     Inability: Not on file    Transportation needs:     Medical: Not on file     Non-medical: Not on file   Tobacco Use    Smoking status: Former Smoker     Years: 1.50     Last attempt to quit: 3/26/2015     Years since quittin.8    Smokeless tobacco: Never Used   Substance and Sexual Activity    Alcohol use: No    Drug use: Not Currently     Types: Opiates , Marijuana, Cocaine     Comment: anthonynyl     Sexual activity: Yes     Partners: Male   Lifestyle    Physical activity:     Days per week: Not on file     Minutes per session: Not on file    Stress: Not on file   Relationships    Social connections:     Talks on phone: Not on file     Gets together: Not on file     Attends Bahai service: Not on file     Active member of club or organization: Not on file     Attends meetings of clubs or organizations: Not on file     Relationship status: Not on file    Intimate partner violence:     Fear of current or ex partner: Not on file     Emotionally abused: Not on file     Physically abused: Not on file     Forced sexual activity: Not on file   Other Topics Concern    Not on file   Social History Narrative    Not on file       Family History   Problem Relation Age of Onset    Cancer Brother         testicular    Breast Cancer Maternal Grandmother     Hypertension Mother     Thyroid Disease Mother        Allergies:  Ketamine and Morphine    Home Medications:  Prior to Admission medications    Medication Sig Start Date End Date Taking?  Authorizing Provider   QUEtiapine (SEROQUEL) 100 MG tablet Take Cardiovascular:  Heart sounds normal, no murmurs. Peripheral pulses strong, regular, equal.   Abdomen: Soft, nontender, nondistended. Bowel sounds normal. No palpable masses. Neurology: GCS 15. Oriented to person place and time. Normal tone and power in all 4 extremities. No sensory deficits.     Skin: Warm, dry, well perfused      DIFFERENTIAL  DIAGNOSIS     PLAN (LABS / [facility] / EKG):  Orders Placed This Encounter   Procedures    CTA CHEST W WO CONTRAST    CBC Auto Differential    Basic Metabolic Panel w/ Reflex to MG    HCG Qualitative, Serum    Troponin    EKG 12 Lead       MEDICATIONS ORDERED:  Orders Placed This Encounter   Medications    LORazepam (ATIVAN) injection 1 mg    DISCONTD: LORazepam (ATIVAN) injection 1 mg    DISCONTD: iohexol (OMNIPAQUE 350) solution 100 mL    ioversol (OPTIRAY) 74 % injection 100 mL           DIAGNOSTIC RESULTS / EMERGENCY DEPARTMENT COURSE / MDM     LABS:  Results for orders placed or performed during the hospital encounter of 02/13/20   CBC Auto Differential   Result Value Ref Range    WBC 5.7 3.5 - 11.3 k/uL    RBC 4.30 3.95 - 5.11 m/uL    Hemoglobin 12.0 11.9 - 15.1 g/dL    Hematocrit 39.2 36.3 - 47.1 %    MCV 91.2 82.6 - 102.9 fL    MCH 27.9 25.2 - 33.5 pg    MCHC 30.6 28.4 - 34.8 g/dL    RDW 15.6 (H) 11.8 - 14.4 %    Platelets 141 405 - 484 k/uL    MPV 10.2 8.1 - 13.5 fL    NRBC Automated 0.0 0.0 per 100 WBC    Differential Type NOT REPORTED     Seg Neutrophils 53 36 - 65 %    Lymphocytes 33 24 - 43 %    Monocytes 8 3 - 12 %    Eosinophils % 5 (H) 1 - 4 %    Basophils 1 0 - 2 %    Immature Granulocytes 0 0 %    Segs Absolute 3.06 1.50 - 8.10 k/uL    Absolute Lymph # 1.84 1.10 - 3.70 k/uL    Absolute Mono # 0.43 0.10 - 1.20 k/uL    Absolute Eos # 0.29 0.00 - 0.44 k/uL    Basophils Absolute 0.04 0.00 - 0.20 k/uL    Absolute Immature Granulocyte <0.03 0.00 - 0.30 k/uL    WBC Morphology NOT REPORTED     RBC Morphology ANISOCYTOSIS PRESENT     Platelet Estimate NOT REPORTED    Basic Metabolic Panel w/ Reflex to MG   Result Value Ref Range    Glucose 79 70 - 99 mg/dL    BUN 7 6 - 20 mg/dL    CREATININE 0.41 (L) 0.50 - 0.90 mg/dL    Bun/Cre Ratio NOT REPORTED 9 - 20    Calcium 9.2 8.6 - 10.4 mg/dL    Sodium 137 135 - 144 mmol/L    Potassium 3.9 3.7 - 5.3 mmol/L    Chloride 99 98 - 107 mmol/L    CO2 28 20 - 31 mmol/L    Anion Gap 10 9 - 17 mmol/L    GFR Non-African American >60 >60 mL/min    GFR African American >60 >60 mL/min    GFR Comment          GFR Staging NOT REPORTED    HCG Qualitative, Serum   Result Value Ref Range    hCG Qual NEGATIVE NEGATIVE   Troponin   Result Value Ref Range    Troponin, High Sensitivity 11 0 - 14 ng/L    Troponin T NOT REPORTED <0.03 ng/mL    Troponin Interp NOT REPORTED    Troponin   Result Value Ref Range    Troponin, High Sensitivity 7 0 - 14 ng/L    Troponin T NOT REPORTED <0.03 ng/mL    Troponin Interp NOT REPORTED    EKG 12 Lead   Result Value Ref Range    Ventricular Rate 97 BPM    Atrial Rate 97 BPM    P-R Interval 142 ms    QRS Duration 70 ms    Q-T Interval 326 ms    QTc Calculation (Bazett) 414 ms    P Axis 59 degrees    R Axis 50 degrees    T Axis 70 degrees       RADIOLOGY:  None    EKG  EKG Interpretation    Interpreted by me    Rhythm: normal sinus   Rate: Tachycardia  Axis: normal  Ectopy: none  Conduction: normal  ST Segments: no acute change  T Waves: no acute change  Q Waves: none    Clinical Impression: Sinus tachycardia    All EKG's are interpreted by the Emergency Department Physician who either signs or Co-signs this chart in the absence of a cardiologist.    EMERGENCY DEPARTMENT COURSE:  42 y.o. female who presents with chest pain. Marfan's. CT scan negative for dissection. Chest pain work-up negative. Patient initially quite anxious, Ativan given with heart rate coming down and patient resting comfortably in the room.   Follow-up with PCP in 3 to 7 days        ED Course as of Feb 13 1857   Thu Feb 13, 2020   1600

## 2020-02-14 LAB
EKG ATRIAL RATE: 97 BPM
EKG P AXIS: 59 DEGREES
EKG P-R INTERVAL: 142 MS
EKG Q-T INTERVAL: 326 MS
EKG QRS DURATION: 70 MS
EKG QTC CALCULATION (BAZETT): 414 MS
EKG R AXIS: 50 DEGREES
EKG T AXIS: 70 DEGREES
EKG VENTRICULAR RATE: 97 BPM

## 2020-02-14 PROCEDURE — 93010 ELECTROCARDIOGRAM REPORT: CPT | Performed by: INTERNAL MEDICINE

## 2020-04-30 ENCOUNTER — APPOINTMENT (OUTPATIENT)
Dept: CT IMAGING | Age: 40
End: 2020-04-30
Payer: COMMERCIAL

## 2020-04-30 ENCOUNTER — HOSPITAL ENCOUNTER (EMERGENCY)
Age: 40
Discharge: HOME | End: 2020-04-30
Attending: EMERGENCY MEDICINE
Payer: COMMERCIAL

## 2020-04-30 VITALS
RESPIRATION RATE: 16 BRPM | HEART RATE: 98 BPM | BODY MASS INDEX: 25.62 KG/M2 | OXYGEN SATURATION: 98 % | TEMPERATURE: 98 F | SYSTOLIC BLOOD PRESSURE: 112 MMHG | WEIGHT: 179 LBS | HEIGHT: 70 IN | DIASTOLIC BLOOD PRESSURE: 65 MMHG

## 2020-04-30 LAB
-: NORMAL
ABSOLUTE EOS #: 0.32 K/UL (ref 0–0.44)
ABSOLUTE IMMATURE GRANULOCYTE: 0.02 K/UL (ref 0–0.3)
ABSOLUTE LYMPH #: 1.49 K/UL (ref 1.1–3.7)
ABSOLUTE MONO #: 0.58 K/UL (ref 0.1–1.2)
AMORPHOUS: NORMAL
ANION GAP SERPL CALCULATED.3IONS-SCNC: 10 MMOL/L (ref 9–17)
BACTERIA: NORMAL
BASOPHILS # BLD: 1 % (ref 0–2)
BASOPHILS ABSOLUTE: 0.07 K/UL (ref 0–0.2)
BILIRUBIN URINE: NEGATIVE
BUN BLDV-MCNC: 9 MG/DL (ref 6–20)
BUN/CREAT BLD: 21 (ref 9–20)
CALCIUM SERPL-MCNC: 9.1 MG/DL (ref 8.6–10.4)
CASTS UA: NORMAL /LPF
CHLORIDE BLD-SCNC: 98 MMOL/L (ref 98–107)
CO2: 33 MMOL/L (ref 20–31)
COLOR: YELLOW
COMMENT UA: ABNORMAL
CREAT SERPL-MCNC: 0.42 MG/DL (ref 0.5–0.9)
CRYSTALS, UA: NORMAL /HPF
DIFFERENTIAL TYPE: ABNORMAL
EOSINOPHILS RELATIVE PERCENT: 4 % (ref 1–4)
EPITHELIAL CELLS UA: NORMAL /HPF (ref 0–5)
GFR AFRICAN AMERICAN: >60 ML/MIN
GFR NON-AFRICAN AMERICAN: >60 ML/MIN
GFR SERPL CREATININE-BSD FRML MDRD: ABNORMAL ML/MIN/{1.73_M2}
GFR SERPL CREATININE-BSD FRML MDRD: ABNORMAL ML/MIN/{1.73_M2}
GLUCOSE BLD-MCNC: 81 MG/DL (ref 70–99)
GLUCOSE URINE: NEGATIVE
HCT VFR BLD CALC: 40.4 % (ref 36.3–47.1)
HEMOGLOBIN: 12.3 G/DL (ref 11.9–15.1)
IMMATURE GRANULOCYTES: 0 %
KETONES, URINE: NEGATIVE
LEUKOCYTE ESTERASE, URINE: NEGATIVE
LYMPHOCYTES # BLD: 17 % (ref 24–43)
MCH RBC QN AUTO: 28 PG (ref 25.2–33.5)
MCHC RBC AUTO-ENTMCNC: 30.4 G/DL (ref 28.4–34.8)
MCV RBC AUTO: 91.8 FL (ref 82.6–102.9)
MONOCYTES # BLD: 6 % (ref 3–12)
MUCUS: NORMAL
NITRITE, URINE: NEGATIVE
NRBC AUTOMATED: 0 PER 100 WBC
OTHER OBSERVATIONS UA: NORMAL
PDW BLD-RTO: 16.3 % (ref 11.8–14.4)
PH UA: 6 (ref 5–8)
PLATELET # BLD: 298 K/UL (ref 138–453)
PLATELET ESTIMATE: ABNORMAL
PMV BLD AUTO: 9.6 FL (ref 8.1–13.5)
POTASSIUM SERPL-SCNC: 4.4 MMOL/L (ref 3.7–5.3)
PROTEIN UA: NEGATIVE
RBC # BLD: 4.4 M/UL (ref 3.95–5.11)
RBC # BLD: ABNORMAL 10*6/UL
RBC UA: NORMAL /HPF (ref 0–2)
RENAL EPITHELIAL, UA: NORMAL /HPF
SEG NEUTROPHILS: 72 % (ref 36–65)
SEGMENTED NEUTROPHILS ABSOLUTE COUNT: 6.56 K/UL (ref 1.5–8.1)
SODIUM BLD-SCNC: 141 MMOL/L (ref 135–144)
SPECIFIC GRAVITY UA: 1.03 (ref 1–1.03)
TRICHOMONAS: NORMAL
TURBIDITY: ABNORMAL
URINE HGB: NEGATIVE
UROBILINOGEN, URINE: NORMAL
WBC # BLD: 9 K/UL (ref 3.5–11.3)
WBC # BLD: ABNORMAL 10*3/UL
WBC UA: NORMAL /HPF (ref 0–5)
YEAST: NORMAL

## 2020-04-30 PROCEDURE — 2580000003 HC RX 258: Performed by: EMERGENCY MEDICINE

## 2020-04-30 PROCEDURE — 6360000004 HC RX CONTRAST MEDICATION: Performed by: EMERGENCY MEDICINE

## 2020-04-30 PROCEDURE — 85025 COMPLETE CBC W/AUTO DIFF WBC: CPT

## 2020-04-30 PROCEDURE — 96374 THER/PROPH/DIAG INJ IV PUSH: CPT

## 2020-04-30 PROCEDURE — 99284 EMERGENCY DEPT VISIT MOD MDM: CPT

## 2020-04-30 PROCEDURE — 6360000002 HC RX W HCPCS: Performed by: EMERGENCY MEDICINE

## 2020-04-30 PROCEDURE — 80048 BASIC METABOLIC PNL TOTAL CA: CPT

## 2020-04-30 PROCEDURE — 81001 URINALYSIS AUTO W/SCOPE: CPT

## 2020-04-30 PROCEDURE — 72132 CT LUMBAR SPINE W/DYE: CPT

## 2020-04-30 PROCEDURE — 6370000000 HC RX 637 (ALT 250 FOR IP): Performed by: EMERGENCY MEDICINE

## 2020-04-30 RX ORDER — SODIUM CHLORIDE 0.9 % (FLUSH) 0.9 %
10 SYRINGE (ML) INJECTION PRN
Status: DISCONTINUED | OUTPATIENT
Start: 2020-04-30 | End: 2020-04-30 | Stop reason: HOSPADM

## 2020-04-30 RX ORDER — 0.9 % SODIUM CHLORIDE 0.9 %
80 INTRAVENOUS SOLUTION INTRAVENOUS ONCE
Status: COMPLETED | OUTPATIENT
Start: 2020-04-30 | End: 2020-04-30

## 2020-04-30 RX ORDER — ACETAMINOPHEN 325 MG/1
650 TABLET ORAL ONCE
Status: COMPLETED | OUTPATIENT
Start: 2020-04-30 | End: 2020-04-30

## 2020-04-30 RX ORDER — ORPHENADRINE CITRATE 30 MG/ML
60 INJECTION INTRAMUSCULAR; INTRAVENOUS ONCE
Status: COMPLETED | OUTPATIENT
Start: 2020-04-30 | End: 2020-04-30

## 2020-04-30 RX ADMIN — Medication 10 ML: at 10:12

## 2020-04-30 RX ADMIN — ORPHENADRINE CITRATE 60 MG: 30 INJECTION INTRAMUSCULAR; INTRAVENOUS at 09:47

## 2020-04-30 RX ADMIN — ACETAMINOPHEN 650 MG: 325 TABLET ORAL at 09:50

## 2020-04-30 RX ADMIN — SODIUM CHLORIDE 80 ML: 9 INJECTION, SOLUTION INTRAVENOUS at 10:13

## 2020-04-30 RX ADMIN — IOPAMIDOL 80 ML: 755 INJECTION, SOLUTION INTRAVENOUS at 10:12

## 2020-04-30 ASSESSMENT — PAIN SCALES - GENERAL: PAINLEVEL_OUTOF10: 8

## 2020-04-30 NOTE — ED NOTES
Patient brought into  by private auto and taken to treatment area via wheelchair. Patient states that she has a chronic hx of marfan syndrome and chronic back problems. . Has experienced increased pain in mid lower back over the past week and discomfort has become progressively  more intense. Patient able to ambulate with assistance of a walker cane. Movement very slow and require assistance to get legs up onto stretcher. Patient being evaluated by doctor goldberger. X-rays. and lab work ordered.       Karson Jimenez RN  04/30/20 0488 [facility], RN  04/30/20 4448

## 2020-06-01 NOTE — ED PROVIDER NOTES
(postoperative nausea and vomiting)     Scoliosis     Seasonal allergies     Staph infection     HX OF ALSO BACTEREMIA    UTI (lower urinary tract infection)      SURGICAL HISTORY       Past Surgical History:   Procedure Laterality Date    BREAST REDUCTION SURGERY Bilateral     2012    [facility]    FOOT SURGERY Bilateral     RT HAS 2 SCREWS, LT HAD FUSION IN DEC WITH 2 SCREWS    HIP ARTHROSCOPY Left     KNEE ARTHROSCOPY Right     X 2    CT EGD TRANSORAL BIOPSY SINGLE/MULTIPLE N/A 8/29/2017    EGD BIOPSY WITH DILATATION WITH MALONEYS performed by Diogenes Montelongo MD at [facility] Bilateral     harware in place bilat    TUBAL LIGATION      clips placed on fallopian tubes    UPPER GASTROINTESTINAL ENDOSCOPY  08/29/2017     EXTENSIVE SPASMATIC CONTRACTIONS; NO LUMINAL OBSTRUCTIONS; CLOSER TO THE DISTAL ESOPHAGUS AND DEVELOPING DIVERTICULUM IS NOTED AND BELIEVED TO BE SEC TO DSYSMOTILITY;  DALEY DIATIONS WERE DONE EMPIRICALLY WITH 56 FR     CURRENT MEDICATIONS       Discharge Medication List as of 9/21/2019  8:58 PM      CONTINUE these medications which have NOT CHANGED    Details   fentaNYL (DURAGESIC) 50 MCG/HR Place 1 patch onto the skin every 72 hours for 30 days. , Disp-4 patch, R-0Normal      LORazepam (ATIVAN) 1 MG tablet Take 1 tablet by mouth every 8 hours as needed for Anxiety for up to 15 days. , Disp-45 tablet, R-0Normal      DULoxetine (CYMBALTA) 30 MG extended release capsule Take 1 capsule by mouth daily for 15 days, Disp-15 capsule, R-0Normal      lurasidone (LATUDA) 60 MG TABS tablet Take 1 tablet by mouth Daily with supper for 15 days, Disp-15 tablet, R-0Normal      cloNIDine (CATAPRES) 0.1 MG tablet Take 1 tablet by mouth 3 times daily for 15 days, Disp-45 tablet, R-0Normal           ALLERGIES     is allergic to ketamine and morphine. FAMILY HISTORY     She indicated that her mother is alive. She indicated that her father is alive.  She 0

## 2021-01-16 ENCOUNTER — APPOINTMENT (OUTPATIENT)
Dept: GENERAL RADIOLOGY | Age: 41
End: 2021-01-16
Payer: COMMERCIAL

## 2021-01-16 ENCOUNTER — HOSPITAL ENCOUNTER (EMERGENCY)
Age: 41
Discharge: HOME | End: 2021-01-16
Attending: EMERGENCY MEDICINE
Payer: COMMERCIAL

## 2021-01-16 VITALS
RESPIRATION RATE: 18 BRPM | TEMPERATURE: 97.8 F | SYSTOLIC BLOOD PRESSURE: 119 MMHG | OXYGEN SATURATION: 97 % | HEART RATE: 104 BPM | DIASTOLIC BLOOD PRESSURE: 74 MMHG

## 2021-01-16 DIAGNOSIS — J06.9 ACUTE UPPER RESPIRATORY INFECTION: Primary | ICD-10-CM

## 2021-01-16 DIAGNOSIS — R05.9 COUGH: ICD-10-CM

## 2021-01-16 PROCEDURE — 71045 X-RAY EXAM CHEST 1 VIEW: CPT

## 2021-01-16 PROCEDURE — 99282 EMERGENCY DEPT VISIT SF MDM: CPT

## 2021-01-16 RX ORDER — PREDNISONE 20 MG/1
40 TABLET ORAL DAILY
Qty: 6 TABLET | Refills: 0 | Status: SHIPPED | OUTPATIENT
Start: 2021-01-16 | End: 2021-01-16 | Stop reason: SDUPTHER

## 2021-01-16 RX ORDER — PREDNISONE 20 MG/1
40 TABLET ORAL DAILY
Qty: 6 TABLET | Refills: 0 | Status: SHIPPED | OUTPATIENT
Start: 2021-01-16 | End: 2021-01-19

## 2021-01-16 ASSESSMENT — ENCOUNTER SYMPTOMS
COUGH: 1
ABDOMINAL PAIN: 0
SORE THROAT: 0
CHEST TIGHTNESS: 1
VOMITING: 0
RHINORRHEA: 0
SINUS PRESSURE: 0
SINUS PAIN: 0
SHORTNESS OF BREATH: 1
NAUSEA: 0

## 2021-01-16 NOTE — ED PROVIDER NOTES
[facility]  ED  Emergency Department Encounter  Emergency Medicine Resident     Pt Name: Danilo Munoz  MRN: [id number]  Rhiannongfdesean 1980  Date of evaluation: 1/16/21  PCP:  GUIDO Land CNP    CHIEF COMPLAINT       Chief Complaint   Patient presents with    Cough       HISTORY Lila  (Location/Symptom, Timing/Onset, Context/Setting, Quality, Duration, Modifying Factors,Severity.)      Danilo Munoz is a 47 y. o.yo female who presents with cough and SOB x3 weeks. She reports a productive cough that has been stable. She is producing moderate amounts of yellow sputum. She reports SOB that is minimal at baseline and increases with activity. She is in a motorized chair but reports she can ambulate for short distances. She reports chest congestion and tightness and has been taking Sudafed for relief. She reports her neighbor had COVID-19 infection 2 weeks ago, but denies prior COVID exposure. PAST MEDICAL / SURGICAL / SOCIAL / FAMILY HISTORY      has a past medical history of Anxiety, Arthritis, Bipolar depression ([address] Dermatitis, Dysuria, Exposure to STD, Folliculitis, Hx of acute bronchitis, Hypertension, Marfan syndrome, MVP (mitral valve prolapse), Nausea & vomiting, PONV (postoperative nausea and vomiting), Scoliosis, Seasonal allergies, Staph infection, and UTI (lower urinary tract infection). has a past surgical history that includes Foot surgery (Bilateral); Hip arthroscopy (Left); Knee arthroscopy (Right); Tubal ligation; Breast reduction surgery (Bilateral); Foot Capsulotomy (Left, 9/17/14); Shoulder Arthroplasty (Bilateral); pr egd transoral biopsy single/multiple (N/A, 8/29/2017); and Upper gastrointestinal endoscopy (08/29/2017).      Social History     Socioeconomic History    Marital status:      Spouse name: Not on file    Number of children: Not on file    Years of education: Not on file    Highest education level: Not on file Occupational History    Not on file   Social Needs    Financial resource strain: Not on file    Food insecurity     Worry: Not on file     Inability: Not on file    Transportation needs     Medical: Not on file     Non-medical: Not on file   Tobacco Use    Smoking status: Former Smoker     Years: 1.50     Quit date: 3/26/2015     Years since quittin.8    Smokeless tobacco: Never Used   Substance and Sexual Activity    Alcohol use: No    Drug use: Not Currently     Types: Opiates , Marijuana, Cocaine     Comment: fentnyl     Sexual activity: Yes     Partners: Male   Lifestyle    Physical activity     Days per week: Not on file     Minutes per session: Not on file    Stress: Not on file   Relationships    Social connections     Talks on phone: Not on file     Gets together: Not on file     Attends Hoahaoism service: Not on file     Active member of club or organization: Not on file     Attends meetings of clubs or organizations: Not on file     Relationship status: Not on file    Intimate partner violence     Fear of current or ex partner: Not on file     Emotionally abused: Not on file     Physically abused: Not on file     Forced sexual activity: Not on file   Other Topics Concern    Not on file   Social History Narrative    Not on file       Family History   Problem Relation Age of Onset    Cancer Brother         testicular    Breast Cancer Maternal Grandmother     Hypertension Mother     Thyroid Disease Mother        Allergies:  Ketamine and Morphine    Home Medications:  Prior to Admission medications    Medication Sig Start Date End Date Taking? Authorizing Provider   predniSONE (DELTASONE) 20 MG tablet Take 2 tablets by mouth daily for 3 days 21 Yes Marquez Sampson MD   QUEtiapine (SEROQUEL) 100 MG tablet Take 100 mg by mouth nightly    Historical Provider, MD   buprenorphine-naloxone (SUBOXONE) 8-2 MG FILM SL film Place 1 Film under the tongue 2 times daily.     Historical Provider, MD   ibuprofen (ADVIL;MOTRIN) 800 MG tablet Take 1 tablet by mouth every 8 hours as needed for Pain 10/12/19   GUIDO Jasmine CNP   DULoxetine (CYMBALTA) 30 MG extended release capsule Take 1 capsule by mouth daily for 15 days  Patient taking differently: Take 30 mg by mouth 2 times daily  9/9/19 10/12/19  Sergey Carpio MD   lurasidone (LATUDA) 60 MG TABS tablet Take 1 tablet by mouth Daily with supper for 15 days 9/9/19 9/24/19  Sergey Carpio MD   cloNIDine (CATAPRES) 0.1 MG tablet Take 1 tablet by mouth 3 times daily for 15 days 8/26/19 9/10/19  CarlosUpton GUIDO Todd CNP       REVIEW OFSYSTEMS    (2-9 systems for level 4, 10 or more for level 5)      Review of Systems   Constitutional: Negative for chills, diaphoresis, fatigue and fever. HENT: Positive for congestion. Negative for rhinorrhea, sinus pressure, sinus pain and sore throat. Respiratory: Positive for cough, chest tightness and shortness of breath. Cardiovascular: Negative for chest pain and palpitations. Gastrointestinal: Negative for abdominal pain, nausea and vomiting. Genitourinary: Negative for dysuria, flank pain, frequency and urgency. Musculoskeletal: Negative for arthralgias, joint swelling and myalgias. Neurological: Negative for dizziness, weakness, light-headedness and headaches. PHYSICAL EXAM   (up to 7 for level 4, 8 or more forlevel 5)      INITIAL VITALS:   ED Triage Vitals [01/16/21 1327]   BP Temp Temp Source Pulse Resp SpO2 Height Weight   119/74 97.8 °F (36.6 °C) Infrared 104 18 97 % -- --       Physical Exam  Constitutional:       General: She is not in acute distress. Appearance: Normal appearance. She is normal weight. She is not ill-appearing. HENT:      Head: Normocephalic and atraumatic. Nose: Congestion present. No rhinorrhea. Mouth/Throat:      Mouth: Mucous membranes are moist.      Pharynx: Oropharynx is clear.  No oropharyngeal exudate or posterior oropharyngeal erythema. Eyes:      Extraocular Movements: Extraocular movements intact. Conjunctiva/sclera: Conjunctivae normal.      Pupils: Pupils are equal, round, and reactive to light. Neck:      Musculoskeletal: Normal range of motion and neck supple. Cardiovascular:      Rate and Rhythm: Normal rate and regular rhythm. Pulses: Normal pulses. Heart sounds: Normal heart sounds. Pulmonary:      Effort: Pulmonary effort is normal. No respiratory distress. Breath sounds: Wheezing (end expiratory wheezing throughout all lung fields) present. Abdominal:      General: Bowel sounds are normal. There is no distension. Palpations: Abdomen is soft. Tenderness: There is no abdominal tenderness. There is no guarding or rebound. Musculoskeletal: Normal range of motion. Skin:     General: Skin is warm and dry. Neurological:      General: No focal deficit present. Mental Status: She is alert and oriented to person, place, and time. Psychiatric:         Mood and Affect: Mood normal.         Behavior: Behavior normal.         DIFFERENTIAL  DIAGNOSIS     PLAN (LABS / IMAGING / EKG):  Orders Placed This Encounter   Procedures    XR CHEST PORTABLE    Respiratory Care Evaluation and Treat       MEDICATIONS ORDERED:  Orders Placed This Encounter   Medications    DISCONTD: predniSONE (DELTASONE) 20 MG tablet     Sig: Take 2 tablets by mouth daily for 3 days     Dispense:  6 tablet     Refill:  0    predniSONE (DELTASONE) 20 MG tablet     Sig: Take 2 tablets by mouth daily for 3 days     Dispense:  6 tablet     Refill:  0       DDX: COVID-19 infection, other viral URI, acute bronchitis, reactive airway disease, pulmonary edema    Initial MDM/Plan: 47 y.o. female who presents with cough and SOB for 3 weeks. She denies fever, chills, or chest pain. Initial workup to include CXR. Will consult respiratory therapy for evaluation and treatment.       DIAGNOSTIC RESULTS /

## 2021-01-16 NOTE — ED NOTES
Pt to ED cc cough x 3 weeks  Pt states that she is having a hard time clearing her lungs due to a chronic illness  States she sometimes is SOB, pt otherwise has no complaints  Pt alert and oriented x4, resp even and non-labored  Pt alert and oriented x4, talking in complete sentences, respirations even and unlabored. Pt acting age appropriate.  White board updated       Jeni Espinoza RN  01/16/21 8283

## 2021-01-16 NOTE — PROGRESS NOTES
Respiratory eval done. Pt's lungs are clear t/o, SpO2 97% on RA, RR is 16 and non-labored. No respiratory intervention needed at this time.

## 2021-01-16 NOTE — ED NOTES
Patient requesting scripts to be faxed to [facility]. RN informed her that if the resident is still here it can be done. Patient said she had to catch a cab and did not want the breathing treatment.      Samuel Purcell RN  01/16/21 4821

## 2021-01-16 NOTE — ED PROVIDER NOTES
[facility] ED     Emergency Department     Faculty Attestation    I performed a history and physical examination of the patient and discussed management with the resident. I reviewed the residents note and agree with the documented findings and plan of care. Any areas of disagreement are noted on the chart. I was personally present for the key portions of any procedures. I have documented in the chart those procedures where I was not present during the key portions. I have reviewed the emergency nurses triage note. I agree with the chief complaint, past medical history, past surgical history, allergies, medications, social and family history as documented unless otherwise noted below. For Physician Assistant/ Nurse Practitioner cases/documentation I have personally evaluated this patient and have completed at least one if not all key elements of the E/M (history, physical exam, and MDM). Additional findings are as noted. Patient presents with a cough that she has had for the past 2 to 3 weeks. She says she has been bringing up yellow sputum. She says she has chest heaviness from all the congestion. She denies fever, abdominal pain, vomiting or diarrhea. Patient does have a history of Marfan's. On exam, patient is resting comfortably in the bed. There is scattered wheeze heard on auscultation of the lungs bilaterally. Heart sounds are normal.  Abdomen is soft and nontender. Will get chest x-ray and treat patient's cough.       Jae Willis MD  Attending Emergency  Physician              Castillo Iyer MD  01/16/21 4369

## 2021-05-25 ENCOUNTER — HOSPITAL ENCOUNTER (INPATIENT)
Age: 41
LOS: 5 days | Discharge: HOME | DRG: 885 | End: 2021-05-30
Attending: EMERGENCY MEDICINE | Admitting: PSYCHIATRY & NEUROLOGY
Payer: COMMERCIAL

## 2021-05-25 ENCOUNTER — APPOINTMENT (OUTPATIENT)
Dept: GENERAL RADIOLOGY | Age: 41
DRG: 885 | End: 2021-05-25
Payer: COMMERCIAL

## 2021-05-25 DIAGNOSIS — G89.4 CHRONIC PAIN SYNDROME: ICD-10-CM

## 2021-05-25 DIAGNOSIS — F23 ACUTE PSYCHOSIS (HCC): Primary | ICD-10-CM

## 2021-05-25 LAB
ACETAMINOPHEN LEVEL: <5 UG/ML (ref 10–30)
ALBUMIN SERPL-MCNC: 3.9 G/DL (ref 3.5–5.2)
ALBUMIN/GLOBULIN RATIO: ABNORMAL (ref 1–2.5)
ALP BLD-CCNC: 108 U/L (ref 35–104)
ALT SERPL-CCNC: 11 U/L (ref 5–33)
ANION GAP SERPL CALCULATED.3IONS-SCNC: 11 MMOL/L (ref 9–17)
AST SERPL-CCNC: 13 U/L
BILIRUB SERPL-MCNC: 0.63 MG/DL (ref 0.3–1.2)
BUN BLDV-MCNC: 6 MG/DL (ref 6–20)
BUN/CREAT BLD: ABNORMAL (ref 9–20)
CALCIUM SERPL-MCNC: 9.3 MG/DL (ref 8.6–10.4)
CHLORIDE BLD-SCNC: 100 MMOL/L (ref 98–107)
CO2: 25 MMOL/L (ref 20–31)
CREAT SERPL-MCNC: 0.43 MG/DL (ref 0.5–0.9)
ETHANOL PERCENT: <0.01 %
ETHANOL: <10 MG/DL
GFR AFRICAN AMERICAN: >60 ML/MIN
GFR NON-AFRICAN AMERICAN: >60 ML/MIN
GFR SERPL CREATININE-BSD FRML MDRD: ABNORMAL ML/MIN/{1.73_M2}
GFR SERPL CREATININE-BSD FRML MDRD: ABNORMAL ML/MIN/{1.73_M2}
GLUCOSE BLD-MCNC: 79 MG/DL (ref 70–99)
HCT VFR BLD CALC: 43.2 % (ref 36–46)
HEMOGLOBIN: 14.5 G/DL (ref 12–16)
MCH RBC QN AUTO: 28.6 PG (ref 26–34)
MCHC RBC AUTO-ENTMCNC: 33.6 G/DL (ref 31–37)
MCV RBC AUTO: 84.9 FL (ref 80–100)
NRBC AUTOMATED: ABNORMAL PER 100 WBC
PDW BLD-RTO: 15 % (ref 11.5–14.9)
PLATELET # BLD: 329 K/UL (ref 150–450)
PMV BLD AUTO: 8.1 FL (ref 6–12)
POTASSIUM SERPL-SCNC: 4.1 MMOL/L (ref 3.7–5.3)
RBC # BLD: 5.09 M/UL (ref 4–5.2)
SALICYLATE LEVEL: <1 MG/DL (ref 3–10)
SARS-COV-2, RAPID: NOT DETECTED
SODIUM BLD-SCNC: 136 MMOL/L (ref 135–144)
SPECIMEN DESCRIPTION: NORMAL
TOTAL PROTEIN: 7 G/DL (ref 6.4–8.3)
TOXIC TRICYCLIC SC,BLOOD: ABNORMAL
TSH SERPL DL<=0.05 MIU/L-ACNC: 0.75 MIU/L (ref 0.3–5)
WBC # BLD: 7 K/UL (ref 3.5–11)

## 2021-05-25 PROCEDURE — 80307 DRUG TEST PRSMV CHEM ANLYZR: CPT

## 2021-05-25 PROCEDURE — 80053 COMPREHEN METABOLIC PANEL: CPT

## 2021-05-25 PROCEDURE — 80179 DRUG ASSAY SALICYLATE: CPT

## 2021-05-25 PROCEDURE — 36415 COLL VENOUS BLD VENIPUNCTURE: CPT

## 2021-05-25 PROCEDURE — 84443 ASSAY THYROID STIM HORMONE: CPT

## 2021-05-25 PROCEDURE — 99283 EMERGENCY DEPT VISIT LOW MDM: CPT

## 2021-05-25 PROCEDURE — 6370000000 HC RX 637 (ALT 250 FOR IP): Performed by: PSYCHIATRY & NEUROLOGY

## 2021-05-25 PROCEDURE — 80143 DRUG ASSAY ACETAMINOPHEN: CPT

## 2021-05-25 PROCEDURE — 87635 SARS-COV-2 COVID-19 AMP PRB: CPT

## 2021-05-25 PROCEDURE — 85027 COMPLETE CBC AUTOMATED: CPT

## 2021-05-25 PROCEDURE — G0480 DRUG TEST DEF 1-7 CLASSES: HCPCS

## 2021-05-25 PROCEDURE — 71045 X-RAY EXAM CHEST 1 VIEW: CPT

## 2021-05-25 PROCEDURE — 1240000000 HC EMOTIONAL WELLNESS R&B

## 2021-05-25 RX ORDER — NICOTINE 21 MG/24HR
1 PATCH, TRANSDERMAL 24 HOURS TRANSDERMAL DAILY
Status: DISCONTINUED | OUTPATIENT
Start: 2021-05-25 | End: 2021-05-30 | Stop reason: HOSPADM

## 2021-05-25 RX ORDER — IBUPROFEN 400 MG/1
400 TABLET ORAL EVERY 6 HOURS PRN
Status: DISCONTINUED | OUTPATIENT
Start: 2021-05-25 | End: 2021-05-30 | Stop reason: HOSPADM

## 2021-05-25 RX ORDER — LORAZEPAM 1 MG/1
2 TABLET ORAL EVERY 4 HOURS PRN
Status: DISCONTINUED | OUTPATIENT
Start: 2021-05-25 | End: 2021-05-30 | Stop reason: HOSPADM

## 2021-05-25 RX ORDER — POLYETHYLENE GLYCOL 3350 17 G/17G
17 POWDER, FOR SOLUTION ORAL DAILY PRN
Status: DISCONTINUED | OUTPATIENT
Start: 2021-05-25 | End: 2021-05-30 | Stop reason: HOSPADM

## 2021-05-25 RX ORDER — MAGNESIUM HYDROXIDE/ALUMINUM HYDROXICE/SIMETHICONE 120; 1200; 1200 MG/30ML; MG/30ML; MG/30ML
30 SUSPENSION ORAL EVERY 6 HOURS PRN
Status: DISCONTINUED | OUTPATIENT
Start: 2021-05-25 | End: 2021-05-30 | Stop reason: HOSPADM

## 2021-05-25 RX ORDER — DIPHENHYDRAMINE HYDROCHLORIDE 50 MG/ML
50 INJECTION INTRAMUSCULAR; INTRAVENOUS EVERY 4 HOURS PRN
Status: DISCONTINUED | OUTPATIENT
Start: 2021-05-25 | End: 2021-05-30 | Stop reason: HOSPADM

## 2021-05-25 RX ORDER — HYDROCHLOROTHIAZIDE 50 MG/1
50 TABLET ORAL DAILY
Status: ON HOLD | COMMUNITY
End: 2021-07-28

## 2021-05-25 RX ORDER — ACETAMINOPHEN 325 MG/1
650 TABLET ORAL EVERY 4 HOURS PRN
Status: DISCONTINUED | OUTPATIENT
Start: 2021-05-25 | End: 2021-05-30 | Stop reason: HOSPADM

## 2021-05-25 RX ORDER — HYDROXYZINE 50 MG/1
50 TABLET, FILM COATED ORAL EVERY 6 HOURS PRN
COMMUNITY

## 2021-05-25 RX ORDER — HALOPERIDOL 5 MG/ML
5 INJECTION INTRAMUSCULAR EVERY 4 HOURS PRN
Status: DISCONTINUED | OUTPATIENT
Start: 2021-05-25 | End: 2021-05-30 | Stop reason: HOSPADM

## 2021-05-25 RX ORDER — DULOXETIN HYDROCHLORIDE 60 MG/1
60 CAPSULE, DELAYED RELEASE ORAL 2 TIMES DAILY
COMMUNITY

## 2021-05-25 RX ORDER — CYCLOBENZAPRINE HCL 10 MG
10 TABLET ORAL 3 TIMES DAILY PRN
Status: ON HOLD | COMMUNITY
End: 2021-06-16 | Stop reason: HOSPADM

## 2021-05-25 RX ORDER — HALOPERIDOL 5 MG
5 TABLET ORAL EVERY 4 HOURS PRN
Status: DISCONTINUED | OUTPATIENT
Start: 2021-05-25 | End: 2021-05-30 | Stop reason: HOSPADM

## 2021-05-25 RX ORDER — PREGABALIN 300 MG/1
300 CAPSULE ORAL 2 TIMES DAILY
Status: ON HOLD | COMMUNITY
End: 2021-05-30 | Stop reason: SDUPTHER

## 2021-05-25 RX ORDER — BUPRENORPHINE HYDROCHLORIDE AND NALOXONE HYDROCHLORIDE DIHYDRATE 8; 2 MG/1; MG/1
1 TABLET SUBLINGUAL DAILY
Status: ON HOLD | COMMUNITY
End: 2021-06-16 | Stop reason: HOSPADM

## 2021-05-25 RX ORDER — NICOTINE 21 MG/24HR
1 PATCH, TRANSDERMAL 24 HOURS TRANSDERMAL DAILY
Status: DISCONTINUED | OUTPATIENT
Start: 2021-05-26 | End: 2021-05-25

## 2021-05-25 RX ORDER — TRAZODONE HYDROCHLORIDE 50 MG/1
50 TABLET ORAL NIGHTLY PRN
Status: DISCONTINUED | OUTPATIENT
Start: 2021-05-25 | End: 2021-05-30 | Stop reason: HOSPADM

## 2021-05-25 RX ORDER — LORAZEPAM 2 MG/ML
2 INJECTION INTRAMUSCULAR EVERY 4 HOURS PRN
Status: DISCONTINUED | OUTPATIENT
Start: 2021-05-25 | End: 2021-05-30 | Stop reason: HOSPADM

## 2021-05-25 RX ORDER — HYDROXYZINE 50 MG/1
50 TABLET, FILM COATED ORAL 3 TIMES DAILY PRN
Status: DISCONTINUED | OUTPATIENT
Start: 2021-05-25 | End: 2021-05-30 | Stop reason: HOSPADM

## 2021-05-25 ASSESSMENT — ENCOUNTER SYMPTOMS
EYE DISCHARGE: 0
EYE REDNESS: 0
SINUS PRESSURE: 0
ABDOMINAL PAIN: 0
WHEEZING: 0
VOMITING: 0
NAUSEA: 0
DIARRHEA: 0
BLOOD IN STOOL: 0
FACIAL SWELLING: 0
SORE THROAT: 0
COUGH: 0
CONSTIPATION: 0
BACK PAIN: 1
COLOR CHANGE: 0
SHORTNESS OF BREATH: 0
TROUBLE SWALLOWING: 0
RHINORRHEA: 0
EYE PAIN: 0
CHEST TIGHTNESS: 0

## 2021-05-25 ASSESSMENT — PAIN DESCRIPTION - PAIN TYPE: TYPE: ACUTE PAIN

## 2021-05-25 ASSESSMENT — SLEEP AND FATIGUE QUESTIONNAIRES
AVERAGE NUMBER OF SLEEP HOURS: 6
DO YOU HAVE DIFFICULTY SLEEPING: NO
DO YOU USE A SLEEP AID: NO

## 2021-05-25 ASSESSMENT — PAIN SCALES - GENERAL
PAINLEVEL_OUTOF10: 10
PAINLEVEL_OUTOF10: 0

## 2021-05-25 ASSESSMENT — LIFESTYLE VARIABLES: HISTORY_ALCOHOL_USE: YES

## 2021-05-25 ASSESSMENT — PAIN DESCRIPTION - LOCATION: LOCATION: BACK

## 2021-05-25 NOTE — ED PROVIDER NOTES
[address]  eMERGENCY dEPARTMENT eNCOUnter      Pt Name: Tiffanie Wilson  MRN: [id number]  [place] 1980  Date of evaluation: 5/25/21      CHIEF COMPLAINT       Chief Complaint   Patient presents with    Back Pain    Knee Pain    Nausea    Mental Health Problem         HISTORY OF PRESENT ILLNESS    Tiffanie Wilson is a 46 y.o. female who presents complaining of mental health evaluation. Patient was brought in by mental health because she has been having hallucinations and not able to care for herself appropriately because of her bizarre behaviors. Patient is on a pink slip. Patient states she does admit to hearing voices and things and states that the person upstairs is messing with her. Patient also has multiple chronic pain complaints but states that she has had a cough for the last 3 days that is nonproductive but no fever or shortness of breath. Patient assumes it is probably from her cocaine and fentanyl abuse that she started back up on recently. REVIEW OF SYSTEMS       Review of Systems   Constitutional: Negative for activity change, appetite change, chills, diaphoresis and fever. HENT: Negative for congestion, ear pain, facial swelling, nosebleeds, rhinorrhea, sinus pressure, sore throat and trouble swallowing. Eyes: Negative for pain, discharge and redness. Respiratory: Negative for cough, chest tightness, shortness of breath and wheezing. Cardiovascular: Negative for chest pain, palpitations and leg swelling. Gastrointestinal: Negative for abdominal pain, blood in stool, constipation, diarrhea, nausea and vomiting. Genitourinary: Negative for difficulty urinating, dysuria, flank pain, frequency, genital sores and hematuria. Musculoskeletal: Positive for arthralgias and back pain. Negative for gait problem, joint swelling, myalgias and neck pain. Skin: Negative for color change, pallor, rash and wound.    Neurological: Negative for dizziness, tremors, seizures, 60 MG EXTENDED RELEASE CAPSULE    Take 60 mg by mouth 2 times daily    HYDROCHLOROTHIAZIDE (HYDRODIURIL) 50 MG TABLET    Take 50 mg by mouth daily    HYDROXYZINE (ATARAX) 50 MG TABLET    Take 50 mg by mouth every 6 hours as needed    PREGABALIN (LYRICA) 300 MG CAPSULE    Take 300 mg by mouth 2 times daily. ALLERGIES     is allergic to ketamine and morphine. SOCIAL HISTORY      reports that she quit smoking about 6 years ago. She quit after 1.50 years of use. She has never used smokeless tobacco. She reports previous drug use. Drugs: Opiates , Marijuana, and Cocaine. She reports that she does not drink alcohol. PHYSICAL EXAM     INITIAL VITALS: There were no vitals taken for this visit. Physical Exam  Constitutional:       General: She is not in acute distress. Appearance: She is well-developed. She is not diaphoretic. HENT:      Head: Normocephalic and atraumatic. Eyes:      General: No scleral icterus. Right eye: No discharge. Left eye: No discharge. Conjunctiva/sclera: Conjunctivae normal.      Pupils: Pupils are equal, round, and reactive to light. Cardiovascular:      Rate and Rhythm: Normal rate and regular rhythm. Heart sounds: Normal heart sounds. No murmur heard. No friction rub. No gallop. Pulmonary:      Effort: Pulmonary effort is normal. No respiratory distress. Breath sounds: Normal breath sounds. No wheezing or rales. Neurological:      Mental Status: She is alert and oriented to person, place, and time. Psychiatric:         Mood and Affect: Mood is elated. Speech: Speech is rapid and pressured and tangential.         Behavior: Behavior is hyperactive. Thought Content: Thought content does not include homicidal or suicidal ideation.          DIAGNOSTIC RESULTS     RADIOLOGY:All plain film, CT,MRI, and formal ultrasound images (except ED bedside ultrasound) are read by the radiologist and the interpretations are directly viewed by the emergency physician. XR CHEST PORTABLE    Result Date: 5/25/2021  EXAMINATION: ONE XRAY VIEW OF THE CHEST 5/25/2021 6:02 pm COMPARISON: 01/16/2021 HISTORY: ORDERING SYSTEM PROVIDED HISTORY: shortness of breath TECHNOLOGIST PROVIDED HISTORY: shortness of breath Reason for Exam: sob Acuity: Acute Type of Exam: Initial FINDINGS: The lungs are without acute focal process. There is no effusion or pneumothorax. The cardiomediastinal silhouette is stable. The osseous structures are stable. No acute process. LABS: All lab results were reviewed by myself, and all abnormals are listed below. Labs Reviewed   TOX SCR, BLD, ED - Abnormal; Notable for the following components:       Result Value    Acetaminophen Level <5 (*)     Salicylate Lvl <1 (*)     All other components within normal limits   CBC - Abnormal; Notable for the following components:    RDW 15.0 (*)     All other components within normal limits   COMPREHENSIVE METABOLIC PANEL - Abnormal; Notable for the following components:    CREATININE 0.43 (*)     Alkaline Phosphatase 108 (*)     All other components within normal limits   COVID-19, RAPID   TSH WITH REFLEX   URINE DRUG SCREEN         MEDICAL DECISION MAKING:     We will do a chest x-ray to do a normal work-up but I think any is patient's is complaining of chronic issues and will be medically cleared. EMERGENCY DEPARTMENT COURSE:   Vitals: There were no vitals filed for this visit. The patient was given the following medications while in the emergency department:  No orders of the defined types were placed in this encounter. -------------------------  8:10 PM EDT  Patient is medically cleared. Patient has been evaluated and will be admitted to the [facility] for further psychiatric evaluation and treatment. CONSULTS:  None    PROCEDURES:  None    FINAL IMPRESSION      1.  Acute psychosis [facility])          DISPOSITION/PLAN   DISPOSITION Decision To Admit 05/25/2021 07:12:01 PM      PATIENT REFERREDTO:  No follow-up provider specified.     DISCHARGEMEDICATIONS:  New Prescriptions    No medications on file       (Please note that portions of this note were completed with a voice recognition program.  Efforts were made to edit thedictations but occasionally words are mis-transcribed.)    Tommie Almonte MD  Attending Emergency Physician                        Tommie Almonte MD  05/25/21 2011

## 2021-05-26 PROBLEM — F11.20 OPIOID DEPENDENCE ON AGONIST THERAPY (HCC): Status: ACTIVE | Noted: 2021-05-26

## 2021-05-26 PROBLEM — F14.10 COCAINE ABUSE (HCC): Status: ACTIVE | Noted: 2021-05-26

## 2021-05-26 PROCEDURE — 6360000002 HC RX W HCPCS: Performed by: NURSE PRACTITIONER

## 2021-05-26 PROCEDURE — 1240000000 HC EMOTIONAL WELLNESS R&B

## 2021-05-26 PROCEDURE — 6370000000 HC RX 637 (ALT 250 FOR IP): Performed by: PSYCHIATRY & NEUROLOGY

## 2021-05-26 PROCEDURE — 6370000000 HC RX 637 (ALT 250 FOR IP): Performed by: NURSE PRACTITIONER

## 2021-05-26 PROCEDURE — 90792 PSYCH DIAG EVAL W/MED SRVCS: CPT | Performed by: PSYCHIATRY & NEUROLOGY

## 2021-05-26 RX ORDER — QUETIAPINE FUMARATE 25 MG/1
25 TABLET, FILM COATED ORAL NIGHTLY
Status: DISCONTINUED | OUTPATIENT
Start: 2021-05-26 | End: 2021-05-28

## 2021-05-26 RX ORDER — HYDROCHLOROTHIAZIDE 25 MG/1
50 TABLET ORAL DAILY
Status: DISCONTINUED | OUTPATIENT
Start: 2021-05-26 | End: 2021-05-30 | Stop reason: HOSPADM

## 2021-05-26 RX ORDER — CYCLOBENZAPRINE HCL 10 MG
10 TABLET ORAL 3 TIMES DAILY PRN
Status: DISCONTINUED | OUTPATIENT
Start: 2021-05-26 | End: 2021-05-30 | Stop reason: HOSPADM

## 2021-05-26 RX ORDER — PREGABALIN 100 MG/1
300 CAPSULE ORAL 2 TIMES DAILY
Status: DISCONTINUED | OUTPATIENT
Start: 2021-05-26 | End: 2021-05-30 | Stop reason: HOSPADM

## 2021-05-26 RX ORDER — DULOXETIN HYDROCHLORIDE 60 MG/1
60 CAPSULE, DELAYED RELEASE ORAL 2 TIMES DAILY
Status: DISCONTINUED | OUTPATIENT
Start: 2021-05-26 | End: 2021-05-30 | Stop reason: HOSPADM

## 2021-05-26 RX ORDER — BUPRENORPHINE HYDROCHLORIDE AND NALOXONE HYDROCHLORIDE DIHYDRATE 8; 2 MG/1; MG/1
1 TABLET SUBLINGUAL DAILY
Status: DISCONTINUED | OUTPATIENT
Start: 2021-05-26 | End: 2021-05-30 | Stop reason: HOSPADM

## 2021-05-26 RX ADMIN — CYCLOBENZAPRINE 10 MG: 10 TABLET, FILM COATED ORAL at 16:06

## 2021-05-26 RX ADMIN — BUPRENORPHINE HYDROCHLORIDE AND NALOXONE HYDROCHLORIDE DIHYDRATE 1 TABLET: 8; 2 TABLET SUBLINGUAL at 13:04

## 2021-05-26 RX ADMIN — DULOXETINE HYDROCHLORIDE 60 MG: 60 CAPSULE, DELAYED RELEASE ORAL at 13:04

## 2021-05-26 RX ADMIN — ACETAMINOPHEN 650 MG: 325 TABLET, FILM COATED ORAL at 20:06

## 2021-05-26 RX ADMIN — DULOXETINE HYDROCHLORIDE 60 MG: 60 CAPSULE, DELAYED RELEASE ORAL at 21:46

## 2021-05-26 RX ADMIN — HYDROCHLOROTHIAZIDE 50 MG: 25 TABLET ORAL at 13:04

## 2021-05-26 RX ADMIN — QUETIAPINE FUMARATE 25 MG: 25 TABLET ORAL at 21:46

## 2021-05-26 RX ADMIN — PREGABALIN 300 MG: 100 CAPSULE ORAL at 13:04

## 2021-05-26 RX ADMIN — PREGABALIN 300 MG: 100 CAPSULE ORAL at 21:46

## 2021-05-26 ASSESSMENT — PAIN SCALES - GENERAL
PAINLEVEL_OUTOF10: 3
PAINLEVEL_OUTOF10: 6
PAINLEVEL_OUTOF10: 6

## 2021-05-26 ASSESSMENT — LIFESTYLE VARIABLES: HISTORY_ALCOHOL_USE: NO

## 2021-05-26 ASSESSMENT — PAIN DESCRIPTION - LOCATION: LOCATION: BACK

## 2021-05-26 NOTE — PROGRESS NOTES
`[facility]  Admission Note     Admission Type:   Admission Type: Involuntary    Reason for admission:  Reason for Admission: medication induced psychosis    PATIENT STRENGTHS:  Strengths: Communication    Patient Strengths and Limitations:  Limitations: Inappropriate/potentially harmful leisure interests, Difficulty problem solving/relies on others to help solve problems    Addictive Behavior:   Addictive Behavior  In the past 3 months, have you felt or has someone told you that you have a problem with:  : None  Do you have a history of Chemical Use?: No  Do you have a history of Alcohol Use?: Yes (sober for 20 months until 3 days ago)  Do you have a history of Street Drug Abuse?: Yes (sober for 20 months until 3 days ago)  Histroy of Prescripton Drug Abuse?: No    Medical Problems:   Past Medical History:   Diagnosis Date    Anxiety     Arthritis     O. A.    Bipolar depression ([facility] [address]     Dermatitis     Dysuria     Exposure to STD     Folliculitis     Hx of acute bronchitis     Hypertension     Marfan syndrome     MVP (mitral valve prolapse)     Nausea & vomiting     PONV (postoperative nausea and vomiting)     Scoliosis     Seasonal allergies     Staph infection     HX OF ALSO BACTEREMIA    UTI (lower urinary tract infection)        Status EXAM:  Status and Exam  Normal: No  Facial Expression: Flat  Affect: Appropriate  Level of Consciousness: Alert  Mood:Normal: No  Mood: Anxious  Motor Activity:Normal: No  Motor Activity: Unusual Posture/Gait  Interview Behavior: Cooperative  Preception: [place] to Person, [place] to Time, [place] to Place, [place] to Situation  Attention:Normal: Yes  Thought Processes: Circumstantial  Thought Content:Normal: Yes  Hallucinations: None  Delusions: No  Memory:Normal: Yes  Insight and Judgment: No  Insight and Judgment: Poor Judgment  Present Suicidal Ideation: No  Present Homicidal Ideation: No    Tobacco Screening:  Practical Counseling, on
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
## 2021-05-26 NOTE — GROUP NOTE
Group Therapy Note    Date: 5/26/2021    Group Start Time: 1100  Group End Time: 0438  Group Topic: Cognitive Skills    ANYI URBINA    Sherly Mitchell    Patient refused to attend leisure/ cognitive skills group at [id number] after encouragement from staff. 1:1 talk time provided by staff.      Signature:  Sherly Mitchell

## 2021-05-26 NOTE — PROGRESS NOTES
Patient given tobacco quitline number 26433935837 at this time, refusing to call at this time, states \" I just dont want to quit now\"- patient given information as to the dangers of long term tobacco use. Continue to reinforce the importance of tobacco cessation.

## 2021-05-26 NOTE — GROUP NOTE
Group Therapy Note    Date: 5/26/2021    Group Start Time: 1430  Group End Time: 0937  Group Topic: Psychoeducation    ANYI RICHA URBINA    Carlos Pereyra        Group Therapy Note    Attendees: 4/17         Patient's Goal:  To increase interpersonal interaction    Notes:      Status After Intervention:  Improved    Participation Level:  Active Listener and Interactive    Participation Quality: Appropriate, Attentive and Sharing      Speech:  normal      Thought Process/Content: Logical  Linear      Affective Functioning: Congruent      Mood: euthymic      Level of consciousness:  Alert, Oriented x4 and Attentive      Response to Learning: Able to verbalize current knowledge/experience, Able to verbalize/acknowledge new learning, Able to retain information and Progressing to goal      Endings: None Reported    Modes of Intervention: Education, Support, Socialization, Exploration, Clarifying, Problem-solving and Activity      Discipline Responsible: Psychoeducational Specialist      Signature:  Carlos Pereyra

## 2021-05-26 NOTE — GROUP NOTE
Group Therapy Note    Date: 5/26/2021    Group Start Time: 0900  Group End Time: 0920  Group Topic: Community Meeting    ANYI Hughes    Patient refused to attend community meeting/ goals group at 0900 after encouragement from staff. 1:1 talk time provided by staff.       Signature:  Nle Hughes

## 2021-05-26 NOTE — PLAN OF CARE
[facility]  Initial Interdisciplinary Treatment Plan NO      Original treatment plan Date & Time: 5/26/2021 8476    Admission Type:  Admission Type: Involuntary    Reason for admission:   Reason for Admission: medication induced psychosis    Estimated Length of Stay:  5-7days  Estimated Discharge Date: to be determined by physician    PATIENT STRENGTHS:  Patient Strengths:Strengths: Communication  Patient Strengths and Limitations:Limitations: Inappropriate/potentially harmful leisure interests, Difficulty problem solving/relies on others to help solve problems  Addictive Behavior: Addictive Behavior  In the past 3 months, have you felt or has someone told you that you have a problem with:  : None  Do you have a history of Chemical Use?: No  Do you have a history of Alcohol Use?: Yes (sober for 20 months until 3 days ago)  Do you have a history of Street Drug Abuse?: Yes (sober for 20 months until 3 days ago)  Histroy of Prescripton Drug Abuse?: No  Medical Problems:  Past Medical History:   Diagnosis Date    Anxiety     Arthritis     O. A.     Bipolar depression ([facility] [address]     Dermatitis     Dysuria     Exposure to STD     Folliculitis     Hx of acute bronchitis     Hypertension     Marfan syndrome     MVP (mitral valve prolapse)     Nausea & vomiting     PONV (postoperative nausea and vomiting)     Scoliosis     Seasonal allergies     Staph infection     HX OF ALSO BACTEREMIA    UTI (lower urinary tract infection)      Status EXAM:Status and Exam  Normal: No  Facial Expression: Flat  Affect: Appropriate  Level of Consciousness: Alert  Mood:Normal: No  Mood: Anxious  Motor Activity:Normal: No  Motor Activity: Unusual Posture/Gait  Interview Behavior: Cooperative  Preception: Aiken to Person, Aiken to Time, Aiken to Place, Aiken to Situation  Attention:Normal: Yes  Thought Processes: Circumstantial  Thought Content:Normal: Yes  Hallucinations: None  Delusions: No  Memory:Normal: Yes  Insight and Judgment: No  Insight and Judgment: Poor Judgment  Present Suicidal Ideation: No  Present Homicidal Ideation: No    EDUCATION:   Learner Progress Toward Treatment Goals: reviewed group plans and strategies for care    Method:group therapy, medication compliance, individualized assessments and care planning    Outcome: needs reinforcement    PATIENT GOALS: to be discussed with patient within 72 hours    PLAN/TREATMENT RECOMMENDATIONS:     continue group therapy , medications compliance, goal setting, individualized assessments and care, continue to monitor pt on unit      SHORT-TERM GOALS:   Time frame for Short-Term Goals: 5-7 days    LONG-TERM GOALS:  Time frame for Long-Term Goals: 6 months  Members Present in Team Meeting: See Signature Sheet    Doris Baez, [address]

## 2021-05-26 NOTE — GROUP NOTE
Group Therapy Note    Date: 5/26/2021    Group Start Time: 1000  Group End Time: 8571  Group Topic: [id number] [id number], NANDA, FRANKW        Group Therapy Note    Attendees: 4/9         Pt declined to attend psychotherapy at 1000 am despite encouragement. Pt offered 1:1 and refused.  Signature:  Torey Ruff, NANDA, FRANKW

## 2021-05-26 NOTE — GROUP NOTE
Group Therapy Note    Date: 5/26/2021    Group Start Time: 1330  Group End Time: 4399  Group Topic: Recreational    [facility]    Patient refused to attend Recreational Therapy Group at 1330 after encouragement from staff. 1:1 talk time offered.     Signature:  Devika Mittal

## 2021-05-26 NOTE — PROGRESS NOTES
Behavioral Services  Medicare Certification Upon Admission    I certify that this patient's inpatient psychiatric hospital admission is medically necessary for:    [x] (1) Treatment which could reasonably be expected to improve this patient's condition,       [x] (2) Or for diagnostic study;     AND     [x](2) The inpatient psychiatric services are provided while the individual is under the care of a physician and are included in the individualized plan of care.     Estimated length of stay/service 3-5 days    Plan for post-hospital care [facility]    Electronically signed by Adriane Linda MD on 5/26/2021 at 3:18 PM

## 2021-05-26 NOTE — PLAN OF CARE
Problem: Altered Mood, Deterioration in Function:  Goal: Able to verbalize reality based thinking  Description: Able to verbalize reality based thinking  5/26/2021 1046 by Paralee Meigs, LPN  Outcome: Ongoing

## 2021-05-27 PROCEDURE — 99232 SBSQ HOSP IP/OBS MODERATE 35: CPT | Performed by: PSYCHIATRY & NEUROLOGY

## 2021-05-27 PROCEDURE — 6370000000 HC RX 637 (ALT 250 FOR IP): Performed by: PSYCHIATRY & NEUROLOGY

## 2021-05-27 PROCEDURE — 6370000000 HC RX 637 (ALT 250 FOR IP): Performed by: NURSE PRACTITIONER

## 2021-05-27 PROCEDURE — 1240000000 HC EMOTIONAL WELLNESS R&B

## 2021-05-27 PROCEDURE — 6360000002 HC RX W HCPCS: Performed by: NURSE PRACTITIONER

## 2021-05-27 RX ADMIN — DULOXETINE HYDROCHLORIDE 60 MG: 60 CAPSULE, DELAYED RELEASE ORAL at 21:14

## 2021-05-27 RX ADMIN — DULOXETINE HYDROCHLORIDE 60 MG: 60 CAPSULE, DELAYED RELEASE ORAL at 08:26

## 2021-05-27 RX ADMIN — HALOPERIDOL 5 MG: 5 TABLET ORAL at 15:28

## 2021-05-27 RX ADMIN — BUPRENORPHINE HYDROCHLORIDE AND NALOXONE HYDROCHLORIDE DIHYDRATE 1 TABLET: 8; 2 TABLET SUBLINGUAL at 08:26

## 2021-05-27 RX ADMIN — PREGABALIN 300 MG: 100 CAPSULE ORAL at 08:26

## 2021-05-27 RX ADMIN — PREGABALIN 300 MG: 100 CAPSULE ORAL at 21:14

## 2021-05-27 RX ADMIN — CYCLOBENZAPRINE 10 MG: 10 TABLET, FILM COATED ORAL at 08:35

## 2021-05-27 RX ADMIN — HYDROXYZINE HYDROCHLORIDE 50 MG: 50 TABLET, FILM COATED ORAL at 21:14

## 2021-05-27 RX ADMIN — HYDROCHLOROTHIAZIDE 50 MG: 25 TABLET ORAL at 08:27

## 2021-05-27 RX ADMIN — QUETIAPINE FUMARATE 25 MG: 25 TABLET ORAL at 21:14

## 2021-05-27 RX ADMIN — LORAZEPAM 2 MG: 1 TABLET ORAL at 15:28

## 2021-05-27 ASSESSMENT — PAIN SCALES - GENERAL: PAINLEVEL_OUTOF10: 0

## 2021-05-27 NOTE — CARE COORDINATION
SIGIFREDO called [facility] to confirm that patient completed intake - this information was confirmed but waiting on a call back from the nurse there to see if patient was approved to come for treatment. When SW completed DORIAN for patient, patient stated \"I'm not going to treatment [facility]". SIGIFREDO updated physician with this information.

## 2021-05-27 NOTE — GROUP NOTE
Group Therapy Note    Date: 5/27/2021    Group Start Time: 9199  Group End Time: 1923  Group Topic: Healthy Living/Wellness    ANYI URBINA    Lillian Nayak RN        Group Therapy Note    Attendees: 6/15         Patient's Goal: Wellness education    Notes: Active listener and participant    Status After Intervention:  Improved    Participation Level:  Active Listener    Participation Quality: Appropriate      Speech:  normal      Thought Process/Content: Logical      Affective Functioning: Congruent      Mood: Stable      Level of consciousness:  Alert      Response to Learning: Able to verbalize current knowledge/experience      Endings: None Reported    Modes of Intervention: Education      Discipline Responsible: Registered Nurse      Signature:  Lillian Nayak RN

## 2021-05-27 NOTE — GROUP NOTE
Group Therapy Note    Date: 5/27/2021    Group Start Time: 1000  Group End Time: 1030  Group Topic: Psychotherapy    ANYI URBINA    NANDA Chino LSW        Group Therapy Note    Attendees: 7/10         Patient's Goal:  Improve interpersonal relationship skills    Notes: Patient was an active participant in group discussion    Status After Intervention:  Unchanged    Participation Level:  Active Listener and Interactive    Participation Quality: Appropriate, Attentive and Sharing      Speech:  normal      Thought Process/Content: Logical      Affective Functioning: Congruent      Mood: dysphoric      Level of consciousness:  Alert, Oriented x4 and Attentive      Response to Learning: Able to verbalize current knowledge/experience, Able to verbalize/acknowledge new learning and Able to retain information      Endings: None Reported    Modes of Intervention: Socialization and Exploration      Discipline Responsible: /Counselor      Signature:  NANDA Chino LSW

## 2021-05-27 NOTE — GROUP NOTE
Group Therapy Note    Date: 5/27/2021    Group Start Time: 1330  Group End Time: 8432  Group Topic: Music Therapy    [facility]    [place]         Group Therapy Note    Pt did not attend music therapy group d/t resting in room despite staff invitation to attend. 1:1 talk time offered as alternative to group session, pt declined.

## 2021-05-27 NOTE — GROUP NOTE
Group Therapy Note    Date: 5/27/2021    Group Start Time: 0900  Group End Time: 0915  Group Topic: Community Meeting    [facility]    [place], [place]        Group Therapy Note    Attendees: 13/20    Pt did not attend community meeting d/t resting in room despite staff invitation to attend. 1:1 talk time offered as alternative to group session, pt declined.

## 2021-05-27 NOTE — PLAN OF CARE
Problem: Altered Mood, Deterioration in Function:  Goal: Ability to perform activities of daily living will improve  Description: Ability to perform activities of daily living will improve  5/27/2021 1133 by Mihir Crowley RN  Outcome: Ongoing   Patient is calm but tearful. Patient denies suicidal ideations but reports some depression and anxiety. Patient is flat, isolative to self/room, and reports eating and sleeping adequately with safety checks Q15min and at irregular intervals. Problem: Altered Mood, Deterioration in Function:  Goal: Able to verbalize reality based thinking  Description: Able to verbalize reality based thinking  5/27/2021 1133 by Mihir Crowley RN  Outcome: Ongoing   Patient is calm but tearful. Patient denies suicidal ideations but reports some depression and anxiety. Patient is flat, isolative to self/room, and reports eating and sleeping adequately with safety checks Q15min and at irregular intervals.   Problem: Tobacco Use:  Goal: Inpatient tobacco use cessation counseling participation  Description: Inpatient tobacco use cessation counseling participation  5/27/2021 1133 by Mihir Crowley RN  Outcome: Ongoing   Smoking education provided

## 2021-05-27 NOTE — CARE COORDINATION
Phone call received from patient's mother Ariela Tsang [phone] (DORIAN was signed by patient yesterday) who stated this is the worst she has ever seen her daughter mentally. She is asking for patient's physician to complete an expert evaluation because she is planning to file for guardianship for patient. Patient is aware that her mother wants to obtain guardianship for her and is not in agreement with this. SW agreed to forward this request to patient's physician.

## 2021-05-27 NOTE — PLAN OF CARE
[facility]  Day 3 Interdisciplinary Treatment Plan NOTE    Review Date & Time: 5/27/2021   0947    Admission Type:   Admission Type: Involuntary    Reason for admission:  Reason for Admission: medication induced psychosis  Estimated Length of Stay: 5-7 days  Estimated Discharge Date Update: to be determined by physician    PATIENT STRENGTHS:  Patient Strengths Strengths: Connection to output provider, Medication Compliance, Positive Support  Patient Strengths and Limitations:Limitations: Inappropriate/potentially harmful leisure interests  Addictive Behavior:Addictive Behavior  In the past 3 months, have you felt or has someone told you that you have a problem with:  : None  Do you have a history of Chemical Use?: No  Do you have a history of Alcohol Use?: No  Do you have a history of Street Drug Abuse?: Yes  Histroy of Prescripton Drug Abuse?: No  Medical Problems:  Past Medical History:   Diagnosis Date    Anxiety     Arthritis     O. A.     Bipolar depression ([facility] [address]     Dermatitis     Dysuria     Exposure to STD     Folliculitis     Hx of acute bronchitis     Hypertension     Marfan syndrome     MVP (mitral valve prolapse)     Nausea & vomiting     PONV (postoperative nausea and vomiting)     Scoliosis     Seasonal allergies     Staph infection     HX OF ALSO BACTEREMIA    UTI (lower urinary tract infection)        Risk:  Fall RiskTotal: 73  Griffin Scale Griffin Scale Score: 22  BVC Total: 0  Change in scores no Changes to plan of Care no    Status EXAM:   Status and Exam  Normal: No  Facial Expression: Avoids Gaze  Affect: Appropriate  Level of Consciousness: Alert  Mood:Normal: No  Mood: Depressed  Motor Activity:Normal: No  Motor Activity: Unusual Posture/Gait, Decreased  Interview Behavior: Cooperative  Preception: Wolverine to Person, Wolverine to Time, Wolverine to Place, Wolverine to Situation  Attention:Normal: Yes  Thought Processes: Circumstantial  Thought Content:Normal: Yes  Hallucinations: None  Delusions: No  Memory:Normal: No  Insight and Judgment: Yes  Insight and Judgment: Poor Judgment  Present Suicidal Ideation: No  Present Homicidal Ideation: No    Daily Assessment Last Entry:   Daily Sleep (WDL): Within Defined Limits         Patient Currently in Pain: Denies  Daily Nutrition (WDL): Within Defined Limits    Patient Monitoring:  Frequency of Checks: 4 times per hour, close    Psychiatric Symptoms:   Depression Symptoms  Depression Symptoms: Isolative, Loss of interest, Feelings of worthlessness  Anxiety Symptoms  Anxiety Symptoms: Generalized  Debi Symptoms  Debi Symptoms: Pressured speech     Psychosis Symptoms  Delusion Type: No problems reported or observed. Suicide Risk CSSR-S:  1) Within the past month, have you wished you were dead or wished you could go to sleep and not wake up? : No  2) Have you actually had any thoughts of killing yourself? : No  6) Have you ever done anything, started to do anything, or prepared to do anything to end your life?: No  Change in Result NO Change in Plan of care NO      EDUCATION:   EDUCATION:   Learner Progress Toward Treatment Goals: Reviewed results and recommendations of this team, Reviewed group plan and strategies, Reviewed signs, symptoms and risk of self harm and violent behavior, Reviewed goals and plan of care    Method:1:!,  individual verbal education    Outcome:verbalized by patient, but needs reinforcement to obtain goals    PATIENT GOALS: Per SW meeting 1:1 with patient in room  Short term: Stay positive; Improve patience  Long term: Improve relationships; \"staying clean\";  Follow-up with psychiatric care    PLAN/TREATMENT RECOMMENDATIONS UPDATE: continue with group therapies, increased socialization, continue planning for after discharge goals, continue with medication compliance    SHORT-TERM GOALS UPDATE:   Time frame for Short-Term Goals: 5-7 days    LONG-TERM GOALS UPDATE:   Time frame for Long-Term Goals: 6 months  Members Present in Team Meeting: See Signature Sheet    Delmi Hollins

## 2021-05-27 NOTE — PROGRESS NOTES
Daily Progress Note  5/27/2021    Patient Name: Leeanne Gutiérrez    CHIEF COMPLAINT:  Acute psychosis         SUBJECTIVE:      Patient is seen today for a follow up assessment. Patient has been medication compliant. Patient has not received any as needed medications. Patient maintains medication compliance and has not required any emergency as needed medications. She states that she is doing better today. She reports pain in her back, hips, and shoulders and is taking Flexeril. She states her mood is \"good\" and this is congruent with her affect. She does report that she is anxious today, and rates her anxiety 8 out of 10, with 10 being the highest.  She attributes this to her autistic son's graduation today. This is her only child and he is graduating high school, and also went to [employer] for 3D computer design. She denies depression. She is denying hallucinations and delusions today and does not appear to respond to internal stimuli. The patient does attend groups regularly and states that she enjoys this activity. She finds the discussion relatable to her. She reports her appetite is normal.  She reports a full nights rest of 8 hours last night. She denies any medication side effects. She denied any further questions or concerns and is able to continue archana for safety. Appetite:  [x] Normal/Adequate/Unchanged  [] Increased  [] Decreased      Sleep:       [x] Normal/Adequate/Unchanged  [] Fair  [] Poor      Group Attendance on Unit:   [x] Yes  [] Selectively    [] No    ROS:  [x] All negative/unchanged except if checked.  Explain positive (checked items) below:  [] Constitutional  [] Eyes  [] Ear/Nose/Mouth/Throat  [] Respiratory  [] CV  [] GI  []   [x] Musculoskeletal -back, hip, shoulder pain  [] Skin/Breast  [] Neurological  [] Endocrine  [] Heme/Lymph  [] Allergic/Immunologic    Medication Side Effects: denies         Mental Status Exam  Level of consciousness: Alert and awake   Appearance: Appropriate attire for setting, standing, with fair  grooming and hygiene   Behavior/Motor: Approachable, no psychomotor abnormalities   Attitude toward examiner: Cooperative, attentive, good eye contact  Speech: normal rate, normal volume and well articulated   Mood:  Patient reports \"good\". Patient presents as anxious  Affect: anxious  Thought processes: linear and coherent   Thought content: Denies homicidal ideation  Suicidal Ideation: Denies suicidal ideations,  contracts for safety on the unit. Delusions: No evidence of delusions. Denies paranoia. Perceptual Disturbance: Denies visual and auditory hallucinations  Cognition: Oriented to self, location, time, and situation  Memory: intact  Insight & Judgement: fair     Data   height is 5' 10\" (1.778 m) and weight is 200 lb (90.7 kg). Her oral temperature is 97.6 °F (36.4 °C). Her blood pressure is 114/81 and her pulse is 100. Her respiration is 18 and oxygen saturation is 95%.    Labs:   Admission on 05/25/2021   Component Date Value Ref Range Status    Acetaminophen Level 05/25/2021 <5* 10 - 30 ug/mL Final    Ethanol 05/25/2021 <10  <10 mg/dL Final    Ethanol percent 05/25/2021 <5.819  % Final    Salicylate Lvl 02/67/8395 <1* 3 - 10 mg/dL Final    Toxic Tricyclic Sc,Blood 67/83/4000 WRONG TEST ORDERED  NEGATIVE Final    WBC 05/25/2021 7.0  3.5 - 11.0 k/uL Final    RBC 05/25/2021 5.09  4.0 - 5.2 m/uL Final    Hemoglobin 05/25/2021 14.5  12.0 - 16.0 g/dL Final    Hematocrit 05/25/2021 43.2  36 - 46 % Final    MCV 05/25/2021 84.9  80 - 100 fL Final    MCH 05/25/2021 28.6  26 - 34 pg Final    MCHC 05/25/2021 33.6  31 - 37 g/dL Final    RDW 05/25/2021 15.0* 11.5 - 14.9 % Final    Platelets 21/33/6598 329  150 - 450 k/uL Final    MPV 05/25/2021 8.1  6.0 - 12.0 fL Final    NRBC Automated 05/25/2021 NOT REPORTED  per 100 WBC Final    Glucose 05/25/2021 79  70 - 99 mg/dL Final    BUN 05/25/2021 6  6 - 20 mg/dL Final    CREATININE 05/25/2021 0.43* 0.50 - 0.90 mg/dL Final    Bun/Cre Ratio 05/25/2021 NOT REPORTED  9 - 20 Final    Calcium 05/25/2021 9.3  8.6 - 10.4 mg/dL Final    Sodium 05/25/2021 136  135 - 144 mmol/L Final    Potassium 05/25/2021 4.1  3.7 - 5.3 mmol/L Final    Chloride 05/25/2021 100  98 - 107 mmol/L Final    CO2 05/25/2021 25  20 - 31 mmol/L Final    Anion Gap 05/25/2021 11  9 - 17 mmol/L Final    Alkaline Phosphatase 05/25/2021 108* 35 - 104 U/L Final    ALT 05/25/2021 11  5 - 33 U/L Final    AST 05/25/2021 13  <32 U/L Final    Total Bilirubin 05/25/2021 0.63  0.3 - 1.2 mg/dL Final    Total Protein 05/25/2021 7.0  6.4 - 8.3 g/dL Final    Albumin 05/25/2021 3.9  3.5 - 5.2 g/dL Final    Albumin/Globulin Ratio 05/25/2021 NOT REPORTED  1.0 - 2.5 Final    GFR Non- 05/25/2021 >60  >60 mL/min Final    GFR  05/25/2021 >60  >60 mL/min Final    GFR Comment 05/25/2021        Final    Comment: Average GFR for 30-36 years old:   80 mL/min/1.73sq m  Chronic Kidney Disease:   <60 mL/min/1.73sq m  Kidney failure:   <15 mL/min/1.73sq m              eGFR calculated using average adult body mass. Additional eGFR calculator available at:        [employer]            GFR Staging 05/25/2021 NOT REPORTED   Final    TSH 05/25/2021 0.75  0.30 - 5.00 mIU/L Final    Specimen Description 05/25/2021 . NASOPHARYNGEAL SWAB   Final    SARS-CoV-2, Rapid 05/25/2021 Not Detected  Not Detected Final    Comment:       Rapid NAAT:  The specimen is NEGATIVE for SARS-CoV-2, the novel coronavirus associated with   COVID-19. The ID NOW COVID-19 assay is designed to detect the virus that causes COVID-19 in patients   with signs and symptoms of infection who are suspected of COVID-19. An individual without symptoms of COVID-19 and who is not shedding SARS-CoV-2 virus would   expect to have a negative (not detected) result in this assay.   Negative results should be treated as presumptive and, if inconsistent with clinical signs   and symptoms or necessary for patient management,  should be tested with an alternative molecular assay. Negative results do not preclude   SARS-CoV-2 infection and   should not be used as the sole basis for patient management decisions. Fact sheet for Healthcare Providers: Matteo  Fact sheet for Patients: Matteo          Methodology: Isothermal Nucleic Acid Amplification           Reviewed patient's current plan of care and vital signs with nursing staff.     Labs reviewed: [x] Yes  Last EKG in EMR reviewed: [x] Yes    Medications  Current Facility-Administered Medications: buprenorphine-naloxone (SUBOXONE) 8-2 MG SL tablet 1 tablet, 1 tablet, Sublingual, Daily  DULoxetine (CYMBALTA) extended release capsule 60 mg, 60 mg, Oral, BID  cyclobenzaprine (FLEXERIL) tablet 10 mg, 10 mg, Oral, TID PRN  hydroCHLOROthiazide (HYDRODIURIL) tablet 50 mg, 50 mg, Oral, Daily  pregabalin (LYRICA) capsule 300 mg, 300 mg, Oral, BID  QUEtiapine (SEROQUEL) tablet 25 mg, 25 mg, Oral, Nightly  acetaminophen (TYLENOL) tablet 650 mg, 650 mg, Oral, Q4H PRN  aluminum & magnesium hydroxide-simethicone (MAALOX) 200-200-20 MG/5ML suspension 30 mL, 30 mL, Oral, Q6H PRN  ibuprofen (ADVIL;MOTRIN) tablet 400 mg, 400 mg, Oral, Q6H PRN  hydrOXYzine (ATARAX) tablet 50 mg, 50 mg, Oral, TID PRN  polyethylene glycol (GLYCOLAX) packet 17 g, 17 g, Oral, Daily PRN  traZODone (DESYREL) tablet 50 mg, 50 mg, Oral, Nightly PRN  haloperidol (HALDOL) tablet 5 mg, 5 mg, Oral, Q4H PRN **AND** LORazepam (ATIVAN) tablet 2 mg, 2 mg, Oral, Q4H PRN  haloperidol lactate (HALDOL) injection 5 mg, 5 mg, Intramuscular, Q4H PRN **AND** LORazepam (ATIVAN) injection 2 mg, 2 mg, Intramuscular, Q4H PRN **AND** diphenhydrAMINE (BENADRYL) injection 50 mg, 50 mg, Intramuscular, Q4H PRN  nicotine (NICODERM CQ) 14 MG/24HR 1 patch, 1 patch, Transdermal, Daily    ASSESSMENT  Acute psychosis ([facility] [address]         PLAN  Patient symptoms are: Improving  Continue current medication regimen. Monitor need and frequency of PRN medications. Encourage participation in groups and milieu. Attempt to develop insight. Psycho-education conducted. Supportive Therapy conducted. Probable discharge is to be determined by MD.   Follow-up daily while inpatient. Patient continues to be monitored in the inpatient psychiatric facility at [facility] for safety and stabilization. Patient continues to need, on a daily basis, active treatment furnished directly by or requiring the supervision of inpatient psychiatric personnel. Electronically signed by GUIDO Killian CNP on 5/27/2021 at 3:20 PM    **This report has been created using voice recognition software. It may contain minor errors which are inherent in voice recognition technology. **                                         Psychiatry Attending Attestation     I independently saw and evaluated the patient. I reviewed the Advance Practice Provider's documentation above. Any additional comments or changes to the Advance Practice Provider's documentation are stated below otherwise agree with assessment. Patient continues to be very paranoid here on the unit stating that she was hearing voices from her upstairs neighbor threatening her to kill her. She was very discharge focused today stating that her son is graduating from high school. I discussed about this with her mother who mentioned that they will have a party with her and her son after a few weeks. Patient was not agreeable to the plan. She was shouting and yelling on the unit saying \"Fluck you\". Patient initially had plans to go to [facility] however is now stating that she will not go there if she is missing her son's graduation. Unable to contract for safety at this point. Will file for court probate hearing.   We will continue to optimize her antipsychotic medication to help with severe paranoia.      Electronically signed by Adriane Linda MD on 5/27/21 at 9:49 PM EDT

## 2021-05-27 NOTE — GROUP NOTE
Group Therapy Note    Date: 5/27/2021    Group Start Time: 1100  Group End Time: 0339  Group Topic: Recreational    ANYI URBINA    Enrique Sue        Group Therapy Note    Attendees: 5/9         Patient's Goal:  Increase leisure opportunities, normalize environment, and increase sense of community    Notes:  Patient colored throughout group and did not pawrticipate. Was occassionally appropriately engaged inc conversation, but was occassionally. ly delusional. Stated her mother is a , and is trying to keep her hospitalized and miss her sons high school graduation. Patient stated Rogelio Rand said I was hearing things, but it was her talking at me through the vents from upstairs. I know I sound crazy but I'm not. \"     Status After Intervention:  Unchanged    Participation Level: Minimal    Participation Quality: Sharing and Resistant      Speech:  loud      Thought Process/Content: Delusional      Affective Functioning: Congruent      Mood: dysphoric      Level of consciousness:  Alert      Response to Learning: Resistant      Endings: None Reported    Modes of Intervention: Socialization, Exploration, Activity and Reality-testing      Discipline Responsible: Psychoeducational Specialist      Signature:  Enrique Sue Per Dr. Desai, patient was instructed to take lorazepam 1/2 tablet (0.5mg) bid on the day that she flies to avoid having any seizures. Patient verbalized understanding. Elvi Mike RN

## 2021-05-28 PROCEDURE — 6370000000 HC RX 637 (ALT 250 FOR IP): Performed by: PSYCHIATRY & NEUROLOGY

## 2021-05-28 PROCEDURE — 99232 SBSQ HOSP IP/OBS MODERATE 35: CPT | Performed by: PSYCHIATRY & NEUROLOGY

## 2021-05-28 PROCEDURE — 6360000002 HC RX W HCPCS: Performed by: NURSE PRACTITIONER

## 2021-05-28 PROCEDURE — 6370000000 HC RX 637 (ALT 250 FOR IP): Performed by: NURSE PRACTITIONER

## 2021-05-28 PROCEDURE — 1240000000 HC EMOTIONAL WELLNESS R&B

## 2021-05-28 RX ORDER — QUETIAPINE FUMARATE 50 MG/1
50 TABLET, FILM COATED ORAL NIGHTLY
Status: DISCONTINUED | OUTPATIENT
Start: 2021-05-28 | End: 2021-05-30 | Stop reason: HOSPADM

## 2021-05-28 RX ADMIN — ACETAMINOPHEN 650 MG: 325 TABLET, FILM COATED ORAL at 18:50

## 2021-05-28 RX ADMIN — CYCLOBENZAPRINE 10 MG: 10 TABLET, FILM COATED ORAL at 11:58

## 2021-05-28 RX ADMIN — PREGABALIN 300 MG: 100 CAPSULE ORAL at 20:04

## 2021-05-28 RX ADMIN — HALOPERIDOL 5 MG: 5 TABLET ORAL at 20:04

## 2021-05-28 RX ADMIN — DULOXETINE HYDROCHLORIDE 60 MG: 60 CAPSULE, DELAYED RELEASE ORAL at 11:53

## 2021-05-28 RX ADMIN — DULOXETINE HYDROCHLORIDE 60 MG: 60 CAPSULE, DELAYED RELEASE ORAL at 20:04

## 2021-05-28 RX ADMIN — CYCLOBENZAPRINE 10 MG: 10 TABLET, FILM COATED ORAL at 18:50

## 2021-05-28 RX ADMIN — HYDROXYZINE HYDROCHLORIDE 50 MG: 50 TABLET, FILM COATED ORAL at 18:50

## 2021-05-28 RX ADMIN — PREGABALIN 300 MG: 100 CAPSULE ORAL at 11:52

## 2021-05-28 RX ADMIN — HYDROCHLOROTHIAZIDE 50 MG: 25 TABLET ORAL at 11:54

## 2021-05-28 RX ADMIN — LORAZEPAM 2 MG: 1 TABLET ORAL at 20:04

## 2021-05-28 RX ADMIN — BUPRENORPHINE HYDROCHLORIDE AND NALOXONE HYDROCHLORIDE DIHYDRATE 1 TABLET: 8; 2 TABLET SUBLINGUAL at 11:53

## 2021-05-28 ASSESSMENT — PAIN SCALES - GENERAL
PAINLEVEL_OUTOF10: 8
PAINLEVEL_OUTOF10: 1
PAINLEVEL_OUTOF10: 4

## 2021-05-28 ASSESSMENT — PAIN DESCRIPTION - LOCATION: LOCATION: BACK

## 2021-05-28 ASSESSMENT — PAIN DESCRIPTION - PAIN TYPE: TYPE: ACUTE PAIN

## 2021-05-28 NOTE — CARE COORDINATION
Probate has been filed for patient as of 10:55 AM on this date. [facility] has been contacted and documentation was faxed as well.

## 2021-05-28 NOTE — GROUP NOTE
Group Therapy Note    Date: 5/28/2021    Group Start Time: 1330  Group End Time: 1430  Group Topic: Cognitive Skills    ANYI Howell [place], [place]        Group Therapy Note    Attendees: 8/17         Patient's Goal:  To increase interpersonal interactions    Notes:  Patient attended and participated in group. Status After Intervention:  Improved    Participation Level:  Active Listener and Interactive    Participation Quality: Appropriate and Attentive      Speech:  normal      Thought Process/Content: Logical      Affective Functioning: Congruent      Mood: euthymic      Level of consciousness:  Alert and Attentive      Response to Learning: Able to verbalize current knowledge/experience, Able to retain information and Progressing to goal      Endings: None Reported    Modes of Intervention: Socialization, Exploration, Clarifying, Activity and Reality-testing      Discipline Responsible: Psychoeducational Specialist      Signature:  Jennie Bustillos

## 2021-05-28 NOTE — GROUP NOTE
Group Therapy Note    Date: 5/28/2021    Group Start Time: 1100  Group End Time: 5304  Group Topic: Psychotherapy    [facility] NANDA Hayes, LSW        Group Therapy Note    Attendees: 5/9         Patient was offered group therapy today but declined to participate despite encouragement from staff. 1:1 was offered.     Signature:  NANDA Bland, IVAN

## 2021-05-28 NOTE — GROUP NOTE
Group Therapy Note    Date: 5/28/2021    Group Start Time: 0900  Group End Time: 0920  Group Topic: Community Meeting    KARINA Greenfield    Pt did not attend community meeting d/t resting in room despite staff invitation to attend. 1:1 talk time offered as alternative.          Signature:  Oscar Vega

## 2021-05-28 NOTE — GROUP NOTE
Group Therapy Note    Date: 5/28/2021    Group Start Time: 1100  Group End Time: 9061  Group Topic: Psychoeducation    Josie Maldonado        Group Therapy Note    Attendees: 4/9         Pt did not attend RT skills group d/t resting in room despite staff invitation to attend. 1:1 talk time offered as alternative to group session, pt declined.

## 2021-05-28 NOTE — PROGRESS NOTES
patient refused to attend wellness group at 1600 after encouragement from staff. 1:1 talk time provided as alternative to group session.

## 2021-05-28 NOTE — PROGRESS NOTES
Mental Status Exam  Level of consciousness: Alert and awake   Appearance: Appropriate attire for setting, lying in bed, with fair  grooming and hygiene   Behavior/Motor: Approachable, no psychomotor abnormalities   Attitude toward examiner: Cooperative, attentive, good eye contact  Speech: normal rate, normal volume and well articulated   Mood:  Patient reports \"good\". Patient presents as depressed  Affect: depressed  Thought processes: linear and coherent   Thought content: Denies homicidal ideation  Suicidal Ideation: Denies suicidal ideations,  contracts for safety on the unit. Delusions: patient presents as paranoid  Perceptual Disturbance: Denies visual and auditory hallucinations  Cognition: Oriented to self, location, time, and situation  Memory: intact  Insight & Judgement: poor    Data   height is 5' 10\" (1.778 m) and weight is 200 lb (90.7 kg). Her oral temperature is 97.9 °F (36.6 °C). Her blood pressure is 116/73 and her pulse is 89. Her respiration is 14 and oxygen saturation is 95%.    Labs:   Admission on 05/25/2021   Component Date Value Ref Range Status    Acetaminophen Level 05/25/2021 <5* 10 - 30 ug/mL Final    Ethanol 05/25/2021 <10  <10 mg/dL Final    Ethanol percent 05/25/2021 <5.284  % Final    Salicylate Lvl 38/99/7916 <1* 3 - 10 mg/dL Final    Toxic Tricyclic Sc,Blood 83/03/2797 WRONG TEST ORDERED  NEGATIVE Final    WBC 05/25/2021 7.0  3.5 - 11.0 k/uL Final    RBC 05/25/2021 5.09  4.0 - 5.2 m/uL Final    Hemoglobin 05/25/2021 14.5  12.0 - 16.0 g/dL Final    Hematocrit 05/25/2021 43.2  36 - 46 % Final    MCV 05/25/2021 84.9  80 - 100 fL Final    MCH 05/25/2021 28.6  26 - 34 pg Final    MCHC 05/25/2021 33.6  31 - 37 g/dL Final    RDW 05/25/2021 15.0* 11.5 - 14.9 % Final    Platelets 66/13/7187 329  150 - 450 k/uL Final    MPV 05/25/2021 8.1  6.0 - 12.0 fL Final    NRBC Automated 05/25/2021 NOT REPORTED  per 100 WBC Final    Glucose 05/25/2021 79  70 - 99 mg/dL Final    BUN 05/25/2021 6  6 - 20 mg/dL Final    CREATININE 05/25/2021 0.43* 0.50 - 0.90 mg/dL Final    Bun/Cre Ratio 05/25/2021 NOT REPORTED  9 - 20 Final    Calcium 05/25/2021 9.3  8.6 - 10.4 mg/dL Final    Sodium 05/25/2021 136  135 - 144 mmol/L Final    Potassium 05/25/2021 4.1  3.7 - 5.3 mmol/L Final    Chloride 05/25/2021 100  98 - 107 mmol/L Final    CO2 05/25/2021 25  20 - 31 mmol/L Final    Anion Gap 05/25/2021 11  9 - 17 mmol/L Final    Alkaline Phosphatase 05/25/2021 108* 35 - 104 U/L Final    ALT 05/25/2021 11  5 - 33 U/L Final    AST 05/25/2021 13  <32 U/L Final    Total Bilirubin 05/25/2021 0.63  0.3 - 1.2 mg/dL Final    Total Protein 05/25/2021 7.0  6.4 - 8.3 g/dL Final    Albumin 05/25/2021 3.9  3.5 - 5.2 g/dL Final    Albumin/Globulin Ratio 05/25/2021 NOT REPORTED  1.0 - 2.5 Final    GFR Non- 05/25/2021 >60  >60 mL/min Final    GFR  05/25/2021 >60  >60 mL/min Final    GFR Comment 05/25/2021        Final    Comment: Average GFR for 30-36 years old:   80 mL/min/1.73sq m  Chronic Kidney Disease:   <60 mL/min/1.73sq m  Kidney failure:   <15 mL/min/1.73sq m              eGFR calculated using average adult body mass. Additional eGFR calculator available at:        [email or web address]            GFR Staging 05/25/2021 NOT REPORTED   Final    TSH 05/25/2021 0.75  0.30 - 5.00 mIU/L Final    Specimen Description 05/25/2021 . NASOPHARYNGEAL SWAB   Final    SARS-CoV-2, Rapid 05/25/2021 Not Detected  Not Detected Final    Comment:       Rapid NAAT:  The specimen is NEGATIVE for SARS-CoV-2, the novel coronavirus associated with   COVID-19. The ID NOW COVID-19 assay is designed to detect the virus that causes COVID-19 in patients   with signs and symptoms of infection who are suspected of COVID-19.   An individual without symptoms of COVID-19 and who is not shedding SARS-CoV-2 virus would   expect to have a negative (not detected) Seroquel to 50 mg at nighttime to help with the paranoid thoughts and auditory hallucinations.     Electronically signed by Guerita Riojas MD on 5/28/21 at 3:47 PM EDT

## 2021-05-29 LAB
AMPHETAMINE SCREEN URINE: NEGATIVE
BARBITURATE SCREEN URINE: NEGATIVE
BENZODIAZEPINE SCREEN, URINE: NEGATIVE
BUPRENORPHINE URINE: ABNORMAL
CANNABINOID SCREEN URINE: NEGATIVE
COCAINE METABOLITE, URINE: POSITIVE
MDMA URINE: ABNORMAL
METHADONE SCREEN, URINE: NEGATIVE
METHAMPHETAMINE, URINE: ABNORMAL
OPIATES, URINE: NEGATIVE
OXYCODONE SCREEN URINE: NEGATIVE
PHENCYCLIDINE, URINE: NEGATIVE
PROPOXYPHENE, URINE: ABNORMAL
TEST INFORMATION: ABNORMAL
TRICYCLIC ANTIDEPRESSANTS, UR: ABNORMAL

## 2021-05-29 PROCEDURE — 6370000000 HC RX 637 (ALT 250 FOR IP): Performed by: PSYCHIATRY & NEUROLOGY

## 2021-05-29 PROCEDURE — 6360000002 HC RX W HCPCS: Performed by: NURSE PRACTITIONER

## 2021-05-29 PROCEDURE — 1240000000 HC EMOTIONAL WELLNESS R&B

## 2021-05-29 PROCEDURE — 6370000000 HC RX 637 (ALT 250 FOR IP): Performed by: NURSE PRACTITIONER

## 2021-05-29 PROCEDURE — 99231 SBSQ HOSP IP/OBS SF/LOW 25: CPT | Performed by: PSYCHIATRY & NEUROLOGY

## 2021-05-29 RX ADMIN — BUPRENORPHINE HYDROCHLORIDE AND NALOXONE HYDROCHLORIDE DIHYDRATE 1 TABLET: 8; 2 TABLET SUBLINGUAL at 09:00

## 2021-05-29 RX ADMIN — PREGABALIN 300 MG: 100 CAPSULE ORAL at 09:01

## 2021-05-29 RX ADMIN — HYDROCHLOROTHIAZIDE 50 MG: 25 TABLET ORAL at 09:01

## 2021-05-29 RX ADMIN — DULOXETINE HYDROCHLORIDE 60 MG: 60 CAPSULE, DELAYED RELEASE ORAL at 09:01

## 2021-05-29 RX ADMIN — IBUPROFEN 400 MG: 400 TABLET, FILM COATED ORAL at 17:09

## 2021-05-29 RX ADMIN — CYCLOBENZAPRINE 10 MG: 10 TABLET, FILM COATED ORAL at 09:07

## 2021-05-29 RX ADMIN — DULOXETINE HYDROCHLORIDE 60 MG: 60 CAPSULE, DELAYED RELEASE ORAL at 20:50

## 2021-05-29 RX ADMIN — LORAZEPAM 2 MG: 1 TABLET ORAL at 19:35

## 2021-05-29 RX ADMIN — HYDROXYZINE HYDROCHLORIDE 50 MG: 50 TABLET, FILM COATED ORAL at 20:50

## 2021-05-29 RX ADMIN — CYCLOBENZAPRINE 10 MG: 10 TABLET, FILM COATED ORAL at 17:09

## 2021-05-29 RX ADMIN — QUETIAPINE FUMARATE 50 MG: 50 TABLET ORAL at 20:50

## 2021-05-29 RX ADMIN — PREGABALIN 300 MG: 100 CAPSULE ORAL at 20:50

## 2021-05-29 RX ADMIN — HALOPERIDOL 5 MG: 5 TABLET ORAL at 19:35

## 2021-05-29 ASSESSMENT — PAIN SCALES - GENERAL
PAINLEVEL_OUTOF10: 8
PAINLEVEL_OUTOF10: 6

## 2021-05-29 NOTE — GROUP NOTE
Group Therapy Note    Date: 5/29/2021    Group Start Time: 1000  Group End Time: 5999  Group Topic: Group Therapy    [facility]    NANDA Higginbotham LSW        Group Therapy Note    Attendees: 5/21       Patient's Goal:  Increase interpersonal relationship skills    Notes:  Patient was an active participant in group discussion     Status After Intervention:  Unchanged    Participation Level:  Active Listener and Interactive    Participation Quality: Appropriate, Attentive, Sharing and Supportive      Speech:  normal      Thought Process/Content: Logical      Affective Functioning: Congruent      Mood: anxious      Level of consciousness:  Alert, Oriented x4 and Attentive      Response to Learning: Able to verbalize current knowledge/experience      Endings: None Reported    Modes of Intervention: Support, Socialization, Exploration and Clarifying      Discipline Responsible: /Counselor      Signature:  NANDA Higginbotham LSW

## 2021-05-29 NOTE — PLAN OF CARE
Problem: Altered Mood, Deterioration in Function:  Goal: Ability to perform activities of daily living will improve  Description: Ability to perform activities of daily living will improve  5/28/2021 2135 by Julissa Hernandez  Outcome: Ongoing   Coop. With vitals taken. affect flat, depressed mood. Relates that nobody believes what was going on at home. States she guess she needed help. Was snorting crack and doing fentanyl. Not wanting to get high, just needing to deal with the pain. Attends all unit groups et unit activities. Nourishment taken well. Accepting of all medications provided. Problem: Altered Mood, Deterioration in Function:  Goal: Able to verbalize reality based thinking  Description: Able to verbalize reality based thinking  5/28/2021 2135 by Julissa Hernandez  Outcome: Ongoing   States was hearing the girl above her in her apartment. Juarez Jono with her, talking through the floor. States that nobody will believe her. Problem: Pain:  Goal: Pain level will decrease  Description: Pain level will decrease  5/28/2021 2135 by Julissa Hernandez  Outcome: Ongoing   Relates is dealing with pain all the time.

## 2021-05-29 NOTE — PROGRESS NOTES
Daily Progress Note  5/29/2021    Patient Name: Roberto Prado    CHIEF COMPLAINT:  Acute psychosis         SUBJECTIVE:      Patient is seen today for a follow up assessment. Patient maintains medication adherence and has not requested as needed medications. Prince Avendano shares that her mood has improved and she believes that her hospitalization has been beneficial in getting drugs out of her system. She reports that her physical pain is decreased as well as her depressive symptoms. She endorses improved suicidal ideation. She denies auditory or visual hallucinations. She endorses adequate sleep and appetite and feels that her current medication regimen is beneficial particularly as it relates to her diagnosis of Marfan's syndrome and chronic pain. She shares that she has been utilizing recreational groups on the milieu to help improve her anxiety related to the hospitalization and additionally reports that she enjoys painting at home and looks forward to routines of her everyday life. She denies any further questions or concerns and is able to continue archana for safety. Appetite:  [x] Normal/Adequate/Unchanged  [] Increased  [] Decreased      Sleep:       [x] Normal/Adequate/Unchanged  [] Fair  [] Poor      Group Attendance on Unit:   [x] Yes  [] Selectively    [] No    ROS:  [x] All negative/unchanged except if checked.  Explain positive (checked items) below:  [] Constitutional  [] Eyes  [] Ear/Nose/Mouth/Throat  [] Respiratory  [] CV  [] GI  []   [x] Musculoskeletal -back, hip, shoulder pain  [] Skin/Breast  [] Neurological  [] Endocrine  [] Heme/Lymph  [] Allergic/Immunologic    Medication Side Effects: denies         Mental Status Exam  Level of consciousness: Alert and awake   Appearance: Appropriate attire for setting, lying in bed, with fair  grooming and hygiene   Behavior/Motor: Approachable, no psychomotor abnormalities   Attitude toward examiner: Cooperative, attentive, good eye contact  Speech: normal rate, normal volume and well articulated   Mood:  Patient reports \"good\". Patient presents as depressed  Affect: depressed  Thought processes: linear and coherent   Thought content: Denies homicidal ideation  Suicidal Ideation: Denies suicidal ideations,  contracts for safety on the unit. Delusions: patient presents as paranoid  Perceptual Disturbance: Denies visual and auditory hallucinations  Cognition: Oriented to self, location, time, and situation  Memory: intact  Insight & Judgement: poor    Data   height is 5' 10\" (1.778 m) and weight is 200 lb (90.7 kg). Her oral temperature is 98.2 °F (36.8 °C). Her blood pressure is 113/70 and her pulse is 107. Her respiration is 16 and oxygen saturation is 99%.    Labs:   Admission on 05/25/2021   Component Date Value Ref Range Status    Acetaminophen Level 05/25/2021 <5* 10 - 30 ug/mL Final    Ethanol 05/25/2021 <10  <10 mg/dL Final    Ethanol percent 05/25/2021 <2.417  % Final    Salicylate Lvl 08/92/2916 <1* 3 - 10 mg/dL Final    Toxic Tricyclic Sc,Blood 79/38/5579 WRONG TEST ORDERED  NEGATIVE Final    WBC 05/25/2021 7.0  3.5 - 11.0 k/uL Final    RBC 05/25/2021 5.09  4.0 - 5.2 m/uL Final    Hemoglobin 05/25/2021 14.5  12.0 - 16.0 g/dL Final    Hematocrit 05/25/2021 43.2  36 - 46 % Final    MCV 05/25/2021 84.9  80 - 100 fL Final    MCH 05/25/2021 28.6  26 - 34 pg Final    MCHC 05/25/2021 33.6  31 - 37 g/dL Final    RDW 05/25/2021 15.0* 11.5 - 14.9 % Final    Platelets 05/86/8611 329  150 - 450 k/uL Final    MPV 05/25/2021 8.1  6.0 - 12.0 fL Final    NRBC Automated 05/25/2021 NOT REPORTED  per 100 WBC Final    Glucose 05/25/2021 79  70 - 99 mg/dL Final    BUN 05/25/2021 6  6 - 20 mg/dL Final    CREATININE 05/25/2021 0.43* 0.50 - 0.90 mg/dL Final    Bun/Cre Ratio 05/25/2021 NOT REPORTED  9 - 20 Final    Calcium 05/25/2021 9.3  8.6 - 10.4 mg/dL Final    Sodium 05/25/2021 136  135 - 144 mmol/L Final    Potassium 05/25/2021 4.1 should not be used as the sole basis for patient management decisions. Fact sheet for Healthcare Providers: Alessia.saida  Fact sheet for Patients: Alessia.saida          Methodology: Isothermal Nucleic Acid Amplification           Reviewed patient's current plan of care and vital signs with nursing staff. Labs reviewed: [x] Yes  Last EKG in EMR reviewed: [x] Yes    Medications  Current Facility-Administered Medications: QUEtiapine (SEROQUEL) tablet 50 mg, 50 mg, Oral, Nightly  buprenorphine-naloxone (SUBOXONE) 8-2 MG SL tablet 1 tablet, 1 tablet, Sublingual, Daily  DULoxetine (CYMBALTA) extended release capsule 60 mg, 60 mg, Oral, BID  cyclobenzaprine (FLEXERIL) tablet 10 mg, 10 mg, Oral, TID PRN  hydroCHLOROthiazide (HYDRODIURIL) tablet 50 mg, 50 mg, Oral, Daily  pregabalin (LYRICA) capsule 300 mg, 300 mg, Oral, BID  acetaminophen (TYLENOL) tablet 650 mg, 650 mg, Oral, Q4H PRN  aluminum & magnesium hydroxide-simethicone (MAALOX) 200-200-20 MG/5ML suspension 30 mL, 30 mL, Oral, Q6H PRN  ibuprofen (ADVIL;MOTRIN) tablet 400 mg, 400 mg, Oral, Q6H PRN  hydrOXYzine (ATARAX) tablet 50 mg, 50 mg, Oral, TID PRN  polyethylene glycol (GLYCOLAX) packet 17 g, 17 g, Oral, Daily PRN  traZODone (DESYREL) tablet 50 mg, 50 mg, Oral, Nightly PRN  haloperidol (HALDOL) tablet 5 mg, 5 mg, Oral, Q4H PRN **AND** LORazepam (ATIVAN) tablet 2 mg, 2 mg, Oral, Q4H PRN  haloperidol lactate (HALDOL) injection 5 mg, 5 mg, Intramuscular, Q4H PRN **AND** LORazepam (ATIVAN) injection 2 mg, 2 mg, Intramuscular, Q4H PRN **AND** diphenhydrAMINE (BENADRYL) injection 50 mg, 50 mg, Intramuscular, Q4H PRN  nicotine (NICODERM CQ) 14 MG/24HR 1 patch, 1 patch, Transdermal, Daily    ASSESSMENT  Acute psychosis (HCC)         PLAN  Patient symptoms are: remains the same  Continue current medication regimen. Monitor need and frequency of PRN medications.   Encourage participation in groups and milieu. Attempt to develop insight. Psycho-education conducted. Supportive Therapy conducted. Probable discharge is to be determined by MD.  Follow-up daily while inpatient. Patient continues to be monitored in the inpatient psychiatric facility at [facility] for safety and stabilization. Patient continues to need, on a daily basis, active treatment furnished directly by or requiring the supervision of inpatient psychiatric personnel. Electronically signed by GUIDO Gloria CNP on 5/29/2021 at 2:07 PM    **This report has been created using voice recognition software. It may contain minor errors which are inherent in voice recognition technology. **

## 2021-05-29 NOTE — GROUP NOTE
Group Therapy Note    Date: 5/29/2021    Group Start Time: 1330  Group End Time: 5220  Group Topic: Music Therapy    [facility]    [employer]        Group Therapy Note    Attendees: 10/19         Patient's Goal:  To increase self-expression, improve mood, decrease energy level, increase sense of community    Notes:  Patient attended and participated in group, having positive interactions with peers and staff. Patient shared music and engaged in conversations appropriately with peers and staff. Patient expressed too much variance in noise from peers and singing other parts of the songs and improvising was irritating to her, and engaged in appropriate conversations about why she felt this way and that just singing along to music would make her more comfortable. Receptive to peers explaining why they were vocally improvising during music as well. Was pleasant during these conversations     Status After Intervention:  Improved    Participation Level:  Active Listener and Interactive    Participation Quality: Appropriate, Attentive, Sharing and Supportive      Speech:  normal      Thought Process/Content: Logical  Linear      Affective Functioning: Congruent      Mood: euthymic      Level of consciousness:  Alert and Attentive      Response to Learning: Able to verbalize current knowledge/experience, Capable of insight and Progressing to goal      Endings: None Reported    Modes of Intervention: Education, Support, Socialization, Exploration, Activity, Movement, Media and Reality-testing      Discipline Responsible: Psychoeducational Specialist      Signature:  [employer]

## 2021-05-29 NOTE — BH NOTE
Pt is agitated as evidence by pacing, fidgeting, and coming to staff asking for haldol and ativan by name. Pt states she is really anxious and doesn't know what to do. Pt states she needs the medication. Staff attempted to find and relieve the distress by talking to pt, refocusing on new activity, and offering suggestions. Pt is currently accepting PRN medications.

## 2021-05-29 NOTE — GROUP NOTE
Group Therapy Note    Date: 5/29/2021    Group Start Time: 1100  Group End Time: 36  Group Topic: Healthy Living/Wellness    ANYI URBINA    Roberta Peck RN        Group Therapy Note    Attendees: 7/19         Patient's Goal:  Coping skills    Notes:  Patient active participant in group    Status After Intervention:  Improved    Participation Level: Interactive    Participation Quality: Appropriate, Attentive, Sharing and Supportive      Speech:  normal      Thought Process/Content: Logical      Affective Functioning: Congruent      Mood: normal      Level of consciousness:  Alert and Attentive      Response to Learning: Able to verbalize current knowledge/experience      Endings: None Reported    Modes of Intervention: Education and Problem-solving      Discipline Responsible: Registered Nurse      Signature:  Roberta Peck RN

## 2021-05-30 VITALS
SYSTOLIC BLOOD PRESSURE: 118 MMHG | DIASTOLIC BLOOD PRESSURE: 75 MMHG | WEIGHT: 200 LBS | HEART RATE: 103 BPM | OXYGEN SATURATION: 99 % | TEMPERATURE: 97.5 F | HEIGHT: 70 IN | BODY MASS INDEX: 28.63 KG/M2 | RESPIRATION RATE: 16 BRPM

## 2021-05-30 PROCEDURE — 6370000000 HC RX 637 (ALT 250 FOR IP): Performed by: NURSE PRACTITIONER

## 2021-05-30 PROCEDURE — 6360000002 HC RX W HCPCS: Performed by: NURSE PRACTITIONER

## 2021-05-30 PROCEDURE — 6370000000 HC RX 637 (ALT 250 FOR IP): Performed by: PSYCHIATRY & NEUROLOGY

## 2021-05-30 PROCEDURE — 99239 HOSP IP/OBS DSCHRG MGMT >30: CPT | Performed by: PSYCHIATRY & NEUROLOGY

## 2021-05-30 RX ORDER — PREGABALIN 300 MG/1
300 CAPSULE ORAL 2 TIMES DAILY
Qty: 4 CAPSULE | Refills: 0 | Status: ON HOLD | OUTPATIENT
Start: 2021-05-30 | End: 2021-07-28 | Stop reason: ALTCHOICE

## 2021-05-30 RX ORDER — QUETIAPINE FUMARATE 50 MG/1
50 TABLET, FILM COATED ORAL NIGHTLY
Qty: 30 TABLET | Refills: 0 | Status: ON HOLD | OUTPATIENT
Start: 2021-05-30 | End: 2021-06-16 | Stop reason: HOSPADM

## 2021-05-30 RX ADMIN — IBUPROFEN 400 MG: 400 TABLET, FILM COATED ORAL at 08:34

## 2021-05-30 RX ADMIN — DULOXETINE HYDROCHLORIDE 60 MG: 60 CAPSULE, DELAYED RELEASE ORAL at 08:34

## 2021-05-30 RX ADMIN — PREGABALIN 300 MG: 100 CAPSULE ORAL at 08:33

## 2021-05-30 RX ADMIN — HYDROCHLOROTHIAZIDE 50 MG: 25 TABLET ORAL at 08:33

## 2021-05-30 RX ADMIN — CYCLOBENZAPRINE 10 MG: 10 TABLET, FILM COATED ORAL at 08:34

## 2021-05-30 RX ADMIN — BUPRENORPHINE HYDROCHLORIDE AND NALOXONE HYDROCHLORIDE DIHYDRATE 1 TABLET: 8; 2 TABLET SUBLINGUAL at 08:34

## 2021-05-30 ASSESSMENT — PAIN SCALES - GENERAL: PAINLEVEL_OUTOF10: 7

## 2021-05-30 ASSESSMENT — PAIN DESCRIPTION - PAIN TYPE: TYPE: CHRONIC PAIN

## 2021-05-30 ASSESSMENT — PAIN DESCRIPTION - LOCATION: LOCATION: BACK

## 2021-05-30 NOTE — GROUP NOTE
Group Therapy Note    Date: 5/30/2021    Group Start Time: 1000  Group End Time: 1798  Group Topic: Group Therapy    NANDA Padilla LSW        Group Therapy Note  Pt declined to attend psychotherapy at 1000 am despite encouragement. Pt offered 1:1 and refused.       Attendees: 7/19           Signature:  NANDA Hillman LSW
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
## 2021-05-30 NOTE — PLAN OF CARE
Problem: Altered Mood, Deterioration in Function:  Goal: Ability to perform activities of daily living will improve  Description: Ability to perform activities of daily living will improve  5/29/2021 2151 by Charla Lockwood LPN  Outcome: Ongoing     Problem: Altered Mood, Deterioration in Function:  Goal: Able to verbalize reality based thinking  Description: Able to verbalize reality based thinking  5/29/2021 2151 by Charla Lockwood LPN  Outcome: Ongoing     Problem: Tobacco Use:  Goal: Inpatient tobacco use cessation counseling participation  Description: Inpatient tobacco use cessation counseling participation  Outcome: Ongoing     Problem: Pain:  Goal: Pain level will decrease  Description: Pain level will decrease  5/29/2021 2151 by Charla Lockwood LPN  Outcome: Ongoing     Problem: Pain:  Goal: Control of acute pain  Description: Control of acute pain  Outcome: Ongoing     Problem: Pain:  Goal: Control of chronic pain  Description: Control of chronic pain  Outcome: Ongoing

## 2021-05-30 NOTE — DISCHARGE INSTR - OTHER ORDERS
Make sure to take all medications prescribed by your Dr. Hollins Hedge not double up on doses. If you miss or skip a dose make sure to take the next scheduled dose. Make sure to not do any illicit drugs or alcohol. Make sure to follow up with all appointments.

## 2021-05-30 NOTE — BH NOTE
[facility]  Discharge Note    Pt discharged with followings belongings:   Dentures: None  Vision - Corrective Lenses: None  Hearing Aid: None  Jewelry: Earrings, Ring, Necklace, Bracelet  Clothing: Jacket / coat, Pants, Shirt, Socks, Undergarments (Comment), Footwear  Were All Patient Medications Collected?: Yes  Other Valuables: Purse ($0.13,  but no phone)   Valuables sent home with patient. Valuables retrieved from safe, Security envelope number:  [id number], [id number] and returned to patient. Patient education on aftercare instructions: Yes  Information faxed to [facility] by RN Patient verbalize understanding of AVS:  Yes.     Status EXAM upon discharge:  Status and Exam  Normal: Yes  Facial Expression: Brightened  Affect: Appropriate  Level of Consciousness: Alert  Mood:Normal: Yes  Mood: Anxious, Depressed  Motor Activity:Normal: Yes  Motor Activity: Unusual Posture/Gait  Interview Behavior: Cooperative  Preception: [place] to Person, Alvia [place]om[place] to Time, [place] to Place, [place] to Situation  Attention:Normal: Yes  Attention: Distractible  Thought Processes: Circumstantial  Thought Content:Normal: Yes  Thought Content: Preoccupations  Hallucinations: None  Delusions: No  Memory:Normal: Yes  Memory: Poor Remote  Insight and Judgment: Yes  Insight and Judgment: Poor Judgment, Poor Insight  Present Suicidal Ideation: No  Present Homicidal Ideation: No      Metabolic Screening:    Lab Results   Component Value Date    LABA1C 5.1 08/22/2019       Lab Results   Component Value Date    CHOL 168 08/22/2019    CHOL 152 06/10/2018    CHOL 206 (H) 02/21/2018    CHOL 182 09/14/2015    CHOL 132 09/07/2013     Lab Results   Component Value Date    TRIG 79 08/22/2019    TRIG 141 06/10/2018    TRIG 113 02/21/2018    TRIG 54 09/14/2015    TRIG 88 09/07/2013     Lab Results   Component Value Date    HDL 53 08/22/2019    HDL 39 (L) 06/10/2018    HDL 59 02/21/2018    HDL 71 09/14/2015    HDL 53 09/07/2013     No components found for: LDLCAL  No results found for: Isabell Tomlin RN     Patient discharged to address on facesheet at [address] [place] [address] via [employer], [employer] Patient ambulatory. All belongings including discharge paperwork and Lyrica paper script sent with patient.

## 2021-06-11 ENCOUNTER — HOSPITAL ENCOUNTER (INPATIENT)
Age: 41
LOS: 5 days | Discharge: HOME | DRG: 885 | End: 2021-06-16
Attending: EMERGENCY MEDICINE | Admitting: PSYCHIATRY & NEUROLOGY
Payer: COMMERCIAL

## 2021-06-11 DIAGNOSIS — F23 ACUTE PSYCHOSIS (HCC): Primary | ICD-10-CM

## 2021-06-11 LAB
SARS-COV-2, RAPID: NOT DETECTED
SPECIMEN DESCRIPTION: NORMAL

## 2021-06-11 PROCEDURE — 99283 EMERGENCY DEPT VISIT LOW MDM: CPT

## 2021-06-11 PROCEDURE — 6370000000 HC RX 637 (ALT 250 FOR IP): Performed by: PSYCHIATRY & NEUROLOGY

## 2021-06-11 PROCEDURE — 80307 DRUG TEST PRSMV CHEM ANLYZR: CPT

## 2021-06-11 PROCEDURE — 1240000000 HC EMOTIONAL WELLNESS R&B

## 2021-06-11 PROCEDURE — 87635 SARS-COV-2 COVID-19 AMP PRB: CPT

## 2021-06-11 RX ORDER — TRAZODONE HYDROCHLORIDE 50 MG/1
50 TABLET ORAL NIGHTLY PRN
Status: DISCONTINUED | OUTPATIENT
Start: 2021-06-11 | End: 2021-06-16 | Stop reason: HOSPADM

## 2021-06-11 RX ORDER — NICOTINE 21 MG/24HR
1 PATCH, TRANSDERMAL 24 HOURS TRANSDERMAL DAILY
Status: DISCONTINUED | OUTPATIENT
Start: 2021-06-11 | End: 2021-06-16 | Stop reason: HOSPADM

## 2021-06-11 RX ORDER — ACETAMINOPHEN 325 MG/1
650 TABLET ORAL EVERY 4 HOURS PRN
Status: DISCONTINUED | OUTPATIENT
Start: 2021-06-11 | End: 2021-06-16 | Stop reason: HOSPADM

## 2021-06-11 RX ORDER — LORAZEPAM 2 MG/ML
2 INJECTION INTRAMUSCULAR EVERY 4 HOURS PRN
Status: DISCONTINUED | OUTPATIENT
Start: 2021-06-11 | End: 2021-06-14

## 2021-06-11 RX ORDER — LORAZEPAM 1 MG/1
2 TABLET ORAL EVERY 4 HOURS PRN
Status: DISCONTINUED | OUTPATIENT
Start: 2021-06-11 | End: 2021-06-14

## 2021-06-11 RX ORDER — DIPHENHYDRAMINE HYDROCHLORIDE 50 MG/ML
50 INJECTION INTRAMUSCULAR; INTRAVENOUS EVERY 4 HOURS PRN
Status: DISCONTINUED | OUTPATIENT
Start: 2021-06-11 | End: 2021-06-16 | Stop reason: HOSPADM

## 2021-06-11 RX ORDER — IBUPROFEN 400 MG/1
400 TABLET ORAL EVERY 6 HOURS PRN
Status: DISCONTINUED | OUTPATIENT
Start: 2021-06-11 | End: 2021-06-16 | Stop reason: HOSPADM

## 2021-06-11 RX ORDER — POLYETHYLENE GLYCOL 3350 17 G/17G
17 POWDER, FOR SOLUTION ORAL DAILY PRN
Status: DISCONTINUED | OUTPATIENT
Start: 2021-06-11 | End: 2021-06-16 | Stop reason: HOSPADM

## 2021-06-11 RX ORDER — HALOPERIDOL 5 MG/ML
5 INJECTION INTRAMUSCULAR EVERY 4 HOURS PRN
Status: DISCONTINUED | OUTPATIENT
Start: 2021-06-11 | End: 2021-06-16 | Stop reason: HOSPADM

## 2021-06-11 RX ORDER — MAGNESIUM HYDROXIDE/ALUMINUM HYDROXICE/SIMETHICONE 120; 1200; 1200 MG/30ML; MG/30ML; MG/30ML
30 SUSPENSION ORAL EVERY 6 HOURS PRN
Status: DISCONTINUED | OUTPATIENT
Start: 2021-06-11 | End: 2021-06-16 | Stop reason: HOSPADM

## 2021-06-11 RX ORDER — HALOPERIDOL 5 MG
5 TABLET ORAL EVERY 4 HOURS PRN
Status: DISCONTINUED | OUTPATIENT
Start: 2021-06-11 | End: 2021-06-16 | Stop reason: HOSPADM

## 2021-06-11 RX ORDER — HYDROXYZINE 50 MG/1
50 TABLET, FILM COATED ORAL 3 TIMES DAILY PRN
Status: DISCONTINUED | OUTPATIENT
Start: 2021-06-11 | End: 2021-06-16 | Stop reason: HOSPADM

## 2021-06-11 RX ADMIN — TRAZODONE HYDROCHLORIDE 50 MG: 50 TABLET ORAL at 21:26

## 2021-06-11 RX ADMIN — LORAZEPAM 2 MG: 1 TABLET ORAL at 21:26

## 2021-06-11 RX ADMIN — HALOPERIDOL 5 MG: 5 TABLET ORAL at 21:27

## 2021-06-11 RX ADMIN — HYDROXYZINE HYDROCHLORIDE 50 MG: 50 TABLET, FILM COATED ORAL at 21:27

## 2021-06-11 ASSESSMENT — PAIN SCALES - GENERAL: PAINLEVEL_OUTOF10: 0

## 2021-06-11 NOTE — PROGRESS NOTES
`[facility]  Admission Note     Admission Type:   Admission Type: Involuntary    Reason for admission:  Reason for Admission: hallucinations, bizarre,    PATIENT STRENGTHS:  Strengths: Connection to output provider, Positive Support    Patient Strengths and Limitations:  Limitations: Hopeless about future    Addictive Behavior:    history of drug use    Medical Problems:   Past Medical History:   Diagnosis Date    Anxiety     Arthritis     O. A.    Bipolar depression ([facility] [address]     Dermatitis     Dysuria     Exposure to STD     Folliculitis     Hx of acute bronchitis     Hypertension     Marfan syndrome     MVP (mitral valve prolapse)     Nausea & vomiting     Polysubstance abuse (HCC)     drug abuse includes cocaine, fentnyl, cannabis    PONV (postoperative nausea and vomiting)     Scoliosis     Seasonal allergies     Staph infection     HX OF ALSO BACTEREMIA    UTI (lower urinary tract infection)        Status EXAM:  Status and Exam  Normal: No  Facial Expression: Exaggerated  Affect: Inappropriate, Incongruent  Level of Consciousness: Confused  Mood:Normal: No  Mood: Labile, Suspicious, Anxious  Motor Activity:Normal: No  Motor Activity: Unusual Posture/Gait  Interview Behavior: Impulsive, Irritable  Preception: [place] to Person, [place] to Time, [place] to Place, [place] to Situation  Attention:Normal: No  Attention: Distractible  Thought Processes: Flt.of Ideas, Circumstantial  Thought Content:Normal: No  Thought Content: Obsessions, Preoccupations  Hallucinations:  Auditory (Comment)  Delusions: No  Memory:Normal: No  Memory: Poor Recent  Insight and Judgment: No  Insight and Judgment: Poor Judgment, Poor Insight  Present Suicidal Ideation: No  Present Homicidal Ideation: No    Tobacco Screening:  Practical Counseling, on admission, dimple X, if applicable and completed (first 3 are required if patient doesn't refuse):            ( )  Recognizing danger situations (included triggers and roadblocks)                    ( )  Coping skills (new ways to manage stress, exercise, relaxation techniques, changing routine, distraction)                                                           ( )  Basic information about quitting (benefits of quitting, techniques in how to quit, available resources  ( ) Referral for counseling faxed to [facility]                                           (X ) Patient refused counseling  ( ) Patient has not smoked in the last 30 days    Metabolic Screening:    Lab Results   Component Value Date    LABA1C 5.1 08/22/2019       Lab Results   Component Value Date    CHOL 168 08/22/2019    CHOL 152 06/10/2018    CHOL 206 (H) 02/21/2018    CHOL 182 09/14/2015    CHOL 132 09/07/2013     Lab Results   Component Value Date    TRIG 79 08/22/2019    TRIG 141 06/10/2018    TRIG 113 02/21/2018    TRIG 54 09/14/2015    TRIG 88 09/07/2013     Lab Results   Component Value Date    HDL 53 08/22/2019    HDL 39 (L) 06/10/2018    HDL 59 02/21/2018    HDL 71 09/14/2015    HDL 53 09/07/2013     No components found for: LDLCAL  No results found for: LABVLDL      Body mass index is 28.7 kg/m². BP Readings from Last 2 Encounters:   06/11/21 117/76   05/30/21 118/75           Pt admitted with followings belongings:  Dentures: None  Vision - Corrective Lenses: None  Hearing Aid: None  Jewelry: Ring (2 rings [id number])  Body Piercings Removed: N/A  Clothing: Footwear, Pants, Shirt  Were All Patient Medications Collected?: Not Applicable  Other Valuables: None     Valuables sent home with N\A. Valuables placed in safe. Patient's home medications were verified by pharmacist in ER. Patient oriented to surroundings and program expectations and copy of patient rights given. Received admission packet:  yes. Consents reviewed, signed no. Refused to sign any paperwork. Patient verbalize understanding:  yes.     Patient education on precautions: yes                   Sherry Sorenson RN

## 2021-06-11 NOTE — PROGRESS NOTES
Medication History completed:    No changes to  medication list at this encounter. Medications confirmed with [facility] and [facility] from 5/30/21. The patient is past due for refills on all medications except quetiapine.      Thank you,  Governor Jose PharmD, BCPS  [phone]

## 2021-06-11 NOTE — PROGRESS NOTES
Patient given tobacco quitline number 30254516847 at this time, refusing to call at this time, states \" I just dont want to quit now\"- patient given information as to the dangers of long term tobacco use. Continue to reinforce the importance of tobacco cessation.

## 2021-06-11 NOTE — ED PROVIDER NOTES
[address]  EMERGENCY DEPARTMENT ENCOUNTER      Pt Name: Lester Rousseau  MRN: [id number]  [place] 1980  Date of evaluation: 6/11/21      CHIEF COMPLAINT       Chief Complaint   Patient presents with    Mental Health Problem    Hallucinations         HISTORY OF PRESENT ILLNESS    Lester Rousseau is a 46 y.o. female who presents complaining of Psychiatric Evaluation. Patient is brought in not sure who brought her in or why because she just states that she wants to go home with [facility]. Patient was recently here couple weeks ago with acute psychosis and lilliam with recent relapse on drugs. Looks like patient was taken over to [facility] yesterday and was evaluated in the ER and discharged home. Patient is denying suicidal ideation but otherwise will not really answer any my questions. REVIEW OF SYSTEMS       Review of Systems   Unable to perform ROS: Psychiatric disorder       PAST MEDICAL HISTORY     Past Medical History:   Diagnosis Date    Anxiety     Arthritis     O. A.    Bipolar depression ([facility] [address]     Dermatitis     Dysuria     Exposure to STD     Folliculitis     Hx of acute bronchitis     Hypertension     Marfan syndrome     MVP (mitral valve prolapse)     Nausea & vomiting     Polysubstance abuse (HCC)     drug abuse includes cocaine, fentnyl, cannabis    PONV (postoperative nausea and vomiting)     Scoliosis     Seasonal allergies     Staph infection     HX OF ALSO BACTEREMIA    UTI (lower urinary tract infection)        SURGICAL HISTORY       Past Surgical History:   Procedure Laterality Date    BREAST REDUCTION SURGERY Bilateral     2012    [facility]    FOOT SURGERY Bilateral     RT HAS 2 SCREWS, LT HAD FUSION IN DEC WITH 2 SCREWS    HIP ARTHROSCOPY Left     KNEE ARTHROSCOPY Right     X 2    NH EGD TRANSORAL BIOPSY SINGLE/MULTIPLE N/A 8/29/2017    EGD BIOPSY WITH DILATATION WITH MALONEYS performed by John Carmichael MD at [facility]    SHOULDER ARTHROPLASTY Bilateral     harware in place bilat    TUBAL LIGATION      clips placed on fallopian tubes    UPPER GASTROINTESTINAL ENDOSCOPY  08/29/2017     EXTENSIVE SPASMATIC CONTRACTIONS; NO LUMINAL OBSTRUCTIONS; CLOSER TO THE DISTAL ESOPHAGUS AND DEVELOPING DIVERTICULUM IS NOTED AND BELIEVED TO BE SEC TO DSYSMOTILITY;  DALEY DIATIONS WERE DONE EMPIRICALLY WITH 56 FR       CURRENT MEDICATIONS       Previous Medications    BUPRENORPHINE-NALOXONE (SUBOXONE) 8-2 MG SUBL SL TABLET    Place 1 tablet under the tongue daily. CYCLOBENZAPRINE (FLEXERIL) 10 MG TABLET    Take 10 mg by mouth 3 times daily as needed for Muscle spasms    DULOXETINE (CYMBALTA) 60 MG EXTENDED RELEASE CAPSULE    Take 60 mg by mouth 2 times daily    HYDROCHLOROTHIAZIDE (HYDRODIURIL) 50 MG TABLET    Take 50 mg by mouth daily    HYDROXYZINE (ATARAX) 50 MG TABLET    Take 50 mg by mouth every 6 hours as needed    PREGABALIN (LYRICA) 300 MG CAPSULE    Take 1 capsule by mouth 2 times daily for 4 doses. QUETIAPINE (SEROQUEL) 50 MG TABLET    Take 1 tablet by mouth nightly       ALLERGIES     is allergic to ketamine and morphine. SOCIAL HISTORY      reports that she has been smoking cigarettes. She has smoked for the past 1.50 years. She has never used smokeless tobacco. She reports previous drug use. Drugs: Opiates , Marijuana, and Cocaine. She reports that she does not drink alcohol. PHYSICAL EXAM     INITIAL VITALS: /87   Pulse 105   Temp 98.3 °F (36.8 °C) (Oral)   Resp 16   Ht 5' 10\" (1.778 m)   Wt 200 lb (90.7 kg)   LMP  (LMP Unknown)   SpO2 100%   BMI 28.70 kg/m²      Physical Exam  Constitutional:       General: She is not in acute distress. Appearance: She is well-developed. She is not diaphoretic. HENT:      Head: Normocephalic and atraumatic. Eyes:      General: No scleral icterus. Right eye: No discharge. Left eye: No discharge.       Conjunctiva/sclera: Conjunctivae normal.      Pupils: Pupils are equal, round, and reactive to light. Cardiovascular:      Rate and Rhythm: Normal rate and regular rhythm. Heart sounds: Normal heart sounds. No murmur heard. No friction rub. No gallop. Pulmonary:      Effort: Pulmonary effort is normal. No respiratory distress. Breath sounds: Normal breath sounds. No wheezing or rales. Neurological:      Mental Status: She is alert and oriented to person, place, and time. Psychiatric:         Attention and Perception: She is inattentive. Mood and Affect: Affect is flat. Speech: Speech is rapid and pressured. Behavior: Behavior is hyperactive. Thought Content: Thought content does not include suicidal ideation. DIAGNOSTIC RESULTS     LABS: All lab results were reviewed by myself, and all abnormals are listed below. Labs Reviewed   COVID-19, RAPID   URINE DRUG SCREEN         MEDICAL DECISION MAKING:     Patient does not need any medical work-up we will try to get a better story from whoever brought her over and then discuss it with the psychiatrist.      EMERGENCY DEPARTMENT COURSE:   Vitals:    Vitals:    06/11/21 1223   BP: 138/87   Pulse: 105   Resp: 16   Temp: 98.3 °F (36.8 °C)   TempSrc: Oral   SpO2: 100%   Weight: 200 lb (90.7 kg)   Height: 5' 10\" (1.778 m)       The patient was given the following medications while in the emergency department:  No orders of the defined types were placed in this encounter. -------------------------  2:25 PM EDT   talk to the psychiatrist who believes this patient does need to be admitted and thinks that there is enough there to be pink slip so I will go ahead and write a pink slip and the patient will be admitted. FINAL IMPRESSION      1. Acute psychosis [facility])          DISPOSITION/PLAN   DISPOSITION Decision To Admit 06/11/2021 02:25:05 PM      PATIENT REFERREDTO:  No follow-up provider specified.     DISCHARGEMEDICATIONS:  [facility] Prescriptions    No medications on file       (Please note that portions of this note were completed with a voice recognition program.  Efforts were made to edit thedictations but occasionally words are mis-transcribed.)    Juan Panda MD  Attending Emergency Physician                     Juan Panda MD  06/11/21 1372

## 2021-06-11 NOTE — ED NOTES
Provisional Diagnosis:   Acute psychosis    Psychosocial and Contextual Factors:   Patient brought in by her boyfriend. Patient had recent psychiatric admission, and discharged on 5/30/21. C-SSRS Summary:      Patient: X  Family:   Agency:     Substance Abuse: Patient denies. Patient has a history of Fentanyl and cocaine use prior to last admission. Present Suicidal Behavior:  Patient denies. Verbal:     Attempt:    Past Suicidal Behavior: Patient denies. Verbal:    Attempt:      Self-Injurious/Self-Mutilation: Patient denies. Trauma Identified:  Patient denies       Protective Factors:    Patient has housing and positive support system. Risk Factors:    Patient has poor insight. Clinical Summary:    Manoj Merino is a 46 y.o. female who presents to the ED by her boyfriend. Patient has been diagnosed with Marfan syndrome, opiate abuse, bipolar disorder. Patient responding to internal stimuli. Patient states 'I would like to go back home with Merced Mcgrath. \"   Patient responding to internal stimuli while in the ED. Patient states she hears her boyfriend name outside of the room. Patient states she hears her neighbor from upstairs. Patient is hyper verbal and pacing. Patient appears manic. Writer spoke with Merced Mcgrath who states patient was at [facility] two times yesterday. Per review of [facility], patient was seen at [facility] yesterday. Patient was last admitted to the [facility] for acute psychosis on 5/25/21 and discharged on 5/30/21. Patient appears manic. Patient is hyper verbal. Patient states she does not remember being hospitalized late last month. Patient states she did not follow up with her outpatient mental health services and has been off her medications for the last two days. Patient states she has not slept in two days. Patient states \"I just want to go home with Merced Mcgrath and go to sleep. \"  Patient denies auditory and visual hallucinations.  Patient visibly responding to internal stimuli while in the ED. During patients last admission, patient had similar delusions and hallucinations of her neighbor. Patient states she has not been using any drugs or alcohol and states she has been sober for 20 months. During patients last admission, patient reported she relapsed on cocaine and Fentanyl. Patient denies suicidal and homicidal ideations. Writer attempted to get a urine sample from patient. Patient states \"Oscar was telling me to leave the bottle empty. \" Patient is talking to the ED door, believing that her boyfriend is on the other side of the door. Level of Care Disposition:    Writer consulted with Dr. Josephine Bernard who recommends inpatient psychiatric admission for safety and stabilization. Patient is placed on application for emergency admission by ED physcian.

## 2021-06-12 LAB
AMPHETAMINE SCREEN URINE: POSITIVE
BARBITURATE SCREEN URINE: NEGATIVE
BENZODIAZEPINE SCREEN, URINE: NEGATIVE
BUPRENORPHINE URINE: ABNORMAL
CANNABINOID SCREEN URINE: NEGATIVE
COCAINE METABOLITE, URINE: POSITIVE
MDMA URINE: ABNORMAL
METHADONE SCREEN, URINE: NEGATIVE
METHAMPHETAMINE, URINE: ABNORMAL
OPIATES, URINE: NEGATIVE
OXYCODONE SCREEN URINE: NEGATIVE
PHENCYCLIDINE, URINE: NEGATIVE
PROPOXYPHENE, URINE: ABNORMAL
TEST INFORMATION: ABNORMAL
TRICYCLIC ANTIDEPRESSANTS, UR: ABNORMAL

## 2021-06-12 PROCEDURE — 1240000000 HC EMOTIONAL WELLNESS R&B

## 2021-06-12 PROCEDURE — 80307 DRUG TEST PRSMV CHEM ANLYZR: CPT

## 2021-06-12 PROCEDURE — 6370000000 HC RX 637 (ALT 250 FOR IP): Performed by: PSYCHIATRY & NEUROLOGY

## 2021-06-12 PROCEDURE — 99222 1ST HOSP IP/OBS MODERATE 55: CPT | Performed by: PSYCHIATRY & NEUROLOGY

## 2021-06-12 PROCEDURE — 6370000000 HC RX 637 (ALT 250 FOR IP): Performed by: NURSE PRACTITIONER

## 2021-06-12 PROCEDURE — APPSS180 APP SPLIT SHARED TIME > 60 MINUTES: Performed by: NURSE PRACTITIONER

## 2021-06-12 RX ORDER — HYDROCHLOROTHIAZIDE 25 MG/1
50 TABLET ORAL DAILY
Status: DISCONTINUED | OUTPATIENT
Start: 2021-06-12 | End: 2021-06-16 | Stop reason: HOSPADM

## 2021-06-12 RX ORDER — QUETIAPINE FUMARATE 50 MG/1
50 TABLET, FILM COATED ORAL NIGHTLY
Status: DISCONTINUED | OUTPATIENT
Start: 2021-06-12 | End: 2021-06-12

## 2021-06-12 RX ORDER — PREGABALIN 100 MG/1
300 CAPSULE ORAL 2 TIMES DAILY
Status: DISCONTINUED | OUTPATIENT
Start: 2021-06-12 | End: 2021-06-12

## 2021-06-12 RX ORDER — CYCLOBENZAPRINE HCL 10 MG
10 TABLET ORAL 3 TIMES DAILY PRN
Status: DISCONTINUED | OUTPATIENT
Start: 2021-06-12 | End: 2021-06-12

## 2021-06-12 RX ORDER — BUPRENORPHINE HYDROCHLORIDE AND NALOXONE HYDROCHLORIDE DIHYDRATE 8; 2 MG/1; MG/1
1 TABLET SUBLINGUAL DAILY
Status: DISCONTINUED | OUTPATIENT
Start: 2021-06-12 | End: 2021-06-12

## 2021-06-12 RX ORDER — DULOXETIN HYDROCHLORIDE 60 MG/1
60 CAPSULE, DELAYED RELEASE ORAL 2 TIMES DAILY
Status: DISCONTINUED | OUTPATIENT
Start: 2021-06-12 | End: 2021-06-16 | Stop reason: HOSPADM

## 2021-06-12 RX ORDER — QUETIAPINE FUMARATE 100 MG/1
100 TABLET, FILM COATED ORAL NIGHTLY
Status: DISCONTINUED | OUTPATIENT
Start: 2021-06-12 | End: 2021-06-16 | Stop reason: HOSPADM

## 2021-06-12 RX ADMIN — HYDROCHLOROTHIAZIDE 50 MG: 25 TABLET ORAL at 11:53

## 2021-06-12 RX ADMIN — LORAZEPAM 2 MG: 1 TABLET ORAL at 09:25

## 2021-06-12 RX ADMIN — DULOXETINE HYDROCHLORIDE 60 MG: 60 CAPSULE, DELAYED RELEASE ORAL at 21:08

## 2021-06-12 RX ADMIN — QUETIAPINE FUMARATE 100 MG: 100 TABLET ORAL at 21:08

## 2021-06-12 RX ADMIN — TRAZODONE HYDROCHLORIDE 50 MG: 50 TABLET ORAL at 21:08

## 2021-06-12 RX ADMIN — HALOPERIDOL 5 MG: 5 TABLET ORAL at 09:25

## 2021-06-12 RX ADMIN — IBUPROFEN 400 MG: 400 TABLET, FILM COATED ORAL at 09:12

## 2021-06-12 RX ADMIN — HYDROXYZINE HYDROCHLORIDE 50 MG: 50 TABLET, FILM COATED ORAL at 21:08

## 2021-06-12 RX ADMIN — DULOXETINE HYDROCHLORIDE 60 MG: 60 CAPSULE, DELAYED RELEASE ORAL at 11:53

## 2021-06-12 ASSESSMENT — LIFESTYLE VARIABLES
HISTORY_ALCOHOL_USE: YES
HISTORY_ALCOHOL_USE: NO

## 2021-06-12 ASSESSMENT — PAIN SCALES - GENERAL: PAINLEVEL_OUTOF10: 4

## 2021-06-12 NOTE — GROUP NOTE
Group Therapy Note    Date: 6/12/2021    Group Start Time: 1330  Group End Time: 0898  Group Topic: Recreational    [facility], [facility]    Patient refused to attend Recreational Therapy Group at 1330 after encouragement from staff. 1:1 talk time offered.     Signature:  Brandi Rg

## 2021-06-12 NOTE — BH NOTE
PRN Note:    Patient was agitated as evidence by crying and rocking back and forth. Staff tried quiet time, talk time, and redirection. Interventions were ineffective. PRN medications administered.

## 2021-06-12 NOTE — GROUP NOTE
Group Therapy Note    Date: 6/12/2021    Group Start Time: 1000  Group End Time: 2099  Group Topic: Psychotherapy    STTRAC[facility] URBINA    NANDA Jones LSW        Group Therapy Note    Attendees: 3/18         Patient was offered group therapy today but declined to participate despite encouragement from staff. 1:1 was offered.     Signature:  NANDA Jones LSW

## 2021-06-12 NOTE — BH NOTE
Pt is agitated as evidence by pacing unit standing by exits with fists clentched repeating that she was leaving Stating \"fátima is coming to get me. \" Staff attempted to find and relieve the distress by talking to patient offering  PRN medications, Patient was accepting of medications. Pt is currently laying in bed.

## 2021-06-12 NOTE — PLAN OF CARE
Problem: Falls - Risk of:  Goal: Will remain free from falls  Description: Will remain free from falls  6/12/2021 1044 by Luci Malcolm LPN  Outcome: Ongoing   Patient remains free from falls. Call light and fluids within reach. Problem: Falls - Risk of:  Goal: Absence of physical injury  Description: Absence of physical injury  6/12/2021 1044 by Luci Malcolm LPN  Outcome: Ongoing   Patient remains free from physical injury. Every 15 min checks maintained for pt safety.

## 2021-06-12 NOTE — PLAN OF CARE
strategies for care    Method:group therapy, medication compliance, individualized assessments and care planning    Outcome: needs reinforcement    PATIENT GOALS: to be discussed with patient within 72 hours    PLAN/TREATMENT RECOMMENDATIONS:     continue group therapy , medications compliance, goal setting, individualized assessments and care, continue to monitor pt on unit      SHORT-TERM GOALS:   Time frame for Short-Term Goals: 5-7 days    LONG-TERM GOALS:  Time frame for Long-Term Goals: 6 months  Members Present in Team Meeting: See Signature Sheet    Rakesh Miller, [address]

## 2021-06-12 NOTE — PLAN OF CARE
Problem: Falls - Risk of:  Goal: Will remain free from falls  Description: Will remain free from falls  Outcome: Ongoing  Note: Patient is free from falls at this time. Patient provided clear well lit pathways on the unit. Patient educated but needs reinforcement to call for assistance of needed. Patient monitored every 15 minutes with environmental safety checks. Problem: Falls - Risk of:  Goal: Absence of physical injury  Description: Absence of physical injury  Outcome: Ongoing  Note: Patient denies suicidal ideations at this time. Patient is absent of injury at this time. Patient monitored every 15 minutes with environmental safety checks. Problem: Tobacco Use:  Goal: Inpatient tobacco use cessation counseling participation  Description: Inpatient tobacco use cessation counseling participation  Outcome: Ongoing  Note: Patient given tobacco quitline number 99986949576 at this time, refusing to call at this time, states \" I just dont want to quit now\"- patient given information as to the dangers of long term tobacco use. Continue to reinforce the importance of tobacco cessation.        Problem: Substance Abuse:  Goal: Absence of drug withdrawal signs and symptoms  Description: Absence of drug withdrawal signs and symptoms  Outcome: Ongoing     Problem: Substance Abuse:  Goal: Participates in care planning  Description: Participates in care planning  Outcome: Ongoing     Problem: Altered Mood, Psychotic Behavior:  Goal: Able to verbalize decrease in frequency and intensity of hallucinations  Description: Able to verbalize decrease in frequency and intensity of hallucinations  Outcome: Ongoing     Problem: Altered Mood, Psychotic Behavior:  Goal: Able to verbalize reality based thinking  Description: Able to verbalize reality based thinking  Outcome: Ongoing

## 2021-06-12 NOTE — CARE COORDINATION
[facility] Biopsychosocial Assessment    Current Level of Psychosocial Functioning     Independent X  Dependent    Minimal Assist     Comments:  Patient was not able to fully participate in this assessment because she was drowsy. Psychosocial High Risk Factors (check all that apply)    Unable to obtain meds   Chronic illness/pain    Substance abuse   Lack of Family Support   Financial stress   Isolation   Inadequate Community Resources  Suicide attempt(s)  Not taking medications X  Victim of crime   Developmental Delay  Unable to manage personal needs    Age 72 or older   Homeless  No transportation   Readmission within 30 days X  Unemployment  Traumatic Event    Comments:   Psychiatric Advanced Directives: n/a    Family to Involve in Treatment: Patient was unable to identify anyone to involve in her treatment at this time. Sexual Orientation:  n/a    Patient Strengths: Patient has her own apartment, she has [employer], and SSI. Patient Barriers: Patient was not taking medications as prescribed because she ran out and did not attend her follow up appointment. Opiate Education Provided:  BASIL at this time if patient is using opiates. CMHC/mental health history: Patient was linked with [facility] in the past but did not follow through after her most recent discharge. Plan of Care   medication management, group/individual therapies, family meetings, psycho -education, treatment team meetings to assist with stabilization    Initial Discharge Plan:  Patient will discharge to home unless notified of an alternative plan. Clinical Summary:    Trell Lombardo is a 45 y/o female who was admitted to [place] [address] for acute psychosis. She was not able to fully participate in this assessment today because she was very tired therefore most information was obtained from EMR from patient's history.   She was discharged from [facility] last month to her apartment but did not follow through with her appointment at [facility] and has ran out of her medication per patient. She has not been taking medications for reportedly two days now and has not been asleep since. Patient was responding to internal stimuli while in the ED and did confirm that her boyfriend did bring her to the ED. SW will continue to work with patient to confirm discharge plans throughout her hospitalization.

## 2021-06-12 NOTE — H&P
Department of Psychiatry  Attending Physician Psychiatric Assessment     Reason for Admission to Psychiatric Unit:  Concerns about patient's safety in the community    CHIEF COMPLAINT: Acute psychosis    History obtained from: Patient, electronic medical record          HISTORY OF PRESENT ILLNESS:    Sara Lopez is a 46 y.o. female who has a past medical history of hypertension, Marfan syndrome, opiate abuse, bipolar disorder. Patient presented to the ED with her significant other. She was at [facility] for psychiatric evaluation yesterday, and was subsequently discharged. She was noted to be responding to internal stimuli while in the emergency department. She states she hears her boyfriend's name outside of the room and she hears her neighbor from upstairs. She was noted to be hyperverbal and pacing, and appeared to be manic. Per social work, the patient did not remember being hospitalized at the [facility] last month, did not follow-up with outpatient mental health services, and has been off her psychiatric medications for the past 2 days. She also reported that she had not slept in 2 days. The patient was noted to be talking to the ED door, believing that her boyfriend is on the other side of the door. Upon assessment today, the patient stated that she was passed out in someone's lawn and believes that she is dehydrated. She is worried about cutting her hair because of the burs in her hair. She is very paranoid and became tearful. She believes that since her hospital discharge, her providers at the [facility] have been \"messing with her. \"  She states that we have been sending messages through her radio. She also states that we have been getting her neighbor to mess with her. She stated that yesterday lady pretending to be her mother came here, and also someone pretending to be her daughter. She states a girl that is 9 was sitting on her doorsteps and is attempting to set her up for pedophilia. History of head trauma: [] Yes [x] No    History of seizures: [] Yes [x] No    History of violence or aggression: [] Yes [x] No         PSYCHIATRIC HISTORY:  [x] Yes [] No    Currently follows with [facility] [place] for AOD and mental health issues  0 lifetime suicide attempts  Multiple psychiatric hospital admissions     Past psychiatric medications includes:   Seroquel  Cymbalta  Is supposed to be on Vraylar but never started  Suboxone  Many others in which the patient does not recall names    Adverse reactions from psychotropic medications: [x] Yes [] No    Patient states all SSRIs cause vision issues, nausea, and rashes         Lifetime Psychiatric Review of Systems         Depression: Reports     Anxiety: Reports     Panic Attacks: Reports      Debi or Hypomania: Reports years ago     Phobias: Denies     Obsessions and Compulsions: Denies     Body or Vocal Tics: Denies     Visual Hallucinations: Reports     Auditory Hallucinations: Reports     Delusions/Paranoia: Paranoid and persecutory     PTSD: Reports    Past Medical History:        Diagnosis Date    Anxiety     Arthritis     O. A.    [facility]     Dermatitis     Dysuria     Exposure to STD     Folliculitis     Hx of acute bronchitis     Hypertension     Marfan syndrome     MVP (mitral valve prolapse)     Nausea & vomiting     Polysubstance abuse (HCC)     drug abuse includes cocaine, fentnyl, cannabis    PONV (postoperative nausea and vomiting)     Scoliosis     Seasonal allergies     Staph infection     HX OF ALSO BACTEREMIA    UTI (lower urinary tract infection)        Past Surgical History:        Procedure Laterality Date    BREAST REDUCTION SURGERY Bilateral     2012    [facility]    FOOT SURGERY Bilateral     RT HAS 2 SCREWS, LT HAD FUSION IN DEC WITH 2 SCREWS    HIP ARTHROSCOPY Left     KNEE ARTHROSCOPY Right     X 2    NM EGD TRANSORAL BIOPSY SINGLE/MULTIPLE N/A 2017    EGD BIOPSY WITH DILATATION WITH MALONEYS performed by Nedra Calvin MD at [facility] [address] Bilateral     harware in place bilat    TUBAL LIGATION      clips placed on fallopian tubes    UPPER GASTROINTESTINAL ENDOSCOPY  2017     EXTENSIVE SPASMATIC CONTRACTIONS; NO LUMINAL OBSTRUCTIONS; CLOSER TO THE DISTAL ESOPHAGUS AND DEVELOPING DIVERTICULUM IS NOTED AND BELIEVED TO BE SEC TO DSYSMOTILITY;  DALEY DIATIONS WERE DONE EMPIRICALLY WITH 56 FR       Allergies:  Ketamine and Morphine         Social History:     Born in: [facility]  Family: Raised by mother and stepfather. Patient has a sister. She had an older brother who  from drunk driving. Highest Level of Education: High school diploma, some college for [employer]  Occupation: On SSDI  Marital Status:  twice  Children: 1 son who graduated high school  Residence: In own apartment  Stressors: Family, substance abuse, noncompliance  Patient Assets/Supportive Factors: Linked with community resources, willing to seek help, stable income, stable housing         DRUG USE HISTORY  Social History     Tobacco Use   Smoking Status Current Every Day Smoker    Years: 1.50    Types: Cigarettes    Last attempt to quit: 3/26/2015    Years since quittin.2   Smokeless Tobacco Never Used     Social History     Substance and Sexual Activity   Alcohol Use No     Social History     Substance and Sexual Activity   Drug Use Not Currently    Types: Opiates , Marijuana, Cocaine    Comment: drug abuse includes cocaine, fentnyl, cannabis                LEGAL HISTORY:   HISTORY OF INCARCERATION: [x] Yes [] No- Has been to long-term, no care home. Longest time spent in long-term was 11 days. Has also gone to long-term 2 other instances for 2 days and overnight.   States the 11-day charge has been expunged from her record.       Family History:       Problem Relation Age of Onset    Cancer Brother         testicular    Breast Cancer Maternal Grandmother     Hypertension Mother     Thyroid Disease Mother        Psychiatric Family History  Patient reports psychiatric family history. Mother and sisterbipolar disorder     Suicides in family: [] Yes [] No    Substance use in family: [x] Yes [] No  Reports parents were alcoholics         PHYSICAL EXAM:  Vitals:  /85   Pulse 89   Temp 97.8 °F (36.6 °C)   Resp 16   Ht 5' 10\" (1.778 m)   Wt 200 lb (90.7 kg)   LMP  (LMP Unknown)   SpO2 100%   BMI 28.70 kg/m²     LABS:  Labs reviewed: [x] Yes  Last EKG in EMR reviewed: [x] Yes          Review of Systems   Constitutional: Negative for chills and weight loss. HENT: Negative for ear pain and nosebleeds. Eyes: Negative for blurred vision and photophobia. Respiratory: Negative for cough, shortness of breath and wheezing. Cardiovascular: Negative for chest pain and palpitations. Gastrointestinal: Negative for abdominal pain, diarrhea and vomiting. Genitourinary: Negative for dysuria and urgency. Musculoskeletal: Negative for falls and joint pain. Skin: Negative for itching and rash. Neurological: Negative for tremors, seizures and weakness. Endo/Heme/Allergies: Does not bruise/bleed easily. Physical Exam:   Constitutional:  Appears well-developed and well-nourished, no acute distress. HENT:   Head: Normocephalic and atraumatic. Eyes: Conjunctivae are normal. Right eye exhibits no discharge. Left eye exhibits no discharge. No scleral icterus. Neck: Normal range of motion. Neck supple. Pulmonary/Chest:  No respiratory distress or accessory muscle use, no wheezing. Cardiac: Regular rate and rhythm. Abdominal: Soft. Non-tender. Exhibits no distension. Musculoskeletal: Normal range of motion. Exhibits no edema. Neurological: cranial nerves II-XII grossly in tact, normal gait and station. Skin: Skin is warm and dry. Patient is not diaphoretic. No erythema.       Mental Status Examination:    Level of consciousness: seeing people outside her door trying to monitor her and hear her. Reports that she has been having messages directly from radios. Reports that all the providers and staff here are playing games with her. She largely remains isolated to her room this morning. Has very poor attention and concentration. She did report that she has been actively abusing cocaine and cannabis. Mentions that she did not follow-up with her provider at [facility] as scheduled and has also been off her psychotropic medications. She was requesting to go back on her Suboxone Flexeril and Lyrica. Very focused on controlled medications. Discussed with her that I would not be able to prescribe any of these as she did not follow-up as planned and has been off of them for last couple weeks now. Discussed with her that we cannot start Cymbalta to help with her mood pain and anxiety along with Seroquel to help with paranoia. She understood and agreed to the plan.     Electronically signed by Marina Baldwin MD on 6/12/21 at 11:59 AM EDT

## 2021-06-13 PROCEDURE — 99232 SBSQ HOSP IP/OBS MODERATE 35: CPT | Performed by: PSYCHIATRY & NEUROLOGY

## 2021-06-13 PROCEDURE — 1240000000 HC EMOTIONAL WELLNESS R&B

## 2021-06-13 PROCEDURE — 6370000000 HC RX 637 (ALT 250 FOR IP): Performed by: PSYCHIATRY & NEUROLOGY

## 2021-06-13 PROCEDURE — 6370000000 HC RX 637 (ALT 250 FOR IP): Performed by: NURSE PRACTITIONER

## 2021-06-13 RX ADMIN — HYDROXYZINE HYDROCHLORIDE 50 MG: 50 TABLET, FILM COATED ORAL at 19:38

## 2021-06-13 RX ADMIN — QUETIAPINE FUMARATE 100 MG: 100 TABLET ORAL at 22:40

## 2021-06-13 RX ADMIN — HYDROCHLOROTHIAZIDE 50 MG: 25 TABLET ORAL at 09:03

## 2021-06-13 RX ADMIN — ACETAMINOPHEN 650 MG: 325 TABLET ORAL at 09:23

## 2021-06-13 RX ADMIN — DULOXETINE HYDROCHLORIDE 60 MG: 60 CAPSULE, DELAYED RELEASE ORAL at 22:40

## 2021-06-13 RX ADMIN — IBUPROFEN 400 MG: 400 TABLET, FILM COATED ORAL at 15:28

## 2021-06-13 RX ADMIN — HYDROXYZINE HYDROCHLORIDE 50 MG: 50 TABLET, FILM COATED ORAL at 09:23

## 2021-06-13 RX ADMIN — DULOXETINE HYDROCHLORIDE 60 MG: 60 CAPSULE, DELAYED RELEASE ORAL at 09:03

## 2021-06-13 RX ADMIN — IBUPROFEN 400 MG: 400 TABLET, FILM COATED ORAL at 09:23

## 2021-06-13 ASSESSMENT — PAIN SCALES - GENERAL
PAINLEVEL_OUTOF10: 8

## 2021-06-13 ASSESSMENT — PAIN DESCRIPTION - LOCATION: LOCATION: BACK;NECK

## 2021-06-13 ASSESSMENT — PAIN DESCRIPTION - PAIN TYPE: TYPE: CHRONIC PAIN

## 2021-06-13 NOTE — GROUP NOTE
Group Therapy Note    Date: 6/13/2021    Group Start Time: 0900  Group End Time: 0915  Group Topic: Community Meeting    [facility]    Doris Baez, [address]    Patient refused to attend Community Meeting Group at 0900 after encouragement from staff. 1:1 talk time offered.     Signature:  Tha Chapa

## 2021-06-13 NOTE — PROGRESS NOTES
BEHAVIORAL HEALTH FOLLOW-UP NOTE     6/13/2021     Patient was seen and examined in person, Chart reviewed   Patient's case discussed with staff/team   Patient attending groups: No patient has remained isolative since admission   Patient compliant with medication: Yes    Chief Complaint: Acute psychosis    Interim History:     Patient has been present on milieu today for the first time since admission and continues to remain focused on her Marfan's syndrome, chronic pain and need for Flexeril and Lyrica. She additionally requests to utilize Ativan without Haldol and is explained these are emergency only medications and benzodiazepines are not appropriate use for her physical discomfort. Patient is tearful and and unwilling to discuss her lack of follow-up after her previous discharge from facility. She appears to have little insight into the importance of outpatient services to maintain Suboxone, Flexeril, and Lyrica as requested. Staff report that patient is involved with a recent peer that has arrived to unit and there will be a change in unit due to this conflict of interest.  \"As she prefers to be called Sapphire Doyle \"reports that she was able to sleep since being awake for 2 days prior to admission and feels her mood is slightly improved. She remains tearful and paranoid frequently looking around milieu to ensure that others are not listening. She remains disheveled with poor hygiene and reports she was not able to tolerate breakfast or lunch. When asked related to suicidal thoughts or auditory hallucinations she stands and walks away refusing to disclose information. Team will continue to monitor her safety and work to establish therapeutic alliance.     Appetite:   [] Normal/Unchanged  [] Increased  [x] Decreased      Sleep:       [] Normal/Unchanged  [] Fair       [x] Poor remains poor however patient reports greatly improved from prior to admission            Energy:    [] Normal/Unchanged  [] Increased [x] Decreased        Aggression:  [] yes  [x] no    Patient is [x] able  [] unable to CONTRACT FOR SAFETY ON THE UNIT    PAST MEDICAL/PSYCHIATRIC HISTORY:   Past Medical History:   Diagnosis Date    Anxiety     Arthritis     O. A.    B[facility] ([facility] [address]     Dermatitis     Dysuria     Exposure to STD     Folliculitis     Hx of acute bronchitis     Hypertension     Marfan syndrome     MVP (mitral valve prolapse)     Nausea & vomiting     Polysubstance abuse (HCC)     drug abuse includes cocaine, fentnyl, cannabis    PONV (postoperative nausea and vomiting)     Scoliosis     Seasonal allergies     Staph infection     HX OF ALSO BACTEREMIA    UTI (lower urinary tract infection)        FAMILY/SOCIAL HISTORY:  Family History   Problem Relation Age of Onset    Cancer Brother         testicular    Breast Cancer Maternal Grandmother     Hypertension Mother     Thyroid Disease Mother      Social History     Socioeconomic History    Marital status:      Spouse name: Not on file    Number of children: Not on file    Years of education: Not on file    Highest education level: Not on file   Occupational History    Not on file   Tobacco Use    Smoking status: Current Every Day Smoker     Years: 1.50     Types: Cigarettes     Last attempt to quit: 3/26/2015     Years since quittin.2    Smokeless tobacco: Never Used   Vaping Use    Vaping Use: Never used   Substance and Sexual Activity    Alcohol use: No    Drug use: Not Currently     Types: Opiates , Marijuana, Cocaine     Comment: drug abuse includes cocaine, fentnyl, cannabis    Sexual activity: Yes     Partners: Male   Other Topics Concern    Not on file   Social History Narrative    Not on file     Social Determinants of Health     Financial Resource Strain:     Difficulty of Paying Living Expenses:    Food Insecurity:     Worried About Running Out of Food in the Last Year:     Chari of Food in the Last Year: Transportation Needs:     Lack of Transportation (Medical):  Lack of Transportation (Non-Medical):    Physical Activity:     Days of Exercise per Week:     Minutes of Exercise per Session:    Stress:     Feeling of Stress :    Social Connections:     Frequency of Communication with Friends and Family:     Frequency of Social Gatherings with Friends and Family:     Attends Taoist Services:     Active Member of Clubs or Organizations:     Attends Club or Organization Meetings:     Marital Status:    Intimate Partner Violence:     Fear of Current or Ex-Partner:     Emotionally Abused:     Physically Abused:     Sexually Abused:            ROS:  [x] All negative/unchanged except if checked.  Explain positive(checked items) below:  [] Constitutional  [] Eyes  [] Ear/Nose/Mouth/Throat  [] Respiratory  [] CV  [] GI  []   [x] Musculoskeletal  [] Skin/Breast  [] Neurological  [] Endocrine  [] Heme/Lymph  [] Allergic/Immunologic    Explanation: Patient reports chronic generalized pain related to Marfan syndrome    MEDICATIONS:    Current Facility-Administered Medications:     DULoxetine (CYMBALTA) extended release capsule 60 mg, 60 mg, Oral, BID, GUIDO Santana CNP, 60 mg at 06/13/21 0903    hydroCHLOROthiazide (HYDRODIURIL) tablet 50 mg, 50 mg, Oral, Daily, GUIDO Santana CNP, 50 mg at 06/13/21 0903    QUEtiapine (SEROQUEL) tablet 100 mg, 100 mg, Oral, Nightly, Maricel Aparicio MD, 100 mg at 06/12/21 2108    acetaminophen (TYLENOL) tablet 650 mg, 650 mg, Oral, Q4H PRN, Isabell Warren MD, 650 mg at 06/13/21 0923    aluminum & magnesium hydroxide-simethicone (MAALOX) 200-200-20 MG/5ML suspension 30 mL, 30 mL, Oral, Q6H PRN, Isabell Warren MD    hydrOXYzine (ATARAX) tablet 50 mg, 50 mg, Oral, TID PRN, Isabell Warren MD, 50 mg at 06/13/21 0923    ibuprofen (ADVIL;MOTRIN) tablet 400 mg, 400 mg, Oral, Q6H PRN, Isabell Warren MD, 400 mg at 06/13/21 1528    nicotine (NICODERM CQ) 14 MG/24HR 1 patch, 1 patch, Transdermal, Daily, Manpreet Mims MD, 1 patch at 06/13/21 0903    polyethylene glycol (GLYCOLAX) packet 17 g, 17 g, Oral, Daily PRN, Manpreet Mims MD    traZODone (DESYREL) tablet 50 mg, 50 mg, Oral, Nightly PRN, Manpreet Mims MD, 50 mg at 06/12/21 2108    LORazepam (ATIVAN) tablet 2 mg, 2 mg, Oral, Q4H PRN, 2 mg at 06/12/21 0925 **AND** haloperidol (HALDOL) tablet 5 mg, 5 mg, Oral, Q4H PRN, Manpreet Mims MD, 5 mg at 06/12/21 0925    LORazepam (ATIVAN) injection 2 mg, 2 mg, Intramuscular, Q4H PRN **AND** haloperidol lactate (HALDOL) injection 5 mg, 5 mg, Intramuscular, Q4H PRN **AND** diphenhydrAMINE (BENADRYL) injection 50 mg, 50 mg, Intramuscular, Q4H PRN, Manpreet Mims MD      Examination:  BP (!) 124/94   Pulse 98   Temp 97.5 °F (36.4 °C) (Oral)   Resp 14   Ht 5' 10\" (1.778 m)   Wt 200 lb (90.7 kg)   LMP  (LMP Unknown)   SpO2 100%   BMI 28.70 kg/m²   Gait - steady and slow  Medication side effects(SE): Denies    Mental Status Examination:    Level of consciousness:  within normal limits   Appearance:  poor grooming and poor hygiene  Behavior/Motor:  psychomotor retardation  Attitude toward examiner: Suspicious, fragmented eye contact and guarded  Speech:  normal volume and slow   Mood: depressed  Affect:  blunted  Thought processes: Circumstantial and very focused on medications  Thought content:  Homicidal ideation - none  Suicidal Ideation:  denies suicidal ideation  Delusions: Persecutory  Perceptual Disturbance:  auditory  Cognition:  oriented to person, place, and time   Concentration distractible  Insight poor   Judgement poor     ASSESSMENT:   Patient symptoms are:  [] Well controlled  [] Improving  [] Worsening  [x] No change      Diagnosis: *Principal Problem:    [facility]ute [facility] ([facility]  Active Problems:    Opioid dependence on agonist therapy ([facility]  Resolved Problems:    * No resolved hospital problems.  *      LABS:    No results for input(s): WBC, HGB, PLT in the last 72 hours. No results for input(s): NA, K, CL, CO2, BUN, CREATININE, GLUCOSE in the last 72 hours. No results for input(s): BILITOT, ALKPHOS, AST, ALT in the last 72 hours. Lab Results   Component Value Date    LABAMPH neg 02/19/2016    BARBSCNU NEGATIVE 06/12/2021    LABBENZ NEGATIVE 06/12/2021    LABBENZ NEGATIVE 11/13/2012    LABMETH NEGATIVE 06/12/2021    OPIATESCREENURINE neg 02/19/2016    PHENCYCLIDINESCREENURINE neg 02/19/2016    PPXUR NOT REPORTED 06/12/2021     Lab Results   Component Value Date    TSH 0.75 05/25/2021     No results found for: LITHIUM  No results found for: VALPROATE, CBMZ      Treatment Plan:    Reviewed current Medications with the patient. Continue with current medication regimen and monitor symptoms  Continue to offer education related to the dangers of overutilizing controlled medications and the importance of follow-up outpatient care. Risks, benefits, side effects, drug-to-drug interactions and alternatives to treatment were discussed. The patient consented to treatment. Encourage patient to attend group and other milieu activities. Discharge planning discussed with the patient and treatment team.    PSYCHOTHERAPY/COUNSELING:  [] Therapeutic interview  [x] Supportive  [] CBT  [] Ongoing  [] Other    [x] Patient continues to need, on a daily basis, active treatment furnished directly by or requiring the supervision of inpatient psychiatric personnel      Anticipated Length of stay per MD Hernandez Seeds is a 46 y.o. female being evaluated face to face. --GUIDO Espinoza CNP on 6/13/2021 at 3:31 PM    An electronic signature was used to authenticate this note. **This report has been created using voice recognition software. It may contain minor errors which are inherent in voice recognition technology. **

## 2021-06-13 NOTE — BH NOTE
[email or web address]; Patient oriented to the unit by staff: Patient states understanding of the orientation with verbal Teach Back

## 2021-06-13 NOTE — PLAN OF CARE
[facility]  Day 3 Interdisciplinary Treatment Plan NOTE    Review Date & Time: 6/13/2021 0944    Admission Type:   Admission Type: Involuntary    Reason for admission:  Reason for Admission: hallucinations, bizarre,  Estimated Length of Stay: 5-7 days  Estimated Discharge Date Update: to be determined by physician    PATIENT STRENGTHS:  Patient Strengths Strengths: Connection to output provider, Positive Support  Patient Strengths and Limitations:Limitations: Perceives need for assistance with self-care  Addictive Behavior:Addictive Behavior  In the past 3 months, have you felt or has someone told you that you have a problem with:  : None  Do you have a history of Chemical Use?: No  Do you have a history of Alcohol Use?: Yes  Do you have a history of Street Drug Abuse?: Yes  Histroy of Prescripton Drug Abuse?: No  Medical Problems:  Past Medical History:   Diagnosis Date    Anxiety     Arthritis     O. A.     Bipolar depression ([facility] [address]     Dermatitis     Dysuria     Exposure to STD     Folliculitis     Hx of acute bronchitis     Hypertension     Marfan syndrome     MVP (mitral valve prolapse)     Nausea & vomiting     Polysubstance abuse (HCC)     drug abuse includes cocaine, fentnyl, cannabis    PONV (postoperative nausea and vomiting)     Scoliosis     Seasonal allergies     Staph infection     HX OF ALSO BACTEREMIA    UTI (lower urinary tract infection)        Risk:  Fall RiskTotal: 71  Griffin Scale Griffin Scale Score: 22  BVC Total: 1  Change in scores no Changes to plan of Care no    Status EXAM:   Status and Exam  Normal: No  Facial Expression: Avoids Gaze, Flat  Affect: Blunt  Level of Consciousness: Alert  Mood:Normal: No  Mood: Depressed, Anxious, Labile  Motor Activity:Normal: No  Motor Activity: Decreased  Interview Behavior: Cooperative  Preception: [place] to Person, [place] to Place  Attention:Normal: No  Attention: Distractible  Thought Processes: Blocking  Thought Content:Normal: Yes Thought Content: Preoccupations  Hallucinations: None  Delusions: No  Delusions: Persecution  Memory:Normal: No  Memory: Poor Remote, Poor Recent  Insight and Judgment: No  Insight and Judgment: Poor Judgment, Poor Insight, Unmotivated  Present Suicidal Ideation: No  Present Homicidal Ideation: No    Daily Assessment Last Entry:   Daily Sleep (WDL): Within Defined Limits         Patient Currently in Pain: Yes  Daily Nutrition (WDL): Within Defined Limits    Patient Monitoring:  Frequency of Checks: 4 times per hour, close    Psychiatric Symptoms:   Depression Symptoms  Depression Symptoms: Isolative, Loss of interest, Feelings of hopelessess, Feelings of helplessness  Anxiety Symptoms  Anxiety Symptoms: Generalized  Debi Symptoms  Debi Symptoms: Labile     Psychosis Symptoms  Delusion Type: No problems reported or observed. Suicide Risk CSSR-S:  1) Within the past month, have you wished you were dead or wished you could go to sleep and not wake up? : Yes  2) Have you actually had any thoughts of killing yourself? : Yes  3) Have you been thinking about how you might kill yourself? : Yes  5) Have you started to work out or worked out the details of how to kill yourself? Do you intend to carry out this plan? : No  6) Have you ever done anything, started to do anything, or prepared to do anything to end your life?: No  Change in Result: No Change in Plan of care: No      EDUCATION:   EDUCATION:   Learner Progress Toward Treatment Goals: Reviewed results and recommendations of this team, Reviewed group plan and strategies, Reviewed signs, symptoms and risk of self harm and violent behavior, Reviewed goals and plan of care    Method:small group, individual verbal education    Outcome:verbalized by patient, but needs reinforcement to obtain goals    PATIENT GOALS:  Short term: Stabilize on medications. Have improved sleep. Long term: Continue to take medications. Follow up with [facility].      PLAN/TREATMENT RECOMMENDATIONS UPDATE: continue with group therapies, increased socialization, continue planning for after discharge goals, continue with medication compliance    SHORT-TERM GOALS UPDATE:   Time frame for Short-Term Goals: 5-7 days    LONG-TERM GOALS UPDATE:   Time frame for Long-Term Goals: 6 months  Members Present in Team Meeting: See Signature Sheet    Oscar Skinner

## 2021-06-13 NOTE — PLAN OF CARE
Problem: Falls - Risk of:  Goal: Will remain free from falls  Description: Will remain free from falls  Outcome: Ongoing   Patient remained free from falls during this shift. Problem: Falls - Risk of:  Goal: Absence of physical injury  Description: Absence of physical injury  Outcome: Ongoing   Patient remained free from physical injury. No sign of self harm noted. Problem: Substance Abuse:  Goal: Absence of drug withdrawal signs and symptoms  Description: Absence of drug withdrawal signs and symptoms  Outcome: Ongoing   Patient denies current withdrawal from substance abuse. Problem: Altered Mood, Psychotic Behavior:  Goal: Able to verbalize decrease in frequency and intensity of hallucinations  Description: Able to verbalize decrease in frequency and intensity of hallucinations  Outcome: Ongoing   Patient denies hallucinations at this time. Will continue to monitor for safety every 15 minutes and provide support as needed.

## 2021-06-13 NOTE — PLAN OF CARE
Problem: Falls - Risk of:  Goal: Will remain free from falls  Description: Will remain free from falls  6/13/2021 1317 by Be Shields  Outcome: Ongoing    Patient relates that she is in a lot of pain, focused on needing a wheelchair, but is able to walk on her own, bizarre gait, hunched over slightly. Parker is steady and no falls noted. Problem: Altered Mood, Psychotic Behavior:  Goal: Able to verbalize decrease in frequency and intensity of hallucinations  Description: Able to verbalize decrease in frequency and intensity of hallucinations  6/13/2021 1317 by Be Shields  Outcome: Ongoing    Denies any hallucinations at this time, but isolative for long intervals, no responding to internal stimuli noted. Problem: Altered Mood, Psychotic Behavior:  Goal: Able to verbalize reality based thinking  Description: Able to verbalize reality based thinking  6/13/2021 1317 by Be Shields  Outcome: Ongoing    She denies any suicidal thoughts and no self harming behaviors noted. Takes medications as ordered at this time, relating that she just wants her medications that she always takes, focused on pain. Poor appetite. Takes a shower independently. Her hair was full of burrs that she picked out and put on the floor, when asked how her hair got full of those and she relates, \"You don't want to know\". Evasive with specifics that brought her back. Isolative most of the day, no groups, but did attend her treatment team meeting.

## 2021-06-13 NOTE — GROUP NOTE
Group Therapy Note    Date: 6/13/2021    Group Start Time: 1010  Group End Time: 6651  Group Topic: Psychotherapy    [facility]    NANDA Chino, IVAN        Group Therapy Note    Attendees: 10/19         Patient was offered group therapy today but declined to participate despite encouragement from staff. 1:1 was offered.     Signature:  NANDA Chino, IVAN

## 2021-06-14 PROCEDURE — 6370000000 HC RX 637 (ALT 250 FOR IP): Performed by: PSYCHIATRY & NEUROLOGY

## 2021-06-14 PROCEDURE — 1240000000 HC EMOTIONAL WELLNESS R&B

## 2021-06-14 PROCEDURE — 6370000000 HC RX 637 (ALT 250 FOR IP): Performed by: NURSE PRACTITIONER

## 2021-06-14 PROCEDURE — APPSS30 APP SPLIT SHARED TIME 16-30 MINUTES: Performed by: PSYCHIATRY & NEUROLOGY

## 2021-06-14 PROCEDURE — 99232 SBSQ HOSP IP/OBS MODERATE 35: CPT | Performed by: PSYCHIATRY & NEUROLOGY

## 2021-06-14 RX ADMIN — HALOPERIDOL 5 MG: 5 TABLET ORAL at 09:07

## 2021-06-14 RX ADMIN — IBUPROFEN 400 MG: 400 TABLET, FILM COATED ORAL at 03:35

## 2021-06-14 RX ADMIN — LORAZEPAM 2 MG: 1 TABLET ORAL at 09:07

## 2021-06-14 RX ADMIN — DULOXETINE HYDROCHLORIDE 60 MG: 60 CAPSULE, DELAYED RELEASE ORAL at 08:34

## 2021-06-14 RX ADMIN — ALUMINUM HYDROXIDE, MAGNESIUM HYDROXIDE, AND SIMETHICONE 30 ML: 200; 200; 20 SUSPENSION ORAL at 18:19

## 2021-06-14 RX ADMIN — QUETIAPINE FUMARATE 100 MG: 100 TABLET ORAL at 22:04

## 2021-06-14 RX ADMIN — TRAZODONE HYDROCHLORIDE 50 MG: 50 TABLET ORAL at 22:04

## 2021-06-14 RX ADMIN — HYDROXYZINE HYDROCHLORIDE 50 MG: 50 TABLET, FILM COATED ORAL at 03:36

## 2021-06-14 RX ADMIN — HYDROCHLOROTHIAZIDE 50 MG: 25 TABLET ORAL at 08:34

## 2021-06-14 RX ADMIN — HYDROXYZINE HYDROCHLORIDE 50 MG: 50 TABLET, FILM COATED ORAL at 18:19

## 2021-06-14 RX ADMIN — IBUPROFEN 400 MG: 400 TABLET, FILM COATED ORAL at 18:19

## 2021-06-14 RX ADMIN — DULOXETINE HYDROCHLORIDE 60 MG: 60 CAPSULE, DELAYED RELEASE ORAL at 22:03

## 2021-06-14 ASSESSMENT — PAIN SCALES - GENERAL
PAINLEVEL_OUTOF10: 8
PAINLEVEL_OUTOF10: 0
PAINLEVEL_OUTOF10: 8
PAINLEVEL_OUTOF10: 4

## 2021-06-14 ASSESSMENT — PAIN DESCRIPTION - LOCATION: LOCATION: BACK

## 2021-06-14 NOTE — PROGRESS NOTES
BEHAVIORAL HEALTH FOLLOW-UP NOTE     6/14/2021     Patient was seen and examined in person, Chart reviewed   Patient's case discussed with staff/team   Patient attending groups: No, more visible on milieu   Patient compliant with medication: Yes    Chief Complaint: Acute psychosis    Interim History:     Staff report patient has maintained medication adherence and has been without acute behavioral changes in the last 24 hours. Patient is agreeable to assessment and requesting to remain in common area as she reports it is too painful to walk to her room. She continues to be extremely focused on her Marfan's syndrome, chronic pain and need for Flexeril and Lyrica and has little insight into the importance of follow-up outpatient care to maintain utilization of these medications. Patient is tearful and and unwilling to discuss her lack of follow-up after her previous discharge from facility and insists that she needs to speak to the attending physician. She is tearful however currently denies suicidal ideation and  contracts for safety on unit. She denies having questions or concerns related to plan. Appetite:   [] Normal/Unchanged  [] Increased  [x] Decreased      Sleep:       [] Normal/Unchanged  [] Fair       [x] Poor remains poor however patient reports greatly improved from prior to admission            Energy:    [] Normal/Unchanged  [] Increased  [x] Decreased        Aggression:  [] yes  [x] no    Patient is [x] able  [] unable to CONTRACT FOR SAFETY ON THE UNIT    PAST MEDICAL/PSYCHIATRIC HISTORY:   Past Medical History:   Diagnosis Date    Anxiety     Arthritis     O. A.    Bipolar depression ([facility]     Dermatitis     Dysuria     Exposure to STD     Folliculitis     Hx of acute bronchitis     Hypertension     Marfan syndrome     MVP (mitral valve prolapse)     Nausea & vomiting     Polysubstance abuse ([facility]     drug abuse includes cocaine, fentnyl, cannabis    PONV (postoperative nausea and vomiting)     Scoliosis     Seasonal allergies     Staph infection     HX OF ALSO BACTEREMIA    UTI (lower urinary tract infection)        FAMILY/SOCIAL HISTORY:  Family History   Problem Relation Age of Onset    Cancer Brother         testicular    Breast Cancer Maternal Grandmother     Hypertension Mother     Thyroid Disease Mother      Social History     Socioeconomic History    Marital status:      Spouse name: Not on file    Number of children: Not on file    Years of education: Not on file    Highest education level: Not on file   Occupational History    Not on file   Tobacco Use    Smoking status: Current Every Day Smoker     Years: 1.50     Types: Cigarettes     Last attempt to quit: 3/26/2015     Years since quittin.2    Smokeless tobacco: Never Used   Vaping Use    Vaping Use: Never used   Substance and Sexual Activity    Alcohol use: No    Drug use: Not Currently     Types: Opiates , Marijuana, Cocaine     Comment: drug abuse includes cocaine, fentnyl, cannabis    Sexual activity: Yes     Partners: Male   Other Topics Concern    Not on file   Social History Narrative    Not on file     Social Determinants of Health     Financial Resource Strain:     Difficulty of Paying Living Expenses:    Food Insecurity:     Worried About Running Out of Food in the Last Year:     Ran Out of Food in the Last Year:    Transportation Needs:     Lack of Transportation (Medical):      Lack of Transportation (Non-Medical):    Physical Activity:     Days of Exercise per Week:     Minutes of Exercise per Session:    Stress:     Feeling of Stress :    Social Connections:     Frequency of Communication with Friends and Family:     Frequency of Social Gatherings with Friends and Family:     Attends [employer] Services:     Active Member of Clubs or Organizations:     Attends Club or Organization Meetings:     Marital Status:    Intimate Partner Violence:     Fear of Current or Ex-Partner:     Emotionally Abused:     Physically Abused:     Sexually Abused:            ROS:  [x] All negative/unchanged except if checked.  Explain positive(checked items) below:  [] Constitutional  [] Eyes  [] Ear/Nose/Mouth/Throat  [] Respiratory  [] CV  [] GI  []   [x] Musculoskeletal  [] Skin/Breast  [] Neurological  [] Endocrine  [] Heme/Lymph  [] Allergic/Immunologic    Explanation: Patient reports chronic generalized pain related to Marfan syndrome    MEDICATIONS:    Current Facility-Administered Medications:     DULoxetine (CYMBALTA) extended release capsule 60 mg, 60 mg, Oral, BID, GUIDO Santana - CNP, 60 mg at 06/14/21 0834    hydroCHLOROthiazide (HYDRODIURIL) tablet 50 mg, 50 mg, Oral, Daily, GUIDO Santana CNP, 50 mg at 06/14/21 0834    QUEtiapine (SEROQUEL) tablet 100 mg, 100 mg, Oral, Nightly, Cheikh Alexandra MD, 100 mg at 06/13/21 2240    acetaminophen (TYLENOL) tablet 650 mg, 650 mg, Oral, Q4H PRN, Nakul Parikh MD, 650 mg at 06/13/21 0923    aluminum & magnesium hydroxide-simethicone (MAALOX) 200-200-20 MG/5ML suspension 30 mL, 30 mL, Oral, Q6H PRN, Nakul Parikh MD    hydrOXYzine (ATARAX) tablet 50 mg, 50 mg, Oral, TID PRN, Nakul Parikh MD, 50 mg at 06/14/21 0336    ibuprofen (ADVIL;MOTRIN) tablet 400 mg, 400 mg, Oral, Q6H PRN, Nakul Parikh MD, 400 mg at 06/14/21 0335    nicotine (NICODERM CQ) 14 MG/24HR 1 patch, 1 patch, Transdermal, Daily, Nakul Parikh MD, 1 patch at 06/14/21 0837    polyethylene glycol (GLYCOLAX) packet 17 g, 17 g, Oral, Daily PRN, Nakul Parikh MD    traZODone (DESYREL) tablet 50 mg, 50 mg, Oral, Nightly PRN, Nakul Parikh MD, 50 mg at 06/12/21 2105    [DISCONTINUED] LORazepam (ATIVAN) tablet 2 mg, 2 mg, Oral, Q4H PRN, 2 mg at 06/14/21 0907 **AND** haloperidol (HALDOL) tablet 5 mg, 5 mg, Oral, Q4H PRN, Nakul Parikh MD, 5 mg at 06/14/21 0907    [DISCONTINUED] LORazepam (ATIVAN) injection 2 mg, 2 mg, Intramuscular, Q4H PRN **AND** haloperidol lactate (HALDOL) injection 5 mg, 5 mg, Intramuscular, Q4H PRN **AND** diphenhydrAMINE (BENADRYL) injection 50 mg, 50 mg, Intramuscular, Q4H PRN, Alex Robles MD      Examination:  /70   Pulse 108   Temp 97.5 °F (36.4 °C) (Temporal)   Resp 14   Ht 5' 10\" (1.778 m)   Wt 200 lb (90.7 kg)   LMP  (LMP Unknown)   SpO2 100%   BMI 28.70 kg/m²   Gait - steady and slow  Medication side effects(SE): Denies    Mental Status Examination:    Level of consciousness:  within normal limits   Appearance:  poor grooming and poor hygiene  Behavior/Motor:  psychomotor retardation  Attitude toward examiner: Suspicious, fragmented eye contact and guarded  Speech:  normal volume and slow   Mood: depressed  Affect:  blunted  Thought processes: Circumstantial and very focused on medications  Thought content:  Homicidal ideation - none  Suicidal Ideation:  denies suicidal ideation  Delusions: Persecutory  Perceptual Disturbance:  auditory  Cognition:  oriented to person, place, and time   Concentration distractible  Insight poor   Judgement poor     ASSESSMENT:   Patient symptoms are:  [] Well controlled  [] Improving  [] Worsening  [x] No change      Diagnosis: *Principal Problem:    Acute psychosis ([facility]  Active Problems:    Opioid dependence on agonist therapy ([facility]  Resolved Problems:    * No resolved hospital problems. *      LABS:    No results for input(s): WBC, HGB, PLT in the last 72 hours. No results for input(s): NA, K, CL, CO2, BUN, CREATININE, GLUCOSE in the last 72 hours. No results for input(s): BILITOT, ALKPHOS, AST, ALT in the last 72 hours.   Lab Results   Component Value Date    LABAMPH neg 02/19/2016    BARBSCNU NEGATIVE 06/12/2021    LABBENZ NEGATIVE 06/12/2021    LABBENZ NEGATIVE 11/13/2012    LABMETH NEGATIVE 06/12/2021    OPIATESCREENURINE neg 02/19/2016    PHENCYCLIDINESCREENURINE neg 02/19/2016    PPXUR NOT REPORTED 06/12/2021     Lab Results   Component Value Date    TSH 0.75 05/25/2021     No results found for: LITHIUM  No results found for: VALPROATE, CBMZ      Treatment Plan:    Reviewed current Medications with the patient. Discussed with  and treatment team  Continue with current medication regimen and monitor symptoms  Continue to offer education related to the dangers of overutilizing controlled medications and the importance of follow-up outpatient care. Risks, benefits, side effects, drug-to-drug interactions and alternatives to treatment were discussed. The patient consented to treatment. Encourage patient to attend group and other milieu activities. Discharge planning discussed with the patient and treatment team.    PSYCHOTHERAPY/COUNSELING:  [] Therapeutic interview  [x] Supportive  [] CBT  [] Ongoing  [] Other    [x] Patient continues to need, on a daily basis, active treatment furnished directly by or requiring the supervision of inpatient psychiatric personnel      Anticipated Length of stay per MD Batista Soahm is a 46 y.o. female being evaluated face to face. --Marta Worthington, GUIDO - CNP on 6/14/2021 at 1:41 PM    An electronic signature was used to authenticate this note. **This report has been created using voice recognition software. It may contain minor errors which are inherent in voice recognition technology. **                                         Psychiatry Attending Attestation     I independently saw and evaluated the patient. I reviewed the Advance Practice Provider's documentation above. Any additional comments or changes to the Advance Practice Provider's documentation are stated below otherwise agree with assessment. Patient continues to have vague paranoia but is significantly better compared to her presentation. Appears more like substance-induced psychosis. Notes that her mood is better however she was very tearful during the interview.   She was going back and forth and pushing to get Lyrica and Flexeril. Discussed with her that I cannot give her any controlled substances due to recurrent drug abuse. Patient did mention that she has plans to go to rehab. Mentioned to her the social work will assist her with that. She understood and agreed to the plan. We got a phone call from her mother to social work requesting us to do expert evaluation. As a social work to commit to the mother that we do not know the patient long enough to do an expert evaluation for her for guardianship. Patient's family have to pursue that with her outpatient physician. We will continue same medication today and observe. Possible discharge is Wednesday morning.     Electronically signed by Haylee Wagner MD on 6/14/21 at 2:42 PM EDT

## 2021-06-14 NOTE — PLAN OF CARE
Problem: Falls - Risk of:  Goal: Will remain free from falls  Description: Will remain free from falls  6/14/2021 1125 by Bruce Sidhu  Outcome: Ongoing   Pt remains free of falls. Pt was able to walk to dayroom for breakfast without incident. Pt. Remains safe on the unit. Q 15 minute checks for safety maintained. Problem: Substance Abuse:  Goal: Absence of drug withdrawal signs and symptoms  Description: Absence of drug withdrawal signs and symptoms  6/14/2021 1125 by Bruce Sidhu  Outcome: Ongoing  Pt. States she would like to go to a drug rehab place when leaving here. Problem: Altered Mood, Psychotic Behavior:  Goal: Able to verbalize decrease in frequency and intensity of hallucinations  Description: Able to verbalize decrease in frequency and intensity of hallucinations  6/14/2021 1125 by Bruce Sidhu  Outcome: Ongoing  Pt denies hallucinations. Pt does take scheduled meds. Isolates in bed. Relates she is depressed and anxious. Denies suicidal thoughts. Pt. Remains safe on the unit. Q 15 minute checks for safety maintained. Problem: Pain:  Goal: Pain level will decrease  Description: Pain level will decrease  6/14/2021 1125 by Bruce Sidhu  Outcome: Ongoing  Pt states she has pain all over and that is why she is staying in bed all day.

## 2021-06-14 NOTE — GROUP NOTE
Group Therapy Note    Date: 6/14/2021    Group Start Time: 0900  Group End Time: 1173  Group Topic: Community Meeting    [address], [address] [place] [address] Therapy Note    Attendees: 11/23    patient refused to attend community meeting and goal setting group at [phone] after encouragement from staff. 1:1 talk time provided as alternative to group session.     Signature:  Saintclair Oms, [place]

## 2021-06-14 NOTE — GROUP NOTE
Group Therapy Note    Date: 6/14/2021    Group Start Time: 1330  Group End Time: 8969  Group Topic: Psychoeducation    ANYI Khoury    Patient declined to attend coping skills group at 1330 despite encouragement from staff. 1:1 talk time offered by staff as alternative to group session.       Signature:  Sumi Horowitz

## 2021-06-14 NOTE — GROUP NOTE
Group Therapy Note    Date: 6/14/2021    Group Start Time: 1000  Group End Time: 8683  Group Topic: Group Therapy    NANDA Meyers, IVAN        Group Therapy Note  Pt declined to attend psychotherapy at 1000 am despite encouragement. Pt offered 1:1 and refused.       Attendees: 13/23             Signature:  NANDA Pereira, IVAN

## 2021-06-14 NOTE — GROUP NOTE
HS Wrap Up Group  Date: June 8, 2021  Patient did not participate in HS Wrap Up Group. 1:1 talk time was offered as an alternative to group.   Signature: MARLEY Moreno

## 2021-06-15 PROCEDURE — APPSS30 APP SPLIT SHARED TIME 16-30 MINUTES: Performed by: PSYCHIATRY & NEUROLOGY

## 2021-06-15 PROCEDURE — 6370000000 HC RX 637 (ALT 250 FOR IP): Performed by: PSYCHIATRY & NEUROLOGY

## 2021-06-15 PROCEDURE — 6370000000 HC RX 637 (ALT 250 FOR IP): Performed by: NURSE PRACTITIONER

## 2021-06-15 PROCEDURE — 99232 SBSQ HOSP IP/OBS MODERATE 35: CPT | Performed by: PSYCHIATRY & NEUROLOGY

## 2021-06-15 PROCEDURE — 1240000000 HC EMOTIONAL WELLNESS R&B

## 2021-06-15 RX ORDER — PREGABALIN 75 MG/1
150 CAPSULE ORAL 2 TIMES DAILY
Status: DISCONTINUED | OUTPATIENT
Start: 2021-06-15 | End: 2021-06-16 | Stop reason: HOSPADM

## 2021-06-15 RX ADMIN — DULOXETINE HYDROCHLORIDE 60 MG: 60 CAPSULE, DELAYED RELEASE ORAL at 21:04

## 2021-06-15 RX ADMIN — QUETIAPINE FUMARATE 100 MG: 100 TABLET ORAL at 21:04

## 2021-06-15 RX ADMIN — HYDROXYZINE HYDROCHLORIDE 50 MG: 50 TABLET, FILM COATED ORAL at 21:04

## 2021-06-15 RX ADMIN — PREGABALIN 150 MG: 75 CAPSULE ORAL at 21:04

## 2021-06-15 RX ADMIN — TRAZODONE HYDROCHLORIDE 50 MG: 50 TABLET ORAL at 21:04

## 2021-06-15 RX ADMIN — IBUPROFEN 400 MG: 400 TABLET, FILM COATED ORAL at 08:36

## 2021-06-15 RX ADMIN — HYDROCHLOROTHIAZIDE 50 MG: 25 TABLET ORAL at 08:28

## 2021-06-15 RX ADMIN — HYDROXYZINE HYDROCHLORIDE 50 MG: 50 TABLET, FILM COATED ORAL at 08:28

## 2021-06-15 RX ADMIN — DULOXETINE HYDROCHLORIDE 60 MG: 60 CAPSULE, DELAYED RELEASE ORAL at 08:28

## 2021-06-15 RX ADMIN — PREGABALIN 150 MG: 75 CAPSULE ORAL at 15:34

## 2021-06-15 ASSESSMENT — PAIN SCALES - GENERAL
PAINLEVEL_OUTOF10: 7
PAINLEVEL_OUTOF10: 8

## 2021-06-15 ASSESSMENT — PAIN DESCRIPTION - LOCATION: LOCATION: BACK

## 2021-06-15 NOTE — CARE COORDINATION
Patient refused to attend psychoeducation group at 10:00 am after encouragement from staff.   1:1 talk time provided as alternative to group session

## 2021-06-15 NOTE — GROUP NOTE
Group Therapy Note    Date: 6/15/2021    Group Start Time: 1100  Group End Time: 9621  Group Topic: Recreational    [address] [facility], [facility]    Patient refused to attend Recreational Therapy group at 1100 after encouragement from staff.   1:1 talk time provided as alternative to group session     Signature:  Roberta Murphy [place]

## 2021-06-15 NOTE — GROUP NOTE
Group Therapy Note    Date: 6/15/2021    Group Start Time: 1430  Group End Time: 0107  Group Topic: Psychoeducation    ANYI Muñoz, CTRS    Patient refused to attend benefits of music for coping group at 1430 after encouragement from staff. 1:1 talk time offered by staff as alternative to group session.

## 2021-06-15 NOTE — GROUP NOTE
Group Therapy Note    Date: 6/15/2021    Group Start Time: 0900  Group End Time: 0920  Group Topic: Community Meeting    [address] [facility]    Patient refused to attend community meeting and goal setting group at 0900 after encouragement from staff. 1:1 talk time offered by staff as alternative to group session.

## 2021-06-15 NOTE — GROUP NOTE
Group Therapy Note    Date: 6/15/2021    Group Start Time: 1330  Group End Time: 6515  Group Topic: Healthy Living/Wellness    [address], [facility]        Group Therapy Note    Attendees:4/19    patient refused to attend decision making skills group at [phone] after encouragement from staff. 1:1 talk time provided as alternative to group session.           Signature:  Unknown [place], [address]

## 2021-06-15 NOTE — PROGRESS NOTES
[facility] FOLLOW-UP NOTE     6/15/2021     Patient was seen and examined in person, Chart reviewed   Patient's case discussed with staff/team   Patient attending groups: No, more visible on milieu   Patient compliant with medication: Yes    Chief Complaint: Acute psychosis    Interim History:     Staff report patient continues to maintain medication adherence and has not required emergency medications since yesterday morning at [phone]. She did utilize hydroxyzine, trazodone and ibuprofen last evening related to anxiety, insomnia and generalized pain and reports with good effect. Patient is extremely tearful today and remains focused on her physical symptoms and drug use. She reports feeling helpless and hopeless related to inability to maintain sobriety and how she has let her family down. She endorses improving suicidal ideation and contracts for safety on the unit. She continues to explore her options regarding AOD treatment and has concerns that she does not have appropriate belongings. Reassurance given that the team will help her organize the items needed to transition appropriately. She denies side effects related to her current medication regimen or having questions or concerns related to current treatment plan. Appetite:   [] Normal/Unchanged  [] Increased  [x] Decreased      Sleep:       [] Normal/Unchanged  [x] Fair       [x] Poor patient reports\" I tossed and turned all night\"            Energy:    [] Normal/Unchanged  [] Increased  [x] Decreased        Aggression:  [] yes  [x] no    Patient is [x] able  [] unable to CONTRACT FOR SAFETY ON THE UNIT    PAST MEDICAL/PSYCHIATRIC HISTORY:   Past Medical History:   Diagnosis Date    Anxiety     Arthritis     O. A.    Bipolar depression ([facility]     Dermatitis     Dysuria     Exposure to STD     Folliculitis     Hx of acute bronchitis     Hypertension     Marfan syndrome     MVP (mitral valve prolapse)     Nausea & vomiting     Polysubstance abuse ([facility]     drug abuse includes cocaine, fentnyl, cannabis    PONV (postoperative nausea and vomiting)     Scoliosis     Seasonal allergies     Staph infection     HX OF ALSO BACTEREMIA    UTI (lower urinary tract infection)        FAMILY/SOCIAL HISTORY:  Family History   Problem Relation Age of Onset    Cancer Brother         testicular    Breast Cancer Maternal Grandmother     Hypertension Mother     Thyroid Disease Mother      Social History     Socioeconomic History    Marital status:      Spouse name: Not on file    Number of children: Not on file    Years of education: Not on file    Highest education level: Not on file   Occupational History    Not on file   Tobacco Use    Smoking status: Current Every Day Smoker     Years: 1.50     Types: Cigarettes     Last attempt to quit: 3/26/2015     Years since quittin.2    Smokeless tobacco: Never Used   Vaping Use    Vaping Use: Never used   Substance and Sexual Activity    Alcohol use: No    Drug use: Not Currently     Types: Opiates , Marijuana, Cocaine     Comment: drug abuse includes cocaine, fentnyl, cannabis    Sexual activity: Yes     Partners: Male   Other Topics Concern    Not on file   Social History Narrative    Not on file     Social Determinants of Health     Financial Resource Strain:     Difficulty of Paying Living Expenses:    Food Insecurity:     Worried About Running Out of Food in the Last Year:     Ran Out of Food in the Last Year:    Transportation Needs:     Lack of Transportation (Medical):      Lack of Transportation (Non-Medical):    Physical Activity:     Days of Exercise per Week:     Minutes of Exercise per Session:    Stress:     Feeling of Stress :    Social Connections:     Frequency of Communication with Friends and Family:     Frequency of Social Gatherings with Friends and Family:     Attends [employer] Services:     Active Member of Clubs or Organizations:     Attends Club or Organization Meetings:     Marital Status:    Intimate Partner Violence:     Fear of Current or Ex-Partner:     Emotionally Abused:     Physically Abused:     Sexually Abused:            ROS:  [x] All negative/unchanged except if checked.  Explain positive(checked items) below:  [] Constitutional  [] Eyes  [] Ear/Nose/Mouth/Throat  [] Respiratory  [] CV  [] GI  []   [x] Musculoskeletal  [] Skin/Breast  [] Neurological  [] Endocrine  [] Heme/Lymph  [] Allergic/Immunologic    Explanation: Patient reports chronic generalized pain related to Marfan syndrome    MEDICATIONS:    Current Facility-Administered Medications:     DULoxetine (CYMBALTA) extended release capsule 60 mg, 60 mg, Oral, BID, GUIDO Santana - CNP, 60 mg at 06/15/21 0828    hydroCHLOROthiazide (HYDRODIURIL) tablet 50 mg, 50 mg, Oral, Daily, GUIDO Santana - CNP, 50 mg at 06/15/21 0828    QUEtiapine (SEROQUEL) tablet 100 mg, 100 mg, Oral, Nightly, Rishabh Luna MD, 100 mg at 06/14/21 2204    acetaminophen (TYLENOL) tablet 650 mg, 650 mg, Oral, Q4H PRN, Nick Webb MD, 650 mg at 06/13/21 0923    aluminum & magnesium hydroxide-simethicone (MAALOX) 200-200-20 MG/5ML suspension 30 mL, 30 mL, Oral, Q6H PRN, Nick Webb MD, 30 mL at 06/14/21 1819    hydrOXYzine (ATARAX) tablet 50 mg, 50 mg, Oral, TID PRN, Nick Webb MD, 50 mg at 06/15/21 0828    ibuprofen (ADVIL;MOTRIN) tablet 400 mg, 400 mg, Oral, Q6H PRN, Nick Webb MD, 400 mg at 06/15/21 0836    nicotine (NICODERM CQ) 14 MG/24HR 1 patch, 1 patch, Transdermal, Daily, Nick Webb MD, 1 patch at 06/14/21 0837    polyethylene glycol (GLYCOLAX) packet 17 g, 17 g, Oral, Daily PRN, Nick Webb MD    traZODone (DESYREL) tablet 50 mg, 50 mg, Oral, Nightly PRN, Nick Webb MD, 50 mg at 06/14/21 2204    [DISCONTINUED] LORazepam (ATIVAN) tablet 2 mg, 2 mg, Oral, Q4H PRN, 2 mg at 06/14/21 0907 **AND** haloperidol (HALDOL) tablet 5 mg, 5 mg, Oral, Q4H PRN, Marvel Najjar, MD, 5 mg at 06/14/21 0907    [DISCONTINUED] LORazepam (ATIVAN) injection 2 mg, 2 mg, Intramuscular, Q4H PRN **AND** haloperidol lactate (HALDOL) injection 5 mg, 5 mg, Intramuscular, Q4H PRN **AND** diphenhydrAMINE (BENADRYL) injection 50 mg, 50 mg, Intramuscular, Q4H PRN, Marvel Najjar, MD      Examination:  /73   Pulse 103   Temp 98.2 °F (36.8 °C) (Oral)   Resp 14   Ht 5' 10\" (1.778 m)   Wt 200 lb (90.7 kg)   LMP  (LMP Unknown)   SpO2 100%   BMI 28.70 kg/m²   Gait - steady and slow  Medication side effects(SE): Denies    Mental Status Examination:    Level of consciousness: Resting but responds to verbal stimulus  Appearance:  poor grooming and poor hygiene  Behavior/Motor: Tearful, psychomotor retardation  Attitude toward examiner: Cooperative, good eye contact  Speech: Delayed, normal volume  Mood: Patient reports \"I am really bad today \"presents depressed  Affect:  blunted  Thought processes: Circumstantial and very focused on medications  Thought content:  Homicidal ideation - none  Suicidal Ideation: Endorses improving suicidal ideation  Delusions: Denies  Perceptual Disturbance: Denies  Cognition:  oriented to person, place, and time   Concentration: Appropriate  Insight poor   Judgement poor     ASSESSMENT:   Patient symptoms are:  [] Well controlled  [] Improving  [] Worsening  [x] No change      Diagnosis: *Principal Problem:    Acute psychosis ([facility]  Active Problems:    Opioid dependence on agonist therapy ([facility]  Resolved Problems:    * No resolved hospital problems. *      LABS:    No results for input(s): WBC, HGB, PLT in the last 72 hours. No results for input(s): NA, K, CL, CO2, BUN, CREATININE, GLUCOSE in the last 72 hours. No results for input(s): BILITOT, ALKPHOS, AST, ALT in the last 72 hours.   Lab Results   Component Value Date    LABAMPH neg 02/19/2016    BARBSCNU NEGATIVE 06/12/2021    LABBENZ NEGATIVE 06/12/2021    LABBENZ NEGATIVE 11/13/2012    LABMETH NEGATIVE 06/12/2021    OPIATESCREENURINE neg 02/19/2016    PHENCYCLIDINESCREENURINE neg 02/19/2016    PPXUR NOT REPORTED 06/12/2021     Lab Results   Component Value Date    TSH 0.75 05/25/2021     No results found for: LITHIUM  No results found for: VALPROATE, CBMZ      Treatment Plan:    Reviewed current Medications with the patient. Continue with current medication regimen and monitor symptoms  Continue to offer education related to the dangers of overutilizing controlled medications and the importance of follow-up outpatient care. Risks, benefits, side effects, drug-to-drug interactions and alternatives to treatment were discussed. The patient consented to treatment. Encourage patient to attend group and other milieu activities. Discharge planning discussed with the patient and treatment team.    PSYCHOTHERAPY/COUNSELING:  [] Therapeutic interview  [x] Supportive  [] CBT  [] Ongoing  [] Other    [x] Patient continues to need, on a daily basis, active treatment furnished directly by or requiring the supervision of inpatient psychiatric personnel      Anticipated Length of stay per MD Duckworthfreddie Blankenship is a 46 y.o. female being evaluated face to face. --GUIDO Parrish CNP on 6/15/2021 at 1:09 PM    An electronic signature was used to authenticate this note. **This report has been created using voice recognition software. It may contain minor errors which are inherent in voice recognition technology. **                                         Psychiatry Attending Attestation     I independently saw and evaluated the patient. I reviewed the Advance Practice Provider's documentation above. Any additional comments or changes to the Advance Practice Provider's documentation are stated below otherwise agree with assessment. Patient reports that she is feeling little better today. Her thought process more organized.   Denies any suicidal or homicidal ideation plan or intent. Discussed at length with her about the option of going to a sober living facility directly from here. Patient mentions that she wants to go home first and then go to a sober living facility. Mentioned to her that this might be a bad idea as she has been living with a person (whom she met here on the unit in the previous visit) and has been using crack cocaine and meth. Discussed with her some of her paranoid thoughts might have been worsening significantly secondary to meth use. Patient is fully agreeing however does not want to go directly to a sober living facility from here. Patient's family called and were concerned that she might relapse if she goes home. He understands that there is a significant concern however we cannot force the patient to go to a rehab facility at this time. Discussed with her about adding her Lyrica back in the lower dose as she has been dealing with significant back pain and has been not able to walk more than a few yards. She continues to remain isolated to her room. Possible discharge is tomorrow morning.     Electronically signed by Maricel Aparicio MD on 6/15/21 at 1:50 PM EDT

## 2021-06-15 NOTE — PLAN OF CARE
Problem: Falls - Risk of:  Goal: Will remain free from falls  Description: Will remain free from falls  6/14/2021 2212 by Michelle James LPN  Outcome: Ongoing     Problem: Altered Mood, Psychotic Behavior:  Goal: Able to verbalize reality based thinking  Description: Able to verbalize reality based thinking  Outcome: Ongoing     Patient denies all thoughts or ideations to harm self. Patient is mostly isolative to room but responds to staff and is medication and behavorial compliant. Patient remains free from falls and is AOx4. Patient is provided with safe environment. Will continue to monitor Q15 minute safety checks.

## 2021-06-15 NOTE — GROUP NOTE
HS Wrap Up Group  Date: June 14, 2021  Patient did not participate in HS Wrap Up Group. 1:1 talk time was offered as an alternative to group.   Signature: MARLEY Pierce

## 2021-06-16 VITALS
WEIGHT: 200 LBS | OXYGEN SATURATION: 100 % | RESPIRATION RATE: 16 BRPM | HEIGHT: 70 IN | DIASTOLIC BLOOD PRESSURE: 77 MMHG | TEMPERATURE: 98 F | SYSTOLIC BLOOD PRESSURE: 116 MMHG | HEART RATE: 90 BPM | BODY MASS INDEX: 28.63 KG/M2

## 2021-06-16 PROCEDURE — 6370000000 HC RX 637 (ALT 250 FOR IP): Performed by: PSYCHIATRY & NEUROLOGY

## 2021-06-16 PROCEDURE — 99239 HOSP IP/OBS DSCHRG MGMT >30: CPT | Performed by: PSYCHIATRY & NEUROLOGY

## 2021-06-16 PROCEDURE — 6370000000 HC RX 637 (ALT 250 FOR IP): Performed by: NURSE PRACTITIONER

## 2021-06-16 RX ORDER — QUETIAPINE FUMARATE 100 MG/1
100 TABLET, FILM COATED ORAL NIGHTLY
Qty: 30 TABLET | Refills: 0 | Status: ON HOLD | OUTPATIENT
Start: 2021-06-16 | End: 2021-07-28

## 2021-06-16 RX ADMIN — PREGABALIN 150 MG: 75 CAPSULE ORAL at 09:05

## 2021-06-16 RX ADMIN — DULOXETINE HYDROCHLORIDE 60 MG: 60 CAPSULE, DELAYED RELEASE ORAL at 09:05

## 2021-06-16 RX ADMIN — ALUMINUM HYDROXIDE, MAGNESIUM HYDROXIDE, AND SIMETHICONE 30 ML: 200; 200; 20 SUSPENSION ORAL at 14:02

## 2021-06-16 RX ADMIN — HYDROCHLOROTHIAZIDE 50 MG: 25 TABLET ORAL at 09:05

## 2021-06-16 RX ADMIN — HYDROXYZINE HYDROCHLORIDE 50 MG: 50 TABLET, FILM COATED ORAL at 12:55

## 2021-06-16 NOTE — BH NOTE
Opal sister Lisandro Valdez (DORIAN signed) called to see what the plan for patient after discharge. She would like Opal to go straight to a treatment facility follow discharge. She is afraid that patient will go back to her apartment and use again. The area Ag lives in is filled with drugs. Lisandro Valdez states that doctor and social work can call her at anytime.

## 2021-06-16 NOTE — DISCHARGE INSTR - OTHER ORDERS
Please take all medications as prescribed and go to all follow up appointments. Avoid alcohol and street drugs.

## 2021-06-16 NOTE — GROUP NOTE
Group Therapy Note    Date: 6/16/2021    Group Start Time: 1330  Group End Time: 6851  Group Topic: Relaxation    KARINA Mcknight    Group Therapy Note    Attendees: 9    Patient's Goal:  Pt will identify benefits of relaxation techniques for mental health    Notes:  Pt attended group and participated appropriately    Status After Intervention:  Improved    Participation Level:  Active Listener and Interactive    Participation Quality: Appropriate, Attentive, Sharing and Supportive      Speech:  normal      Thought Process/Content: Logical  Linear      Affective Functioning: Constricted/Restricted      Mood: euthymic      Level of consciousness:  Alert, Oriented x4 and Attentive      Response to Learning: Able to verbalize current knowledge/experience, Able to verbalize/acknowledge new learning, Able to retain information, Able to change behavior and Progressing to goal      Endings: None Reported    Modes of Intervention: Education, Support, Socialization and Exploration      Discipline Responsible: Psychoeducational Specialist      Signature:  Roberta Murphy [place]

## 2021-06-16 NOTE — CARE COORDINATION
SW met with patient to discuss discharge plans. Patient wanted to go to [facility] for inpatient treatment since she is already a consumer there for treatment. Contact was made with [facility] who said patient would need to complete an application for recovery housing and they would be willing to email SW a copy however SW never received an application. [facility] and she completed an intake.   Patient information was faxed to Darcie Feliciano for consideration of patient to the program.

## 2021-06-16 NOTE — BH NOTE
Patient given tobacco quitline number 55838232743 at this time, refusing to call at this time, states \" I just dont want to quit now\"- patient given information as to the dangers of long term tobacco use. Continue to reinforce the importance of tobacco cessation.

## 2021-06-16 NOTE — SUICIDE SAFETY PLAN
SAFETY PLAN    A suicide Safety Plan is a document that supports someone when they are having thoughts of suicide. Warning Signs that indicate a suicidal crisis may be developing: What (situations, thoughts, feelings, body sensations, behaviors, etc.) do you experience that lets you know you are beginning to think about suicide? 1. Not sleeping again  2. Hearing voices  3. Overexerting/isolating    Internal Coping Strategies:  What things can I do (relaxation techniques, hobbies, physical activities, etc.) to take my mind off my problems without contacting another person? 1. Call my sponsor  2. Take a bath  3. Read from the big book/recovery bible    People and social settings that provide distraction: Who can I call or where can I go to distract me? 1. Name: my sister Sandra Park    2. Name: my mom Ariela Tsang    3. Place: my neighbors house          4. Place: my sister's house        Professionals or [address] [place] [address] Blvd I can contact during a crisis: Who can I call for help - for example, my doctor, my psychiatrist, my psychologist, a mental health provider, a suicide hotline? 1. [place]  [phone]    2. [facility] [phone]    3. Suicide Prevention Lifeline: 7-656-586-TALK (9691)    4. Local Behavioral Health Emergency Services    [facility] Hotline: 875  Crisis Text Hotline: text HOME to 465327    Making the environment safe: How can I make my environment (house/apartment/living space) safer? For example, can I remove guns, medications, and other items? 1. Take my medications as prescribed  2.  Attend all scheduled appointments

## 2021-06-16 NOTE — DISCHARGE SUMMARY
hyperverbal and pacing, and appeared to be manic. Per social work, the patient did not remember being hospitalized at the [facility] last month, did not follow-up with outpatient mental health services, and has been off her psychiatric medications for the past 2 days. She also reported that she had not slept in 2 days. The patient was noted to be talking to the ED door, believing that her boyfriend is on the other side of the door.       Upon assessment today, the patient stated that she was passed out in someone's lawn and believes that she is dehydrated. She is worried about cutting her hair because of the burs in her hair. She is very paranoid and became tearful. She believes that since her hospital discharge, her providers at the [facility] have been \"messing with her. \"  She states that we have been sending messages through her radio. She also states that we have been getting her neighbor to mess with her. She stated that yesterday lady pretending to be her mother came here, and also someone pretending to be her daughter. She states a girl that is 9 was sitting on her doorsteps and is attempting to set her up for pedophilia. Hospital Course:   Upon admission, Tonya Barthel was provided a safe secure environment, introduced to unit milieu. Patient participated in groups and individual therapies. Meds were adjusted as noted below. After few days of hospital care, patient began to feel improvement. Depression lifted, thoughts to harm self ceased. Sleep improved, appetite was good. On morning rounds 6/16/2021, Tonya Barthel endorses feeling ready for discharge. Patient denies suicidal or homicidal ideations, denies hallucinations or delusions. Denies SE's from meds. It was decided that maximum benefit from hospital care had been achieved and patient can be discharged.      Consults:   none    Significant Diagnostic Studies: Routine labs and diagnostics    Treatments: Psychotropic medications, therapy with group, as scheduled with Alisha draper       Signed:    Electronically signed by Stew Quiñones MD on 6/16/21 at 4:03 PM EDT    Time Spent on discharge is more than 35 minutes in the examination, evaluation, counseling and review of medications and discharge plan.

## 2021-06-16 NOTE — FLOWSHEET NOTE
Patient attended Spirituality Group with Prem Rand.     06/16/21 1515   Encounter Summary   Services provided to: Patient   Referral/Consult From: Rounding   Continue Visiting   (6/16/21)   Complexity of Encounter Low   Length of Encounter 30 minutes   Spiritual Assessment Completed Yes   Spiritual/Jew   Type Spiritual support   Assessment Approachable   Intervention Sustaining presence/ Ministry of presence   Outcome Receptive

## 2021-06-16 NOTE — GROUP NOTE
Group Therapy Note    Date: 6/16/2021    Group Start Time: 0900  Group End Time: 7857  Group Topic: Community Meeting    [facility], [facility]      Patient refused to attend community meeting/goal setting group at 0900 after encouragement from staff. 1:1 talk time provided by staff.        Signature:  Grzegorz Guerrero, [address]

## 2021-06-16 NOTE — GROUP NOTE
Group Therapy Note    Date: 6/16/2021    Group Start Time: 1100  Group End Time: 2835  Group Topic: Recreational    [facility] KARINA Martin        Group Therapy Note    Attendees: 8/18         Patient's Goal:  Increase interpersonal interaction    Notes:      Status After Intervention:  Improved    Participation Level:  Active Listener and Interactive    Participation Quality: Appropriate, Attentive, Sharing and Supportive      Speech:  normal      Thought Process/Content: Logical      Affective Functioning: Congruent      Mood: euthymic      Level of consciousness:  Alert, Oriented x4 and Attentive      Response to Learning: Able to verbalize current knowledge/experience and Progressing to goal      Endings: None Reported    Modes of Intervention: Education, Support, Socialization, Exploration, Clarifying and Activity      Discipline Responsible: Psychoeducational Specialist      Signature:  Jairo De Jesus, [address]

## 2021-06-16 NOTE — GROUP NOTE
Group Therapy Note    Date: 6/16/2021    Group Start Time: 1000  Group End Time: 9639  Group Topic: Group Therapy    ANYI FINCH    NANDA Steiner LSW        Group Therapy Note    Attendees: 8/18         Patient's Goal:  Increase interpersonal relationship skills     Notes:  Patient was an active participant, arrived to group late    Status After Intervention:  Unchanged    Participation Level:  Active Listener and Interactive    Participation Quality: Appropriate, Attentive, Sharing and Supportive      Speech:  normal      Thought Process/Content: Logical      Affective Functioning: Congruent      Mood: anxious      Level of consciousness:  Alert, Oriented x4 and Attentive      Response to Learning: Able to verbalize current knowledge/experience      Endings: None Reported    Modes of Intervention: Support, Socialization, Exploration and Clarifying      Discipline Responsible: /Counselor      Signature:  NANDA Steiner LSW

## 2021-06-16 NOTE — BH NOTE
[address] [facility]  Discharge Note    Pt discharged with followings belongings:   Dentures: None  Vision - Corrective Lenses: None  Hearing Aid: None  Jewelry: Ring (2 rings [id number])  Body Piercings Removed: N/A  Clothing: Footwear, Pants, Shirt  Were All Patient Medications Collected?: Not Applicable  Other Valuables: None   Valuables sent home with patient. Valuables retrieved from safe, Security envelope number:  [id number] and returned to patient. Patient education on aftercare instructions: given to patient  Information faxed to [facility] by nurse Patient verbalize understanding of AVS:  Yes, pt discharge to [facility] with sister.     Status EXAM upon discharge:  Status and Exam  Normal: No  Facial Expression: Flat  Affect: Appropriate  Level of Consciousness: Alert  Mood:Normal: No  Mood: Depressed  Motor Activity:Normal: Yes  Motor Activity: Decreased  Interview Behavior: Cooperative  Preception: [place] to Person, [place] to Time, [place] to Place, [place] to Situation  Attention:Normal: No  Attention: Distractible, Unable to Concentrate  Thought Processes: Circumstantial  Thought Content:Normal: No  Thought Content: Preoccupations  Hallucinations: None  Delusions: No  Delusions: Persecution  Memory:Normal: No  Memory: Poor Recent, Poor Remote  Insight and Judgment: No  Insight and Judgment: Poor Judgment, Poor Insight  Present Suicidal Ideation: No  Present Homicidal Ideation: No      Metabolic Screening:    Lab Results   Component Value Date    LABA1C 5.1 08/22/2019       Lab Results   Component Value Date    CHOL 168 08/22/2019    CHOL 152 06/10/2018    CHOL 206 (H) 02/21/2018    CHOL 182 09/14/2015    CHOL 132 09/07/2013     Lab Results   Component Value Date    TRIG 79 08/22/2019    TRIG 141 06/10/2018    TRIG 113 02/21/2018    TRIG 54 09/14/2015    TRIG 88 09/07/2013     Lab Results   Component Value Date    HDL 53 08/22/2019    HDL 39 (L) 06/10/2018    HDL 59 02/21/2018    HDL 71 09/14/2015    HDL 53 09/07/2013     No components found for: LDLCAL  No results found for: Roma Pabon LPN

## 2021-06-17 NOTE — CARE COORDINATION
Name: Manoj Merino    : 1980    Discharge Date:   Primary Auth/Cert #: [id number]  Destination: [facility]     Discharge Medications:      Medication List      CHANGE how you take these medications    QUEtiapine 100 MG tablet  Commonly known as: SEROQUEL  Take 1 tablet by mouth nightly  What changed:   · medication strength  · how much to take  Notes to patient: It is used to treat low mood (depression        CONTINUE taking these medications    DULoxetine 60 MG extended release capsule  Commonly known as: CYMBALTA  Notes to patient: It is used to treat low mood (depression). It is used to treat anxiety     hydroCHLOROthiazide 50 MG tablet  Commonly known as: HYDRODIURIL  Notes to patient: It is used to treat high blood pressure     hydrOXYzine 50 MG tablet  Commonly known as: ATARAX  Notes to patient: anxiety     pregabalin 300 MG capsule  Commonly known as: LYRICA  Take 1 capsule by mouth 2 times daily for 4 doses. Notes to patient: It is used to treat painful nerve diseases.         STOP taking these medications    buprenorphine-naloxone 8-2 MG Subl SL tablet  Commonly known as: SUBOXONE     cyclobenzaprine 10 MG tablet  Commonly known as: FLEXERIL           Where to Get Your Medications      These medications were sent to [facility] [address], [address] [facility]  [address]    Phone: [phone]   · QUEtiapine 100 MG tablet         Follow Up Appointment: [facility]  [address] [facility] [place] [phone] on 2021  arrive prior to 5PM

## 2021-07-27 ENCOUNTER — HOSPITAL ENCOUNTER (INPATIENT)
Age: 41
LOS: 6 days | Discharge: HOME | DRG: 885 | End: 2021-08-02
Attending: STUDENT IN AN ORGANIZED HEALTH CARE EDUCATION/TRAINING PROGRAM | Admitting: PSYCHIATRY & NEUROLOGY
Payer: COMMERCIAL

## 2021-07-27 DIAGNOSIS — F29 PSYCHOSIS, UNSPECIFIED PSYCHOSIS TYPE (HCC): Primary | ICD-10-CM

## 2021-07-27 LAB
-: ABNORMAL
ABSOLUTE EOS #: 0.1 K/UL (ref 0–0.4)
ABSOLUTE IMMATURE GRANULOCYTE: ABNORMAL K/UL (ref 0–0.3)
ABSOLUTE LYMPH #: 2 K/UL (ref 1–4.8)
ABSOLUTE MONO #: 0.5 K/UL (ref 0.1–1.3)
ALBUMIN SERPL-MCNC: 4.2 G/DL (ref 3.5–5.2)
ALBUMIN/GLOBULIN RATIO: ABNORMAL (ref 1–2.5)
ALP BLD-CCNC: 91 U/L (ref 35–104)
ALT SERPL-CCNC: 11 U/L (ref 5–33)
AMORPHOUS: ABNORMAL
AMPHETAMINE SCREEN URINE: NEGATIVE
ANION GAP SERPL CALCULATED.3IONS-SCNC: 7 MMOL/L (ref 9–17)
AST SERPL-CCNC: 12 U/L
BACTERIA: ABNORMAL
BARBITURATE SCREEN URINE: NEGATIVE
BASOPHILS # BLD: 1 % (ref 0–2)
BASOPHILS ABSOLUTE: 0 K/UL (ref 0–0.2)
BENZODIAZEPINE SCREEN, URINE: NEGATIVE
BILIRUB SERPL-MCNC: 0.51 MG/DL (ref 0.3–1.2)
BILIRUBIN URINE: NEGATIVE
BUN BLDV-MCNC: 5 MG/DL (ref 6–20)
BUN/CREAT BLD: ABNORMAL (ref 9–20)
BUPRENORPHINE URINE: ABNORMAL
CALCIUM SERPL-MCNC: 9.5 MG/DL (ref 8.6–10.4)
CANNABINOID SCREEN URINE: POSITIVE
CASTS UA: ABNORMAL /LPF
CHLORIDE BLD-SCNC: 101 MMOL/L (ref 98–107)
CO2: 29 MMOL/L (ref 20–31)
COCAINE METABOLITE, URINE: NEGATIVE
COLOR: YELLOW
COMMENT UA: ABNORMAL
CREAT SERPL-MCNC: 0.59 MG/DL (ref 0.5–0.9)
CRYSTALS, UA: ABNORMAL /HPF
DIFFERENTIAL TYPE: ABNORMAL
EOSINOPHILS RELATIVE PERCENT: 2 % (ref 0–4)
EPITHELIAL CELLS UA: ABNORMAL /HPF
ETHANOL PERCENT: <0.01 %
ETHANOL: <10 MG/DL
GFR AFRICAN AMERICAN: >60 ML/MIN
GFR NON-AFRICAN AMERICAN: >60 ML/MIN
GFR SERPL CREATININE-BSD FRML MDRD: ABNORMAL ML/MIN/{1.73_M2}
GFR SERPL CREATININE-BSD FRML MDRD: ABNORMAL ML/MIN/{1.73_M2}
GLUCOSE BLD-MCNC: 101 MG/DL (ref 70–99)
GLUCOSE URINE: NEGATIVE
HCG QUALITATIVE: NEGATIVE
HCT VFR BLD CALC: 42.5 % (ref 36–46)
HEMOGLOBIN: 14 G/DL (ref 12–16)
IMMATURE GRANULOCYTES: ABNORMAL %
KETONES, URINE: NEGATIVE
LEUKOCYTE ESTERASE, URINE: ABNORMAL
LYMPHOCYTES # BLD: 30 % (ref 24–44)
MCH RBC QN AUTO: 28.5 PG (ref 26–34)
MCHC RBC AUTO-ENTMCNC: 33 G/DL (ref 31–37)
MCV RBC AUTO: 86.2 FL (ref 80–100)
MDMA URINE: ABNORMAL
METHADONE SCREEN, URINE: NEGATIVE
METHAMPHETAMINE, URINE: ABNORMAL
MONOCYTES # BLD: 7 % (ref 1–7)
MUCUS: ABNORMAL
NITRITE, URINE: NEGATIVE
NRBC AUTOMATED: ABNORMAL PER 100 WBC
OPIATES, URINE: NEGATIVE
OTHER OBSERVATIONS UA: ABNORMAL
OXYCODONE SCREEN URINE: NEGATIVE
PDW BLD-RTO: 15.1 % (ref 11.5–14.9)
PH UA: 6 (ref 5–8)
PHENCYCLIDINE, URINE: NEGATIVE
PLATELET # BLD: 381 K/UL (ref 150–450)
PLATELET ESTIMATE: ABNORMAL
PMV BLD AUTO: 7 FL (ref 6–12)
POTASSIUM SERPL-SCNC: 4.1 MMOL/L (ref 3.7–5.3)
PROPOXYPHENE, URINE: ABNORMAL
PROTEIN UA: NEGATIVE
RBC # BLD: 4.93 M/UL (ref 4–5.2)
RBC # BLD: ABNORMAL 10*6/UL
RBC UA: ABNORMAL /HPF
RENAL EPITHELIAL, UA: ABNORMAL /HPF
SARS-COV-2, RAPID: NOT DETECTED
SEG NEUTROPHILS: 60 % (ref 36–66)
SEGMENTED NEUTROPHILS ABSOLUTE COUNT: 3.9 K/UL (ref 1.3–9.1)
SODIUM BLD-SCNC: 137 MMOL/L (ref 135–144)
SPECIFIC GRAVITY UA: 1.01 (ref 1–1.03)
SPECIMEN DESCRIPTION: NORMAL
TEST INFORMATION: ABNORMAL
TOTAL PROTEIN: 6.9 G/DL (ref 6.4–8.3)
TRICHOMONAS: ABNORMAL
TRICYCLIC ANTIDEPRESSANTS, UR: ABNORMAL
TURBIDITY: ABNORMAL
URINE HGB: NEGATIVE
UROBILINOGEN, URINE: NORMAL
WBC # BLD: 6.5 K/UL (ref 3.5–11)
WBC # BLD: ABNORMAL 10*3/UL
WBC UA: ABNORMAL /HPF
YEAST: ABNORMAL

## 2021-07-27 PROCEDURE — 85025 COMPLETE CBC W/AUTO DIFF WBC: CPT

## 2021-07-27 PROCEDURE — 36415 COLL VENOUS BLD VENIPUNCTURE: CPT

## 2021-07-27 PROCEDURE — 80307 DRUG TEST PRSMV CHEM ANLYZR: CPT

## 2021-07-27 PROCEDURE — 6370000000 HC RX 637 (ALT 250 FOR IP)

## 2021-07-27 PROCEDURE — 1240000000 HC EMOTIONAL WELLNESS R&B

## 2021-07-27 PROCEDURE — 99283 EMERGENCY DEPT VISIT LOW MDM: CPT

## 2021-07-27 PROCEDURE — 80053 COMPREHEN METABOLIC PANEL: CPT

## 2021-07-27 PROCEDURE — 84703 CHORIONIC GONADOTROPIN ASSAY: CPT

## 2021-07-27 PROCEDURE — APPSS60 APP SPLIT SHARED TIME 46-60 MINUTES

## 2021-07-27 PROCEDURE — 81001 URINALYSIS AUTO W/SCOPE: CPT

## 2021-07-27 PROCEDURE — 87635 SARS-COV-2 COVID-19 AMP PRB: CPT

## 2021-07-27 PROCEDURE — G0480 DRUG TEST DEF 1-7 CLASSES: HCPCS

## 2021-07-27 RX ORDER — TRAZODONE HYDROCHLORIDE 50 MG/1
50 TABLET ORAL NIGHTLY PRN
Status: DISCONTINUED | OUTPATIENT
Start: 2021-07-28 | End: 2021-08-02 | Stop reason: HOSPADM

## 2021-07-27 RX ORDER — POLYETHYLENE GLYCOL 3350 17 G/17G
17 POWDER, FOR SOLUTION ORAL DAILY PRN
Status: DISCONTINUED | OUTPATIENT
Start: 2021-07-27 | End: 2021-08-02 | Stop reason: HOSPADM

## 2021-07-27 RX ORDER — HYDROCHLOROTHIAZIDE 25 MG/1
50 TABLET ORAL DAILY
Status: DISCONTINUED | OUTPATIENT
Start: 2021-07-28 | End: 2021-08-02 | Stop reason: HOSPADM

## 2021-07-27 RX ORDER — QUETIAPINE FUMARATE 100 MG/1
100 TABLET, FILM COATED ORAL NIGHTLY
Status: DISCONTINUED | OUTPATIENT
Start: 2021-07-27 | End: 2021-07-28

## 2021-07-27 RX ORDER — DULOXETIN HYDROCHLORIDE 60 MG/1
60 CAPSULE, DELAYED RELEASE ORAL 2 TIMES DAILY
Status: DISCONTINUED | OUTPATIENT
Start: 2021-07-27 | End: 2021-08-02 | Stop reason: HOSPADM

## 2021-07-27 RX ORDER — MAGNESIUM HYDROXIDE/ALUMINUM HYDROXICE/SIMETHICONE 120; 1200; 1200 MG/30ML; MG/30ML; MG/30ML
30 SUSPENSION ORAL EVERY 6 HOURS PRN
Status: DISCONTINUED | OUTPATIENT
Start: 2021-07-27 | End: 2021-08-02 | Stop reason: HOSPADM

## 2021-07-27 RX ORDER — HYDROXYZINE 50 MG/1
50 TABLET, FILM COATED ORAL 3 TIMES DAILY PRN
Status: DISCONTINUED | OUTPATIENT
Start: 2021-07-27 | End: 2021-08-02 | Stop reason: HOSPADM

## 2021-07-27 RX ORDER — IBUPROFEN 400 MG/1
400 TABLET ORAL EVERY 6 HOURS PRN
Status: DISCONTINUED | OUTPATIENT
Start: 2021-07-27 | End: 2021-08-02 | Stop reason: HOSPADM

## 2021-07-27 RX ORDER — PREGABALIN 100 MG/1
300 CAPSULE ORAL 2 TIMES DAILY
Status: DISCONTINUED | OUTPATIENT
Start: 2021-07-27 | End: 2021-07-27

## 2021-07-27 RX ORDER — ACETAMINOPHEN 325 MG/1
650 TABLET ORAL EVERY 4 HOURS PRN
Status: DISCONTINUED | OUTPATIENT
Start: 2021-07-27 | End: 2021-08-02 | Stop reason: HOSPADM

## 2021-07-27 RX ADMIN — PREGABALIN 300 MG: 100 CAPSULE ORAL at 23:07

## 2021-07-27 RX ADMIN — DULOXETINE HYDROCHLORIDE 60 MG: 60 CAPSULE, DELAYED RELEASE ORAL at 23:07

## 2021-07-27 RX ADMIN — HYDROXYZINE HYDROCHLORIDE 50 MG: 50 TABLET, FILM COATED ORAL at 23:07

## 2021-07-27 RX ADMIN — QUETIAPINE FUMARATE 100 MG: 100 TABLET ORAL at 23:07

## 2021-07-27 ASSESSMENT — ENCOUNTER SYMPTOMS
SHORTNESS OF BREATH: 0
PHOTOPHOBIA: 0
EYE ITCHING: 0
FACIAL SWELLING: 0
COUGH: 0
COLOR CHANGE: 0
VOMITING: 0
RHINORRHEA: 0
ABDOMINAL PAIN: 0
DIARRHEA: 0
NAUSEA: 0

## 2021-07-27 ASSESSMENT — SLEEP AND FATIGUE QUESTIONNAIRES
DO YOU USE A SLEEP AID: NO
AVERAGE NUMBER OF SLEEP HOURS: 7
DO YOU HAVE DIFFICULTY SLEEPING: NO

## 2021-07-27 ASSESSMENT — PATIENT HEALTH QUESTIONNAIRE - PHQ9: SUM OF ALL RESPONSES TO PHQ QUESTIONS 1-9: 7

## 2021-07-27 ASSESSMENT — PAIN SCALES - GENERAL: PAINLEVEL_OUTOF10: 0

## 2021-07-27 ASSESSMENT — LIFESTYLE VARIABLES
HISTORY_ALCOHOL_USE: NO
HISTORY_ALCOHOL_USE: NO

## 2021-07-27 NOTE — ED PROVIDER NOTES
EMERGENCY DEPARTMENT ENCOUNTER    Pt Name: Scott Bangura  MRN: [id number]  [place] 1980  Date of evaluation: 7/27/21  CHIEF COMPLAINT       Chief Complaint   Patient presents with    Mental Health Problem     HISTORY OF PRESENT ILLNESS   HPI  65-year-old female history of opiate abuse, depression, bipolar presents for evaluation of auditory hallucinations. Symptoms progressed over the past month or so. Patient does take Seroquel and Suboxone. Patient states she has been having command hallucinations telling her to hurt her self. No actual attempted self-harm. No homicidal ideation. No other associated symptoms. No other recent illness. Patient states she has been clean off opiates for about a month. No other recent changes in medicine. REVIEW OF SYSTEMS     Review of Systems   Constitutional: Negative for chills and fatigue. HENT: Negative for facial swelling, postnasal drip and rhinorrhea. Eyes: Negative for photophobia and itching. Respiratory: Negative for cough and shortness of breath. Cardiovascular: Negative for chest pain and leg swelling. Gastrointestinal: Negative for abdominal pain, diarrhea, nausea and vomiting. Genitourinary: Negative for dysuria, flank pain and hematuria. Musculoskeletal: Negative for arthralgias and joint swelling. Skin: Negative for color change and rash. Neurological: Negative for dizziness, numbness and headaches. Psychiatric/Behavioral: Positive for hallucinations and suicidal ideas. Negative for behavioral problems. PASTMEDICAL HISTORY     Past Medical History:   Diagnosis Date    Anxiety     Arthritis     O. A.    Bipolar depression ([facility]     Dermatitis     Dysuria     Exposure to STD     Folliculitis     Hx of acute bronchitis     Hypertension     Marfan syndrome     MVP (mitral valve prolapse)     Nausea & vomiting     Polysubstance abuse ([address]     drug abuse includes cocaine, fentnyl, cannabis    PONV (postoperative nausea and vomiting)     Scoliosis     Seasonal allergies     Staph infection     HX OF ALSO BACTEREMIA    UTI (lower urinary tract infection)      Past Problem List  Patient Active Problem List   Diagnosis Code    Opioid abuse ([address] F11.10    Polyarthritis M13.0    Scoliosis M41.9    H/O long-term treatment with high-risk medication Z79.899    Chronic pain syndrome G89.4    Marfan's syndrome Q87.40    Dysphagia R13.10    Instability of both shoulder joints M25.311, M25.312    Hip instability M25.359    Benign essential HTN I10    Severe major depression without psychotic features ([address] F32.2    Depression with suicidal ideation F32.9, R45.851    Bipolar disorder with moderate depression ([address] F31.32    Opiate withdrawal ([address] F11.23    Severe recurrent major depression without psychotic features ([address] F33.2    Severe malnutrition ([address] E43    Acute psychosis ([address] F23    Opioid dependence on agonist therapy ([address] F11.20    Cocaine abuse ([address] F14.10     SURGICAL HISTORY       Past Surgical History:   Procedure Laterality Date    BREAST REDUCTION SURGERY Bilateral     2012    [facility]    FOOT SURGERY Bilateral     RT HAS 2 SCREWS, LT HAD FUSION IN DEC WITH 2 SCREWS    HIP ARTHROSCOPY Left     KNEE ARTHROSCOPY Right     X 2    ND EGD TRANSORAL BIOPSY SINGLE/MULTIPLE N/A 8/29/2017    EGD BIOPSY WITH DILATATION WITH MALONEYS performed by Nicholas Kaplan MD at [facility] Bilateral     harware in place bilat    TUBAL LIGATION      clips placed on fallopian tubes    UPPER GASTROINTESTINAL ENDOSCOPY  08/29/2017     EXTENSIVE SPASMATIC CONTRACTIONS; NO LUMINAL OBSTRUCTIONS; CLOSER TO THE DISTAL ESOPHAGUS AND DEVELOPING DIVERTICULUM IS NOTED AND BELIEVED TO BE SEC TO DSYSMOTILITY;  DALEY DIATIONS WERE DONE EMPIRICALLY WITH 56 FR     CURRENT MEDICATIONS       Previous Medications    DULOXETINE (CYMBALTA) 60 MG EXTENDED RELEASE CAPSULE General: Skin is warm and dry. Neurological:      General: No focal deficit present. Mental Status: She is alert. Mental status is at baseline. Psychiatric:      Comments: Hyperverbal.  Paranoid. Not responding to internal stimuli. MEDICAL DECISION MAKIN-year-old female presents for evaluation of progressing command auditory hallucinations. I do not think she is safe to go home. I think she would benefit from inpatient psychiatric hospitalization and stabilization. We will plan for some basic labs and medical clearance and then admit to the psych unit. Labs unremarkable. Medically cleared for psychiatric admission. CRITICAL CARE:       PROCEDURES:    Procedures    DIAGNOSTIC RESULTS   EKG:All EKG's are interpreted by the Emergency Department Physician who either signs or Co-signs this chart in the absence of a cardiologist.        RADIOLOGY:All plain film, CT, MRI, and formal ultrasound images (except ED bedside ultrasound) are read by the radiologist, see reports below, unless otherwisenoted in MDM or here. No orders to display     LABS: All lab results were reviewed by myself, and all abnormals are listed below.   Labs Reviewed   CBC WITH AUTO DIFFERENTIAL - Abnormal; Notable for the following components:       Result Value    RDW 15.1 (*)     All other components within normal limits   COMPREHENSIVE METABOLIC PANEL W/ REFLEX TO MG FOR LOW K - Abnormal; Notable for the following components:    Glucose 101 (*)     BUN 5 (*)     Anion Gap 7 (*)     All other components within normal limits   COVID-19, RAPID   HCG, SERUM, QUALITATIVE   ETHANOL   URINALYSIS   URINE DRUG SCREEN       EMERGENCY DEPARTMENTCOURSE:         Vitals:    Vitals:    21 1841   BP: (!) 131/91   Pulse: 91   Resp: 18   Temp: 97.9 °F (36.6 °C)   TempSrc: Oral   SpO2: 95%   Weight: 180 lb (81.6 kg)   Height: 5' 10\" (1.778 m)       The patient was given the following medications while in the emergency department:  No orders of the defined types were placed in this encounter. CONSULTS:  None    FINAL IMPRESSION      1. Psychosis, unspecified psychosis type [facility]          DISPOSITION/PLAN   DISPOSITION        PATIENT REFERRED TO:  No follow-up provider specified.   DISCHARGE MEDICATIONS:  New Prescriptions    No medications on file     Magdeil Lai MD  Attending Emergency Physician                    Magdiel Lai MD  07/27/21 3127

## 2021-07-27 NOTE — ED NOTES
Provisional Diagnosis:     Patient presented to ED for a mental health evaluation. Patient has history of anxiety and substance abuse. Psychosocial and Contextual Factors:     Patient linked with [facility]. C-SSRS Summary:    Patient denies SI    Patient: X  Family:   Agency:     Substance Abuse:  Patient reports being sober for the past 2 months. Patient indicted she used to abuse crack cocaine. Present Suicidal Behavior:    Patient denies at this time    Verbal:     Attempt:    Past Suicidal Behavior:   Patient denies    Verbal:    Attempt:    Self-Injurious/Self-Mutilation:  Patient denies    Trauma Identified:    Patient denies    Protective Factors:    Patient has support in her mother. Patient has stable housing. Patient has stable income. [facility] Patient is taking medications as prescribed. Patient has insurance. Risk Factors:    Patient has physical disability. Patient lives alone. Clinical Summary:    Patient is a 46year old  female who presented to ED requesting a mental health evaluation. Patient reports she is hearing voices and she has been unable to decipher the difference between reality and fantasy. Patient reports command auditory hallucinations calling her \"a bitch\", \"a whore\", \"you should get commit suicide\", \"show me your pussy\" and \"just die\". Patient reports she also feels as though people are watching her and she has called the  but they told her there wasn't anyone there when they arrived. Patient reports severe \"confusion\" due to these hallucinations. Patient denies SI. Patient does report HI towards the \"people that are fucking with me\". Patient believes these voices to be real and she is fearful. Patient is linked with [facility] and they recently upped her Seroquel to 200mg, but she reports this made the voices worse. Patient stated Merry Virgen eliminated one of her meds today. Patient reports a decrease in her sleep habits. Patient is diagnosed with Marfan's syndrome which limits her mobility. Patient reports she utilizes a motorized chair when in the community. Level of Care Disposition: This writer consulted with Zulema Haynes NP, who recommended inpatient hospitalization for safety and stabilization. Patient signed application for voluntary admission to [facility].

## 2021-07-27 NOTE — ED TRIAGE NOTES
Mode of arrival (squad #, walk in, police, etc) : Walk in        Chief complaint(s): Mental health problem, hallucinations        Arrival Note (brief scenario, treatment PTA, etc). : Pt arrives to ED c/o          C= \"Have you ever felt that you should Cut down on your drinking? \"  No  A= \"Have people Annoyed you by criticizing your drinking? \"  No  G= \"Have you ever felt bad or Guilty about your drinking? \"  No  E= \"Have you ever had a drink as an Eye-opener first thing in the morning to steady your nerves or to help a hangover? \"  No      Deferred []      Reason for deferring: N/A    *If yes to two or more: probable alcohol abuse. *

## 2021-07-28 PROCEDURE — 99223 1ST HOSP IP/OBS HIGH 75: CPT | Performed by: PSYCHIATRY & NEUROLOGY

## 2021-07-28 PROCEDURE — 6370000000 HC RX 637 (ALT 250 FOR IP): Performed by: PSYCHIATRY & NEUROLOGY

## 2021-07-28 PROCEDURE — 1240000000 HC EMOTIONAL WELLNESS R&B

## 2021-07-28 PROCEDURE — 6370000000 HC RX 637 (ALT 250 FOR IP)

## 2021-07-28 RX ORDER — BUPRENORPHINE AND NALOXONE 8; 2 MG/1; MG/1
1 FILM, SOLUBLE BUCCAL; SUBLINGUAL DAILY
Status: DISCONTINUED | OUTPATIENT
Start: 2021-07-28 | End: 2021-08-02 | Stop reason: HOSPADM

## 2021-07-28 RX ORDER — HYDROCHLOROTHIAZIDE 50 MG/1
50 TABLET ORAL DAILY
COMMUNITY

## 2021-07-28 RX ORDER — QUETIAPINE FUMARATE 50 MG/1
50 TABLET, FILM COATED ORAL NIGHTLY
Status: ON HOLD | COMMUNITY
End: 2021-07-28 | Stop reason: CLARIF

## 2021-07-28 RX ORDER — QUETIAPINE FUMARATE 100 MG/1
100 TABLET, FILM COATED ORAL NIGHTLY
Status: ON HOLD | COMMUNITY
End: 2021-08-02 | Stop reason: HOSPADM

## 2021-07-28 RX ORDER — MELOXICAM 7.5 MG/1
15 TABLET ORAL DAILY
Status: DISCONTINUED | OUTPATIENT
Start: 2021-07-28 | End: 2021-08-02 | Stop reason: HOSPADM

## 2021-07-28 RX ORDER — BUPRENORPHINE AND NALOXONE 8; 2 MG/1; MG/1
1 FILM, SOLUBLE BUCCAL; SUBLINGUAL DAILY
COMMUNITY

## 2021-07-28 RX ORDER — GABAPENTIN 100 MG/1
100 CAPSULE ORAL 3 TIMES DAILY
Status: DISCONTINUED | OUTPATIENT
Start: 2021-07-28 | End: 2021-08-02 | Stop reason: HOSPADM

## 2021-07-28 RX ORDER — GABAPENTIN 100 MG/1
100 CAPSULE ORAL 3 TIMES DAILY
Status: ON HOLD | COMMUNITY
End: 2021-08-02 | Stop reason: SDUPTHER

## 2021-07-28 RX ORDER — MELOXICAM 15 MG/1
15 TABLET ORAL DAILY PRN
Status: ON HOLD | COMMUNITY
End: 2021-08-02 | Stop reason: SDUPTHER

## 2021-07-28 RX ORDER — ACETAMINOPHEN 325 MG/1
650 TABLET ORAL 2 TIMES DAILY PRN
Status: ON HOLD | COMMUNITY
End: 2021-08-02 | Stop reason: HOSPADM

## 2021-07-28 RX ORDER — NICOTINE 21 MG/24HR
1 PATCH, TRANSDERMAL 24 HOURS TRANSDERMAL DAILY
Status: DISCONTINUED | OUTPATIENT
Start: 2021-07-28 | End: 2021-08-02 | Stop reason: HOSPADM

## 2021-07-28 RX ORDER — CYCLOBENZAPRINE HCL 10 MG
10 TABLET ORAL 3 TIMES DAILY PRN
COMMUNITY

## 2021-07-28 RX ORDER — CYCLOBENZAPRINE HCL 10 MG
10 TABLET ORAL 3 TIMES DAILY PRN
Status: DISCONTINUED | OUTPATIENT
Start: 2021-07-28 | End: 2021-08-02 | Stop reason: HOSPADM

## 2021-07-28 RX ORDER — RISPERIDONE 1 MG/1
1 TABLET, FILM COATED ORAL NIGHTLY
Status: DISCONTINUED | OUTPATIENT
Start: 2021-07-28 | End: 2021-07-30

## 2021-07-28 RX ADMIN — MELOXICAM 15 MG: 7.5 TABLET ORAL at 14:37

## 2021-07-28 RX ADMIN — DULOXETINE HYDROCHLORIDE 60 MG: 60 CAPSULE, DELAYED RELEASE ORAL at 09:09

## 2021-07-28 RX ADMIN — GABAPENTIN 100 MG: 100 CAPSULE ORAL at 22:45

## 2021-07-28 RX ADMIN — CYCLOBENZAPRINE 10 MG: 10 TABLET, FILM COATED ORAL at 14:38

## 2021-07-28 RX ADMIN — BUPRENORPHINE AND NALOXONE 1 FILM: 8; 2 FILM BUCCAL; SUBLINGUAL at 14:38

## 2021-07-28 RX ADMIN — IBUPROFEN 400 MG: 400 TABLET ORAL at 09:13

## 2021-07-28 RX ADMIN — ACETAMINOPHEN 650 MG: 325 TABLET ORAL at 09:13

## 2021-07-28 RX ADMIN — DULOXETINE HYDROCHLORIDE 60 MG: 60 CAPSULE, DELAYED RELEASE ORAL at 22:45

## 2021-07-28 RX ADMIN — GABAPENTIN 100 MG: 100 CAPSULE ORAL at 14:38

## 2021-07-28 RX ADMIN — RISPERIDONE 1 MG: 1 TABLET ORAL at 22:45

## 2021-07-28 RX ADMIN — HYDROCHLOROTHIAZIDE 50 MG: 25 TABLET ORAL at 09:09

## 2021-07-28 ASSESSMENT — PAIN DESCRIPTION - LOCATION: LOCATION: GENERALIZED

## 2021-07-28 ASSESSMENT — PAIN SCALES - GENERAL
PAINLEVEL_OUTOF10: 7
PAINLEVEL_OUTOF10: 5
PAINLEVEL_OUTOF10: 6

## 2021-07-28 ASSESSMENT — PAIN DESCRIPTION - PROGRESSION: CLINICAL_PROGRESSION: GRADUALLY IMPROVING

## 2021-07-28 NOTE — H&P
Department of Psychiatry  Attending Physician Psychiatric Assessment     Reason for Admission to Psychiatric Unit:    · Threat to self requiring 24 hour professional observation  · Acute disordered/bizarre behavior or psychomotor agitation or retardation;interferes with ADLs so that patient cannot function at a less intensive care level of care during evaluation and treatment   · A mental disorder causing major disability in social, interpersonal, occupational, and/or educational functioning that is leading to dangerous or life-threatening functioning, and that can only be addressed in an acute inpatient setting   · Concerns about patient's safety in the community    CHIEF COMPLAINT:  Acute psychosis    History obtained from:  patient, electronic medical record and family members    HISTORY OF PRESENT ILLNESS:    Sunny Plaza is a 46 y.o. female with significant past medical history of hypertension, Marfan syndrome, opiate abuse, bipolar disorder who presented to the ED for psych evaluation. Per ED social work \"Patient is a 46year old  female who presented to ED requesting a mental health evaluation. Patient reports she is hearing voices and she has been unable to decipher the difference between reality and fantasy. Patient reports command auditory hallucinations calling her \"a bitch\", \"a whore\", \"you should get commit suicide\", \"show me your pussy\" and \"just die\". Patient reports she also feels as though people are watching her and she has called the  but they told her there wasn't anyone there when they arrived. Patient reports severe \"confusion\" due to these hallucinations.   Patient denies SI. Patient does report HI towards the \"people that are fucking with me\". Patient believes these voices to be real and she is fearful.   Patient is linked with [facility] and they recently upped her Seroquel to 200mg, but she reports this made the voices worse.   Patient stated Merry Virgen eliminated one of her meds today.   Patient reports a decrease in her sleep habits. Patient is diagnosed with Marfan's syndrome which limits her mobility. Patient reports she utilizes a motorized chair when in the community. \"    Per nursing staff, patient has been in behavioral control since arrival to the unit. She has not required the use of any medication for anxiety or agitation. Today, patient was interviewed in the intake room, patient was cooperative with writer. Patient endorses an increase in voices that have gotten worse over the last week. Patient endorses that she recently relapsed after 20 months clean. [facility] after detoxing for using fentanyl for 4 days. Patient states that upon discharge she smoked marijuana however states that her urine has been clean for approximately 1 month. Patient endorses auditory hallucinations. Patient endorses to writer \"I am disoriented, I made up a new person who was not there, she is talks to me while I am in the bathroom and she talks to me on the stairwell. \"    Patient displays thought blocking, disorganized speech, she is hyperverbal and states she has been up for at least 48 hours without sleep. Patient is hyper focused on getting her Suboxone, states that she is on 8 mg / 2 mg film daily. Patient states that she is attending IOP groups at [facility] and has been there for approximately 2 weeks. Patient educated that once her Suboxone dose can be verified with the pharmacy, that it would be up to the attending physician to restart her Suboxone as it was discontinued upon her last discharge in June 2021. Patient states that she is no longer taking Lyrica, states that her family physician changed her to Neurontin 100 mg 3 times daily however states that she has not taken the medication in 5 or 6 days, at this time it was not reordered by the nurse practitioner.      PSYCHIATRIC HISTORY:  yes - bipolar  Currently follows with [facility] [address] for [facility] and mental health issues  0 lifetime suicide attempts  Multiple psychiatric hospital admissions    Past psychiatric medications includes:   Seroquel  Cymbalta  Suboxone  lyrica    Adverse reactions from psychotropic medications: no    Lifetime Psychiatric Review of Systems         Debi or Hypomania: endorses     Panic Attacks: denies      Phobias: denies     Obsessions and Compulsions:denies     Body or Vocal Tics:  denies     Hallucinations:verbal hallucinations present     Delusions: none present      Past Medical History:        Diagnosis Date    Anxiety     Arthritis     O. A.    Bipolar depression [facility]     Dermatitis     Dysuria     Exposure to STD     Folliculitis     Hx of acute bronchitis     Hypertension     Marfan syndrome     MVP (mitral valve prolapse)     Nausea & vomiting     Polysubstance abuse ([facility])     drug abuse includes cocaine, fentnyl, cannabis    PONV (postoperative nausea and vomiting)     Scoliosis     Seasonal allergies     Staph infection     HX OF ALSO BACTEREMIA    UTI (lower urinary tract infection)        Past Surgical History:        Procedure Laterality Date    BREAST REDUCTION SURGERY Bilateral     2012    FOOT CAPSULOTOMY Left 9/17/14    [facility]    FOOT SURGERY Bilateral     RT HAS 2 SCREWS, LT HAD FUSION IN DEC WITH 2 SCREWS    HIP ARTHROSCOPY Left     KNEE ARTHROSCOPY Right     X 2    [place] EGD TRANSORAL BIOPSY SINGLE/MULTIPLE N/A 8/29/2017    EGD BIOPSY WITH DILATATION WITH MALONEYS performed by Megan Webb MD at [facility] Bilateral     harware in place bilat    TUBAL LIGATION      clips placed on fallopian tubes    UPPER GASTROINTESTINAL ENDOSCOPY  08/29/2017     EXTENSIVE SPASMATIC CONTRACTIONS; NO LUMINAL OBSTRUCTIONS; CLOSER TO THE DISTAL ESOPHAGUS AND DEVELOPING DIVERTICULUM IS NOTED AND BELIEVED TO BE SEC TO DSYSMOTILITY;  DALEY DIATIONS WERE DONE HENT: Negative for ear pain and nosebleeds. Eyes: Negative for blurred vision and photophobia. Respiratory: Negative for cough, shortness of breath and wheezing. Cardiovascular: Negative for chest pain and palpitations. Gastrointestinal: Negative for abdominal pain, diarrhea and vomiting. Genitourinary: Negative for dysuria and urgency. Musculoskeletal: Negative for falls and joint pain. Skin: Negative for itching and rash. Neurological: Negative for tremors, seizures and weakness. Endo/Heme/Allergies: Does not bruise/bleed easily. Physical Exam:      Constitutional:  Appears well-developed and well-nourished, no acute distress  HENT:   Head: Normocephalic and atraumatic. Eyes: Conjunctivae are normal. Right eye exhibits no discharge. Left eye exhibits no discharge. No scleral icterus. Neck: Normal range of motion. Neck supple. Pulmonary/Chest:  No respiratory distress or accessory muscle use, no wheezing. Abdominal: Soft. Exhibits no distension. Musculoskeletal: Normal range of motion. Exhibits no edema. Neurological: cranial nerves II-XII grossly in tact, normal gait and station  Skin: Skin is warm and dry. Patient is not diaphoretic. No erythema.          Mental Status Examination:    Level of consciousness:  within normal limits   Appearance:  Hospital attire, seated on the side of bed, fair grooming   Behavior/Motor: no abnormalities noted  Attitude toward examiner:  Cooperative  Speech: normal rate and volume  Mood:  Depressed  Affect:  blunted  Thought processes:  Goal directed, linear  Thought content: active suicidal ideations without current plan or intent               denies homicidal ideations               Denies hallucinations              denies delusions  Cognition:  Oriented to self, location, time, situation  Concentration clinically adequate  Memory: intact  Insight &Judgment: poor    DSM-5 Diagnosis  Acute psychosis      Psychosocial and Contextual reasonably be expected to improve this patient's condition, or    x (2) diagnostic study or its equivalent. Time Spent: 60 minutes     Physicians Signature:  Electronically signed by GUIDO Aggarwal CNP on 7/27/21 at 11:16 PM EDT    I independently saw and evaluated the patient. I reviewed the nurse practitioners documentation above. Any additional comments or changes to the nurse practitioners documentation are stated below otherwise agree with assessment. Plan will be as follows:  Reviewed labs U tox. Unclear what triggered this acute psychotic episode for the patient. She is positive for marijuana. Is possible that something was laced for example K2 though patient denies any knowledge of anything like this. For now we will treat as acute psychosis. We will discontinue Seroquel and use Risperdal. Verified medications with pharmacy and reviewed patient's [place] automated reporting system report. Appears to be relatively steady with Suboxone and will reorder this at present time. Time spent: 60 minutes in face-to-face, review of records and discussion of treatment plan.     Electronically signed by Doug An MD on 7/28/2021 at 2:20 PM

## 2021-07-28 NOTE — GROUP NOTE
Group Therapy Note    Date: 7/28/2021    Group Start Time: 0900  Group End Time: 9270  Group Topic: Community Meeting    ANYI Amezcua        Group Therapy Note    Pt did not attend community meeting group d/t resting in room despite staff invitation to attend.

## 2021-07-28 NOTE — GROUP NOTE
Group Therapy Note    Date: 7/28/2021    Group Start Time: 1000  Group End Time: 5655  Group Topic: Group Therapy    NANDA Hurtado, IVAN        Group Therapy Note  Pt declined to attend psychotherapy at 1000 am despite encouragement. Pt offered 1:1 and refused.       Attendees: 1/14             Signature:  NANDA Morales, IVAN

## 2021-07-28 NOTE — PLAN OF CARE
Problem: Altered Mood, Psychotic Behavior:  Goal: Able to verbalize decrease in frequency and intensity of hallucinations  Description: Able to verbalize decrease in frequency and intensity of hallucinations  Outcome: Ongoing     Problem: Falls - Risk of:  Goal: Will remain free from falls  Description: Will remain free from falls  Outcome: Ongoing   Pt continues to report auditory hallucinations telling her \"shut the fuck up bitch. \" Pt isolates to room for long intervals. Pt out for a short time in milieu to watch TV. Pt remains free from falls. Pt refused to use wheelchair and was steady during ambulation however has a unusual gait/posture. Pt is disheveled and encouraged to tend to hygiene and ADL's. Pt. Remains on q15 min checks and frequent spontaneous checks throughout shift. Pt. Safety maintained.

## 2021-07-28 NOTE — BH NOTE

## 2021-07-28 NOTE — GROUP NOTE
Group Therapy Note    Date: 7/28/2021    Group Start Time: 1330  Group End Time: 0865  Group Topic: Music Therapy    ANYI Centeno        Group Therapy Note    Pt did not attend music therapy group d/t resting in room despite staff invitation to attend. 1:1 talk time offered as alternative to group session, pt declined.

## 2021-07-28 NOTE — PROGRESS NOTES
Pharmacy Medication History Note      List of current medications patient is taking is complete. Source of information: Minerva Webster (Cooper Wilhelm), [facility]), BELLO    Changes made to medication list:  Medications removed (include reason, ex. therapy complete or physician discontinued, noncompliance):  · Pregabalin (alternate therapy)    Medications added/doses adjusted:  · Added Suboxone 8-2 mg SL film once daily - LF 7/26/21 (3 day supply)  · Added Gabapentin 100 mg three times daily - LF 6/23/21 (30 day supply)  · Added Acetaminophen 650 mg twice daily as needed  · Added Meloxicam 15 mg daily as needed  · Added Cylcobenzaprine 10 mg three times daily as needed      Please let me know if you have any questions about this encounter. Thank you!     Electronically signed by Daniel Montilla, [address] [place] [address] on 7/28/2021 at 8:34 AM

## 2021-07-28 NOTE — PLAN OF CARE
[facility]  Initial Interdisciplinary Treatment Plan NO      Original treatment plan Date & Time: 7/28/2021   0805    Admission Type:  Admission Type: Voluntary    Reason for admission:   Reason for Admission: command auditory hallucinations, homicidal ideations    Estimated Length of Stay:  5-7days  Estimated Discharge Date: to be determined by physician    PATIENT STRENGTHS:  Patient Strengths:Strengths: Communication  Patient Strengths and Limitations:Limitations: Difficulty problem solving/relies on others to help solve problems, Inappropriate/potentially harmful leisure interests  Addictive Behavior: Addictive Behavior  In the past 3 months, have you felt or has someone told you that you have a problem with:  : None  Do you have a history of Chemical Use?: No  Do you have a history of Alcohol Use?: No  Do you have a history of Street Drug Abuse?: Yes  Histroy of Prescripton Drug Abuse?: No  Medical Problems:  Past Medical History:   Diagnosis Date    Anxiety     Arthritis     O. A. Bipolar depression ([facility]     Dermatitis     Dysuria     Exposure to STD     Folliculitis     Hx of acute bronchitis     Hypertension     Marfan syndrome     MVP (mitral valve prolapse)     Nausea & vomiting     Polysubstance abuse ([facility]     drug abuse includes cocaine, fentnyl, cannabis    PONV (postoperative nausea and vomiting)     Scoliosis     Seasonal allergies     Staph infection     HX OF ALSO BACTEREMIA    UTI (lower urinary tract infection)      Status EXAM:Status and Exam  Normal: No  Facial Expression: Flat, Sad  Affect: Congruent  Level of Consciousness: Alert  Mood:Normal: No  Mood: Depressed, Anxious  Motor Activity:Normal: No  Motor Activity: Decreased, Unusual Posture/Gait  Interview Behavior: Cooperative  Attention:Normal: Yes  Thought Content:Normal: No  Thought Content: Paranoia  Hallucinations:  Auditory (Comment), Command(Comment) (to kill self)  Delusions: No  Memory:Normal: Yes  Insight and Judgment: No  Insight and Judgment: Poor Judgment, Poor Insight  Present Suicidal Ideation: No  Present Homicidal Ideation: Yes (towards voices)    EDUCATION:   Learner Progress Toward Treatment Goals: reviewed group plans and strategies for care    Method:group therapy, medication compliance, individualized assessments and care planning    Outcome: needs reinforcement    PATIENT GOALS: to be discussed with patient within 72 hours    PLAN/TREATMENT RECOMMENDATIONS:     continue group therapy , medications compliance, goal setting, individualized assessments and care, continue to monitor pt on unit      SHORT-TERM GOALS:   Time frame for Short-Term Goals: 5-7 days    LONG-TERM GOALS:  Time frame for Long-Term Goals: 6 months  Members Present in Team Meeting: See Signature Sheet    Imer Lopez

## 2021-07-28 NOTE — GROUP NOTE
Group Therapy Note    Date: 7/28/2021    Group Start Time: 1100  Group End Time: 9163  Group Topic: Music Therapy    ANYI Hollis        Group Therapy Note    Pt did not attend psycho-education group d/t resting in room despite staff invitation to attend. 1:1 talk time offered as alternative to group session, pt declined.

## 2021-07-28 NOTE — BH NOTE
Patient given tobacco quitline number 68097949686 at this time, refusing to call at this time, states \" I just dont want to quit now\"- patient given information as to the dangers of long term tobacco use. Continue to reinforce the importance of tobacco cessation.

## 2021-07-28 NOTE — ED NOTES
This writer consulted with Tad Mcknight NP, who recommended inpatient hospitalization for safety and stabilization. Patient signed application for voluntary admission to [facility].

## 2021-07-28 NOTE — CARE COORDINATION
Psychosocial Assessment    Current Level of Psychosocial Functioning     Independent X  Dependent    Minimal Assist     Comments:      Psychosocial High Risk Factors (check all that apply)    Unable to obtain meds   Chronic illness/pain    Substance abuse            X  Lack of Family Support   Financial stress   Isolation   Inadequate Community Resources  Suicide attempt(s)              X  Not taking medications   Victim of crime   Developmental Delay  Unable to manage personal needs    Age 72 or older   Homeless  No transportation   Readmission within 30 days  Unemployment  Traumatic Event    Family/Supports identified: Pt reports having good support from mother. Patient Strengths: Income, insurance and stable housing     Patient Barriers: Poor or lack of coping skills, current mental health symptoms      CMHC/MH history: Linked with [facility] of Care:  medication management, group/individual therapies, family meetings, psycho -education, treatment team meetings to assist with stabilization    Initial Discharge Plan:  Stabilize on medications , return home and continue treatment with [facility]     Clinical Summary:  Pt is a 46year old female admitted to the [facility]. Pt denies suicidal, homicidal ideations at the time of assessment. Pt reports hearing and visual hallucinations. Pt stated \"I hear people whispering, I think it's Abimbola Hai that lives upstairs from me. \" Pt could not stay awake to explain the visual hallucinations. Pt denies any drug or alcohol use stating \"I have been clean from heroine and crack since I left here about a month ago. \" Pt made attempts to sit up in bed as a way to stay awake, but was unsuccessful, pt responded when name was called and would apologize stating \"I'm sorry the medication change has me tired. \" Pt laid down in bed at that point and stopped responding.

## 2021-07-28 NOTE — PROGRESS NOTES
[facility]  Admission Note     Admission Type:   Admission Type: Voluntary    Reason for admission:  Reason for Admission: command auditory hallucinations, homicidal ideations    PATIENT STRENGTHS:  Strengths: Communication    Patient Strengths and Limitations:  Limitations: Difficulty problem solving/relies on others to help solve problems, Inappropriate/potentially harmful leisure interests    Addictive Behavior:   Addictive Behavior  In the past 3 months, have you felt or has someone told you that you have a problem with:  : None  Do you have a history of Chemical Use?: No  Do you have a history of Alcohol Use?: No  Do you have a history of Street Drug Abuse?: Yes  Histroy of Prescripton Drug Abuse?: No    Medical Problems:   Past Medical History:   Diagnosis Date    Anxiety     Arthritis     O. A.    [facility] ([facility] [address] [facility]     Dermatitis     Dysuria     Exposure to STD     Folliculitis     Hx of acute bronchitis     Hypertension     Marfan syndrome     MVP (mitral valve prolapse)     Nausea & vomiting     Polysubstance abuse (HCC)     drug abuse includes cocaine, fentnyl, cannabis    PONV (postoperative nausea and vomiting)     Scoliosis     Seasonal allergies     Staph infection     HX OF ALSO BACTEREMIA    UTI (lower urinary tract infection)        Status EXAM:  Status and Exam  Normal: No  Facial Expression: Flat, Sad  Affect: Congruent  Level of Consciousness: Alert  Mood:Normal: No  Mood: Depressed, Anxious  Motor Activity:Normal: No  Motor Activity: Decreased, Unusual Posture/Gait  Interview Behavior: Cooperative  Attention:Normal: Yes  Thought Content:Normal: No  Thought Content: Paranoia  Hallucinations:  Auditory (Comment), Command(Comment) (to kill self)  Delusions: No  Memory:Normal: Yes  Insight and Judgment: No  Insight and Judgment: Poor Judgment, Poor Insight  Present Suicidal Ideation: No  Present Homicidal Ideation: Yes (towards voices)    Tobacco Screening:  Practical Counseling, on admission, dimple X, if applicable and completed (first 3 are required if patient doesn't refuse):            ( )  Recognizing danger situations (included triggers and roadblocks)                    ( )  Coping skills (new ways to manage stress, exercise, relaxation techniques, changing routine, distraction)                                                           ( )  Basic information about quitting (benefits of quitting, techniques in how to quit, available resources  ( ) Referral for counseling faxed to [facility]                                           ( ) Patient refused counseling  ( ) Patient has not smoked in the last 30 days    Metabolic Screening:    Lab Results   Component Value Date    LABA1C 5.1 08/22/2019       Lab Results   Component Value Date    CHOL 168 08/22/2019    CHOL 152 06/10/2018    CHOL 206 (H) 02/21/2018    CHOL 182 09/14/2015    CHOL 132 09/07/2013     Lab Results   Component Value Date    TRIG 79 08/22/2019    TRIG 141 06/10/2018    TRIG 113 02/21/2018    TRIG 54 09/14/2015    TRIG 88 09/07/2013     Lab Results   Component Value Date    HDL 53 08/22/2019    HDL 39 (L) 06/10/2018    HDL 59 02/21/2018    HDL 71 09/14/2015    HDL 53 09/07/2013     No components found for: LDLCAL  No results found for: LABVLDL      Body mass index is 25.83 kg/m². BP Readings from Last 2 Encounters:   07/27/21 (!) 130/90   06/16/21 116/77           Pt admitted with followings belongings:  Dentures: None  Vision - Corrective Lenses: None  Hearing Aid: None  Jewelry: None  Body Piercings Removed: N/A  Clothing: Shirt, Pants, Footwear, Undergarments (Comment)  Were All Patient Medications Collected?: Not Applicable  Other Valuables: Purse (locked in safe; double bagged sprayed with pepper spray)     Patient's home medications were n/a. Patient oriented to surroundings and program expectations and copy of patient rights given.  Received admission packet: yes.  Consents reviewed, signed all paperwork. Refused nothing. Patient verbalize understanding:  yes. Patient education on precautions: yes. Valuables in hub safe.                       Laura Armando RN

## 2021-07-29 PROCEDURE — 90833 PSYTX W PT W E/M 30 MIN: CPT | Performed by: PSYCHIATRY & NEUROLOGY

## 2021-07-29 PROCEDURE — 99232 SBSQ HOSP IP/OBS MODERATE 35: CPT | Performed by: PSYCHIATRY & NEUROLOGY

## 2021-07-29 PROCEDURE — 1240000000 HC EMOTIONAL WELLNESS R&B

## 2021-07-29 PROCEDURE — 6370000000 HC RX 637 (ALT 250 FOR IP)

## 2021-07-29 PROCEDURE — 6370000000 HC RX 637 (ALT 250 FOR IP): Performed by: PSYCHIATRY & NEUROLOGY

## 2021-07-29 PROCEDURE — APPSS30 APP SPLIT SHARED TIME 16-30 MINUTES: Performed by: NURSE PRACTITIONER

## 2021-07-29 RX ADMIN — DULOXETINE HYDROCHLORIDE 60 MG: 60 CAPSULE, DELAYED RELEASE ORAL at 20:01

## 2021-07-29 RX ADMIN — TRAZODONE HYDROCHLORIDE 50 MG: 50 TABLET ORAL at 20:01

## 2021-07-29 RX ADMIN — DULOXETINE HYDROCHLORIDE 60 MG: 60 CAPSULE, DELAYED RELEASE ORAL at 08:50

## 2021-07-29 RX ADMIN — HYDROXYZINE HYDROCHLORIDE 50 MG: 50 TABLET, FILM COATED ORAL at 14:29

## 2021-07-29 RX ADMIN — CYCLOBENZAPRINE 10 MG: 10 TABLET, FILM COATED ORAL at 20:03

## 2021-07-29 RX ADMIN — MELOXICAM 15 MG: 7.5 TABLET ORAL at 08:50

## 2021-07-29 RX ADMIN — GABAPENTIN 100 MG: 100 CAPSULE ORAL at 14:29

## 2021-07-29 RX ADMIN — CYCLOBENZAPRINE 10 MG: 10 TABLET, FILM COATED ORAL at 03:45

## 2021-07-29 RX ADMIN — BUPRENORPHINE AND NALOXONE 1 FILM: 8; 2 FILM BUCCAL; SUBLINGUAL at 08:50

## 2021-07-29 RX ADMIN — RISPERIDONE 1 MG: 1 TABLET ORAL at 20:01

## 2021-07-29 RX ADMIN — CYCLOBENZAPRINE 10 MG: 10 TABLET, FILM COATED ORAL at 14:29

## 2021-07-29 RX ADMIN — HYDROXYZINE HYDROCHLORIDE 50 MG: 50 TABLET, FILM COATED ORAL at 20:00

## 2021-07-29 RX ADMIN — GABAPENTIN 100 MG: 100 CAPSULE ORAL at 20:40

## 2021-07-29 RX ADMIN — GABAPENTIN 100 MG: 100 CAPSULE ORAL at 08:50

## 2021-07-29 RX ADMIN — HYDROCHLOROTHIAZIDE 50 MG: 25 TABLET ORAL at 08:50

## 2021-07-29 RX ADMIN — ACETAMINOPHEN 650 MG: 325 TABLET ORAL at 03:45

## 2021-07-29 ASSESSMENT — PAIN SCALES - GENERAL
PAINLEVEL_OUTOF10: 0
PAINLEVEL_OUTOF10: 3
PAINLEVEL_OUTOF10: 3

## 2021-07-29 NOTE — PROGRESS NOTES
Daily Progress Note  7/29/2021    Patient Name: Kingsley Garces    CHIEF COMPLAINT: Acute psychosis         SUBJECTIVE:     Patient seen for follow-up assessment. She is medication compliant and behaviorally in control. Has not required emergency medications. She was started on risperidone 1 mg nightly and is denying any side effects. Reports continued command auditory hallucinations that also tell her negative things about herself. Patient explains that the voices call her derogatory phrases. Rates the current volume as fluctuating between 5 and 7 out of 10 (with 10 indicating the most severe). Patient states that the voices are better with distractions, but become distressing when she is alone. Improvement noted in patient's thought process. She is less disoriented and is able to engage in meaningful conversation with this writer. Requests use of long-acting injectable. We discussed both Risperdal Consta and Perseris and patient verbalizes desire for once monthly dosing as opposed to every other week for help with stability. Patient somewhat guarded and superficial discussing current symptoms. She has not yet demonstrated stability and requires continued inpatient hospitalization. PRNs: No emergency medications administered since admission     Scheduled medications: Adherent     SW discharge plan:   Mother would like professional assessment for possible need for guardianship    Appetite:  [x] Normal/Adequate/Unchanged  [] Increased  [] Decreased      Sleep:       [] Normal/Adequate/Unchanged  [x] Fair  [] Poor      Group Attendance on Unit:   [x] Yes  [] Selectively    [] No    Medication Side Effects: Denies         Mental Status Exam  Level of consciousness: Alert and awake   Appearance: Appropriate attire for setting, resting in bed, with poor grooming and hygiene   Behavior/Motor: Approachable, no psychomotor abnormalities   Attitude toward examiner: Cooperative, attentive, good eye contact Speech: spontaneous, normal rate, normal volume and well articulated   Mood: \"Anxious\"  Affect: Congruent  Thought processes: linear, goal directed and thought blocking  Thought content: Denies homicidal ideation  Suicidal Ideation: Denies suicidal ideation  Delusions: Denies  Perceptual Disturbance: Endorses current auditory hallucinations that are negative in nature  Cognition: Oriented to self, location, time, and situation  Memory: intact  Insight & Judgement: fair     Data   height is 5' 10\" (1.778 m) and weight is 180 lb (81.6 kg). Her temperature is 97.8 °F (36.6 °C). Her blood pressure is 116/74 and her pulse is 86. Her respiration is 16 and oxygen saturation is 95%.    Labs:   Admission on 07/27/2021   Component Date Value Ref Range Status    WBC 07/27/2021 6.5  3.5 - 11.0 k/uL Final    RBC 07/27/2021 4.93  4.0 - 5.2 m/uL Final    Hemoglobin 07/27/2021 14.0  12.0 - 16.0 g/dL Final    Hematocrit 07/27/2021 42.5  36 - 46 % Final    MCV 07/27/2021 86.2  80 - 100 fL Final    MCH 07/27/2021 28.5  26 - 34 pg Final    MCHC 07/27/2021 33.0  31 - 37 g/dL Final    RDW 07/27/2021 15.1* 11.5 - 14.9 % Final    Platelets 64/99/7074 381  150 - 450 k/uL Final    MPV 07/27/2021 7.0  6.0 - 12.0 fL Final    NRBC Automated 07/27/2021 NOT REPORTED  per 100 WBC Final    Differential Type 07/27/2021 NOT REPORTED   Final    Seg Neutrophils 07/27/2021 60  36 - 66 % Final    Lymphocytes 07/27/2021 30  24 - 44 % Final    Monocytes 07/27/2021 7  1 - 7 % Final    Eosinophils % 07/27/2021 2  0 - 4 % Final    Basophils 07/27/2021 1  0 - 2 % Final    Immature Granulocytes 07/27/2021 NOT REPORTED  0 % Final    Segs Absolute 07/27/2021 3.90  1.3 - 9.1 k/uL Final    Absolute Lymph # 07/27/2021 2.00  1.0 - 4.8 k/uL Final    Absolute Mono # 07/27/2021 0.50  0.1 - 1.3 k/uL Final    Absolute Eos # 07/27/2021 0.10  0.0 - 0.4 k/uL Final    Basophils Absolute 07/27/2021 0.00  0.0 - 0.2 k/uL Final    Absolute Immature 07/27/2021 NEGATIVE  NEGATIVE Final    pH, UA 07/27/2021 6.0  5.0 - 8.0 Final    Protein, UA 07/27/2021 NEGATIVE  NEGATIVE Final    Urobilinogen, Urine 07/27/2021 Normal  Normal Final    Nitrite, Urine 07/27/2021 NEGATIVE  NEGATIVE Final    Leukocyte Esterase, Urine 07/27/2021 LARGE* NEGATIVE Final    Urinalysis Comments 07/27/2021 NOT REPORTED   Final    hCG Qual 07/27/2021 NEGATIVE  NEGATIVE Final    Comment: Specimens with hCG levels near the threshold of the test (25 mIU/mL) may give a negative or   indeterminate result. In such cases, another test should be performed with a new specimen   in 48-72 hours. If early pregnancy is suspected clinically in this setting, correlation   with quantitative serum b-hCG level is suggested.       Amphetamine Screen, Ur 07/27/2021 NEGATIVE  NEGATIVE Final    Comment:       (Positive cutoff 1000 ng/mL)                  Barbiturate Screen, Ur 07/27/2021 NEGATIVE  NEGATIVE Final    Comment:       (Positive cutoff 200 ng/mL)                  Benzodiazepine Screen, Urine 07/27/2021 NEGATIVE  NEGATIVE Final    Comment:       (Positive cutoff 200 ng/mL)                  Cocaine Metabolite, Urine 07/27/2021 NEGATIVE  NEGATIVE Final    Comment:       (Positive cutoff 300 ng/mL)                  Methadone Screen, Urine 07/27/2021 NEGATIVE  NEGATIVE Final    Comment:       (Positive cutoff 300 ng/mL)                  Opiates, Urine 07/27/2021 NEGATIVE  NEGATIVE Final    Comment:       (Positive cutoff 300 ng/mL)                  Phencyclidine, Urine 07/27/2021 NEGATIVE  NEGATIVE Final    Comment:       (Positive cutoff 25 ng/mL)                  Propoxyphene, Urine 07/27/2021 NOT REPORTED  NEGATIVE Final    Cannabinoid Scrn, Ur 07/27/2021 POSITIVE* NEGATIVE Final    Comment:       (Positive cutoff 50 ng/mL)                  Oxycodone Screen, Ur 07/27/2021 NEGATIVE  NEGATIVE Final    Comment:       (Positive cutoff 100 ng/mL)                  Methamphetamine, Urine

## 2021-07-29 NOTE — PLAN OF CARE
[address] [place] [address]  Day 3 Interdisciplinary Treatment Plan NOTE    Review Date & Time: 7/29/2021   0924    Admission Type:   Admission Type: Voluntary    Reason for admission:  Reason for Admission: command auditory hallucinations, homicidal ideations  Estimated Length of Stay: 5-7 days  Estimated Discharge Date Update: to be determined by physician    PATIENT STRENGTHS:  Patient Strengths Strengths: Communication  Patient Strengths and Limitations:Limitations: Difficulty problem solving/relies on others to help solve problems, Inappropriate/potentially harmful leisure interests, Unrealistic self-view, Difficult relationships / poor social skills  Addictive Behavior:Addictive Behavior  In the past 3 months, have you felt or has someone told you that you have a problem with:  : None  Do you have a history of Chemical Use?: No  Do you have a history of Alcohol Use?: No  Do you have a history of Street Drug Abuse?: Yes  Histroy of Prescripton Drug Abuse?: No  Medical Problems:  Past Medical History:   Diagnosis Date    Anxiety     Arthritis     O. A.     B[facility] ([facility]     Dermatitis     Dysuria     Exposure to STD     Folliculitis     Hx of acute bronchitis     Hypertension     Marfan syndrome     MVP (mitral valve prolapse)     Nausea & vomiting     Polysubstance abuse ([facility]     drug abuse includes cocaine, fentnyl, cannabis    PONV (postoperative nausea and vomiting)     Scoliosis     Seasonal allergies     Staph infection     HX OF ALSO BACTEREMIA    UTI (lower urinary tract infection)        Risk:  Fall RiskTotal: 65  Griffin Scale Griffin Scale Score: 21  BVC Total: 0  Change in scores no Changes to plan of Care no    Status EXAM:   Status and Exam  Normal: No  Facial Expression: Worried  Affect: Blunt  Level of Consciousness: Alert  Mood:Normal: No  Mood: Anxious  Motor Activity:Normal: No  Motor Activity: Decreased, Unusual Posture/Gait  Interview Behavior: Cooperative  Preception: [place] to Person, [place]urnell Merle to Time, [place] to Place  Attention:Normal: Yes  Thought Content:Normal: No  Thought Content: Preoccupations  Hallucinations: Auditory (Comment)  Delusions: No  Memory:Normal: Yes  Insight and Judgment: Yes  Insight and Judgment: Poor Insight  Present Suicidal Ideation: No  Present Homicidal Ideation: No    Daily Assessment Last Entry:   Daily Sleep (WDL): Within Defined Limits         Patient Currently in Pain: Yes  Daily Nutrition (WDL): Within Defined Limits    Patient Monitoring:  Frequency of Checks: 4 times per hour, close    Psychiatric Symptoms:   Depression Symptoms  Depression Symptoms: Impaired concentration, Isolative, Loss of interest, Feelings of hopelessess  Anxiety Symptoms  Anxiety Symptoms: Generalized  Debi Symptoms  Debi Symptoms: No problems reported or observed. Psychosis Symptoms  Delusion Type: No problems reported or observed. Suicide Risk CSSR-S:  1) Within the past month, have you wished you were dead or wished you could go to sleep and not wake up? : No  2) Have you actually had any thoughts of killing yourself? : No  6) Have you ever done anything, started to do anything, or prepared to do anything to end your life?: No  Change in Result NO Change in Plan of care NO      EDUCATION:   EDUCATION:   Learner Progress Toward Treatment Goals: Reviewed results and recommendations of this team, Reviewed group plan and strategies, Reviewed signs, symptoms and risk of self harm and violent behavior, Reviewed goals and plan of care    Method:small group, individual verbal education    Outcome:verbalized by patient, but needs reinforcement to obtain goals    PATIENT GOALS:  Short term: Decrease hallucinations; Medication stabilization; Go to groups  Long term: Keep up on medication routine;  Stay clean and sober; Self-exploration    PLAN/TREATMENT RECOMMENDATIONS UPDATE: continue with group therapies, increased socialization, continue planning for after discharge goals, continue with medication compliance    SHORT-TERM GOALS UPDATE:   Time frame for Short-Term Goals: 5-7 days    LONG-TERM GOALS UPDATE:   Time frame for Long-Term Goals: 6 months  Members Present in Team Meeting: See Signature Sheet    Jeannine Temple

## 2021-07-29 NOTE — PLAN OF CARE
Problem: Anger Management/Homicidal Ideation:  Goal: Absence of homicidal ideation  Description: Absence of homicidal ideation  7/28/2021 2249 by Masha Saavedra RN  Outcome: Ongoing   Pt denies homicidal thoughts  Problem: Depressive Behavior With or Without Suicide Precautions:  Goal: Ability to disclose and discuss suicidal ideas will improve  Description: Ability to disclose and discuss suicidal ideas will improve  7/28/2021 2249 by Masha Saavedra RN  Outcome: Ongoing   Pt denies thoughts of harming themself and verbally agrees to remain safe while on the unit. No self harming behaviors are noted this shift    Problem: Altered Mood, Psychotic Behavior:  Goal: Able to verbalize decrease in frequency and intensity of hallucinations  Description: Able to verbalize decrease in frequency and intensity of hallucinations  7/28/2021 2249 by Masha Saavedra RN  Outcome: Ongoing   Pt reports the voices are still there but more manageable at this time. Continues to report paranoid thoughts but has been convincing herself not to listen to them. Is hopefully that the medication change will help. Pt is visible in the milieu social with staff and peers. She eats well at snack and accepts all medication.

## 2021-07-29 NOTE — PLAN OF CARE
Problem: Depressive Behavior With or Without Suicide Precautions:  Goal: Ability to disclose and discuss suicidal ideas will improve  Description: Ability to disclose and discuss suicidal ideas will improve  7/29/2021 1439 by Radha Dennison RN  Outcome: Ongoing   Pt denies thoughts of self harm and is agreeable to seeking out should thoughts of self harm arise. Safe environment maintained. Q15 minute checks for safety continues per unit policy. Will continue to monitor for safety and provides support and reassurance as needed.

## 2021-07-29 NOTE — FLOWSHEET NOTE
Patient attended [facility]     07/29/21 1513   Encounter Summary   Services provided to: Patient   Referral/Consult From: Rounding   Continue Visiting   (7/29/21)   Complexity of Encounter Moderate   Length of Encounter 30 minutes   Spiritual Assessment Completed Yes   Spiritual/Restorationist   Type Spiritual support   Assessment Approachable   Intervention Active listening;Prayer;Sustaining presence/ Ministry of presence   Outcome Expressed gratitude;Engaged in conversation;Expressed feelings/needs/concerns;Receptive

## 2021-07-29 NOTE — GROUP NOTE
Group Therapy Note    Date: 7/29/2021    Group Start Time: 1330  Group End Time: 9766  Group Topic: Music Therapy    TRACIE MAREKI A    Nixon Rahman        Group Therapy Note    Attendees: 3/14         Patient's Goal:  Patients shared music and dedicated the songs to important people in their lives. Goals to increase self-expression; increase sense of community; reflect on supports;     Notes:  Patient attended and participated in group, having positive interactions with peers and staff. Patient shared son Hero by Vi Langston and dedicated it to her mother, and reflected on feeling happy that her mother said she was proud of patients recent sobriety. Also reflected she understands the difficulty of her mother raising her and her siblings. Patient mentioned hallucinations, and peer asked question about hallucinations, to which peer discussed how she experiences them. Status After Intervention:  Improved    Participation Level:  Active Listener and Interactive    Participation Quality: Appropriate, Attentive, Sharing and Supportive      Speech:  normal      Thought Process/Content: Logical  Linear      Affective Functioning: Congruent      Mood: euthymic      Level of consciousness:  Alert and Attentive      Response to Learning: Able to verbalize current knowledge/experience, Capable of insight and Progressing to goal      Endings: None Reported    Modes of Intervention: Support, Socialization, Exploration, Activity, Media and Reality-testing      Discipline Responsible: Psychoeducational Specialist      Signature:  Nixon Rahman

## 2021-07-29 NOTE — GROUP NOTE
Group Therapy Note    Date: 7/29/2021    Group Start Time: 0900  Group End Time: 0915  Group Topic: Community Meeting    ANYI RICHA CIARA Prasad        Group Therapy Note    Attendees: 4/14         Patient's Goal:  To orient to unit and set daily goal    Notes:  Patient attended and participated in group    Status After Intervention:  Improved    Participation Level:  Active Listener and Interactive    Participation Quality: Appropriate, Attentive and Sharing      Speech:  normal      Thought Process/Content: Logical  Linear      Affective Functioning: Congruent      Mood: euthymic      Level of consciousness:  Alert and Attentive      Response to Learning: Able to verbalize current knowledge/experience and Progressing to goal      Endings: None Reported    Modes of Intervention: Education, Support, Socialization, Exploration and Reality-testing      Discipline Responsible: Psychoeducational Specialist      Signature:  Jacky Prasad

## 2021-07-29 NOTE — GROUP NOTE
Group Therapy Note    Date: 7/29/2021    Group Start Time: 1330  Group End Time: 1583  Group Topic: Music Therapy    ANYI Hayes        Group Therapy Note    Pt did not attend music therapy group d/t resting in room despite staff invitation to attend. 1:1 talk time offered as alternative to group session, pt declined.

## 2021-07-29 NOTE — CARE COORDINATION
SW attempted to return pts mother (DORIAN on file) phone call. SW left message on voicemail with name and call back number for a return call.

## 2021-07-29 NOTE — GROUP NOTE
Group Therapy Note    Date: 7/29/2021    Group Start Time: 1000  Group End Time: 1030  Group Topic: Psychotherapy    ANYI URBINA    NANDA Ruiz LSW        Group Therapy Note    Attendees: 5/14         Patient's Goal: Increase interpersonal relationship skills    Notes:  Patient was an active participant in group discussion    Status After Intervention:  Unchanged    Participation Level:  Active Listener and Interactive    Participation Quality: Appropriate, Attentive and Sharing      Speech:  normal      Thought Process/Content: Logical      Affective Functioning: Constricted/Restricted      Mood: dysphoric      Level of consciousness:  Alert, Oriented x4 and Attentive      Response to Learning: Able to verbalize current knowledge/experience and Able to verbalize/acknowledge new learning      Endings: None Reported    Modes of Intervention: Socialization and Exploration      Discipline Responsible: /Counselor      Signature:  NANDA Ruiz LSW

## 2021-07-30 PROCEDURE — 6370000000 HC RX 637 (ALT 250 FOR IP): Performed by: PSYCHIATRY & NEUROLOGY

## 2021-07-30 PROCEDURE — 6370000000 HC RX 637 (ALT 250 FOR IP)

## 2021-07-30 PROCEDURE — 6370000000 HC RX 637 (ALT 250 FOR IP): Performed by: NURSE PRACTITIONER

## 2021-07-30 PROCEDURE — 99232 SBSQ HOSP IP/OBS MODERATE 35: CPT | Performed by: PSYCHIATRY & NEUROLOGY

## 2021-07-30 PROCEDURE — APPSS30 APP SPLIT SHARED TIME 16-30 MINUTES: Performed by: NURSE PRACTITIONER

## 2021-07-30 PROCEDURE — 1240000000 HC EMOTIONAL WELLNESS R&B

## 2021-07-30 RX ORDER — HALOPERIDOL 5 MG
5 TABLET ORAL EVERY 4 HOURS PRN
Status: DISCONTINUED | OUTPATIENT
Start: 2021-07-30 | End: 2021-08-02 | Stop reason: HOSPADM

## 2021-07-30 RX ORDER — DIPHENHYDRAMINE HYDROCHLORIDE 50 MG/ML
50 INJECTION INTRAMUSCULAR; INTRAVENOUS EVERY 4 HOURS PRN
Status: DISCONTINUED | OUTPATIENT
Start: 2021-07-30 | End: 2021-08-02 | Stop reason: HOSPADM

## 2021-07-30 RX ORDER — LORAZEPAM 1 MG/1
2 TABLET ORAL EVERY 4 HOURS PRN
Status: DISCONTINUED | OUTPATIENT
Start: 2021-07-30 | End: 2021-08-02 | Stop reason: HOSPADM

## 2021-07-30 RX ORDER — HALOPERIDOL 5 MG/ML
5 INJECTION INTRAMUSCULAR EVERY 4 HOURS PRN
Status: DISCONTINUED | OUTPATIENT
Start: 2021-07-30 | End: 2021-08-02 | Stop reason: HOSPADM

## 2021-07-30 RX ORDER — LORAZEPAM 2 MG/ML
2 INJECTION INTRAMUSCULAR EVERY 4 HOURS PRN
Status: DISCONTINUED | OUTPATIENT
Start: 2021-07-30 | End: 2021-08-02 | Stop reason: HOSPADM

## 2021-07-30 RX ORDER — RISPERIDONE 1 MG/1
0.5 TABLET, FILM COATED ORAL DAILY
Status: DISCONTINUED | OUTPATIENT
Start: 2021-07-30 | End: 2021-08-01

## 2021-07-30 RX ORDER — RISPERIDONE 3 MG/1
1.5 TABLET, FILM COATED ORAL NIGHTLY
Status: DISCONTINUED | OUTPATIENT
Start: 2021-07-30 | End: 2021-08-01

## 2021-07-30 RX ADMIN — GABAPENTIN 100 MG: 100 CAPSULE ORAL at 14:55

## 2021-07-30 RX ADMIN — MELOXICAM 15 MG: 7.5 TABLET ORAL at 08:10

## 2021-07-30 RX ADMIN — LORAZEPAM 2 MG: 1 TABLET ORAL at 00:40

## 2021-07-30 RX ADMIN — HYDROXYZINE HYDROCHLORIDE 50 MG: 50 TABLET, FILM COATED ORAL at 00:10

## 2021-07-30 RX ADMIN — DULOXETINE HYDROCHLORIDE 60 MG: 60 CAPSULE, DELAYED RELEASE ORAL at 08:11

## 2021-07-30 RX ADMIN — GABAPENTIN 100 MG: 100 CAPSULE ORAL at 21:06

## 2021-07-30 RX ADMIN — CYCLOBENZAPRINE 10 MG: 10 TABLET, FILM COATED ORAL at 15:03

## 2021-07-30 RX ADMIN — HALOPERIDOL 5 MG: 5 TABLET ORAL at 00:40

## 2021-07-30 RX ADMIN — RISPERIDONE 0.5 MG: 1 TABLET ORAL at 17:51

## 2021-07-30 RX ADMIN — HYDROXYZINE HYDROCHLORIDE 50 MG: 50 TABLET, FILM COATED ORAL at 21:06

## 2021-07-30 RX ADMIN — DULOXETINE HYDROCHLORIDE 60 MG: 60 CAPSULE, DELAYED RELEASE ORAL at 21:07

## 2021-07-30 RX ADMIN — BUPRENORPHINE AND NALOXONE 1 FILM: 8; 2 FILM BUCCAL; SUBLINGUAL at 08:11

## 2021-07-30 RX ADMIN — HYDROXYZINE HYDROCHLORIDE 50 MG: 50 TABLET, FILM COATED ORAL at 15:03

## 2021-07-30 RX ADMIN — TRAZODONE HYDROCHLORIDE 50 MG: 50 TABLET ORAL at 21:07

## 2021-07-30 RX ADMIN — HYDROCHLOROTHIAZIDE 50 MG: 25 TABLET ORAL at 08:11

## 2021-07-30 RX ADMIN — ACETAMINOPHEN 650 MG: 325 TABLET ORAL at 00:10

## 2021-07-30 RX ADMIN — CYCLOBENZAPRINE 10 MG: 10 TABLET, FILM COATED ORAL at 00:10

## 2021-07-30 RX ADMIN — CYCLOBENZAPRINE 10 MG: 10 TABLET, FILM COATED ORAL at 21:06

## 2021-07-30 RX ADMIN — GABAPENTIN 100 MG: 100 CAPSULE ORAL at 08:10

## 2021-07-30 RX ADMIN — RISPERIDONE 1.5 MG: 3 TABLET ORAL at 21:07

## 2021-07-30 ASSESSMENT — PAIN - FUNCTIONAL ASSESSMENT: PAIN_FUNCTIONAL_ASSESSMENT: 0-10

## 2021-07-30 ASSESSMENT — PAIN DESCRIPTION - LOCATION
LOCATION: GENERALIZED
LOCATION: SHOULDER

## 2021-07-30 ASSESSMENT — PAIN SCALES - GENERAL
PAINLEVEL_OUTOF10: 0
PAINLEVEL_OUTOF10: 3
PAINLEVEL_OUTOF10: 7

## 2021-07-30 ASSESSMENT — PAIN DESCRIPTION - PAIN TYPE: TYPE: CHRONIC PAIN

## 2021-07-30 NOTE — PROGRESS NOTES
Prn note  Pt is anxious tearful, wanting to go outside to meet with her family, reports people are talking to her through the vents and the speakers. Becomes agitated at attempts of injections of doubt. With much encouragement she remains controlled and accepts prn haldol and ativan.  Will continue to monitor closely

## 2021-07-30 NOTE — GROUP NOTE
Group Therapy Note    Date: 7/30/2021    Group Start Time: 1010  Group End Time: 0722  Group Topic: Psychotherapy    NANDA Solis LSW        Group Therapy Note    Attendees: 5/18         Patient was offered group therapy today but declined to participate despite encouragement from staff. 1:1 was offered.     Signature:  NANDA Burton LSW

## 2021-07-30 NOTE — GROUP NOTE
Group Therapy Note    Date: 7/30/2021    Group Start Time: 1100  Group End Time: 0434  Group Topic: Music Therapy    [facility] A Judeen Klinefelter        Group Therapy Note    Pt did not attend music therapy group d/t resting in room despite staff invitation to attend. 1:1 talk time offered as alternative to group session, pt declined.

## 2021-07-30 NOTE — PROGRESS NOTES
low volume and well articulated   Mood: \"Worried\"  Affect: Congruent  Thought processes: linear, goal directed and thought blocking  Thought content: Denies homicidal ideation  Suicidal Ideation: Denies suicidal ideation  Delusions: Denies  Perceptual Disturbance: Endorses current auditory hallucinations that are negative in nature  Cognition: Oriented to self, location, time, and situation  Memory: intact  Insight & Judgement: fair     Data   height is 5' 10\" (1.778 m) and weight is 180 lb (81.6 kg). Her oral temperature is 98 °F (36.7 °C). Her blood pressure is 131/77 and her pulse is 82. Her respiration is 14 and oxygen saturation is 95%.    Labs:   Admission on 07/27/2021   Component Date Value Ref Range Status    WBC 07/27/2021 6.5  3.5 - 11.0 k/uL Final    RBC 07/27/2021 4.93  4.0 - 5.2 m/uL Final    Hemoglobin 07/27/2021 14.0  12.0 - 16.0 g/dL Final    Hematocrit 07/27/2021 42.5  36 - 46 % Final    MCV 07/27/2021 86.2  80 - 100 fL Final    MCH 07/27/2021 28.5  26 - 34 pg Final    MCHC 07/27/2021 33.0  31 - 37 g/dL Final    RDW 07/27/2021 15.1* 11.5 - 14.9 % Final    Platelets 57/17/4283 381  150 - 450 k/uL Final    MPV 07/27/2021 7.0  6.0 - 12.0 fL Final    NRBC Automated 07/27/2021 NOT REPORTED  per 100 WBC Final    Differential Type 07/27/2021 NOT REPORTED   Final    Seg Neutrophils 07/27/2021 60  36 - 66 % Final    Lymphocytes 07/27/2021 30  24 - 44 % Final    Monocytes 07/27/2021 7  1 - 7 % Final    Eosinophils % 07/27/2021 2  0 - 4 % Final    Basophils 07/27/2021 1  0 - 2 % Final    Immature Granulocytes 07/27/2021 NOT REPORTED  0 % Final    Segs Absolute 07/27/2021 3.90  1.3 - 9.1 k/uL Final    Absolute Lymph # 07/27/2021 2.00  1.0 - 4.8 k/uL Final    Absolute Mono # 07/27/2021 0.50  0.1 - 1.3 k/uL Final    Absolute Eos # 07/27/2021 0.10  0.0 - 0.4 k/uL Final    Basophils Absolute 07/27/2021 0.00  0.0 - 0.2 k/uL Final    Absolute Immature Granulocyte 07/27/2021 NOT REPORTED  0.00 - 0.30 k/uL Final    WBC Morphology 07/27/2021 NOT REPORTED   Final    RBC Morphology 07/27/2021 NOT REPORTED   Final    Platelet Estimate 69/30/6366 NOT REPORTED   Final    Glucose 07/27/2021 101* 70 - 99 mg/dL Final    BUN 07/27/2021 5* 6 - 20 mg/dL Final    CREATININE 07/27/2021 0.59  0.50 - 0.90 mg/dL Final    Bun/Cre Ratio 07/27/2021 NOT REPORTED  9 - 20 Final    Calcium 07/27/2021 9.5  8.6 - 10.4 mg/dL Final    Sodium 07/27/2021 137  135 - 144 mmol/L Final    Potassium 07/27/2021 4.1  3.7 - 5.3 mmol/L Final    Chloride 07/27/2021 101  98 - 107 mmol/L Final    CO2 07/27/2021 29  20 - 31 mmol/L Final    Anion Gap 07/27/2021 7* 9 - 17 mmol/L Final    Alkaline Phosphatase 07/27/2021 91  35 - 104 U/L Final    ALT 07/27/2021 11  5 - 33 U/L Final    AST 07/27/2021 12  <32 U/L Final    Total Bilirubin 07/27/2021 0.51  0.3 - 1.2 mg/dL Final    Total Protein 07/27/2021 6.9  6.4 - 8.3 g/dL Final    Albumin 07/27/2021 4.2  3.5 - 5.2 g/dL Final    Albumin/Globulin Ratio 07/27/2021 NOT REPORTED  1.0 - 2.5 Final    GFR Non- 07/27/2021 >60  >60 mL/min Final    GFR  07/27/2021 >60  >60 mL/min Final    GFR Comment 07/27/2021        Final    Comment: Average GFR for 30-36 years old:   80 mL/min/1.73sq m  Chronic Kidney Disease:   <60 mL/min/1.73sq m  Kidney failure:   <15 mL/min/1.73sq m              eGFR calculated using average adult body mass.  Additional eGFR calculator available at:        [employer]            GFR Staging 07/27/2021 NOT REPORTED   Final    Color, UA 07/27/2021 YELLOW  YELLOW Final    Turbidity UA 07/27/2021 CLOUDY* CLEAR Final    Glucose, Ur 07/27/2021 NEGATIVE  NEGATIVE Final    Bilirubin Urine 07/27/2021 NEGATIVE  NEGATIVE Final    Ketones, Urine 07/27/2021 NEGATIVE  NEGATIVE Final    Specific Gravity, UA 07/27/2021 1.006  1.000 - 1.030 Final    Urine Hgb 07/27/2021 NEGATIVE  NEGATIVE Final    pH, UA 07/27/2021 6.0  5.0 - 8.0 Final    Protein, UA 07/27/2021 NEGATIVE  NEGATIVE Final    Urobilinogen, Urine 07/27/2021 Normal  Normal Final    Nitrite, Urine 07/27/2021 NEGATIVE  NEGATIVE Final    Leukocyte Esterase, Urine 07/27/2021 LARGE* NEGATIVE Final    Urinalysis Comments 07/27/2021 NOT REPORTED   Final    hCG Qual 07/27/2021 NEGATIVE  NEGATIVE Final    Comment: Specimens with hCG levels near the threshold of the test (25 mIU/mL) may give a negative or   indeterminate result. In such cases, another test should be performed with a new specimen   in 48-72 hours. If early pregnancy is suspected clinically in this setting, correlation   with quantitative serum b-hCG level is suggested.       Amphetamine Screen, Ur 07/27/2021 NEGATIVE  NEGATIVE Final    Comment:       (Positive cutoff 1000 ng/mL)                  Barbiturate Screen, Ur 07/27/2021 NEGATIVE  NEGATIVE Final    Comment:       (Positive cutoff 200 ng/mL)                  Benzodiazepine Screen, Urine 07/27/2021 NEGATIVE  NEGATIVE Final    Comment:       (Positive cutoff 200 ng/mL)                  Cocaine Metabolite, Urine 07/27/2021 NEGATIVE  NEGATIVE Final    Comment:       (Positive cutoff 300 ng/mL)                  Methadone Screen, Urine 07/27/2021 NEGATIVE  NEGATIVE Final    Comment:       (Positive cutoff 300 ng/mL)                  Opiates, Urine 07/27/2021 NEGATIVE  NEGATIVE Final    Comment:       (Positive cutoff 300 ng/mL)                  Phencyclidine, Urine 07/27/2021 NEGATIVE  NEGATIVE Final    Comment:       (Positive cutoff 25 ng/mL)                  Propoxyphene, Urine 07/27/2021 NOT REPORTED  NEGATIVE Final    Cannabinoid Scrn, Ur 07/27/2021 POSITIVE* NEGATIVE Final    Comment:       (Positive cutoff 50 ng/mL)                  Oxycodone Screen, Ur 07/27/2021 NEGATIVE  NEGATIVE Final    Comment:       (Positive cutoff 100 ng/mL)                  Methamphetamine, Urine 07/27/2021 NOT REPORTED  NEGATIVE Final    Tricyclic Antidepressants, Urine 07/27/2021 NOT REPORTED  NEGATIVE Final    MDMA, Urine 07/27/2021 NOT REPORTED  NEGATIVE Final    Buprenorphine Urine 07/27/2021 NOT REPORTED  NEGATIVE Final    Test Information 07/27/2021 Assay provides medical screening only. The absence of expected drug(s) and/or metabolite(s) may indicate diluted or adulterated urine, limitations of testing or timing of collection. Final    Comment: Testing for legal purposes should be confirmed by another method. To request confirmation   of test result, please call the lab within 7 days of sample submission.  Ethanol 07/27/2021 <10  <10 mg/dL Final    Ethanol percent 07/27/2021 <0.010  % Final    Specimen Description 07/27/2021 . NASOPHARYNGEAL SWAB   Final    SARS-CoV-2, Rapid 07/27/2021 Not Detected  Not Detected Final    Comment:       Rapid NAAT:  The specimen is NEGATIVE for SARS-CoV-2, the novel coronavirus associated with   COVID-19. The ID NOW COVID-19 assay is designed to detect the virus that causes COVID-19 in patients   with signs and symptoms of infection who are suspected of COVID-19. An individual without symptoms of COVID-19 and who is not shedding SARS-CoV-2 virus would   expect to have a negative (not detected) result in this assay. Negative results should be treated as presumptive and, if inconsistent with clinical signs   and symptoms or necessary for patient management,  should be tested with an alternative molecular assay. Negative results do not preclude   SARS-CoV-2 infection and   should not be used as the sole basis for patient management decisions.          Fact sheet for Healthcare Providers: Matteo  Fact sheet for Patients: Matteo          Methodology: Isothermal Nucleic Acid Amplification      - 07/27/2021        Final    WBC, UA 07/27/2021 5 TO 10  /HPF Final    RBC, UA 07/27/2021 0 TO 2  /HPF Final    Casts UA 07/27/2021 NOT REPORTED  /LPF Final    Crystals, UA 07/27/2021 NOT REPORTED  None /HPF Final    Epithelial Cells UA 07/27/2021 20 TO 50  /HPF Final    Renal Epithelial, UA 07/27/2021 NOT REPORTED  0 /HPF Final    Bacteria, UA 07/27/2021 MODERATE* None Final    Mucus, UA 07/27/2021 NOT REPORTED  None Final    Trichomonas, UA 07/27/2021 NOT REPORTED  None Final    Amorphous, UA 07/27/2021 NOT REPORTED  None Final    Other Observations UA 07/27/2021 NOT REPORTED  NOT REQ. Final    Yeast, UA 07/27/2021 FEW* None Final         Reviewed patient's current plan of care and vital signs with nursing staff.     Labs reviewed: [x] Yes  Last EKG in EMR reviewed: [x] Yes    Medications  Current Facility-Administered Medications: haloperidol (HALDOL) tablet 5 mg, 5 mg, Oral, Q4H PRN **AND** LORazepam (ATIVAN) tablet 2 mg, 2 mg, Oral, Q4H PRN  haloperidol lactate (HALDOL) injection 5 mg, 5 mg, Intramuscular, Q4H PRN **AND** LORazepam (ATIVAN) injection 2 mg, 2 mg, Intramuscular, Q4H PRN **AND** diphenhydrAMINE (BENADRYL) injection 50 mg, 50 mg, Intramuscular, Q4H PRN  risperiDONE (RISPERDAL) tablet 1.5 mg, 1.5 mg, Oral, Nightly  risperiDONE (RISPERDAL) tablet 0.5 mg, 0.5 mg, Oral, Daily  buprenorphine-naloxone (SUBOXONE) 8-2 MG SL film 1 Film, 1 Film, Sublingual, Daily  cyclobenzaprine (FLEXERIL) tablet 10 mg, 10 mg, Oral, TID PRN  gabapentin (NEURONTIN) capsule 100 mg, 100 mg, Oral, TID  meloxicam (MOBIC) tablet 15 mg, 15 mg, Oral, Daily  nicotine (NICODERM CQ) 14 MG/24HR 1 patch, 1 patch, Transdermal, Daily  acetaminophen (TYLENOL) tablet 650 mg, 650 mg, Oral, Q4H PRN  ibuprofen (ADVIL;MOTRIN) tablet 400 mg, 400 mg, Oral, Q6H PRN  polyethylene glycol (GLYCOLAX) packet 17 g, 17 g, Oral, Daily PRN  aluminum & magnesium hydroxide-simethicone (MAALOX) 200-200-20 MG/5ML suspension 30 mL, 30 mL, Oral, Q6H PRN  hydrOXYzine (ATARAX) tablet 50 mg, 50 mg, Oral, TID PRN  traZODone (DESYREL) tablet 50 mg, 50 mg, Oral, Nightly PRN  DULoxetine (CYMBALTA) extended release capsule 60 mg, 60 mg, Oral, BID  hydroCHLOROthiazide (HYDRODIURIL) tablet 50 mg, 50 mg, Oral, Daily    ASSESSMENT  Acute psychosis ([facility] [address]         PLAN  Patient symptoms are: Modestly Improving  Titrate Risperdal to 0.5 mg daily and 1.5 mg nightly. Consider transitioning to Risperdal Perseris pending response to oral risperidone  Monitor need and frequency of PRN medications  Encourage participation in groups and milieu  Attempt to develop insight  Psycho-education conducted  Supportive Therapy conducted  Probable discharge: Per attending MD  Follow-up daily while inpatient    Patient continues to be monitored in the inpatient psychiatric facility at [facility] for safety and stabilization. Patient continues to need, on a daily basis, active treatment furnished directly by or requiring the supervision of inpatient psychiatric personnel. Electronically signed by GUIDO Wyatt CNP on 7/30/2021 at 5:08 PM    **This report has been created using voice recognition software. It may contain minor errors which are inherent in voice recognition technology. **    I independently saw and evaluated the patient. I reviewed the nurse practitioners documentation above. Any additional comments or changes to the nurse practitioners documentation are stated below otherwise agree with assessment. Plan will be as follows:  Patient willing to consider long-acting injectable however her symptoms are not yet stabilized on Risperdal.  I discussed with her that I would not want to use a long-acting injectable until I was confident the medication would stabilize her symptoms. We discussed dose adjustment above and she was agreeable. PLAN  Patient s symptoms   are worsening  Increase at bedtime and add a.m. dose of Risperdal  Attempt to develop insight  Psycho-education conducted. Supportive Therapy conducted.   Probable discharge is undetermined at this time  Follow-up daily while on inpatient unit

## 2021-07-30 NOTE — PLAN OF CARE
Problem: Anger Management/Homicidal Ideation:  Goal: Absence of homicidal ideation  Description: Absence of homicidal ideation  7/29/2021 2159 by Caryn Boone LPN  Outcome: Ongoing  Patient denies having any homicidal ideations at this time. Problem: Depressive Behavior With or Without Suicide Precautions:  Goal: Ability to disclose and discuss suicidal ideas will improve  Description: Ability to disclose and discuss suicidal ideas will improve  7/29/2021 2159 by Caryn Boone LPN  Outcome: Ongoing  Patient denies suicidal ideations and contracts for safety while on the unit. Patient is encouraged to seek help from staff if such thoughts continue to be bothersome. Patient accepting of oral PRN medications for pain, agitation, anxiety and sleep this evening. 15 minute checks maintained for patent safety. Problem: Altered Mood, Psychotic Behavior:  Goal: Able to verbalize decrease in frequency and intensity of hallucinations  Description: Able to verbalize decrease in frequency and intensity of hallucinations  7/29/2021 2159 by Caryn Boone LPN  Outcome: Ongoing  Patient states she continues to have auditory hallucinations of her family telling her to meet them \"downstairs\" and of her neighbor yelling at her. Patient tearful at times and heard responding to the voices she's hearing. Writer redirected patient thoughts by talking to her and offering her activities to take her mind off the negative voices.
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
## 2021-07-30 NOTE — GROUP NOTE
Group Therapy Note    Date: 7/30/2021    Group Start Time: 0900  Group End Time: 0915  Group Topic: Community Meeting    ANYI Kauffman        Group Therapy Note    Pt did not attend community meeting group d/t resting in room despite staff invitation to attend. 1:1 talk time offered as alternative to group session, pt declined.

## 2021-07-30 NOTE — GROUP NOTE
Group Therapy Note    Date: 7/30/2021    Group Start Time: 1430  Group End Time: 5694  Group Topic: Psychoeducation    ANYI Alvarez      Patient declined to attend social skills group at 1430 despite encouragement from staff. 1:1 talk time offered by staff as alternative to group session.         Signature:  Tatiana Alvarez

## 2021-07-30 NOTE — GROUP NOTE
Group Therapy Note    Date: 7/30/2021    Group Start Time: 9383  Group End Time: 1692  Group Topic: Group Therapy    KARINA Restrepo    Group Therapy Note    Attendees: 6    Patient's Goal:  Pt will demonstrate improved interpersonal skills    Notes:  Pt attended group and participated    Status After Intervention:  Improved    Participation Level:  Active Listener and Interactive    Participation Quality: Appropriate, Attentive and Sharing      Speech:  normal      Thought Process/Content: Logical  Linear      Affective Functioning: Constricted/Restricted      Mood: euthymic      Level of consciousness:  Alert, Oriented x4 and Attentive      Response to Learning: Able to verbalize current knowledge/experience, Able to verbalize/acknowledge new learning, Able to change behavior and Progressing to goal      Endings: None Reported    Modes of Intervention: Education, Support, Socialization and Exploration      Discipline Responsible: Behavorial Health Tech      Signature:  Josie Rosario

## 2021-07-31 PROCEDURE — 1240000000 HC EMOTIONAL WELLNESS R&B

## 2021-07-31 PROCEDURE — APPSS30 APP SPLIT SHARED TIME 16-30 MINUTES: Performed by: NURSE PRACTITIONER

## 2021-07-31 PROCEDURE — 6370000000 HC RX 637 (ALT 250 FOR IP): Performed by: PSYCHIATRY & NEUROLOGY

## 2021-07-31 PROCEDURE — 99232 SBSQ HOSP IP/OBS MODERATE 35: CPT | Performed by: PSYCHIATRY & NEUROLOGY

## 2021-07-31 PROCEDURE — 6370000000 HC RX 637 (ALT 250 FOR IP)

## 2021-07-31 PROCEDURE — 6370000000 HC RX 637 (ALT 250 FOR IP): Performed by: NURSE PRACTITIONER

## 2021-07-31 RX ADMIN — HYDROCHLOROTHIAZIDE 50 MG: 25 TABLET ORAL at 08:42

## 2021-07-31 RX ADMIN — GABAPENTIN 100 MG: 100 CAPSULE ORAL at 20:05

## 2021-07-31 RX ADMIN — RISPERIDONE 1.5 MG: 3 TABLET ORAL at 20:05

## 2021-07-31 RX ADMIN — RISPERIDONE 0.5 MG: 1 TABLET ORAL at 08:42

## 2021-07-31 RX ADMIN — CYCLOBENZAPRINE 10 MG: 10 TABLET, FILM COATED ORAL at 20:05

## 2021-07-31 RX ADMIN — CYCLOBENZAPRINE 10 MG: 10 TABLET, FILM COATED ORAL at 14:03

## 2021-07-31 RX ADMIN — GABAPENTIN 100 MG: 100 CAPSULE ORAL at 14:04

## 2021-07-31 RX ADMIN — BUPRENORPHINE AND NALOXONE 1 FILM: 8; 2 FILM BUCCAL; SUBLINGUAL at 08:42

## 2021-07-31 RX ADMIN — HYDROXYZINE HYDROCHLORIDE 50 MG: 50 TABLET, FILM COATED ORAL at 20:06

## 2021-07-31 RX ADMIN — ACETAMINOPHEN 650 MG: 325 TABLET ORAL at 14:04

## 2021-07-31 RX ADMIN — GABAPENTIN 100 MG: 100 CAPSULE ORAL at 08:41

## 2021-07-31 RX ADMIN — DULOXETINE HYDROCHLORIDE 60 MG: 60 CAPSULE, DELAYED RELEASE ORAL at 20:06

## 2021-07-31 RX ADMIN — DULOXETINE HYDROCHLORIDE 60 MG: 60 CAPSULE, DELAYED RELEASE ORAL at 08:42

## 2021-07-31 RX ADMIN — MELOXICAM 15 MG: 7.5 TABLET ORAL at 08:41

## 2021-07-31 ASSESSMENT — PAIN SCALES - GENERAL
PAINLEVEL_OUTOF10: 3
PAINLEVEL_OUTOF10: 2
PAINLEVEL_OUTOF10: 7

## 2021-07-31 ASSESSMENT — PAIN DESCRIPTION - PROGRESSION: CLINICAL_PROGRESSION: GRADUALLY IMPROVING

## 2021-07-31 ASSESSMENT — PAIN DESCRIPTION - LOCATION: LOCATION: GENERALIZED

## 2021-07-31 NOTE — PLAN OF CARE
Problem: Anger Management/Homicidal Ideation:  Goal: Absence of homicidal ideation  Description: Absence of homicidal ideation  7/30/2021 2149 by Yanet Blanc RN  Outcome: Ongoing  Note: Patient denies homicidal ideations at this time and agrees to seek assistance from staff should thoughts of harming others arise. Problem: Depressive Behavior With or Without Suicide Precautions:  Goal: Ability to disclose and discuss suicidal ideas will improve  Description: Ability to disclose and discuss suicidal ideas will improve  7/30/2021 2149 by Yanet Blanc RN  Outcome: Ongoing  Note: Patient denies suicidal ideations at this time and agrees to seek assistance from staff should thoughts of self harm arise. Problem: Altered Mood, Psychotic Behavior:  Goal: Able to verbalize decrease in frequency and intensity of hallucinations  Description: Able to verbalize decrease in frequency and intensity of hallucinations  7/30/2021 2149 by Yanet Blanc RN  Outcome: Ongoing  Note: Patient verbalizes a decrease in hallucinations. When writer asks patient if she is experiencing any hallucinations or hearing any voices patient states, \"No. I mean I just woke up so I haven't been up for too long; but, nope I can't say I hear anything. \"      Problem: Tobacco Use:  Goal: Inpatient tobacco use cessation counseling participation  Description: Inpatient tobacco use cessation counseling participation  Outcome: Ongoing  Note: Patient has nicotine patch 14 mg ordered for smoking cessation. Patient is compliant with this order and declines tobacco cessation education at this time. Problem: Falls - Risk of:  Goal: Will remain free from falls  Description: Will remain free from falls  7/30/2021 2149 by Yanet Blanc RN  Outcome: Ongoing  Note: Patient remains free from falls this shift. She has been observed resting in her room for majority of the night. She does come out briefly for her needs.  Patient is ambulatory with a steady but slow gait. She is independent and does not require any assistive device(s). She is compliant with non skid footwear. Patient is aware of limitations and abilities. Fall precautions remain in place per unit policy. Problem: Pain:  Goal: Pain level will decrease  Description: Pain level will decrease  Outcome: Ongoing  Note: Patient reports generalized mild muscle aches and is accepting of prn muscle relaxer.

## 2021-07-31 NOTE — PROGRESS NOTES
Daily Progress Note  7/31/2021    Patient Name: Scott Bangura    CHIEF COMPLAINT: Acute psychosis         SUBJECTIVE:     Patient seen for follow-up assessment. She is medication compliant. Risperidone was titrated yesterday and she reports feeling tired today, but having far fewer auditory hallucinations. Patient repeats multiple times \"I cannot believe how much quieter it is\". She is agreeable to moving forward with long-acting injectable Perseris. We discussed risks versus benefits and need for continued IM administrations of the medication on a monthly basis. Following our discussion, staff report that patient has been voicing continued auditory hallucinations and describes them as \"static in the radio\". Patient did voice side effects of the medication as feeling tired. She is actively out in the milieu during our conversation and socializing with peers. Responses to questions are slow but mostly appropriate. Some thought blocking observed, but improved since assessment yesterday. We discussed disposition planning, and patient is fearful that this could happen to her again when she is living alone and feels that it would be beneficial if she were in a group environment where she had support. At this time, patient has not yet demonstrated stability and requires inpatient hospitalization for safety.     PRNs: No emergency medications administered since admission     Scheduled medications: Adherent      Appetite:  [x] Normal/Adequate/Unchanged  [] Increased  [] Decreased      Sleep:       [] Normal/Adequate/Unchanged  [x] Fair  [] Poor      Group Attendance on Unit:   [] Yes  [x] Selectively    [] No    Medication Side Effects: Sedation         Mental Status Exam  Level of consciousness: Alert and awake, appears slightly stuporous today  Appearance: Appropriate attire for setting, seated in chair, with poor grooming and hygiene   Behavior/Motor: Approachable, no psychomotor abnormalities   Attitude toward examiner: Cooperative, attentive, poor eye contact    Speech: spontaneous, slow rate, low volume and well articulated   Mood: \"Better\"  Affect: Blunted  Thought processes: linear, goal directed and thought blocking  Thought content: Denies homicidal ideation  Suicidal Ideation: Denies suicidal ideation  Delusions: Denies  Perceptual Disturbance: Endorses current auditory hallucinations that are negative in nature  Cognition: Oriented to self, location, time, and situation  Memory: intact  Insight & Judgement: fair     Data   height is 5' 10\" (1.778 m) and weight is 180 lb (81.6 kg). Her oral temperature is 98.1 °F (36.7 °C). Her blood pressure is 113/71 and her pulse is 110. Her respiration is 14 and oxygen saturation is 95%.    Labs:   Admission on 07/27/2021   Component Date Value Ref Range Status    WBC 07/27/2021 6.5  3.5 - 11.0 k/uL Final    RBC 07/27/2021 4.93  4.0 - 5.2 m/uL Final    Hemoglobin 07/27/2021 14.0  12.0 - 16.0 g/dL Final    Hematocrit 07/27/2021 42.5  36 - 46 % Final    MCV 07/27/2021 86.2  80 - 100 fL Final    MCH 07/27/2021 28.5  26 - 34 pg Final    MCHC 07/27/2021 33.0  31 - 37 g/dL Final    RDW 07/27/2021 15.1* 11.5 - 14.9 % Final    Platelets 32/95/0096 381  150 - 450 k/uL Final    MPV 07/27/2021 7.0  6.0 - 12.0 fL Final    NRBC Automated 07/27/2021 NOT REPORTED  per 100 WBC Final    Differential Type 07/27/2021 NOT REPORTED   Final    Seg Neutrophils 07/27/2021 60  36 - 66 % Final    Lymphocytes 07/27/2021 30  24 - 44 % Final    Monocytes 07/27/2021 7  1 - 7 % Final    Eosinophils % 07/27/2021 2  0 - 4 % Final    Basophils 07/27/2021 1  0 - 2 % Final    Immature Granulocytes 07/27/2021 NOT REPORTED  0 % Final    Segs Absolute 07/27/2021 3.90  1.3 - 9.1 k/uL Final    Absolute Lymph # 07/27/2021 2.00  1.0 - 4.8 k/uL Final    Absolute Mono # 07/27/2021 0.50  0.1 - 1.3 k/uL Final    Absolute Eos # 07/27/2021 0.10  0.0 - 0.4 k/uL Final    Basophils Absolute 07/27/2021 0.00  0.0 - 0.2 k/uL Final    Absolute Immature Granulocyte 07/27/2021 NOT REPORTED  0.00 - 0.30 k/uL Final    WBC Morphology 07/27/2021 NOT REPORTED   Final    RBC Morphology 07/27/2021 NOT REPORTED   Final    Platelet Estimate 33/23/7677 NOT REPORTED   Final    Glucose 07/27/2021 101* 70 - 99 mg/dL Final    BUN 07/27/2021 5* 6 - 20 mg/dL Final    CREATININE 07/27/2021 0.59  0.50 - 0.90 mg/dL Final    Bun/Cre Ratio 07/27/2021 NOT REPORTED  9 - 20 Final    Calcium 07/27/2021 9.5  8.6 - 10.4 mg/dL Final    Sodium 07/27/2021 137  135 - 144 mmol/L Final    Potassium 07/27/2021 4.1  3.7 - 5.3 mmol/L Final    Chloride 07/27/2021 101  98 - 107 mmol/L Final    CO2 07/27/2021 29  20 - 31 mmol/L Final    Anion Gap 07/27/2021 7* 9 - 17 mmol/L Final    Alkaline Phosphatase 07/27/2021 91  35 - 104 U/L Final    ALT 07/27/2021 11  5 - 33 U/L Final    AST 07/27/2021 12  <32 U/L Final    Total Bilirubin 07/27/2021 0.51  0.3 - 1.2 mg/dL Final    Total Protein 07/27/2021 6.9  6.4 - 8.3 g/dL Final    Albumin 07/27/2021 4.2  3.5 - 5.2 g/dL Final    Albumin/Globulin Ratio 07/27/2021 NOT REPORTED  1.0 - 2.5 Final    GFR Non- 07/27/2021 >60  >60 mL/min Final    GFR  07/27/2021 >60  >60 mL/min Final    GFR Comment 07/27/2021        Final    Comment: Average GFR for 30-36 years old:   80 mL/min/1.73sq m  Chronic Kidney Disease:   <60 mL/min/1.73sq m  Kidney failure:   <15 mL/min/1.73sq m              eGFR calculated using average adult body mass.  Additional eGFR calculator available at:        [employer]            GFR Staging 07/27/2021 NOT REPORTED   Final    Color, UA 07/27/2021 YELLOW  YELLOW Final    Turbidity UA 07/27/2021 CLOUDY* CLEAR Final    Glucose, Ur 07/27/2021 NEGATIVE  NEGATIVE Final    Bilirubin Urine 07/27/2021 NEGATIVE  NEGATIVE Final    Ketones, Urine 07/27/2021 NEGATIVE  NEGATIVE Final    Specific [place], UA 07/27/2021 1.006  1.000 - 1.030 Final    Urine Hgb 07/27/2021 NEGATIVE  NEGATIVE Final    pH, UA 07/27/2021 6.0  5.0 - 8.0 Final    Protein, UA 07/27/2021 NEGATIVE  NEGATIVE Final    Urobilinogen, Urine 07/27/2021 Normal  Normal Final    Nitrite, Urine 07/27/2021 NEGATIVE  NEGATIVE Final    Leukocyte Esterase, Urine 07/27/2021 LARGE* NEGATIVE Final    Urinalysis Comments 07/27/2021 NOT REPORTED   Final    hCG Qual 07/27/2021 NEGATIVE  NEGATIVE Final    Comment: Specimens with hCG levels near the threshold of the test (25 mIU/mL) may give a negative or   indeterminate result. In such cases, another test should be performed with a new specimen   in 48-72 hours. If early pregnancy is suspected clinically in this setting, correlation   with quantitative serum b-hCG level is suggested.       Amphetamine Screen, Ur 07/27/2021 NEGATIVE  NEGATIVE Final    Comment:       (Positive cutoff 1000 ng/mL)                  Barbiturate Screen, Ur 07/27/2021 NEGATIVE  NEGATIVE Final    Comment:       (Positive cutoff 200 ng/mL)                  Benzodiazepine Screen, Urine 07/27/2021 NEGATIVE  NEGATIVE Final    Comment:       (Positive cutoff 200 ng/mL)                  Cocaine Metabolite, Urine 07/27/2021 NEGATIVE  NEGATIVE Final    Comment:       (Positive cutoff 300 ng/mL)                  Methadone Screen, Urine 07/27/2021 NEGATIVE  NEGATIVE Final    Comment:       (Positive cutoff 300 ng/mL)                  Opiates, Urine 07/27/2021 NEGATIVE  NEGATIVE Final    Comment:       (Positive cutoff 300 ng/mL)                  Phencyclidine, Urine 07/27/2021 NEGATIVE  NEGATIVE Final    Comment:       (Positive cutoff 25 ng/mL)                  Propoxyphene, Urine 07/27/2021 NOT REPORTED  NEGATIVE Final    Cannabinoid Scrn, Ur 07/27/2021 POSITIVE* NEGATIVE Final    Comment:       (Positive cutoff 50 ng/mL)                  Oxycodone Screen, Ur 07/27/2021 NEGATIVE  NEGATIVE Final    Comment:       (Positive cutoff 100 07/27/2021 5 TO 10  /HPF Final    RBC, UA 07/27/2021 0 TO 2  /HPF Final    Casts UA 07/27/2021 NOT REPORTED  /LPF Final    Crystals, UA 07/27/2021 NOT REPORTED  None /HPF Final    Epithelial Cells UA 07/27/2021 20 TO 50  /HPF Final    Renal Epithelial, UA 07/27/2021 NOT REPORTED  0 /HPF Final    Bacteria, UA 07/27/2021 MODERATE* None Final    Mucus, UA 07/27/2021 NOT REPORTED  None Final    Trichomonas, UA 07/27/2021 NOT REPORTED  None Final    Amorphous, UA 07/27/2021 NOT REPORTED  None Final    Other Observations UA 07/27/2021 NOT REPORTED  NOT REQ. Final    Yeast, UA 07/27/2021 FEW* None Final         Reviewed patient's current plan of care and vital signs with nursing staff.     Labs reviewed: [x] Yes  Last EKG in EMR reviewed: [x] Yes    Medications  Current Facility-Administered Medications: [START ON 8/1/2021] risperiDONE ER (PERSERIS) injection 90 mg, 90 mg, Subcutaneous, Once  haloperidol (HALDOL) tablet 5 mg, 5 mg, Oral, Q4H PRN **AND** LORazepam (ATIVAN) tablet 2 mg, 2 mg, Oral, Q4H PRN  haloperidol lactate (HALDOL) injection 5 mg, 5 mg, Intramuscular, Q4H PRN **AND** LORazepam (ATIVAN) injection 2 mg, 2 mg, Intramuscular, Q4H PRN **AND** diphenhydrAMINE (BENADRYL) injection 50 mg, 50 mg, Intramuscular, Q4H PRN  risperiDONE (RISPERDAL) tablet 1.5 mg, 1.5 mg, Oral, Nightly  risperiDONE (RISPERDAL) tablet 0.5 mg, 0.5 mg, Oral, Daily  buprenorphine-naloxone (SUBOXONE) 8-2 MG SL film 1 Film, 1 Film, Sublingual, Daily  cyclobenzaprine (FLEXERIL) tablet 10 mg, 10 mg, Oral, TID PRN  gabapentin (NEURONTIN) capsule 100 mg, 100 mg, Oral, TID  meloxicam (MOBIC) tablet 15 mg, 15 mg, Oral, Daily  nicotine (NICODERM CQ) 14 MG/24HR 1 patch, 1 patch, Transdermal, Daily  acetaminophen (TYLENOL) tablet 650 mg, 650 mg, Oral, Q4H PRN  ibuprofen (ADVIL;MOTRIN) tablet 400 mg, 400 mg, Oral, Q6H PRN  polyethylene glycol (GLYCOLAX) packet 17 g, 17 g, Oral, Daily PRN  aluminum & magnesium hydroxide-simethicone (MAALOX) 200-200-20 MG/5ML suspension 30 mL, 30 mL, Oral, Q6H PRN  hydrOXYzine (ATARAX) tablet 50 mg, 50 mg, Oral, TID PRN  traZODone (DESYREL) tablet 50 mg, 50 mg, Oral, Nightly PRN  DULoxetine (CYMBALTA) extended release capsule 60 mg, 60 mg, Oral, BID  hydroCHLOROthiazide (HYDRODIURIL) tablet 50 mg, 50 mg, Oral, Daily    ASSESSMENT  Acute psychosis (HCC)         PLAN  Patient symptoms are: Modestly Improving   risperidone Perseris 90 mg IM tomorrow morning  We will discontinue oral risperidone following administration of Perseris  Monitor need and frequency of PRN medications  Encourage participation in groups and milieu  Attempt to develop insight  Psycho-education conducted  Supportive Therapy conducted  Probable discharge: Per attending MD  Follow-up daily while inpatient    Patient continues to be monitored in the inpatient psychiatric facility at [facility] for safety and stabilization. Patient continues to need, on a daily basis, active treatment furnished directly by or requiring the supervision of inpatient psychiatric personnel. Electronically signed by GUIDO Wyatt CNP on 7/31/2021 at 5:20 PM    **This report has been created using voice recognition software. It may contain minor errors which are inherent in voice recognition technology. **    I independently saw and evaluated the patient. I reviewed the nurse practitioners documentation above. Any additional comments or changes to the nurse practitioners documentation are stated below otherwise agree with assessment. Plan will be as follows:  Patient reports improvement with dose adjustment and Risperdal.  Still agreeable to long-acting injectable. We will move forward tomorrow with Perseris. Denying side effects to medication.   Reports reduction in intensity and frequency of auditory hallucinations  PLAN  Patient s symptoms   are improving  Continue with current medication for now  Attempt to develop insight  Psycho-education

## 2021-07-31 NOTE — PLAN OF CARE
[facility] INFECTIOUS DISEASE PROGRESS NOTE    Cecilio Wang Patient Status:  Inpatient    1957 MRN [id number]   Location [facility] MEDICAL INTENSIVE CARE UNIT Attending Tate Moy MD   Hosp Day # 10 PCP No Pcp       ANTIMICROBIAL THERAPY:   Cefepime IV (12/3-  S/p Tocilizumab (12/3)      Subjective:  No face-to-face encounter d/t COVID-19 isolation and PPE preservation in setting of pandemic.  Fevers noted (tmax-38.9)  VV-ECMO being considered.  On vent and proning trials.      Allergies:  ALLERGIES:   Allergen Reactions   • Penicillins HIVES     Tolerates cefepime         Physical Exam:  Vital signs: Vitals with min/max:  Vital Last Value 24 Hour Range   Temperature (!) 102 °F (38.9 °C) (20 0400) Temp  Min: 101.1 °F (38.4 °C)  Max: 102.2 °F (39 °C)   Pulse 95 (20 0700) Pulse  Min: 78  Max: 127   Respiratory (!) 30 (20 0700) Resp  Min: 22  Max: 30   Non-Invasive  Blood Pressure (!) 140/81 (20 1800) No data recorded   Pulse Oximetry 93 % (20 0722) SpO2  Min: 76 %  Max: 98 %   Arterial   Blood Pressure 117/57 (20 0700) Arterial Line BP  Min: 93/53  Max: 129/66      Deferred d/t COVID-19 isolation      Laboratory Data:  Recent Results (from the past 24 hour(s))   Arterial Blood Gas with Cooximetry    Collection Time: 20 11:56 AM   Result Value Ref Range    PH RC Arterial 7.37 7.35 - 7.45 Units    PCO2 RC Arterial 44 35 - 48 mm Hg    PO2 RC Arterial 83 83 - 108 mm Hg    HCO3 RC Arterial 25 22 - 28 mmol/L    Base Deficit RC Arterial 0 0 - 2 mmol/L    O2 SAT RC Arterial 99 95 - 99 %    Temperature RC 37.0 degrees    Site RC ARTERIAL LINE     CONDITION RC VC 30  FIO2 60 PEEP 12 RADHA 20     Carbon Monoxide RC 1.6 (H) <1.5 %    O2 Content RC Arterial 19 15 - 23 %    OXY HGB RC 95.7 94 - 98 %    Methemoglobin RC Mixed Venous 1.4 <1.6 %    Hemoglobin RC 14.1 13.0 - 17.0 g/dL    PAO2 / FIO2 Ratio 138 (L)  Problem: Anger Management/Homicidal Ideation:  Goal: Absence of homicidal ideation  Description: Absence of homicidal ideation  Outcome: Ongoing  Patient denies homicidal ideas at this time; patient agrees to seek out staff if homicidal ideas arise; 15-min safety checks continued     Problem: Depressive Behavior With or Without Suicide Precautions:  Goal: Ability to disclose and discuss suicidal ideas will improve  Description: Ability to disclose and discuss suicidal ideas will improve  Outcome: Ongoing  Patient denies suicidal ideas; patient agrees to seek out staff if suicidal ideas arise; 15-min safety check continued    Problem: Altered Mood, Psychotic Behavior:  Goal: Able to verbalize decrease in frequency and intensity of hallucinations  Description: Able to verbalize decrease in frequency and intensity of hallucinations  Outcome: Ongoing  Patient acknowledges audio hallucinations radio static and negative voices 300 - 500   Potassium (performed by respiratory)    Collection Time: 12/04/20 11:56 AM   Result Value Ref Range    Potassium RC 4.9 3.4 - 5.1 mmol/L   DRVVT CONFIRM    Collection Time: 12/04/20 11:56 AM   Result Value Ref Range    Sodium  135 - 145 mmol/L   IONIZED CALCIUM-RC    Collection Time: 12/04/20 11:56 AM   Result Value Ref Range    CALCIUM IONIZED RC 1.11 (L) 1.15 - 1.29 mmol/L   Arterial Lactic Acid-RC    Collection Time: 12/04/20 11:56 AM   Result Value Ref Range    Lactic Acid Arterial RC 1.8 (H) <1.6 mmol/L   Metered blood glucose    Collection Time: 12/04/20 12:26 PM   Result Value Ref Range    Glucose Bedside  (H) 70 - 99 mg/dL   Partial Thromboplastin Time    Collection Time: 12/04/20  1:40 PM   Result Value Ref Range    PTT 32 (H) 22 - 30 sec   Metered blood glucose    Collection Time: 12/04/20  4:47 PM   Result Value Ref Range    Glucose Bedside  (H) 70 - 99 mg/dL   Metered blood glucose    Collection Time: 12/04/20  8:58 PM   Result Value Ref Range    Glucose Bedside  (H) 70 - 99 mg/dL   Partial Thromboplastin Time    Collection Time: 12/04/20 10:20 PM   Result Value Ref Range    PTT 41 (H) 22 - 30 sec   Comprehensive Metabolic Panel    Collection Time: 12/05/20  5:25 AM   Result Value Ref Range    Fasting Status      Sodium 144 135 - 145 mmol/L    Potassium 5.5 (H) 3.4 - 5.1 mmol/L    Chloride 113 (H) 98 - 107 mmol/L    Carbon Dioxide 29 21 - 32 mmol/L    Anion Gap 8 (L) 10 - 20 mmol/L    Glucose 200 (H) 65 - 99 mg/dL    BUN 43 (H) 6 - 20 mg/dL    Creatinine 1.24 (H) 0.67 - 1.17 mg/dL    Glomerular Filtration Rate 62 (L) >90 mL/min/1.73m2    BUN/ Creatinine Ratio 35 (H) 7 - 25    Calcium 7.7 (L) 8.4 - 10.2 mg/dL    Bilirubin, Total 0.5 0.2 - 1.0 mg/dL    GOT/AST 17 <=37 Units/L    GPT/ALT 41 <64 Units/L    Alkaline Phosphatase 96 45 - 117 Units/L    Albumin 2.1 (L) 3.6 - 5.1 g/dL    Protein, Total 5.9 (L) 6.4 - 8.2 g/dL    Globulin 3.8 2.0 - 4.0 g/dL    A/G Ratio 0.6  (L) 1.0 - 2.4   Procalcitonin    Collection Time: 12/05/20  5:25 AM   Result Value Ref Range    Procalcitonin 0.91 (H) <=0.09 ng/mL   D Dimer, Quantitative    Collection Time: 12/05/20  5:25 AM   Result Value Ref Range    D Dimer, Quantitative 4.47 (H) <0.57 mg/L (FEU)   Phosphorus    Collection Time: 12/05/20  5:25 AM   Result Value Ref Range    Phosphorus 3.3 2.4 - 4.7 mg/dL   Magnesium    Collection Time: 12/05/20  5:25 AM   Result Value Ref Range    Magnesium 3.0 (H) 1.7 - 2.4 mg/dL   C Reactive Protein    Collection Time: 12/05/20  5:25 AM   Result Value Ref Range    C-Reactive Protein 4.4 (H) <=1.0 mg/dL   Partial Thromboplastin Time    Collection Time: 12/05/20  5:25 AM   Result Value Ref Range    PTT 59 (H) 22 - 30 sec   CBC with Automated Differential (performable only)    Collection Time: 12/05/20  5:25 AM   Result Value Ref Range    WBC 7.8 4.2 - 11.0 K/mcL    RBC 4.70 4.50 - 5.90 mil/mcL    HGB 13.5 13.0 - 17.0 g/dL    HCT 45.5 39.0 - 51.0 %    MCV 96.8 78.0 - 100.0 fl    MCH 28.7 26.0 - 34.0 pg    MCHC 29.7 (L) 32.0 - 36.5 g/dL    RDW-CV 13.1 11.0 - 15.0 %     140 - 450 K/mcL    NRBC 0 <=0 /100 WBC    Neutrophil, Percent 93 %    Lymphocytes, Percent 2 %    Mono, Percent 4 %    Eosinophils, Percent 0 %    Basophils, Percent 0 %    Immature Granulocytes 1 %    Absolute Neutrophils 7.2 1.8 - 7.7 K/mcL    Absolute Lymphocytes 0.2 (L) 1.0 - 4.0 K/mcL    Absolute Monocytes 0.3 0.3 - 0.9 K/mcL    Absolute Eosinophils  0.0 0.0 - 0.5 K/mcL    Absolute Basophils 0.0 0.0 - 0.3 K/mcL    Absolute Immmature Granulocytes 0.1 0.0 - 0.2 K/mcL    RDW-SD 46.7 39.0 - 50.0 fL   BLOOD GAS, ARTERIAL WITH COOXIMETRY - RESPIRATORY    Collection Time: 12/05/20  5:50 AM   Result Value Ref Range    BASE EXCESS / DEFICIT, ARTERIAL - RESPIRATORY 0 -2 - 3 mmol/L    HCO3, ARTERIAL - RESPIRATORY 27 22 - 28 mmol/L    O2 CONTENT, ARTERIAL - RESPIRATORY 19 15 - 23 %    PCO2, ARTERIAL - RESPIRATORY 53 (H) 35 - 48 mm Hg    PH,  ARTERIAL - RESPIRATORY 7.32 (L) 7.35 - 7.45 Units    PO2, ARTERIAL - RESPIRATORY 202 (H) 83 - 108 mm Hg    O2 SATURATION, ARTERIAL - RESPIRATORY 100 (H) 95 - 99 %    CONDITION - RESPIRATORY ; AC RR30  +12 70% RADHA 20PM     CARBOXYHEMOGLOBIN - RESPIRATORY 1.3 (L) 1.5 - 15.0 %    HEMOGLOBIN - RESPIRATORY 13.4 13.0 - 17.0 g/dL    METHEMOGLOBIN - RESPIRATORY 1.3 <=1.6 %    OXYHEMOGLOBIN, ARTERIAL - RESPIRATORY 97.4 94.0 - 98.0 %    P/F RATIO - RESPIRATORY 289 (L) 300 - 500    TEMPERATURE - RESPIRATORY 37.0 degrees   POTASSIUM - RESPIRATORY    Collection Time: 12/05/20  5:50 AM   Result Value Ref Range    POTASSIUM - RESPIRATORY 5.6 (H) 3.4 - 5.1 mmol/L   SODIUM - RESPIRATORY    Collection Time: 12/05/20  5:50 AM   Result Value Ref Range    SODIUM - RESPIRATORY 138 135 - 145 mmol/L   CALCIUM, IONIZED - RESPIRATORY    Collection Time: 12/05/20  5:50 AM   Result Value Ref Range    CALCIUM, IONIZED - RESPIRATORY 1.09 (L) 1.15 - 1.29 mmol/L   LACTIC ACID, ARTERIAL - RESPIRATORY    Collection Time: 12/05/20  5:50 AM   Result Value Ref Range    LACTIC ACID, ARTERIAL - RESPIRATORY 1.8 (H) <1.6 mmol/L       Microbiologic Data:   (this admission)  SARS-CoV-2 PCR (11/25):  Detected  Resp cx (12/3):  Many H. Influenzae  MRSA PCR (12/3):  Negative      Imaging:  CT chest (11/29):  1. No evidence for pulmonary embolism.  2. Commonly reported imaging findings for Covid 19 infection are present in the lungs.  Other processes such as influenza pneumonia, organizing pneumonia or drug toxicity and connective tissue disease can cause a similar imaging pattern.    CXR (12/5):  Right chest pleural catheter.  Perihilar and bilateral lower lobe interstitial airspace opacities.  Elevated right hemidiaphragm.      History of Present Illness:  Cecilio Wang is a a(n) 63 year old male.  PMH - asthma.  Initially presented to [facility] ED 11/25 w/ fever, generalized myalgias, nonproductive cough, SOB x6 days (found to be COVID  positive).      ASSESSMENT:    # COVID-19 infection/pneumonia  # COVID coagulopathy    - s/p 5 days of Remdesivir (completed 11/30)   - on Solumedrol IV   - CCP unlikely to be of benefit, >2 wks in setting of elevated D-Dimer    # Acute hypoxemic respiratory failure on BiPAP  # Asthma       PLAN:  -  Maintain in COVID-19 isolation.  Currently day #11 of COVID illness.  -  Monitor acute phase reactant labs.  -  Monitor resp status for improvement.  On vent/proning trials.  Being evaluated for VV-ECMO.  -  Follow labs/vitals.  -  Case d/w ID attending (Dr. Villarreal).      Torrey Parry PA-C  Plan as outlined above we will follow  12/5/2020  11:13 AM

## 2021-07-31 NOTE — GROUP NOTE
Group Therapy Note    Date: 7/31/2021    Group Start Time: 1330  Group End Time: 8668  Group Topic: Cognitive Skills    [facility]    Maria M Carlos, SONNYS    Pt did not attend 1100 cognitive skills group d/t resting in room despite staff invitation to attend. 1:1 talk time offered as alternative to group session, pt declined.             Signature:  Esperanza Abad

## 2021-08-01 PROCEDURE — 6370000000 HC RX 637 (ALT 250 FOR IP)

## 2021-08-01 PROCEDURE — 1240000000 HC EMOTIONAL WELLNESS R&B

## 2021-08-01 PROCEDURE — 6370000000 HC RX 637 (ALT 250 FOR IP): Performed by: PSYCHIATRY & NEUROLOGY

## 2021-08-01 PROCEDURE — 90833 PSYTX W PT W E/M 30 MIN: CPT | Performed by: PSYCHIATRY & NEUROLOGY

## 2021-08-01 PROCEDURE — 6370000000 HC RX 637 (ALT 250 FOR IP): Performed by: NURSE PRACTITIONER

## 2021-08-01 PROCEDURE — 99232 SBSQ HOSP IP/OBS MODERATE 35: CPT | Performed by: PSYCHIATRY & NEUROLOGY

## 2021-08-01 PROCEDURE — APPSS30 APP SPLIT SHARED TIME 16-30 MINUTES: Performed by: NURSE PRACTITIONER

## 2021-08-01 RX ADMIN — GABAPENTIN 100 MG: 100 CAPSULE ORAL at 21:57

## 2021-08-01 RX ADMIN — MELOXICAM 15 MG: 7.5 TABLET ORAL at 08:51

## 2021-08-01 RX ADMIN — GABAPENTIN 100 MG: 100 CAPSULE ORAL at 14:04

## 2021-08-01 RX ADMIN — TRAZODONE HYDROCHLORIDE 50 MG: 50 TABLET ORAL at 21:57

## 2021-08-01 RX ADMIN — DULOXETINE HYDROCHLORIDE 60 MG: 60 CAPSULE, DELAYED RELEASE ORAL at 21:57

## 2021-08-01 RX ADMIN — IBUPROFEN 400 MG: 400 TABLET ORAL at 14:04

## 2021-08-01 RX ADMIN — DULOXETINE HYDROCHLORIDE 60 MG: 60 CAPSULE, DELAYED RELEASE ORAL at 08:52

## 2021-08-01 RX ADMIN — RISPERIDONE 0.5 MG: 1 TABLET ORAL at 08:51

## 2021-08-01 RX ADMIN — BUPRENORPHINE AND NALOXONE 1 FILM: 8; 2 FILM BUCCAL; SUBLINGUAL at 08:52

## 2021-08-01 RX ADMIN — CYCLOBENZAPRINE 10 MG: 10 TABLET, FILM COATED ORAL at 14:04

## 2021-08-01 RX ADMIN — GABAPENTIN 100 MG: 100 CAPSULE ORAL at 08:52

## 2021-08-01 RX ADMIN — HYDROXYZINE HYDROCHLORIDE 50 MG: 50 TABLET, FILM COATED ORAL at 21:57

## 2021-08-01 RX ADMIN — CYCLOBENZAPRINE 10 MG: 10 TABLET, FILM COATED ORAL at 21:57

## 2021-08-01 RX ADMIN — HYDROCHLOROTHIAZIDE 50 MG: 25 TABLET ORAL at 08:51

## 2021-08-01 ASSESSMENT — PAIN SCALES - GENERAL
PAINLEVEL_OUTOF10: 7
PAINLEVEL_OUTOF10: 0
PAINLEVEL_OUTOF10: 6

## 2021-08-01 NOTE — PROGRESS NOTES
Mental Status Exam  Level of consciousness: Alert and awake, appears slightly stuporous today  Appearance: Appropriate attire for setting, sitting in chair, with fair grooming and hygiene   Behavior/Motor: Approachable, no psychomotor abnormalities   Attitude toward examiner: Cooperative, attentive, poor eye contact   Speech: spontaneous, slow rate, low volume and well articulated   Mood: \"Good\"  Affect: Blunted  Thought processes: linear, goal directed and thought blocking  Thought content: Denies homicidal ideation  Suicidal Ideation: Denies suicidal ideation  Delusions: Denies  Perceptual Disturbance: Endorses current auditory hallucinations that are negative in nature  Cognition: Oriented to self, location, time, and situation  Memory: intact  Insight & Judgement: fair     Data   height is 5' 10\" (1.778 m) and weight is 180 lb (81.6 kg). Her oral temperature is 97.3 °F (36.3 °C). Her blood pressure is 126/72 and her pulse is 89. Her respiration is 14 and oxygen saturation is 95%.    Labs:   Admission on 07/27/2021   Component Date Value Ref Range Status    WBC 07/27/2021 6.5  3.5 - 11.0 k/uL Final    RBC 07/27/2021 4.93  4.0 - 5.2 m/uL Final    Hemoglobin 07/27/2021 14.0  12.0 - 16.0 g/dL Final    Hematocrit 07/27/2021 42.5  36 - 46 % Final    MCV 07/27/2021 86.2  80 - 100 fL Final    MCH 07/27/2021 28.5  26 - 34 pg Final    MCHC 07/27/2021 33.0  31 - 37 g/dL Final    RDW 07/27/2021 15.1* 11.5 - 14.9 % Final    Platelets 16/46/8669 381  150 - 450 k/uL Final    MPV 07/27/2021 7.0  6.0 - 12.0 fL Final    NRBC Automated 07/27/2021 NOT REPORTED  per 100 WBC Final    Differential Type 07/27/2021 NOT REPORTED   Final    Seg Neutrophils 07/27/2021 60  36 - 66 % Final    Lymphocytes 07/27/2021 30  24 - 44 % Final    Monocytes 07/27/2021 7  1 - 7 % Final    Eosinophils % 07/27/2021 2  0 - 4 % Final    Basophils 07/27/2021 1  0 - 2 % Final    Immature Granulocytes 07/27/2021 NOT REPORTED  0 % Final  Segs Absolute 07/27/2021 3.90  1.3 - 9.1 k/uL Final    Absolute Lymph # 07/27/2021 2.00  1.0 - 4.8 k/uL Final    Absolute Mono # 07/27/2021 0.50  0.1 - 1.3 k/uL Final    Absolute Eos # 07/27/2021 0.10  0.0 - 0.4 k/uL Final    Basophils Absolute 07/27/2021 0.00  0.0 - 0.2 k/uL Final    Absolute Immature Granulocyte 07/27/2021 NOT REPORTED  0.00 - 0.30 k/uL Final    WBC Morphology 07/27/2021 NOT REPORTED   Final    RBC Morphology 07/27/2021 NOT REPORTED   Final    Platelet Estimate 67/21/7214 NOT REPORTED   Final    Glucose 07/27/2021 101* 70 - 99 mg/dL Final    BUN 07/27/2021 5* 6 - 20 mg/dL Final    CREATININE 07/27/2021 0.59  0.50 - 0.90 mg/dL Final    Bun/Cre Ratio 07/27/2021 NOT REPORTED  9 - 20 Final    Calcium 07/27/2021 9.5  8.6 - 10.4 mg/dL Final    Sodium 07/27/2021 137  135 - 144 mmol/L Final    Potassium 07/27/2021 4.1  3.7 - 5.3 mmol/L Final    Chloride 07/27/2021 101  98 - 107 mmol/L Final    CO2 07/27/2021 29  20 - 31 mmol/L Final    Anion Gap 07/27/2021 7* 9 - 17 mmol/L Final    Alkaline Phosphatase 07/27/2021 91  35 - 104 U/L Final    ALT 07/27/2021 11  5 - 33 U/L Final    AST 07/27/2021 12  <32 U/L Final    Total Bilirubin 07/27/2021 0.51  0.3 - 1.2 mg/dL Final    Total Protein 07/27/2021 6.9  6.4 - 8.3 g/dL Final    Albumin 07/27/2021 4.2  3.5 - 5.2 g/dL Final    Albumin/Globulin Ratio 07/27/2021 NOT REPORTED  1.0 - 2.5 Final    GFR Non- 07/27/2021 >60  >60 mL/min Final    GFR  07/27/2021 >60  >60 mL/min Final    GFR Comment 07/27/2021        Final    Comment: Average GFR for 30-36 years old:   80 mL/min/1.73sq m  Chronic Kidney Disease:   <60 mL/min/1.73sq m  Kidney failure:   <15 mL/min/1.73sq m              eGFR calculated using average adult body mass.  Additional eGFR calculator available at:        [employer]            GFR Staging 07/27/2021 NOT REPORTED   Final    Color, UA 07/27/2021 YELLOW  YELLOW Final    Turbidity UA 07/27/2021 CLOUDY* CLEAR Final    Glucose, Ur 07/27/2021 NEGATIVE  NEGATIVE Final    Bilirubin Urine 07/27/2021 NEGATIVE  NEGATIVE Final    Ketones, Urine 07/27/2021 NEGATIVE  NEGATIVE Final    Specific Gravity, UA 07/27/2021 1.006  1.000 - 1.030 Final    Urine Hgb 07/27/2021 NEGATIVE  NEGATIVE Final    pH, UA 07/27/2021 6.0  5.0 - 8.0 Final    Protein, UA 07/27/2021 NEGATIVE  NEGATIVE Final    Urobilinogen, Urine 07/27/2021 Normal  Normal Final    Nitrite, Urine 07/27/2021 NEGATIVE  NEGATIVE Final    Leukocyte Esterase, Urine 07/27/2021 LARGE* NEGATIVE Final    Urinalysis Comments 07/27/2021 NOT REPORTED   Final    hCG Qual 07/27/2021 NEGATIVE  NEGATIVE Final    Comment: Specimens with hCG levels near the threshold of the test (25 mIU/mL) may give a negative or   indeterminate result. In such cases, another test should be performed with a new specimen   in 48-72 hours. If early pregnancy is suspected clinically in this setting, correlation   with quantitative serum b-hCG level is suggested.       Amphetamine Screen, Ur 07/27/2021 NEGATIVE  NEGATIVE Final    Comment:       (Positive cutoff 1000 ng/mL)                  Barbiturate Screen, Ur 07/27/2021 NEGATIVE  NEGATIVE Final    Comment:       (Positive cutoff 200 ng/mL)                  Benzodiazepine Screen, Urine 07/27/2021 NEGATIVE  NEGATIVE Final    Comment:       (Positive cutoff 200 ng/mL)                  Cocaine Metabolite, Urine 07/27/2021 NEGATIVE  NEGATIVE Final    Comment:       (Positive cutoff 300 ng/mL)                  Methadone Screen, Urine 07/27/2021 NEGATIVE  NEGATIVE Final    Comment:       (Positive cutoff 300 ng/mL)                  Opiates, Urine 07/27/2021 NEGATIVE  NEGATIVE Final    Comment:       (Positive cutoff 300 ng/mL)                  Phencyclidine, Urine 07/27/2021 NEGATIVE  NEGATIVE Final    Comment:       (Positive cutoff 25 ng/mL)                  Propoxyphene, Urine 07/27/2021 NOT REPORTED  NEGATIVE Final    Cannabinoid Scrn, Ur 07/27/2021 POSITIVE* NEGATIVE Final    Comment:       (Positive cutoff 50 ng/mL)                  Oxycodone Screen, Ur 07/27/2021 NEGATIVE  NEGATIVE Final    Comment:       (Positive cutoff 100 ng/mL)                  Methamphetamine, Urine 07/27/2021 NOT REPORTED  NEGATIVE Final    Tricyclic Antidepressants, Urine 07/27/2021 NOT REPORTED  NEGATIVE Final    MDMA, Urine 07/27/2021 NOT REPORTED  NEGATIVE Final    Buprenorphine Urine 07/27/2021 NOT REPORTED  NEGATIVE Final    Test Information 07/27/2021 Assay provides medical screening only. The absence of expected drug(s) and/or metabolite(s) may indicate diluted or adulterated urine, limitations of testing or timing of collection. Final    Comment: Testing for legal purposes should be confirmed by another method. To request confirmation   of test result, please call the lab within 7 days of sample submission.  Ethanol 07/27/2021 <10  <10 mg/dL Final    Ethanol percent 07/27/2021 <0.010  % Final    Specimen Description 07/27/2021 . NASOPHARYNGEAL SWAB   Final    SARS-CoV-2, Rapid 07/27/2021 Not Detected  Not Detected Final    Comment:       Rapid NAAT:  The specimen is NEGATIVE for SARS-CoV-2, the novel coronavirus associated with   COVID-19. The ID NOW COVID-19 assay is designed to detect the virus that causes COVID-19 in patients   with signs and symptoms of infection who are suspected of COVID-19. An individual without symptoms of COVID-19 and who is not shedding SARS-CoV-2 virus would   expect to have a negative (not detected) result in this assay. Negative results should be treated as presumptive and, if inconsistent with clinical signs   and symptoms or necessary for patient management,  should be tested with an alternative molecular assay. Negative results do not preclude   SARS-CoV-2 infection and   should not be used as the sole basis for patient management decisions. Fact sheet for Healthcare Providers: Alessia.es  Fact sheet for Patients: Alessia.es          Methodology: Isothermal Nucleic Acid Amplification      - 07/27/2021        Final    WBC, UA 07/27/2021 5 TO 10  /HPF Final    RBC, UA 07/27/2021 0 TO 2  /HPF Final    Casts UA 07/27/2021 NOT REPORTED  /LPF Final    Crystals, UA 07/27/2021 NOT REPORTED  None /HPF Final    Epithelial Cells UA 07/27/2021 20 TO 50  /HPF Final    Renal Epithelial, UA 07/27/2021 NOT REPORTED  0 /HPF Final    Bacteria, UA 07/27/2021 MODERATE* None Final    Mucus, UA 07/27/2021 NOT REPORTED  None Final    Trichomonas, UA 07/27/2021 NOT REPORTED  None Final    Amorphous, UA 07/27/2021 NOT REPORTED  None Final    Other Observations UA 07/27/2021 NOT REPORTED  NOT REQ. Final    Yeast, UA 07/27/2021 FEW* None Final         Reviewed patient's current plan of care and vital signs with nursing staff.     Labs reviewed: [x] Yes  Last EKG in EMR reviewed: [x] Yes    Medications  Current Facility-Administered Medications: haloperidol (HALDOL) tablet 5 mg, 5 mg, Oral, Q4H PRN **AND** LORazepam (ATIVAN) tablet 2 mg, 2 mg, Oral, Q4H PRN  haloperidol lactate (HALDOL) injection 5 mg, 5 mg, Intramuscular, Q4H PRN **AND** LORazepam (ATIVAN) injection 2 mg, 2 mg, Intramuscular, Q4H PRN **AND** diphenhydrAMINE (BENADRYL) injection 50 mg, 50 mg, Intramuscular, Q4H PRN  risperiDONE (RISPERDAL) tablet 1.5 mg, 1.5 mg, Oral, Nightly  risperiDONE (RISPERDAL) tablet 0.5 mg, 0.5 mg, Oral, Daily  buprenorphine-naloxone (SUBOXONE) 8-2 MG SL film 1 Film, 1 Film, Sublingual, Daily  cyclobenzaprine (FLEXERIL) tablet 10 mg, 10 mg, Oral, TID PRN  gabapentin (NEURONTIN) capsule 100 mg, 100 mg, Oral, TID  meloxicam (MOBIC) tablet 15 mg, 15 mg, Oral, Daily  nicotine (NICODERM CQ) 14 MG/24HR 1 patch, 1 patch, Transdermal, Daily  acetaminophen (TYLENOL) tablet 650 mg, 650 mg, Oral, Q4H PRN  ibuprofen (ADVIL;MOTRIN) tablet 400 mg, 400 mg, Oral, Q6H PRN  polyethylene glycol (GLYCOLAX) packet 17 g, 17 g, Oral, Daily PRN  aluminum & magnesium hydroxide-simethicone (MAALOX) 200-200-20 MG/5ML suspension 30 mL, 30 mL, Oral, Q6H PRN  hydrOXYzine (ATARAX) tablet 50 mg, 50 mg, Oral, TID PRN  traZODone (DESYREL) tablet 50 mg, 50 mg, Oral, Nightly PRN  DULoxetine (CYMBALTA) extended release capsule 60 mg, 60 mg, Oral, BID  hydroCHLOROthiazide (HYDRODIURIL) tablet 50 mg, 50 mg, Oral, Daily    ASSESSMENT  Acute psychosis (HCC)         PLAN  Patient symptoms are: Modestly Improving  DENNISON risperidone Perseris 90 mg administered this morning without complication  Monitor need and frequency of PRN medications  Encourage participation in groups and milieu  Attempt to develop insight  Psycho-education conducted  Supportive Therapy conducted  Probable discharge: Per attending MD  Follow-up daily while inpatient    Patient continues to be monitored in the inpatient psychiatric facility at [facility] for safety and stabilization. Patient continues to need, on a daily basis, active treatment furnished directly by or requiring the supervision of inpatient psychiatric personnel. Electronically signed by GUIDO Rodriguez CNP on 8/1/2021 at 12:28 PM    **This report has been created using voice recognition software. It may contain minor errors which are inherent in voice recognition technology. **    I independently saw and evaluated the patient. I reviewed the nurse practitioners documentation above. Any additional comments or changes to the nurse practitioners documentation are stated below otherwise agree with assessment. Plan will be as follows:  Spent 30 minutes with the patient, of that greater than 16 minutes was spent in supportive psychotherapy. Patient denying side effects to medication. Reports improvement in mood and hallucinations. Denying any hallucinations to this author. Reports feeling safer.   Discussed observing status post long-acting injectable with plan of discharging tomorrow if continued improvement or stability and patient is in agreement  PLAN  Patient s symptoms   are improving  Discontinue oral Risperdal as patient has received long-acting injectable Perseris   Attempt to develop insight  Psycho-education conducted. Supportive Therapy conducted.   Probable discharge is tomorrow if stable or improved  Follow-up daily while on inpatient unit

## 2021-08-01 NOTE — PLAN OF CARE
Problem: Anger Management/Homicidal Ideation:  Goal: Absence of homicidal ideation  Description: Absence of homicidal ideation  7/31/2021 2240 by Rosezena Meter  Outcome: Ongoing  Patient denies homicidal ideation. Agreeable to talk with staff if thoughts to harm others arise. Q15min checks for safety. Problem: Depressive Behavior With or Without Suicide Precautions:  Goal: Ability to disclose and discuss suicidal ideas will improve  Description: Ability to disclose and discuss suicidal ideas will improve  7/31/2021 2240 by Rosezena Meter  Outcome: Ongoing  Patient denies suicidal ideations. Agreeable to talking with staff if thoughts to harm self arise. Q15min checks maintained for safety. Problem: Altered Mood, Psychotic Behavior:  Goal: Able to verbalize decrease in frequency and intensity of hallucinations  Description: Able to verbalize decrease in frequency and intensity of hallucinations  7/31/2021 2240 by Rosezena Meter  Outcome: Ongoing  Patient reports experiencing audio hallucinations of voices saying \"bitch\" coming from speakers. Writer reoriented patient to reality. Denies any other perceptual disturbances.

## 2021-08-01 NOTE — PLAN OF CARE
Problem: Anger Management/Homicidal Ideation:  Goal: Absence of homicidal ideation  Outcome: Ongoing   Patient is medication compliant and in behavioral control. Patient has attended groups and been social with peers in the common areas of the unit. Patient denies suicidal and homicidal ideation. Patient denies auditory and visual hallucinations. Patient has engaged in ADL's. Patient has consumed meals and snacks this shift. Patient reports improved sleep. Patient has remained free from harm. Every 15 minute and spontaneous safety checks have been maintained. Problem: Depressive Behavior With or Without Suicide Precautions:  Goal: Ability to disclose and discuss suicidal ideas will improve  Outcome: Ongoing   Patient has denied suicidal ideation this shift. Problem: Altered Mood, Psychotic Behavior:  Goal: Able to verbalize decrease in frequency and intensity of hallucinations  Outcome: Ongoing   Patient denies hallucinations this shift. Problem: Tobacco Use:  Goal: Inpatient tobacco use cessation counseling participation  Outcome: Ongoing     Problem: Falls - Risk of:  Goal: Will remain free from falls  Outcome: Ongoing   Patient has remained free from falls. Patient is wearing non skid slippers this shift. Problem: Falls - Risk of:  Goal: Absence of physical injury  Outcome: Ongoing   Patient has remained free from injury this shift. Problem: Pain:  Description: Pain management should include both nonpharmacologic and pharmacologic interventions. Goal: Pain level will decrease  Outcome: Ongoing     Problem: Pain:  Description: Pain management should include both nonpharmacologic and pharmacologic interventions. Goal: Control of acute pain  Outcome: Ongoing     Problem: Pain:  Description: Pain management should include both nonpharmacologic and pharmacologic interventions.   Goal: Control of chronic pain  Outcome: Ongoing

## 2021-08-02 VITALS
HEART RATE: 100 BPM | HEIGHT: 70 IN | TEMPERATURE: 97.8 F | OXYGEN SATURATION: 95 % | WEIGHT: 180 LBS | SYSTOLIC BLOOD PRESSURE: 115 MMHG | DIASTOLIC BLOOD PRESSURE: 71 MMHG | BODY MASS INDEX: 25.77 KG/M2 | RESPIRATION RATE: 14 BRPM

## 2021-08-02 PROCEDURE — 6370000000 HC RX 637 (ALT 250 FOR IP)

## 2021-08-02 PROCEDURE — 99239 HOSP IP/OBS DSCHRG MGMT >30: CPT | Performed by: PSYCHIATRY & NEUROLOGY

## 2021-08-02 PROCEDURE — 6370000000 HC RX 637 (ALT 250 FOR IP): Performed by: PSYCHIATRY & NEUROLOGY

## 2021-08-02 RX ORDER — GABAPENTIN 100 MG/1
100 CAPSULE ORAL 3 TIMES DAILY
Qty: 90 CAPSULE | Refills: 0 | Status: SHIPPED | OUTPATIENT
Start: 2021-08-02 | End: 2021-09-01

## 2021-08-02 RX ORDER — TRAZODONE HYDROCHLORIDE 50 MG/1
50 TABLET ORAL NIGHTLY PRN
Qty: 30 TABLET | Refills: 0 | Status: SHIPPED | OUTPATIENT
Start: 2021-08-02

## 2021-08-02 RX ORDER — MELOXICAM 15 MG/1
15 TABLET ORAL DAILY PRN
Qty: 30 TABLET | Refills: 0 | Status: SHIPPED | OUTPATIENT
Start: 2021-08-02

## 2021-08-02 RX ADMIN — GABAPENTIN 100 MG: 100 CAPSULE ORAL at 08:28

## 2021-08-02 RX ADMIN — DULOXETINE HYDROCHLORIDE 60 MG: 60 CAPSULE, DELAYED RELEASE ORAL at 08:27

## 2021-08-02 RX ADMIN — HYDROCHLOROTHIAZIDE 50 MG: 25 TABLET ORAL at 08:28

## 2021-08-02 RX ADMIN — MELOXICAM 15 MG: 7.5 TABLET ORAL at 08:27

## 2021-08-02 RX ADMIN — BUPRENORPHINE AND NALOXONE 1 FILM: 8; 2 FILM BUCCAL; SUBLINGUAL at 08:28

## 2021-08-02 ASSESSMENT — PAIN SCALES - GENERAL: PAINLEVEL_OUTOF10: 3

## 2021-08-02 NOTE — PROGRESS NOTES
Patient given tobacco quitline number 11363707306 at this time, refusing to call at this time, states \" I just dont want to quit now\"- patient given information as to the dangers of long term tobacco use. Continue to reinforce the importance of tobacco cessation.

## 2021-08-02 NOTE — SUICIDE SAFETY PLAN
SAFETY PLAN    A suicide Safety Plan is a document that supports someone when they are having thoughts of suicide. Warning Signs that indicate a suicidal crisis may be developing: What (situations, thoughts, feelings, body sensations, behaviors, etc.) do you experience that lets you know you are beginning to think about suicide? 1. Chest pains  2. voices  3. Feeling I need to defend myself    Internal Coping Strategies:  What things can I do (relaxation techniques, hobbies, physical activities, etc.) to take my mind off my problems without contacting another person? 1. Finger painting  2. color  3. Take a bath    People and social settings that provide distraction: Who can I call or where can I go to distract me? 1. Name: james  Phone:   2. Name: Noah  Phone: [phone]   3. Place: [facility]            4. Place: [facility]    People whom I can ask for help: Who can I call when I need help - for example, friends, family, clergy, someone else? 1. Name: juan                Phone:   2. Name: james  Phone:   3. Name: Adrienne Garces  Phone: [phone]    Professionals or [address] [place], [place] [address]vd I can contact during a crisis: Who can I call for help - for example, my doctor, my psychiatrist, my psychologist, a mental health provider, a suicide hotline? 1. Clinician Name: dario   Phone: [phone]      Clinician Pager or Emergency Contact #:     2. Clinician Name: Barbara Rogers   Phone:       Clinician Pager or Emergency Contact #:     3. Suicide Prevention Lifeline: 0-033-781-TALK (3990)    4. [address] [place] [address] Emergency Services -  for example, [facility] suicide hotline, [facility] Hotline: 211      Emergency Services Address:       Emergency Services Phone:     Making the environment safe: How can I make my environment (house/apartment/living space) safer? For example, can I remove guns, medications, and other items? 1. No guns  2.  No extra meds

## 2021-08-02 NOTE — GROUP NOTE
HS Goal Group     Date: August 1, 2021     Patient did not participate in HS goal group. 1:1 talk time was offered as an alternative to group. Will continue to encourage patient to participate in unit programming.           Signature: MARLEY Jenkins

## 2021-08-02 NOTE — PROGRESS NOTES
CLINICAL PHARMACY NOTE: MEDS TO BEDS    Total # of Prescriptions Filled: 2   The following medications were delivered to the patient:  · Trazodone HCL 50mg  · Gabapentin 100mg    Additional Documentation:  Delivered Medication to Nurses Station     Refill Too Soon + Profiled Rx:   Meloxicam - 8/12

## 2021-08-02 NOTE — CARE COORDINATION
Name: Juana Srivastava    : 1980    Discharge Date: 21    Primary Auth/Cert #: [id number]  Destination: [facility] for [facility] programming    Discharge Medications:      Medication List      START taking these medications    risperiDONE ER 90 MG Prsy injection  Commonly known as: Perseris  Inject 0.6 mLs into the skin every 30 days  Start taking on: 2021  Notes to patient: Help Clear thoughts     traZODone 50 MG tablet  Commonly known as: DESYREL  Take 1 tablet by mouth nightly as needed for Sleep  Notes to patient: Help with sleep        CHANGE how you take these medications    meloxicam 15 MG tablet  Commonly known as: MOBIC  Take 1 tablet by mouth daily as needed for Pain  What changed: reasons to take this  Notes to patient: Help with pain relief. CONTINUE taking these medications    cyclobenzaprine 10 MG tablet  Commonly known as: FLEXERIL  Notes to patient: Help reduce muscle spasms - Muscle relaxer      DULoxetine 60 MG extended release capsule  Commonly known as: CYMBALTA  Notes to patient: Help Treat depression like symptoms      gabapentin 100 MG capsule  Commonly known as: NEURONTIN  Take 1 capsule by mouth 3 times daily for 30 days.   Notes to patient: Help with nerve pain/anxiety      hydroCHLOROthiazide 50 MG tablet  Commonly known as: HYDRODIURIL  Notes to patient: Help with water retention/high blood pressure - Diuretic      hydrOXYzine 50 MG tablet  Commonly known as: ATARAX  Notes to patient: Help with anxiety      Suboxone 8-2 MG Film SL film  Generic drug: buprenorphine-naloxone  Notes to patient: Help Relieve Withdrawal symptoms        STOP taking these medications    acetaminophen 325 MG tablet  Commonly known as: TYLENOL     pregabalin 300 MG capsule  Commonly known as: LYRICA     QUEtiapine 100 MG tablet  Commonly known as: SEROQUEL           Where to Get Your Medications      [facility] [address] [facility] Narciso Skelton [address], [address] [place] [address]    Phone: [phone]   · risperiDONE ER 90 MG Prsy injection     These medications were sent to [facility], [address] [place] [address] Ave [phone] Trish Huber [phone]  [address], [address] [facility] [address]    Phone: [phone]   · gabapentin 100 MG capsule  · meloxicam 15 MG tablet  · traZODone 50 MG tablet         Follow Up Appointment: [address] [facility]   [address] [id number] [phone]    On 8/11/2021  Pt has appointment at 11:00 am     Brett  [phone] [place] [id number] [phone]  On 8/2/2021  @ 1:40pm for MAT    Brett John [phone]  On 8/2/2021  @ 3:30pm for IOP

## 2021-08-02 NOTE — PROGRESS NOTES
[facility]  Discharge Note    Pt discharged with followings belongings:   Dentures: None  Vision - Corrective Lenses: None  Hearing Aid: None  Jewelry: None  Body Piercings Removed: N/A  Clothing: Shirt, Pants, Footwear, Undergarments (Comment)  Were All Patient Medications Collected?: Not Applicable  Other Valuables: Purse (locked in safe; double bagged sprayed with pepper spray)   Valuables sent home with pt or returned to patient. Patient education on aftercare instructions: yes  Information faxed to [facility] by [employer]  at 10:28 AM .Patient verbalize understanding of AVS:  yes.     Status EXAM upon discharge:  Status and Exam  Normal: No  Facial Expression: Flat  Affect: Blunt  Level of Consciousness: Alert  Mood:Normal: No  Mood: Depressed  Motor Activity:Normal: Yes  Motor Activity: Decreased  Interview Behavior: Cooperative  Preception: Wendel to Person, Emily Carolyne to Time, Wendel to Place, Wendel to Situation  Attention:Normal: Yes  Attention: Distractible  Thought Processes: Circumstantial  Thought Content:Normal: Yes  Thought Content: Preoccupations  Hallucinations: None  Delusions: No  Delusions: Persecution  Memory:Normal: Yes  Memory: Poor Recent  Insight and Judgment: Yes  Insight and Judgment: Poor Insight, Unmotivated  Present Suicidal Ideation: No  Present Homicidal Ideation: No      Metabolic Screening:    Lab Results   Component Value Date    LABA1C 5.1 08/22/2019       Lab Results   Component Value Date    CHOL 168 08/22/2019    CHOL 152 06/10/2018    CHOL 206 (H) 02/21/2018    CHOL 182 09/14/2015    CHOL 132 09/07/2013     Lab Results   Component Value Date    TRIG 79 08/22/2019    TRIG 141 06/10/2018    TRIG 113 02/21/2018    TRIG 54 09/14/2015    TRIG 88 09/07/2013     Lab Results   Component Value Date    HDL 53 08/22/2019    HDL 39 (L) 06/10/2018    HDL 59 02/21/2018    HDL 71 09/14/2015    HDL 53 09/07/2013     No components found for: LDLCAL  No results found for: LABVLDL    Pt

## 2021-08-02 NOTE — GROUP NOTE
Group Therapy Note    Date: 8/2/2021    Group Start Time: 9814  Group End Time: 1050  Group Topic: Psychotherapy    ANYI URBINA    NANDA Perez, IVAN        Group Therapy Note    Attendees: 9/17         Patient's Goal:  Increase interpersonal relationship skills    Notes:  Patient was an active participant in group discussion- she stated she is experiencing clearer thought process and less confusion/voices. Status After Intervention:  Improved    Participation Level:  Active Listener and Interactive    Participation Quality: Appropriate, Attentive, Sharing and Supportive      Speech:  normal      Thought Process/Content: Logical      Affective Functioning: Congruent      Mood: euthymic      Level of consciousness:  Alert, Oriented x4 and Attentive      Response to Learning: Able to verbalize current knowledge/experience, Able to verbalize/acknowledge new learning, Able to retain information and Capable of insight      Endings: None Reported    Modes of Intervention: Socialization and Exploration      Discipline Responsible: /Counselor      Signature:  NANDA Perez, IVAN

## 2021-08-03 NOTE — DISCHARGE SUMMARY
Provider Discharge Summary     Patient ID:  Sara Reynoso  [id number]  89 y.o.  1980    Admit date: 7/27/2021    Discharge date and time: 8/2/2021  8:20 PM     Admitting Physician: Janel Gonzáles MD     Discharge Physician: Darwin Parker MD    Admission Diagnoses: Acute psychosis [facility]) [F23]    Discharge Diagnoses:      Acute psychosis [facility])     Patient Active Problem List   Diagnosis Code    Opioid abuse ([address] F11.10    Polyarthritis M13.0    Scoliosis M41.9    H/O long-term treatment with high-risk medication Z79.899    Chronic pain syndrome G89.4    Marfan's syndrome Q87.40    Dysphagia R13.10    Instability of both shoulder joints M25.311, M25.312    Hip instability M25.359    Benign essential HTN I10    Severe major depression without psychotic features ([address] F32.2    Depression with suicidal ideation F32.9, R45.851    Bipolar disorder with moderate depression ([address] F31.32    Opiate withdrawal ([address] F11.23    Severe recurrent major depression without psychotic features ([address] F33.2    Severe malnutrition ([address] E43    Acute psychosis ([address] F23    Opioid dependence on agonist therapy ([address] F11.20    Cocaine abuse ([address] F14.10        Admission Condition: poor    Discharged Condition: stable    Indication for Admission: threat to self    History of Present Illnes (present tense wording is of findings from admission exam and are not necessarily indicative of current findings):   Sara Reynoso is a 46 y.o. female with significant past medical history of hypertension, Marfan syndrome, opiate abuse, bipolar disorder who presented to the ED for psych evaluation.   Per ED social work \"Patient is a 46year old  female who presented to ED requesting a mental health evaluation.  Patient reports she is hearing voices and she has been unable to decipher the difference between reality and fantasy. Rober Medina reports command auditory hallucinations calling her \"a bitch\", \"a whore\", \"you should get commit suicide\", \"show me your pussy\" and \"just die\".  Patient reports she also feels as though people are watching her and she has called the  but they told her there wasn't anyone there when they arrived. Geri Alvarado reports severe \"confusion\" due to these hallucinations.   Patient denies SI.  Patient does report HI towards the \"people that are fucking with me\".  Patient believes these voices to be real and she is fearful.   Patient is linked with [facility] and they recently upped her Seroquel to 200mg, but she reports this made the voices worse.  Patient stated [facility] eliminated one of her meds today.  Geri Alvarado reports a decrease in her sleep habits.  Patient is diagnosed with Marfan's syndrome which limits her mobility.  Patient reports she utilizes a motorized chair when in the community. \"     Per nursing staff, patient has been in behavioral control since arrival to the unit. She has not required the use of any medication for anxiety or agitation.     Today, patient was interviewed in the intake room, patient was cooperative with writer. Patient endorses an increase in voices that have gotten worse over the last week. Patient endorses that she recently relapsed after 20 months clean. [facility] after detoxing for using fentanyl for 4 days. Patient states that upon discharge she smoked marijuana however states that her urine has been clean for approximately 1 month. Patient endorses auditory hallucinations. Patient endorses to writer \"I am disoriented, I made up a new person who was not there, she is talks to me while I am in the bathroom and she talks to me on the stairwell. \"     Patient displays thought blocking, disorganized speech, she is hyperverbal and states she has been up for at least 48 hours without sleep.     Patient is hyper focused on getting her Suboxone, states that she is on 8 mg / 2 mg film daily.   Patient states that she is attending IOP groups at [facility] and has been there for approximately 2 weeks. Patient educated that once her Suboxone dose can be verified with the pharmacy, that it would be up to the attending physician to restart her Suboxone as it was discontinued upon her last discharge in June 2021.     Patient states that she is no longer taking Lyrica, states that her family physician changed her to Neurontin 100 mg 3 times daily however states that she has not taken the medication in 5 or 6 days, at this time it was not reordered by the nurse practitioner.   [facility] Course:   Upon admission, Karlee Witt was provided a safe secure environment, introduced to unit milieu. Patient participated in groups and individual therapies. Meds were adjusted as noted below. After few days of hospital care, patient began to feel improvement. Depression lifted, thoughts to harm self ceased. Sleep improved, appetite was good. On morning rounds 8/2/2021, Karlee Witt  endorses feeling ready for discharge. Patient denies suicidal or homicidal ideations, denies hallucinations or delusions. Denies SE's from meds. It was decided that maximum benefit from hospital care had been achieved and patient can be discharged. Consults:   No consults      Significant Diagnostic Studies: Routine labs and diagnostics    Treatments: Psychotropic medications, therapy with group, milieu, and 1:1 with nurses, social workers, PAJOHN/CNP, and Attending physician.       Discharge Medications:  Discharge Medication List as of 8/2/2021 10:06 AM      START taking these medications    Details   traZODone (DESYREL) 50 MG tablet Take 1 tablet by mouth nightly as needed for Sleep, Disp-30 tablet, R-0Normal      risperiDONE ER (PERSERIS) 90 MG PRSY injection Inject 0.6 mLs into the skin every 30 days, Disp-1 each, R-0Normal         CONTINUE these medications which have CHANGED    Details   gabapentin (NEURONTIN) 100 MG capsule Take 1 capsule by mouth 3 times daily for 30 days. , Disp-90 capsule, R-0Normal      meloxicam (MOBIC) 15 MG tablet Take 1 tablet by mouth daily as needed for Pain, Disp-30 tablet, R-0Normal         CONTINUE these medications which have NOT CHANGED    Details   buprenorphine-naloxone (SUBOXONE) 8-2 MG FILM SL film Place 1 Film under the tongue daily. Historical Med      cyclobenzaprine (FLEXERIL) 10 MG tablet Take 10 mg by mouth 3 times daily as needed for Muscle spasmsHistorical Med      hydroCHLOROthiazide (HYDRODIURIL) 50 MG tablet Take 50 mg by mouth dailyHistorical Med      hydrOXYzine (ATARAX) 50 MG tablet Take 50 mg by mouth every 6 hours as neededHistorical Med      DULoxetine (CYMBALTA) 60 MG extended release capsule Take 60 mg by mouth 2 times dailyHistorical Med         STOP taking these medications       acetaminophen (TYLENOL) 325 MG tablet Comments:   Reason for Stopping:         QUEtiapine (SEROQUEL) 100 MG tablet Comments:   Reason for Stopping:         QUEtiapine (SEROQUEL) 100 MG tablet Comments:   Reason for Stopping:                  Core Measures statement:   Not applicable    Discharge Exam:  Level of consciousness:  Within normal limits  Appearance: Street clothes, seated, with good grooming  Behavior/Motor: No abnormalities noted  Attitude toward examiner:  Cooperative, attentive, good eye contact  Speech:  spontaneous, normal rate, normal volume and well articulated  Mood:  euthymic  Affect:  Full range  Thought processes:  linear, goal directed and coherent  Thought content:  denies homicidal ideation  Suicidal Ideation:  denies suicidal ideation  Delusions:  no evidence of delusions  Perceptual Disturbance:  denies any perceptual disturbance  Cognition:  Intact  Memory: age appropriate  Insight & Judgement: fair  Medication side effects: denies     Disposition: home    Patient Instructions: Activity: activity as tolerated  1.  Patient instructed to take medications regularly and follow up with outpatient appointments. Follow-up as scheduled with outpatient [facility] mental health and IOP program      Signed:    Electronically signed by Estephanie Love MD on 8/2/21 at 8:20 PM EDT    Time Spent on discharge is more than 30 minutes in the examination, evaluation, counseling and review of medications and discharge plan.

## 2021-09-20 LAB — SARS-COV-2: DETECTED

## 2021-09-24 ENCOUNTER — HOSPITAL ENCOUNTER (INPATIENT)
Age: 41
LOS: 6 days | Discharge: OTHER INSTITUTION | DRG: 177 | End: 2021-09-30
Attending: EMERGENCY MEDICINE | Admitting: INTERNAL MEDICINE
Payer: COMMERCIAL

## 2021-09-24 ENCOUNTER — APPOINTMENT (OUTPATIENT)
Dept: GENERAL RADIOLOGY | Age: 41
DRG: 177 | End: 2021-09-24
Payer: COMMERCIAL

## 2021-09-24 ENCOUNTER — APPOINTMENT (OUTPATIENT)
Dept: CT IMAGING | Age: 41
DRG: 177 | End: 2021-09-24
Payer: COMMERCIAL

## 2021-09-24 DIAGNOSIS — U07.1 COVID-19: Primary | ICD-10-CM

## 2021-09-24 PROBLEM — J96.01 ACUTE HYPOXEMIC RESPIRATORY FAILURE DUE TO COVID-19 (HCC): Status: ACTIVE | Noted: 2021-09-24

## 2021-09-24 LAB
-: NORMAL
ABSOLUTE EOS #: <0.03 K/UL (ref 0–0.44)
ABSOLUTE IMMATURE GRANULOCYTE: 0.03 K/UL (ref 0–0.3)
ABSOLUTE LYMPH #: 0.84 K/UL (ref 1.1–3.7)
ABSOLUTE MONO #: 0.39 K/UL (ref 0.1–1.2)
ALBUMIN SERPL-MCNC: 3.9 G/DL (ref 3.5–5.2)
ALBUMIN/GLOBULIN RATIO: 1.5 (ref 1–2.5)
ALLEN TEST: ABNORMAL
ALP BLD-CCNC: 79 U/L (ref 35–104)
ALT SERPL-CCNC: 51 U/L (ref 5–33)
AST SERPL-CCNC: 42 U/L
BASOPHILS # BLD: 0 % (ref 0–2)
BASOPHILS ABSOLUTE: <0.03 K/UL (ref 0–0.2)
BILIRUB SERPL-MCNC: 0.28 MG/DL (ref 0.3–1.2)
BILIRUBIN DIRECT: 0.13 MG/DL
BILIRUBIN, INDIRECT: 0.15 MG/DL (ref 0–1)
BNP INTERPRETATION: NORMAL
C-REACTIVE PROTEIN: 17.2 MG/L (ref 0–5)
CARBOXYHEMOGLOBIN: 1.8 % (ref 0–5)
D-DIMER QUANTITATIVE: 0.61 MG/L FEU
DIFFERENTIAL TYPE: ABNORMAL
EOSINOPHILS RELATIVE PERCENT: 0 % (ref 1–4)
FERRITIN: 179 UG/L (ref 13–150)
FIBRINOGEN: 332 MG/DL (ref 140–420)
FIO2: ABNORMAL
GLOBULIN: ABNORMAL G/DL (ref 1.5–3.8)
HCO3 VENOUS: 34.1 MMOL/L (ref 24–30)
HCT VFR BLD CALC: 39.4 % (ref 36.3–47.1)
HEMOGLOBIN: 12.5 G/DL (ref 11.9–15.1)
IMMATURE GRANULOCYTES: 1 %
LYMPHOCYTES # BLD: 16 % (ref 24–43)
MCH RBC QN AUTO: 28.2 PG (ref 25.2–33.5)
MCHC RBC AUTO-ENTMCNC: 31.7 G/DL (ref 28.4–34.8)
MCV RBC AUTO: 88.7 FL (ref 82.6–102.9)
METHEMOGLOBIN: ABNORMAL % (ref 0–1.5)
MODE: ABNORMAL
MONOCYTES # BLD: 7 % (ref 3–12)
NEGATIVE BASE EXCESS, VEN: ABNORMAL MMOL/L (ref 0–2)
NOTIFICATION TIME: ABNORMAL
NOTIFICATION: ABNORMAL
NRBC AUTOMATED: 0 PER 100 WBC
O2 DEVICE/FLOW/%: ABNORMAL
O2 SAT, VEN: 74.1 % (ref 60–85)
OXYHEMOGLOBIN: ABNORMAL % (ref 95–98)
PATIENT TEMP: 37
PCO2, VEN, TEMP ADJ: ABNORMAL MMHG (ref 39–55)
PCO2, VEN: 80.4 (ref 39–55)
PDW BLD-RTO: 13.8 % (ref 11.8–14.4)
PEEP/CPAP: ABNORMAL
PH VENOUS: 7.25 (ref 7.32–7.42)
PH, VEN, TEMP ADJ: ABNORMAL (ref 7.32–7.42)
PLATELET # BLD: ABNORMAL K/UL (ref 138–453)
PLATELET ESTIMATE: ABNORMAL
PLATELET, FLUORESCENCE: NORMAL K/UL (ref 138–453)
PLATELET, IMMATURE FRACTION: NORMAL % (ref 1.1–10.3)
PMV BLD AUTO: ABNORMAL FL (ref 8.1–13.5)
PO2, VEN, TEMP ADJ: ABNORMAL MMHG (ref 30–50)
PO2, VEN: 43.5 (ref 30–50)
POSITIVE BASE EXCESS, VEN: 4.5 MMOL/L (ref 0–2)
PRO-BNP: NORMAL PG/ML
PROCALCITONIN: 0.08 NG/ML
PROCALCITONIN: 0.09 NG/ML
PSV: ABNORMAL
PT. POSITION: ABNORMAL
RBC # BLD: 4.44 M/UL (ref 3.95–5.11)
RBC # BLD: ABNORMAL 10*6/UL
REASON FOR REJECTION: NORMAL
RESPIRATORY RATE: ABNORMAL
SAMPLE SITE: ABNORMAL
SARS-COV-2, RAPID: DETECTED
SEG NEUTROPHILS: 76 % (ref 36–65)
SEGMENTED NEUTROPHILS ABSOLUTE COUNT: 4.11 K/UL (ref 1.5–8.1)
SET RATE: ABNORMAL
SPECIMEN DESCRIPTION: ABNORMAL
TEXT FOR RESPIRATORY: ABNORMAL
TOTAL HB: ABNORMAL G/DL (ref 12–16)
TOTAL PROTEIN: 6.5 G/DL (ref 6.4–8.3)
TOTAL RATE: ABNORMAL
TROPONIN INTERP: NORMAL
TROPONIN T: NORMAL NG/ML
TROPONIN, HIGH SENSITIVITY: NORMAL NG/L (ref 0–14)
VT: ABNORMAL
WBC # BLD: 5.4 K/UL (ref 3.5–11.3)
WBC # BLD: ABNORMAL 10*3/UL
ZZ NTE CLEAN UP: ORDERED TEST: NORMAL
ZZ NTE WITH NAME CLEAN UP: SPECIMEN SOURCE: NORMAL

## 2021-09-24 PROCEDURE — 6370000000 HC RX 637 (ALT 250 FOR IP): Performed by: NURSE PRACTITIONER

## 2021-09-24 PROCEDURE — 83880 ASSAY OF NATRIURETIC PEPTIDE: CPT

## 2021-09-24 PROCEDURE — 80076 HEPATIC FUNCTION PANEL: CPT

## 2021-09-24 PROCEDURE — 82805 BLOOD GASES W/O2 SATURATION: CPT

## 2021-09-24 PROCEDURE — 99222 1ST HOSP IP/OBS MODERATE 55: CPT | Performed by: STUDENT IN AN ORGANIZED HEALTH CARE EDUCATION/TRAINING PROGRAM

## 2021-09-24 PROCEDURE — 82728 ASSAY OF FERRITIN: CPT

## 2021-09-24 PROCEDURE — 2060000000 HC ICU INTERMEDIATE R&B

## 2021-09-24 PROCEDURE — 6360000002 HC RX W HCPCS: Performed by: NURSE PRACTITIONER

## 2021-09-24 PROCEDURE — 2580000003 HC RX 258: Performed by: NURSE PRACTITIONER

## 2021-09-24 PROCEDURE — 87635 SARS-COV-2 COVID-19 AMP PRB: CPT

## 2021-09-24 PROCEDURE — 85379 FIBRIN DEGRADATION QUANT: CPT

## 2021-09-24 PROCEDURE — 84484 ASSAY OF TROPONIN QUANT: CPT

## 2021-09-24 PROCEDURE — 99285 EMERGENCY DEPT VISIT HI MDM: CPT

## 2021-09-24 PROCEDURE — 84145 PROCALCITONIN (PCT): CPT

## 2021-09-24 PROCEDURE — 36415 COLL VENOUS BLD VENIPUNCTURE: CPT

## 2021-09-24 PROCEDURE — 85025 COMPLETE CBC W/AUTO DIFF WBC: CPT

## 2021-09-24 PROCEDURE — 6360000002 HC RX W HCPCS: Performed by: STUDENT IN AN ORGANIZED HEALTH CARE EDUCATION/TRAINING PROGRAM

## 2021-09-24 PROCEDURE — 71250 CT THORAX DX C-: CPT

## 2021-09-24 PROCEDURE — 2000000000 HC ICU R&B

## 2021-09-24 PROCEDURE — 71045 X-RAY EXAM CHEST 1 VIEW: CPT

## 2021-09-24 PROCEDURE — 80048 BASIC METABOLIC PNL TOTAL CA: CPT

## 2021-09-24 PROCEDURE — 85384 FIBRINOGEN ACTIVITY: CPT

## 2021-09-24 PROCEDURE — 86140 C-REACTIVE PROTEIN: CPT

## 2021-09-24 PROCEDURE — 93005 ELECTROCARDIOGRAM TRACING: CPT | Performed by: STUDENT IN AN ORGANIZED HEALTH CARE EDUCATION/TRAINING PROGRAM

## 2021-09-24 PROCEDURE — 85055 RETICULATED PLATELET ASSAY: CPT

## 2021-09-24 RX ORDER — VITAMIN B COMPLEX
2000 TABLET ORAL DAILY
Status: DISCONTINUED | OUTPATIENT
Start: 2021-09-25 | End: 2021-09-30 | Stop reason: HOSPADM

## 2021-09-24 RX ORDER — SODIUM CHLORIDE 0.9 % (FLUSH) 0.9 %
5-40 SYRINGE (ML) INJECTION EVERY 12 HOURS SCHEDULED
Status: DISCONTINUED | OUTPATIENT
Start: 2021-09-24 | End: 2021-09-30 | Stop reason: HOSPADM

## 2021-09-24 RX ORDER — GUAIFENESIN DEXTROMETHORPHAN HYDROBROMIDE ORAL SOLUTION 10; 100 MG/5ML; MG/5ML
5 SOLUTION ORAL EVERY 4 HOURS PRN
Status: DISCONTINUED | OUTPATIENT
Start: 2021-09-24 | End: 2021-09-28

## 2021-09-24 RX ORDER — SODIUM CHLORIDE 0.9 % (FLUSH) 0.9 %
5-40 SYRINGE (ML) INJECTION PRN
Status: DISCONTINUED | OUTPATIENT
Start: 2021-09-24 | End: 2021-09-30 | Stop reason: HOSPADM

## 2021-09-24 RX ORDER — ONDANSETRON 4 MG/1
4 TABLET, ORALLY DISINTEGRATING ORAL EVERY 8 HOURS PRN
Status: DISCONTINUED | OUTPATIENT
Start: 2021-09-24 | End: 2021-09-30 | Stop reason: HOSPADM

## 2021-09-24 RX ORDER — ACETAMINOPHEN 325 MG/1
650 TABLET ORAL EVERY 6 HOURS PRN
Status: DISCONTINUED | OUTPATIENT
Start: 2021-09-24 | End: 2021-09-30 | Stop reason: HOSPADM

## 2021-09-24 RX ORDER — PREGABALIN 100 MG/1
100 CAPSULE ORAL 2 TIMES DAILY
COMMUNITY

## 2021-09-24 RX ORDER — ACETAMINOPHEN 650 MG/1
650 SUPPOSITORY RECTAL EVERY 6 HOURS PRN
Status: DISCONTINUED | OUTPATIENT
Start: 2021-09-24 | End: 2021-09-30 | Stop reason: HOSPADM

## 2021-09-24 RX ORDER — POLYETHYLENE GLYCOL 3350 17 G/17G
17 POWDER, FOR SOLUTION ORAL DAILY PRN
Status: DISCONTINUED | OUTPATIENT
Start: 2021-09-24 | End: 2021-09-30 | Stop reason: HOSPADM

## 2021-09-24 RX ORDER — MELOXICAM 7.5 MG/1
15 TABLET ORAL DAILY PRN
Status: DISCONTINUED | OUTPATIENT
Start: 2021-09-24 | End: 2021-09-30 | Stop reason: HOSPADM

## 2021-09-24 RX ORDER — DULOXETIN HYDROCHLORIDE 30 MG/1
60 CAPSULE, DELAYED RELEASE ORAL 2 TIMES DAILY
Status: DISCONTINUED | OUTPATIENT
Start: 2021-09-24 | End: 2021-09-30 | Stop reason: HOSPADM

## 2021-09-24 RX ORDER — DEXAMETHASONE SODIUM PHOSPHATE 10 MG/ML
6 INJECTION INTRAMUSCULAR; INTRAVENOUS EVERY 24 HOURS
Status: DISCONTINUED | OUTPATIENT
Start: 2021-09-24 | End: 2021-09-28

## 2021-09-24 RX ORDER — TRAZODONE HYDROCHLORIDE 50 MG/1
50 TABLET ORAL NIGHTLY PRN
Status: DISCONTINUED | OUTPATIENT
Start: 2021-09-24 | End: 2021-09-30 | Stop reason: HOSPADM

## 2021-09-24 RX ORDER — PREGABALIN 100 MG/1
100 CAPSULE ORAL 2 TIMES DAILY
Status: DISCONTINUED | OUTPATIENT
Start: 2021-09-24 | End: 2021-09-25

## 2021-09-24 RX ORDER — HYDROXYZINE 50 MG/1
50 TABLET, FILM COATED ORAL EVERY 6 HOURS PRN
Status: DISCONTINUED | OUTPATIENT
Start: 2021-09-24 | End: 2021-09-30 | Stop reason: HOSPADM

## 2021-09-24 RX ORDER — DEXAMETHASONE SODIUM PHOSPHATE 10 MG/ML
10 INJECTION INTRAMUSCULAR; INTRAVENOUS ONCE
Status: COMPLETED | OUTPATIENT
Start: 2021-09-24 | End: 2021-09-24

## 2021-09-24 RX ORDER — ONDANSETRON 2 MG/ML
4 INJECTION INTRAMUSCULAR; INTRAVENOUS EVERY 6 HOURS PRN
Status: DISCONTINUED | OUTPATIENT
Start: 2021-09-24 | End: 2021-09-30 | Stop reason: HOSPADM

## 2021-09-24 RX ORDER — HYDROCHLOROTHIAZIDE 25 MG/1
50 TABLET ORAL DAILY
Status: CANCELLED | OUTPATIENT
Start: 2021-09-24

## 2021-09-24 RX ORDER — SODIUM CHLORIDE 9 MG/ML
25 INJECTION, SOLUTION INTRAVENOUS PRN
Status: DISCONTINUED | OUTPATIENT
Start: 2021-09-24 | End: 2021-09-30 | Stop reason: HOSPADM

## 2021-09-24 RX ORDER — NALOXONE HYDROCHLORIDE 1 MG/ML
2 INJECTION INTRAMUSCULAR; INTRAVENOUS; SUBCUTANEOUS ONCE
Status: DISCONTINUED | OUTPATIENT
Start: 2021-09-24 | End: 2021-09-25

## 2021-09-24 RX ADMIN — PREGABALIN 100 MG: 100 CAPSULE ORAL at 22:58

## 2021-09-24 RX ADMIN — SODIUM CHLORIDE, PRESERVATIVE FREE 10 ML: 5 INJECTION INTRAVENOUS at 23:00

## 2021-09-24 RX ADMIN — DULOXETINE HYDROCHLORIDE 60 MG: 30 CAPSULE, DELAYED RELEASE ORAL at 22:58

## 2021-09-24 RX ADMIN — DEXAMETHASONE SODIUM PHOSPHATE 10 MG: 10 INJECTION INTRAMUSCULAR; INTRAVENOUS at 17:11

## 2021-09-24 RX ADMIN — TRAZODONE HYDROCHLORIDE 50 MG: 50 TABLET ORAL at 22:58

## 2021-09-24 RX ADMIN — DEXAMETHASONE SODIUM PHOSPHATE 6 MG: 10 INJECTION INTRAMUSCULAR; INTRAVENOUS at 22:58

## 2021-09-24 RX ADMIN — ENOXAPARIN SODIUM 30 MG: 30 INJECTION SUBCUTANEOUS at 22:58

## 2021-09-24 ASSESSMENT — PAIN SCALES - GENERAL: PAINLEVEL_OUTOF10: 0

## 2021-09-24 ASSESSMENT — ENCOUNTER SYMPTOMS
VOMITING: 0
COUGH: 1
ABDOMINAL PAIN: 0
SHORTNESS OF BREATH: 1
NAUSEA: 0
COLOR CHANGE: 0

## 2021-09-24 NOTE — ED NOTES
Bed: 47PED  Expected date:   Expected time:   Means of arrival:   Comments:  2904 Greg Matson RN  09/24/21 3892

## 2021-09-24 NOTE — ED NOTES
Pt. To ER room 47 via stretcher with EMS  Pt. Presents with shortness of breath; pt tested Covid+ two days ago  Per EMS, pt was found to have labored respirations and was saturating in the 70s and 80s on room air; pt placed on [email or web address] and tolerated well, maintaining a saturation of 98% on [email or web address]. Pt has very congested, wet cough; lung sounds diminished bilaterally. Pt. Placed on full cardiac monitor, IV access established PTA.   Pt. Speaking in full sentences, breathing easier while resting but continues to cough frequently  Call light given  Awaiting orders  Will continue to monitor and reassess     Buffy Campuzano, NANCY  09/24/21 9973

## 2021-09-24 NOTE — LETTER
NOTIFICATION RETURN TO WORK / SCHOOL    9/29/2021    Ms. [address] [facility] [address]      To Whom It May Concern:    Cipriano Burgess was tested for COVID-19 on 9/20 at your facility. As you know, the test result was positive. Per CDC guidelines, patients with mild disease may be out of isolation after ten days. Ms. Fidelia Marquis 10th day will be on 9/30 and she may return to your center without isolation requirements. As you know, we still are recommending that patrons at an indoor facility wear masks and socially distance. If there are questions or concerns, please have the patient contact our office.         Sincerely,          Ekta Howell, [address] [facility]

## 2021-09-24 NOTE — ED PROVIDER NOTES
[facility] [address] [facility]     Emergency Department     Faculty Attestation    I performed a history and physical examination of the patient and discussed management with the resident. I reviewed the residents note and agree with the documented findings and plan of care. Any areas of disagreement are noted on the chart. I was personally present for the key portions of any procedures. I have documented in the chart those procedures where I was not present during the key portions. I have reviewed the emergency nurses triage note. I agree with the chief complaint, past medical history, past surgical history, allergies, medications, social and family history as documented unless otherwise noted below. For Physician Assistant/ Nurse Practitioner cases/documentation I have personally evaluated this patient and have completed at least one if not all key elements of the E/M (history, physical exam, and MDM). Additional findings are as noted. This patient was evaluated in the Emergency Department for symptoms described in the history of present illness. He/she was evaluated in the context of the global COVID-19 pandemic, which necessitated consideration that the patient might be at risk for infection with the SARS-CoV-2 virus that causes COVID-19. Institutional protocols and algorithms that pertain to the evaluation of patients at risk for COVID-19 are in a state of rapid change based on information released by regulatory bodies including the CDC and federal and state organizations. These policies and algorithms were followed during the patient's care in the ED. Patient here with shortness of breath worsening. Diagnosed with Covid several days ago. On arrival sats in the 76s did improve with supplemental oxygen. Patient has no other complaints. No respiratory distress speaking full sentences. We will plan to admit    EKG interpretation: Sinus rhythm 81.   Normal intervals normal axis no acute ST or T changes.   No acute findings    Critical Care     none    Rebeca Ovalles MD, Ashlee Varela  Attending Emergency  Physician             Rebeca Ovalles MD  09/24/21 0473

## 2021-09-25 LAB
-: ABNORMAL
ABSOLUTE EOS #: <0.03 K/UL (ref 0–0.44)
ABSOLUTE IMMATURE GRANULOCYTE: 0.03 K/UL (ref 0–0.3)
ABSOLUTE LYMPH #: 0.97 K/UL (ref 1.1–3.7)
ABSOLUTE MONO #: 0.19 K/UL (ref 0.1–1.2)
ALBUMIN SERPL-MCNC: 3.5 G/DL (ref 3.5–5.2)
ALBUMIN/GLOBULIN RATIO: 1.3 (ref 1–2.5)
ALP BLD-CCNC: 79 U/L (ref 35–104)
ALT SERPL-CCNC: 48 U/L (ref 5–33)
AMORPHOUS: ABNORMAL
AMPHETAMINE SCREEN URINE: NEGATIVE
ANION GAP SERPL CALCULATED.3IONS-SCNC: 10 MMOL/L (ref 9–17)
ANION GAP SERPL CALCULATED.3IONS-SCNC: 15 MMOL/L (ref 9–17)
AST SERPL-CCNC: 33 U/L
BACTERIA: ABNORMAL
BARBITURATE SCREEN URINE: NEGATIVE
BASOPHILS # BLD: 0 % (ref 0–2)
BASOPHILS ABSOLUTE: <0.03 K/UL (ref 0–0.2)
BENZODIAZEPINE SCREEN, URINE: NEGATIVE
BILIRUB SERPL-MCNC: 0.3 MG/DL (ref 0.3–1.2)
BILIRUBIN URINE: NEGATIVE
BNP INTERPRETATION: NORMAL
BUN BLDV-MCNC: 7 MG/DL (ref 6–20)
BUN BLDV-MCNC: 9 MG/DL (ref 6–20)
BUN/CREAT BLD: ABNORMAL (ref 9–20)
BUN/CREAT BLD: ABNORMAL (ref 9–20)
BUPRENORPHINE URINE: NORMAL
C-REACTIVE PROTEIN: 13.9 MG/L (ref 0–5)
CALCIUM SERPL-MCNC: 8.5 MG/DL (ref 8.6–10.4)
CALCIUM SERPL-MCNC: 8.7 MG/DL (ref 8.6–10.4)
CANNABINOID SCREEN URINE: NEGATIVE
CASTS UA: ABNORMAL /LPF (ref 0–8)
CHLORIDE BLD-SCNC: 96 MMOL/L (ref 98–107)
CHLORIDE BLD-SCNC: 97 MMOL/L (ref 98–107)
CO2: 26 MMOL/L (ref 20–31)
CO2: 27 MMOL/L (ref 20–31)
COCAINE METABOLITE, URINE: NEGATIVE
COLOR: YELLOW
COMMENT UA: ABNORMAL
CREAT SERPL-MCNC: 0.47 MG/DL (ref 0.5–0.9)
CREAT SERPL-MCNC: 0.52 MG/DL (ref 0.5–0.9)
CRYSTALS, UA: ABNORMAL /HPF
D-DIMER QUANTITATIVE: 0.44 MG/L FEU
DIFFERENTIAL TYPE: ABNORMAL
EKG ATRIAL RATE: 81 BPM
EKG P AXIS: 51 DEGREES
EKG P-R INTERVAL: 144 MS
EKG Q-T INTERVAL: 372 MS
EKG QRS DURATION: 70 MS
EKG QTC CALCULATION (BAZETT): 432 MS
EKG R AXIS: 75 DEGREES
EKG T AXIS: 90 DEGREES
EKG VENTRICULAR RATE: 81 BPM
EOSINOPHILS RELATIVE PERCENT: 0 % (ref 1–4)
EPITHELIAL CELLS UA: ABNORMAL /HPF (ref 0–5)
FERRITIN: 174 UG/L (ref 13–150)
FIBRINOGEN: 458 MG/DL (ref 140–420)
GFR AFRICAN AMERICAN: >60 ML/MIN
GFR AFRICAN AMERICAN: >60 ML/MIN
GFR NON-AFRICAN AMERICAN: >60 ML/MIN
GFR NON-AFRICAN AMERICAN: >60 ML/MIN
GFR SERPL CREATININE-BSD FRML MDRD: ABNORMAL ML/MIN/{1.73_M2}
GLUCOSE BLD-MCNC: 112 MG/DL (ref 70–99)
GLUCOSE BLD-MCNC: 167 MG/DL (ref 70–99)
GLUCOSE URINE: NEGATIVE
HCT VFR BLD CALC: 40.2 % (ref 36.3–47.1)
HEMOGLOBIN: 12.4 G/DL (ref 11.9–15.1)
IMMATURE GRANULOCYTES: 1 %
INR BLD: 0.9
KETONES, URINE: NEGATIVE
LACTATE DEHYDROGENASE: 230 U/L (ref 135–214)
LACTIC ACID, WHOLE BLOOD: 0.9 MMOL/L (ref 0.7–2.1)
LACTIC ACID: NORMAL MMOL/L
LEUKOCYTE ESTERASE, URINE: ABNORMAL
LYMPHOCYTES # BLD: 19 % (ref 24–43)
MCH RBC QN AUTO: 27.8 PG (ref 25.2–33.5)
MCHC RBC AUTO-ENTMCNC: 30.8 G/DL (ref 28.4–34.8)
MCV RBC AUTO: 90.1 FL (ref 82.6–102.9)
MDMA URINE: NORMAL
METHADONE SCREEN, URINE: NEGATIVE
METHAMPHETAMINE, URINE: NORMAL
MONOCYTES # BLD: 4 % (ref 3–12)
MUCUS: ABNORMAL
NITRITE, URINE: NEGATIVE
NRBC AUTOMATED: 0 PER 100 WBC
OPIATES, URINE: NEGATIVE
OTHER OBSERVATIONS UA: ABNORMAL
OXYCODONE SCREEN URINE: NEGATIVE
PARTIAL THROMBOPLASTIN TIME: 27.9 SEC (ref 20.5–30.5)
PDW BLD-RTO: 13.3 % (ref 11.8–14.4)
PH UA: 5.5 (ref 5–8)
PHENCYCLIDINE, URINE: NEGATIVE
PLATELET # BLD: 187 K/UL (ref 138–453)
PLATELET ESTIMATE: ABNORMAL
PMV BLD AUTO: 9.6 FL (ref 8.1–13.5)
POTASSIUM SERPL-SCNC: 4.9 MMOL/L (ref 3.7–5.3)
POTASSIUM SERPL-SCNC: 4.9 MMOL/L (ref 3.7–5.3)
PRO-BNP: 257 PG/ML
PROPOXYPHENE, URINE: NORMAL
PROTEIN UA: ABNORMAL
PROTHROMBIN TIME: 9.5 SEC (ref 9.1–12.3)
RBC # BLD: 4.46 M/UL (ref 3.95–5.11)
RBC # BLD: ABNORMAL 10*6/UL
RBC UA: ABNORMAL /HPF (ref 0–4)
RENAL EPITHELIAL, UA: ABNORMAL /HPF
SEG NEUTROPHILS: 77 % (ref 36–65)
SEGMENTED NEUTROPHILS ABSOLUTE COUNT: 4.03 K/UL (ref 1.5–8.1)
SODIUM BLD-SCNC: 134 MMOL/L (ref 135–144)
SODIUM BLD-SCNC: 137 MMOL/L (ref 135–144)
SPECIFIC GRAVITY UA: 1.02 (ref 1–1.03)
TEST INFORMATION: NORMAL
TOTAL PROTEIN: 6.3 G/DL (ref 6.4–8.3)
TRICHOMONAS: ABNORMAL
TRICYCLIC ANTIDEPRESSANTS, UR: NORMAL
TROPONIN INTERP: NORMAL
TROPONIN INTERP: NORMAL
TROPONIN T: NORMAL NG/ML
TROPONIN T: NORMAL NG/ML
TROPONIN, HIGH SENSITIVITY: 8 NG/L (ref 0–14)
TROPONIN, HIGH SENSITIVITY: <6 NG/L (ref 0–14)
TURBIDITY: ABNORMAL
URINE HGB: NEGATIVE
UROBILINOGEN, URINE: NORMAL
VITAMIN D 25-HYDROXY: 27.3 NG/ML (ref 30–100)
WBC # BLD: 5.2 K/UL (ref 3.5–11.3)
WBC # BLD: ABNORMAL 10*3/UL
WBC UA: ABNORMAL /HPF (ref 0–5)
YEAST: ABNORMAL

## 2021-09-25 PROCEDURE — 82728 ASSAY OF FERRITIN: CPT

## 2021-09-25 PROCEDURE — 6360000002 HC RX W HCPCS: Performed by: INTERNAL MEDICINE

## 2021-09-25 PROCEDURE — 6370000000 HC RX 637 (ALT 250 FOR IP): Performed by: INTERNAL MEDICINE

## 2021-09-25 PROCEDURE — 2500000003 HC RX 250 WO HCPCS: Performed by: INTERNAL MEDICINE

## 2021-09-25 PROCEDURE — 83615 LACTATE (LD) (LDH) ENZYME: CPT

## 2021-09-25 PROCEDURE — 87186 SC STD MICRODIL/AGAR DIL: CPT

## 2021-09-25 PROCEDURE — 99222 1ST HOSP IP/OBS MODERATE 55: CPT | Performed by: INTERNAL MEDICINE

## 2021-09-25 PROCEDURE — 99232 SBSQ HOSP IP/OBS MODERATE 35: CPT | Performed by: INTERNAL MEDICINE

## 2021-09-25 PROCEDURE — 87086 URINE CULTURE/COLONY COUNT: CPT

## 2021-09-25 PROCEDURE — 80307 DRUG TEST PRSMV CHEM ANLYZR: CPT

## 2021-09-25 PROCEDURE — 85025 COMPLETE CBC W/AUTO DIFF WBC: CPT

## 2021-09-25 PROCEDURE — 6360000002 HC RX W HCPCS: Performed by: NURSE PRACTITIONER

## 2021-09-25 PROCEDURE — 2580000003 HC RX 258: Performed by: NURSE PRACTITIONER

## 2021-09-25 PROCEDURE — 86140 C-REACTIVE PROTEIN: CPT

## 2021-09-25 PROCEDURE — 82306 VITAMIN D 25 HYDROXY: CPT

## 2021-09-25 PROCEDURE — 6370000000 HC RX 637 (ALT 250 FOR IP): Performed by: NURSE PRACTITIONER

## 2021-09-25 PROCEDURE — 83605 ASSAY OF LACTIC ACID: CPT

## 2021-09-25 PROCEDURE — 85384 FIBRINOGEN ACTIVITY: CPT

## 2021-09-25 PROCEDURE — XW033E5 INTRODUCTION OF REMDESIVIR ANTI-INFECTIVE INTO PERIPHERAL VEIN, PERCUTANEOUS APPROACH, NEW TECHNOLOGY GROUP 5: ICD-10-PCS | Performed by: INTERNAL MEDICINE

## 2021-09-25 PROCEDURE — 2060000000 HC ICU INTERMEDIATE R&B

## 2021-09-25 PROCEDURE — 2580000003 HC RX 258: Performed by: INTERNAL MEDICINE

## 2021-09-25 PROCEDURE — 80053 COMPREHEN METABOLIC PANEL: CPT

## 2021-09-25 PROCEDURE — 85379 FIBRIN DEGRADATION QUANT: CPT

## 2021-09-25 PROCEDURE — 81001 URINALYSIS AUTO W/SCOPE: CPT

## 2021-09-25 PROCEDURE — 85610 PROTHROMBIN TIME: CPT

## 2021-09-25 PROCEDURE — 36415 COLL VENOUS BLD VENIPUNCTURE: CPT

## 2021-09-25 PROCEDURE — 84484 ASSAY OF TROPONIN QUANT: CPT

## 2021-09-25 PROCEDURE — 87077 CULTURE AEROBIC IDENTIFY: CPT

## 2021-09-25 PROCEDURE — 85730 THROMBOPLASTIN TIME PARTIAL: CPT

## 2021-09-25 RX ORDER — 0.9 % SODIUM CHLORIDE 0.9 %
30 INTRAVENOUS SOLUTION INTRAVENOUS PRN
Status: DISCONTINUED | OUTPATIENT
Start: 2021-09-25 | End: 2021-09-30 | Stop reason: HOSPADM

## 2021-09-25 RX ORDER — PALIPERIDONE 3 MG/1
6 TABLET, EXTENDED RELEASE ORAL DAILY
Status: DISCONTINUED | OUTPATIENT
Start: 2021-09-25 | End: 2021-09-30 | Stop reason: HOSPADM

## 2021-09-25 RX ORDER — RISPERIDONE 0.5 MG/1
0.5 TABLET, FILM COATED ORAL DAILY
Status: DISCONTINUED | OUTPATIENT
Start: 2021-09-25 | End: 2021-09-25

## 2021-09-25 RX ORDER — PREGABALIN 100 MG/1
100 CAPSULE ORAL DAILY
Status: DISCONTINUED | OUTPATIENT
Start: 2021-09-25 | End: 2021-09-29

## 2021-09-25 RX ORDER — BUPRENORPHINE HYDROCHLORIDE AND NALOXONE HYDROCHLORIDE DIHYDRATE 8; 2 MG/1; MG/1
1 TABLET SUBLINGUAL DAILY
Status: DISCONTINUED | OUTPATIENT
Start: 2021-09-25 | End: 2021-09-25

## 2021-09-25 RX ORDER — NICOTINE 21 MG/24HR
1 PATCH, TRANSDERMAL 24 HOURS TRANSDERMAL DAILY
Status: DISCONTINUED | OUTPATIENT
Start: 2021-09-25 | End: 2021-09-30 | Stop reason: HOSPADM

## 2021-09-25 RX ORDER — BUPRENORPHINE HYDROCHLORIDE AND NALOXONE HYDROCHLORIDE DIHYDRATE 8; 2 MG/1; MG/1
1 TABLET SUBLINGUAL DAILY
Status: DISCONTINUED | OUTPATIENT
Start: 2021-09-25 | End: 2021-09-30 | Stop reason: HOSPADM

## 2021-09-25 RX ORDER — CYCLOBENZAPRINE HCL 10 MG
10 TABLET ORAL 3 TIMES DAILY PRN
Status: DISCONTINUED | OUTPATIENT
Start: 2021-09-25 | End: 2021-09-30 | Stop reason: HOSPADM

## 2021-09-25 RX ADMIN — ENOXAPARIN SODIUM 30 MG: 30 INJECTION SUBCUTANEOUS at 08:28

## 2021-09-25 RX ADMIN — GUAIFENESIN DEXTROMETHORPHAN HYDROBROMIDE ORAL SOLUTION 5 ML: 200; 20 SOLUTION ORAL at 12:29

## 2021-09-25 RX ADMIN — HYDROXYZINE HYDROCHLORIDE 50 MG: 50 TABLET ORAL at 05:44

## 2021-09-25 RX ADMIN — Medication 2000 UNITS: at 08:28

## 2021-09-25 RX ADMIN — PALIPERIDONE 6 MG: 3 TABLET, EXTENDED RELEASE ORAL at 12:29

## 2021-09-25 RX ADMIN — DULOXETINE HYDROCHLORIDE 60 MG: 30 CAPSULE, DELAYED RELEASE ORAL at 08:28

## 2021-09-25 RX ADMIN — BUPRENORPHINE AND NALOXONE 1 TABLET: 8; 2 TABLET SUBLINGUAL at 12:29

## 2021-09-25 RX ADMIN — REMDESIVIR 200 MG: 100 INJECTION, POWDER, LYOPHILIZED, FOR SOLUTION INTRAVENOUS at 10:16

## 2021-09-25 RX ADMIN — DULOXETINE HYDROCHLORIDE 60 MG: 30 CAPSULE, DELAYED RELEASE ORAL at 19:42

## 2021-09-25 RX ADMIN — GUAIFENESIN DEXTROMETHORPHAN HYDROBROMIDE ORAL SOLUTION 5 ML: 200; 20 SOLUTION ORAL at 21:38

## 2021-09-25 RX ADMIN — ENOXAPARIN SODIUM 30 MG: 30 INJECTION SUBCUTANEOUS at 19:43

## 2021-09-25 RX ADMIN — SODIUM CHLORIDE, PRESERVATIVE FREE 10 ML: 5 INJECTION INTRAVENOUS at 08:29

## 2021-09-25 RX ADMIN — GUAIFENESIN DEXTROMETHORPHAN HYDROBROMIDE ORAL SOLUTION 5 ML: 200; 20 SOLUTION ORAL at 04:21

## 2021-09-25 RX ADMIN — PREGABALIN 100 MG: 100 CAPSULE ORAL at 08:28

## 2021-09-25 RX ADMIN — SODIUM CHLORIDE, PRESERVATIVE FREE 10 ML: 5 INJECTION INTRAVENOUS at 19:46

## 2021-09-25 RX ADMIN — GUAIFENESIN DEXTROMETHORPHAN HYDROBROMIDE ORAL SOLUTION 5 ML: 200; 20 SOLUTION ORAL at 17:32

## 2021-09-25 RX ADMIN — TRAZODONE HYDROCHLORIDE 50 MG: 50 TABLET ORAL at 19:42

## 2021-09-25 RX ADMIN — DEXAMETHASONE SODIUM PHOSPHATE 6 MG: 10 INJECTION INTRAMUSCULAR; INTRAVENOUS at 19:46

## 2021-09-25 RX ADMIN — GUAIFENESIN DEXTROMETHORPHAN HYDROBROMIDE ORAL SOLUTION 5 ML: 200; 20 SOLUTION ORAL at 08:42

## 2021-09-25 RX ADMIN — CYCLOBENZAPRINE 10 MG: 10 TABLET, FILM COATED ORAL at 12:29

## 2021-09-25 RX ADMIN — CYCLOBENZAPRINE 10 MG: 10 TABLET, FILM COATED ORAL at 19:43

## 2021-09-25 RX ADMIN — HYDROXYZINE HYDROCHLORIDE 50 MG: 50 TABLET ORAL at 16:25

## 2021-09-25 ASSESSMENT — PAIN DESCRIPTION - PAIN TYPE
TYPE: CHRONIC PAIN
TYPE: CHRONIC PAIN

## 2021-09-25 ASSESSMENT — PAIN SCALES - GENERAL
PAINLEVEL_OUTOF10: 7
PAINLEVEL_OUTOF10: 6
PAINLEVEL_OUTOF10: 0

## 2021-09-25 ASSESSMENT — PAIN DESCRIPTION - DESCRIPTORS
DESCRIPTORS: ACHING
DESCRIPTORS: ACHING

## 2021-09-25 ASSESSMENT — PAIN DESCRIPTION - LOCATION
LOCATION: GENERALIZED
LOCATION: BACK

## 2021-09-25 NOTE — PROGRESS NOTES
Physician Progress Note      Bret Villareal  CSN #:                  [id number]  :                       1980  ADMIT DATE:       2021 1:31 PM  DISCH DATE:  Kristyn Garcia  PROVIDER #:        Rebecca HICKEY          QUERY TEXT:    Pt admitted with Covid-19 PNA and acute respiratory failure. Pt noted to have   hypercarbia with AMS. If possible, please document in the progress notes and   discharge summary if you are evaluating and / or treating any of the   following: The medical record reflects the following:  Risk Factors: COVID 19 PNA and acute respiratory failure  Clinical Indicators: Per ED physician notes: called to bedside by nurse   concerned of decreased mental status and increasing oxygen requirement. During my evaluation, patient was maintaining saturation of 96% on 6 L nasal   cannula. She was somnolent, but arousable to touch. Venous BG show PC02 80.4   with pH 7.250 H&P documenting: AMS became increasingly lethargic in ED likely   2/2 hypercapnia. Per EMS initial saturations of 70-80% on room air. CT chest   showing middle lobe collapse possibly related to a mucous plug and left   basilar airspace disease  Treatment: Supplemental oxygen, Droplet plus isolation, Decadron, Remdesivir,   CXR, CT chest, labs/monitoring    Thank-you,  Abbi Lu RN, [email or web address]  Options provided:  -- Metabolic encephalopathy now resolved. -- Metabolic encephalopathy  -- Toxic encephalopathy  -- Toxic metabolic encephalopathy  -- Other - I will add my own diagnosis  -- Disagree - Not applicable / Not valid  -- Disagree - Clinically unable to determine / Unknown  -- Refer to Clinical Documentation Reviewer    PROVIDER RESPONSE TEXT:    This patient has metabolic encephalopathy, now resolved.     Query created by: Zoë Mccarthy on 2021 9:37 AM      Electronically signed by:  Rebecca HICKEY 2021 9:44 AM

## 2021-09-25 NOTE — CARE COORDINATION
Consult received as pt lives at [facility] pt in her room d/t covid +  Pt stated she has been at [employer] for ~ 10 days and plans to return at discharge. Pt stated they know she is here and is agreeable to SW contacting [facility]. Pt stated she was in quarantine at the [address] [place] [address] prior to admission for covid. [facility] ([phone]) and spoke with Julian Mason. He confirmed pt can return at discharge. He stated if she still needs quarantined at discharge, their staff will transport her back to to the [address] [place] [address] from the hospital.  If she no longer needs quarantine, they will arrange a cab.

## 2021-09-25 NOTE — CONSULTS
[facility]  Initial Consult Note  Date: 9/25/2021    Hospital day :1     Impression:   1. COVID-19 virus infection tested + 9/24/2021  2. Acute hypoxic respiratory failure. 3. Possible early COVID-19 virus pneumonia  4. Bipolar disorder/schizophrenia  5. Polysubstance abuse    Recommendations   · The patient is requiring supplemental oxygen at 4 L though there is no significant airspace disease. · This does raise concern that she has early pneumonia though the imaging findings did not show any major changes. · The patient has been started on Decadron and I will continue this. · Given that the patient has early symptomsinfection for less than week I will start her on remdesivir. · I do not see any need for any Actemra or olumiant at this time given that there is no major imaging findings and inflammatory markers are not very elevated    Chief complaint/reason for consultation:   COVID-19 virus infection, hypoxia    History of Present Illness:   Homer Quinteros is a 46y.o.-year-old female who was initially admitted on 9/24/2021. Ольга De Leon has a history of bipolar/schizophrenia, polysubstance abuse with cocaine and heroin and reports she has been abstinent for about 2 months, Marfan syndrome with mitral valve prolapse and some chronic pain. The patient resides in a sober house with other women reports that 2 other women were recently diagnosed Covid positive and she was in quarantine with her because she had also tested positive. The patient gives reports a 3-day history of cough, shortness of breath, fevers, chills, decreased appetite, nausea with no vomiting or diarrhea. The patient reports worsening symptoms with a productive cough and EMS came found her to be hypoxic with O2 sat in the 70 to 80% range started on 4 L of oxygen by nasal cannula and transferred here to the emergency department.   In the emergency department the patient had a chest x-ray that showed no cardiomegaly pneumonia or interstitial edema and was stable from 5/25/2021. A CT of the chest was done and there was noted to have some middle lobe collapse possibly related to mucous plug, left basilar airspace disease may represent pneumonia and/or atelectasis. The patient was admitted is currently on 4 L of oxygen via nasal cannula and I was asked to evaluate for the COVID-19 virus infection    I have personally reviewed the past medical history, past surgical history, medications, social history, and family history, and I have updated the database accordingly. Past Medical History:     Past Medical History:   Diagnosis Date    Anxiety     Arthritis     O. A.    Bipolar depression ([facility] [address]     Dermatitis     Dysuria     Exposure to STD     Folliculitis     Hx of acute bronchitis     Hypertension     Marfan syndrome     MVP (mitral valve prolapse)     Nausea & vomiting     Polysubstance abuse (HCC)     drug abuse includes cocaine, fentnyl, cannabis    PONV (postoperative nausea and vomiting)     Scoliosis     Seasonal allergies     Staph infection     HX OF ALSO BACTEREMIA    UTI (lower urinary tract infection)      Past Surgical  History:     Past Surgical History:   Procedure Laterality Date    BREAST REDUCTION SURGERY Bilateral     2012    [facility]17[facility]    FOOT SURGERY Bilateral     RT HAS 2 SCREWS, LT HAD FUSION IN DEC WITH 2 SCREWS    HIP ARTHROSCOPY Left     KNEE ARTHROSCOPY Right     X 2    MS EGD TRANSORAL BIOPSY SINGLE/MULTIPLE N/A 8/29/2017    EGD BIOPSY WITH DILATATION WITH MALONEYS performed by Brett Titus MD at [facility] [address] Bilateral     harware in place bilat    TUBAL LIGATION      clips placed on fallopian tubes    UPPER GASTROINTESTINAL ENDOSCOPY  08/29/2017     EXTENSIVE SPASMATIC CONTRACTIONS; NO LUMINAL OBSTRUCTIONS; CLOSER TO THE DISTAL ESOPHAGUS AND DEVELOPING DIVERTICULUM IS NOTED AND BELIEVED TO BE [address] TO DSYSMOTILITY; DALEY DIATIONS WERE DONE EMPIRICALLY WITH 56 FR     Medications:      DULoxetine  60 mg Oral BID    sodium chloride flush  5-40 mL IntraVENous 2 times per day    enoxaparin  30 mg SubCUTAneous BID    dexamethasone  6 mg IntraVENous Q24H    Vitamin D  2,000 Units Oral Daily    naloxone  2 mg IntraVENous Once    pregabalin  100 mg Oral BID     Social History:     Social History     Socioeconomic History    Marital status:      Spouse name: Not on file    Number of children: Not on file    Years of education: Not on file    Highest education level: Not on file   Occupational History    Not on file   Tobacco Use    Smoking status: Current Every Day Smoker     Years: 1.50     Types: Cigarettes     Last attempt to quit: 3/26/2015     Years since quittin.5    Smokeless tobacco: Never Used   Vaping Use    Vaping Use: Never used   Substance and Sexual Activity    Alcohol use: No    Drug use: Not Currently     Types: Opiates , Marijuana, Cocaine     Comment: drug abuse includes cocaine, fentnyl, cannabis    Sexual activity: Yes     Partners: Male   Other Topics Concern    Not on file   Social History Narrative    Not on file     Social Determinants of Health     Financial Resource Strain:     Difficulty of Paying Living Expenses:    Food Insecurity:     Worried About Running Out of Food in the Last Year:     Ran Out of Food in the Last Year:    Transportation Needs:     Lack of Transportation (Medical):      Lack of Transportation (Non-Medical):    Physical Activity:     Days of Exercise per Week:     Minutes of Exercise per Session:    Stress:     Feeling of Stress :    Social Connections:     Frequency of Communication with Friends and Family:     Frequency of Social Gatherings with Friends and Family:     Attends Latter day Services:     Active Member of Clubs or Organizations:     Attends Club or Organization Meetings:     Marital Status:    Intimate Partner Violence:     Fear of Current or Ex-Partner:     Emotionally Abused:     Physically Abused:     Sexually Abused:      Family History:     Family History   Problem Relation Age of Onset   Saira Fox Cancer Brother         testicular    Breast Cancer Maternal Grandmother     Hypertension Mother     Thyroid Disease Mother       Allergies:   Ketamine and Morphine     Review of Systems:   General: The patient has had some subjective fevers but no chills, generalized malaise, decreased appetite  Eyes: No double vision or blurry vision. ENT: No sore throat or runny nose. Cardiovascular: No chest pain or palpitations. Lung: She has had a cough and progressively worsening shortness of breath  Abdomen: She has had some nausea but no vomiting or diarrhea  Genitourinary: No increased urinary frequency, or dysuria. Musculoskeletal: He did have some generalized malaise  Hematologic: No bleeding or bruising. Neurologic: No headache, weakness, numbness, or tingling. Physical Examination :   /71   Pulse 74   Temp 98 °F (36.7 °C) (Oral)   Resp 10   Ht 5' 10\" (1.778 m)   Wt 187 lb (84.8 kg)   SpO2 94%   BMI 26.83 kg/m²     Temperature Range: Temp: 98 °F (36.7 °C) Temp  Av.5 °F (36.9 °C)  Min: 98 °F (36.7 °C)  Max: 99.1 °F (37.3 °C)  General Appearance: Awake, alert, and in no apparent distress  Head: Normocephalic, without obvious abnormality, atraumatic  Eyes: Pupils equal, round, reactive, to light and accommodation; extraocular movements intact; sclera anicteric; conjunctivae pink  ENT: Oropharynx clear, without erythema, exudate, or thrush. Neck: Supple, without lymphadenopathy. Pulmonary/Chest: Clear to auscultation, without wheezes, rales, or rhonchi  Cardiovascular: Regular rate and rhythm without murmurs, rubs, or gallops. Abdomen: Soft, nontender, nondistended. Extremities: No cyanosis, clubbing, edema, or effusions. Neurologic: No gross sensory or motor deficits. Skin: Warm and dry with no rash.     Medical Decision Making:   I have independently reviewed/ordered the following labs:  CBC with Differential:   Recent Labs     09/24/21  1445 09/25/21  0416   WBC 5.4 5.2   HGB 12.5 12.4   HCT 39.4 40.2   PLT See Reflexed IPF Result 187   LYMPHOPCT 16* 19*   MONOPCT 7 4     BMP:   Recent Labs     09/24/21  1651 09/25/21  0416    134*   K 4.9 4.9   CL 96* 97*   CO2 26 27   BUN 7 9   CREATININE 0.52 0.47*     Hepatic Function Panel:   Recent Labs     09/24/21  1651 09/25/21  0416   PROT 6.5 6.3*   LABALBU 3.9 3.5   BILIDIR 0.13  --    IBILI 0.15  --    BILITOT 0.28* 0.30   ALKPHOS 79 79   ALT 51* 48*   AST 42* 33*       Lab Results   Component Value Date    PROCAL 0.08 09/24/2021    PROCAL 0.09 09/24/2021       Lab Results   Component Value Date    CRP 13.9 09/25/2021    CRP 17.2 09/24/2021     Lab Results   Component Value Date    FERRITIN 174 09/25/2021    FERRITIN 179 09/24/2021     Lab Results   Component Value Date    FIBRINOGEN 458 09/25/2021    FIBRINOGEN 332 09/24/2021     Lab Results   Component Value Date    DDIMER 0.44 09/25/2021    DDIMER 0.61 09/24/2021    DDIMER <0.19 04/28/2017    DDIMER <0.19 03/06/2016    DDIMER 0.22 06/16/2015     Lab Results   Component Value Date     09/25/2021       No results found for: [address]    Lab Results   Component Value Date    COVID19 DETECTED 09/24/2021    COVID19 Detected 09/20/2021    COVID19 Not Detected 07/27/2021    COVID19 Not Detected 06/11/2021     No results found for requested labs within last 30 days. Imaging Studies:   CT OF THE CHEST WITHOUT CONTRAST 9/24/2021 7:34 pm        FINDINGS:   Middle lobe collapse possibly related to a mucous plug. Left basilar airspace disease may represent pneumonia and or atelectasis. Stable ectasia of the ascending aorta. ONE XRAY VIEW OF THE CHEST 9/24/2021 1:05 pm   FINDINGS:   No cardiomegaly, pneumonia or interstitial edema. No significant change from 05/25/2021.        Cultures:     COVID-19, Rapid [[id number]] (Abnormal) Collected: 09/24/21 1911   Order Status: Completed Specimen: Nasopharyngeal Swab Updated: 09/24/21 1931    Specimen Description . NASOPHARYNGEAL SWAB    SARS-CoV-2, Rapid DETECTEDAbnormal     Comment:        Rapid NAAT: The specimen is POSITIVE for SARS-Cov-2, the novel coronavirus associated with   COVID-19.         This test has been authorized by the FDA under an Emergency Use Authorization (EUA) for use   by authorized laboratories.         The ID NOW COVID-19 assay is designed to detect the virus that causes COVID-19 in patients   with signs and symptoms of infection who are suspected of COVID-19. An individual without symptoms of COVID-19 and who is not shedding SARS-CoV-2 virus would   expect to have a negative (not detected) result in this assay. Fact sheet for Healthcare Providers: Matteo   Fact sheet for Patients: Matteo           Methodology: Isothermal Nucleic Acid Amplification         Results reported to the appropriate Health Department               Thank you for allowing us to participate in the care of this patient. Please call with questions. Electronically signed by Olvin Finley MD on 9/25/2021 at 7:00 AM      [facility]  Olvin Finley MD  Perfect Serve messaging  OFFICE: [phone]      This note is created with the assistance of a speech recognition program.  While intending to generate a document that actually reflects the content of the visit, the document can still have some errors including those of syntax and sound a like substitutions which may escape proof reading. In such instances, actual meaning can be extrapolated by contextual diversion.

## 2021-09-25 NOTE — PROGRESS NOTES
[facility]  Office: [address], DO, Amaury Roland, DO, Denis [facility], DO, Prateek Cm Blood, DO, Linda Arana MD, Rei Pimentel MD, Fabrice Gates MD, Manuela Guillermo MD, Hilton Alonzo MD, Justyn Peña MD, Sandie De La O MD, Lesa Hurst, DO, Tomasa Marin, DO, Perri Meehan MD,  Jeremias Rodriguez, DO, Jones Truong MD, Jacob Gupta MD, Heather Prado MD, Humberto Ambrose MD, , Akira Godinez MD, Andreas Ha MD, Dolores Florse MD, Noreen Arvizu, [facility], [facility] Vladimirrajwinder, [facility], Tha Messina, CNP, Cole Berrios, [facility], Claudia Dorsey, CNP, Main Porter, CNP, Baron Lesch, CNP, Naomi Hudson, CNP, Angeline Montes, CNP, Minerva Farris PA-C, [place] Parks, [facility], Alex Hartmann, CNP, Cristóbal Cope, CNP, Karen Chowdhury, CNP, Neal Gonsalves, CNP, Dolores Dash, CNP, Nathanael Steen, CNP, Isa Sainz, CNP, Catalino Mast, [address] [place] [address]    Progress Note    9/25/2021    4:19 PM    Name:   Hilton James  MRN:     [id number]     Acct:      [de-identified]   Room:   [address] [place] [address] Day:  1  Admit Date:  9/24/2021  1:31 PM    PCP:   GUIDO King CNP  Code Status:  Full Code    Subjective:     C/C:   Chief Complaint   Patient presents with    Shortness of Breath     pt was found with sats in 70s-80s on room air; tolerating [email or web address] well at 98%    Positive For Covid-19     x2 days ago     Interval History Status: improved. Patient seen and examined today, we discussed her home medications. Discussed current care with Remdesivir and Decadron. Will monitor CRP and add medications as needed. Patient was appreciative of her changes in medications, spoke with  regarding discharge planning. Brief History:     From [facility]  [place] is a 46 y.o. female with a past medical history of HTN and depression who presented to the emergency department on 9/24/2021 complaining of shortness of breath.  The patient tested positive for COVID-19 on 9/20. In the ED, she was hypoxic and requiring supplemental O2. She is admitted to internal medicine for further management of COVID-19 pneumonia. Review of Systems:     Constitutional:  negative for chills, fevers, sweats  Respiratory: +sob  negative for cough, dyspnea on exertion, wheezing  Cardiovascular:  negative for chest pain, chest pressure/discomfort, lower extremity edema, palpitations  Gastrointestinal:  negative for abdominal pain, constipation, diarrhea, nausea, vomiting  Neurological:  negative for dizziness, headache    Medications: Allergies: Allergies   Allergen Reactions    Ketamine Other (See Comments)    Morphine Hives       Current Meds:   Scheduled Meds:    pregabalin  100 mg Oral Daily    [START ON 9/26/2021] remdesivir IVPB  100 mg IntraVENous Q24H    paliperidone  6 mg Oral Daily    buprenorphine-naloxone  1 tablet SubLINGual Daily    DULoxetine  60 mg Oral BID    sodium chloride flush  5-40 mL IntraVENous 2 times per day    enoxaparin  30 mg SubCUTAneous BID    dexamethasone  6 mg IntraVENous Q24H    Vitamin D  2,000 Units Oral Daily     Continuous Infusions:    sodium chloride       PRN Meds: sodium chloride, cyclobenzaprine, hydrOXYzine, meloxicam, traZODone, sodium chloride flush, sodium chloride, ondansetron **OR** ondansetron, polyethylene glycol, acetaminophen **OR** acetaminophen, dextromethorphan-guaiFENesin    Data:     Past Medical History:   has a past medical history of Anxiety, Arthritis, Bipolar depression ([facility] [address] Dermatitis, Dysuria, Exposure to STD, Folliculitis, Hx of acute bronchitis, Hypertension, Marfan syndrome, MVP (mitral valve prolapse), Nausea & vomiting, Polysubstance abuse ([address] PONV (postoperative nausea and vomiting), Scoliosis, Seasonal allergies, Staph infection, and UTI (lower urinary tract infection). Social History:   reports that she has been smoking cigarettes.  She has smoked for the past 1.50

## 2021-09-25 NOTE — ED PROVIDER NOTES
Faculty Sign-Out Attestation  Handoff taken on the following patient from prior Attending Physician: Parviz Lovett    I was available and discussed any additional care issues that arose and coordinated the management plans with the resident(s) caring for the patient during my duty period. Any areas of disagreement with residents documentation of care or procedures are noted on the chart. I was personally present for the key portions of any/all procedures during my duty period. I have documented in the chart those procedures where I was not present during the key portions. 37-year-old female, tested positive for Covid 2 days ago, desaturating to 70 to 80% on room air. Saturating well on nasal cannula oxygen, awaiting admission. Patient admitted to [facility], called to bedside by nurse concerned of decreased mental status and increasing oxygen requirement. During my evaluation, patient was maintaining saturation of 96% on 6 L nasal cannula. She was somnolent, but arousable to touch.   VBG ordered, resident updated Elda Westbrook MD  Attending Physician       Sisto Duane, MD  09/25/21 9232

## 2021-09-25 NOTE — PROGRESS NOTES
Writer updated the pts mother, Jhonathan Tyson, regarding current plan of care. All questions and concerns addressed at this time.

## 2021-09-26 LAB
ALBUMIN SERPL-MCNC: 3.4 G/DL (ref 3.5–5.2)
ALBUMIN SERPL-MCNC: 3.4 G/DL (ref 3.5–5.2)
ALBUMIN/GLOBULIN RATIO: 1.4 (ref 1–2.5)
ALP BLD-CCNC: 72 U/L (ref 35–104)
ALT SERPL-CCNC: 35 U/L (ref 5–33)
ANION GAP SERPL CALCULATED.3IONS-SCNC: 7 MMOL/L (ref 9–17)
AST SERPL-CCNC: 20 U/L
BILIRUB SERPL-MCNC: 0.29 MG/DL (ref 0.3–1.2)
BILIRUBIN DIRECT: 0.13 MG/DL
BILIRUBIN, INDIRECT: 0.16 MG/DL (ref 0–1)
BUN BLDV-MCNC: 10 MG/DL (ref 6–20)
BUN/CREAT BLD: ABNORMAL (ref 9–20)
C-REACTIVE PROTEIN: 12.7 MG/L (ref 0–5)
CALCIUM SERPL-MCNC: 8.2 MG/DL (ref 8.6–10.4)
CHLORIDE BLD-SCNC: 92 MMOL/L (ref 98–107)
CO2: 30 MMOL/L (ref 20–31)
CREAT SERPL-MCNC: 0.37 MG/DL (ref 0.5–0.9)
GFR AFRICAN AMERICAN: >60 ML/MIN
GFR NON-AFRICAN AMERICAN: >60 ML/MIN
GFR SERPL CREATININE-BSD FRML MDRD: ABNORMAL ML/MIN/{1.73_M2}
GFR SERPL CREATININE-BSD FRML MDRD: ABNORMAL ML/MIN/{1.73_M2}
GLOBULIN: ABNORMAL G/DL (ref 1.5–3.8)
GLUCOSE BLD-MCNC: 108 MG/DL (ref 70–99)
HCT VFR BLD CALC: 35.8 % (ref 36.3–47.1)
HEMOGLOBIN: 11.4 G/DL (ref 11.9–15.1)
MAGNESIUM: 2.1 MG/DL (ref 1.6–2.6)
MCH RBC QN AUTO: 27.9 PG (ref 25.2–33.5)
MCHC RBC AUTO-ENTMCNC: 31.8 G/DL (ref 28.4–34.8)
MCV RBC AUTO: 87.7 FL (ref 82.6–102.9)
NRBC AUTOMATED: 0 PER 100 WBC
PDW BLD-RTO: 13.2 % (ref 11.8–14.4)
PHOSPHORUS: 3.6 MG/DL (ref 2.6–4.5)
PLATELET # BLD: 180 K/UL (ref 138–453)
PMV BLD AUTO: 9.6 FL (ref 8.1–13.5)
POTASSIUM SERPL-SCNC: 4.1 MMOL/L (ref 3.7–5.3)
RBC # BLD: 4.08 M/UL (ref 3.95–5.11)
SODIUM BLD-SCNC: 129 MMOL/L (ref 135–144)
TOTAL PROTEIN: 5.8 G/DL (ref 6.4–8.3)
WBC # BLD: 5.9 K/UL (ref 3.5–11.3)

## 2021-09-26 PROCEDURE — 84100 ASSAY OF PHOSPHORUS: CPT

## 2021-09-26 PROCEDURE — 85027 COMPLETE CBC AUTOMATED: CPT

## 2021-09-26 PROCEDURE — 6360000002 HC RX W HCPCS: Performed by: NURSE PRACTITIONER

## 2021-09-26 PROCEDURE — 2580000003 HC RX 258: Performed by: INTERNAL MEDICINE

## 2021-09-26 PROCEDURE — 2500000003 HC RX 250 WO HCPCS: Performed by: INTERNAL MEDICINE

## 2021-09-26 PROCEDURE — 36415 COLL VENOUS BLD VENIPUNCTURE: CPT

## 2021-09-26 PROCEDURE — 80069 RENAL FUNCTION PANEL: CPT

## 2021-09-26 PROCEDURE — 86140 C-REACTIVE PROTEIN: CPT

## 2021-09-26 PROCEDURE — 2700000000 HC OXYGEN THERAPY PER DAY

## 2021-09-26 PROCEDURE — 6370000000 HC RX 637 (ALT 250 FOR IP): Performed by: INTERNAL MEDICINE

## 2021-09-26 PROCEDURE — 2580000003 HC RX 258: Performed by: NURSE PRACTITIONER

## 2021-09-26 PROCEDURE — 2060000000 HC ICU INTERMEDIATE R&B

## 2021-09-26 PROCEDURE — 80053 COMPREHEN METABOLIC PANEL: CPT

## 2021-09-26 PROCEDURE — 82248 BILIRUBIN DIRECT: CPT

## 2021-09-26 PROCEDURE — 99232 SBSQ HOSP IP/OBS MODERATE 35: CPT | Performed by: INTERNAL MEDICINE

## 2021-09-26 PROCEDURE — 6370000000 HC RX 637 (ALT 250 FOR IP): Performed by: NURSE PRACTITIONER

## 2021-09-26 PROCEDURE — 80076 HEPATIC FUNCTION PANEL: CPT

## 2021-09-26 PROCEDURE — 6360000002 HC RX W HCPCS: Performed by: INTERNAL MEDICINE

## 2021-09-26 PROCEDURE — 94761 N-INVAS EAR/PLS OXIMETRY MLT: CPT

## 2021-09-26 PROCEDURE — 83735 ASSAY OF MAGNESIUM: CPT

## 2021-09-26 RX ORDER — SODIUM CHLORIDE 9 MG/ML
INJECTION, SOLUTION INTRAVENOUS CONTINUOUS
Status: DISCONTINUED | OUTPATIENT
Start: 2021-09-26 | End: 2021-09-26

## 2021-09-26 RX ADMIN — DEXAMETHASONE SODIUM PHOSPHATE 6 MG: 10 INJECTION INTRAMUSCULAR; INTRAVENOUS at 19:47

## 2021-09-26 RX ADMIN — HYDROXYZINE HYDROCHLORIDE 50 MG: 50 TABLET ORAL at 19:47

## 2021-09-26 RX ADMIN — BUPRENORPHINE AND NALOXONE 1 TABLET: 8; 2 TABLET SUBLINGUAL at 07:56

## 2021-09-26 RX ADMIN — MELOXICAM 15 MG: 7.5 TABLET ORAL at 07:56

## 2021-09-26 RX ADMIN — DULOXETINE HYDROCHLORIDE 60 MG: 30 CAPSULE, DELAYED RELEASE ORAL at 19:47

## 2021-09-26 RX ADMIN — PREGABALIN 100 MG: 100 CAPSULE ORAL at 07:56

## 2021-09-26 RX ADMIN — GUAIFENESIN DEXTROMETHORPHAN HYDROBROMIDE ORAL SOLUTION 5 ML: 200; 20 SOLUTION ORAL at 12:50

## 2021-09-26 RX ADMIN — SODIUM CHLORIDE, PRESERVATIVE FREE 10 ML: 5 INJECTION INTRAVENOUS at 07:57

## 2021-09-26 RX ADMIN — GUAIFENESIN DEXTROMETHORPHAN HYDROBROMIDE ORAL SOLUTION 5 ML: 200; 20 SOLUTION ORAL at 07:55

## 2021-09-26 RX ADMIN — HYDROXYZINE HYDROCHLORIDE 50 MG: 50 TABLET ORAL at 07:57

## 2021-09-26 RX ADMIN — DULOXETINE HYDROCHLORIDE 60 MG: 30 CAPSULE, DELAYED RELEASE ORAL at 07:55

## 2021-09-26 RX ADMIN — GUAIFENESIN DEXTROMETHORPHAN HYDROBROMIDE ORAL SOLUTION 5 ML: 200; 20 SOLUTION ORAL at 19:59

## 2021-09-26 RX ADMIN — PALIPERIDONE 6 MG: 3 TABLET, EXTENDED RELEASE ORAL at 07:57

## 2021-09-26 RX ADMIN — CYCLOBENZAPRINE 10 MG: 10 TABLET, FILM COATED ORAL at 07:55

## 2021-09-26 RX ADMIN — CYCLOBENZAPRINE 10 MG: 10 TABLET, FILM COATED ORAL at 19:47

## 2021-09-26 RX ADMIN — REMDESIVIR 100 MG: 100 INJECTION, POWDER, LYOPHILIZED, FOR SOLUTION INTRAVENOUS at 10:13

## 2021-09-26 RX ADMIN — SODIUM CHLORIDE, PRESERVATIVE FREE 10 ML: 5 INJECTION INTRAVENOUS at 19:48

## 2021-09-26 RX ADMIN — TRAZODONE HYDROCHLORIDE 50 MG: 50 TABLET ORAL at 19:47

## 2021-09-26 RX ADMIN — Medication 2000 UNITS: at 07:55

## 2021-09-26 ASSESSMENT — ENCOUNTER SYMPTOMS
COUGH: 1
GASTROINTESTINAL NEGATIVE: 1

## 2021-09-26 ASSESSMENT — PAIN SCALES - GENERAL
PAINLEVEL_OUTOF10: 6
PAINLEVEL_OUTOF10: 0
PAINLEVEL_OUTOF10: 6

## 2021-09-26 NOTE — PLAN OF CARE
muscle cramping  9/25/2021 2126 by Cassandra Gerardo RN  Outcome: Ongoing  9/25/2021 1126 by Trevor Anders RN  Outcome: Ongoing  Goal: Maintain normal serum potassium, sodium, calcium, phosphorus, and pH  9/25/2021 2126 by Cassandra Gerardo RN  Outcome: Ongoing  9/25/2021 1126 by Trevor Anders RN  Outcome: Ongoing     Problem: Loneliness or Risk for Loneliness  Goal: Demonstrate positive use of time alone when socialization is not possible  9/25/2021 2126 by Cassandra Gerardo RN  Outcome: Ongoing  9/25/2021 1126 by Trevor Anders RN  Outcome: Ongoing     Problem: Fatigue  Goal: Verbalize increase energy and improved vitality  9/25/2021 2126 by Cassandra Gerardo RN  Outcome: Ongoing  9/25/2021 1126 by Trevor Anders RN  Outcome: Ongoing     Problem: Patient Education: Go to Patient Education Activity  Goal: Patient/Family Education  9/25/2021 2126 by Cassandra Gerardo RN  Outcome: Ongoing  9/25/2021 1126 by Trevor Anders RN  Outcome: Ongoing     Problem: Falls - Risk of:  Goal: Will remain free from falls  Description: Will remain free from falls  9/25/2021 2126 by Cassandra Gerardo RN  Outcome: Ongoing  9/25/2021 1126 by Trevor Anders RN  Outcome: Ongoing  Goal: Absence of physical injury  Description: Absence of physical injury  9/25/2021 2126 by Cassandra Gerardo RN  Outcome: Ongoing  9/25/2021 1126 by Trevor Anders RN  Outcome: Ongoing     Problem: Pain:  Goal: Pain level will decrease  Description: Pain level will decrease  Outcome: Ongoing  Goal: Control of acute pain  Description: Control of acute pain  Outcome: Ongoing  Goal: Control of chronic pain  Description: Control of chronic pain  Outcome: Ongoing     Electronically signed by Cassandra Gerardo RN on 9/25/2021 at 9:26 PM

## 2021-09-26 NOTE — PROGRESS NOTES
[facility]  Progress Note    Nuria Ferguson  MRN: [id number]  Date: 9/26/2021  LOS: 2     Reason for F/U :   COVID-19 virus infection    Impression :   1. COVID-19 virus infection tested + 9/24/2021  2. Acute hypoxic respiratory failure. 3. Possible early COVID-19 virus pneumonia  4. Bipolar disorder/schizophrenia  5. Polysubstance abuse    Recommendations:   · The patient continues on Decadron at 6 mg IV daily  · The patient is on remdesivir given that this was early infection. · She has been able to wean down to 2 L by nasal cannula and is overall doing well. · Still does have clinical cough with some phlegm production but again overall is improved. Infection Control Recommendations:   Droplet plus precautions    Discharge Planning:   Estimated Length of IV antimicrobials: To be determined  Patient will need Midline Catheter Insertion/ PICC line Insertion: No  Patient will need: Home IV , [place],  SNF,  LTAC: Undetermined  Patient willneed outpatient wound care: No    Medical Decision making / Summary of Stay:   Nuria Ferguson is a 46y.o.-year-old female who was initially admitted on 9/24/2021. Bridgette Colon has a history of bipolar/schizophrenia, polysubstance abuse with cocaine and heroin and reports she has been abstinent for about 2 months, Marfan syndrome with mitral valve prolapse and some chronic pain. The patient resides in a sober house with other women reports that 2 other women were recently diagnosed Covid positive and she was in quarantine with her because she had also tested positive. The patient gives reports a 3-day history of cough, shortness of breath, fevers, chills, decreased appetite, nausea with no vomiting or diarrhea. The patient reports worsening symptoms with a productive cough and EMS came found her to be hypoxic with O2 sat in the 70 to 80% range started on 4 L of oxygen by nasal cannula and transferred here to the emergency department.   In the emergency department the patient had a chest x-ray that showed no cardiomegaly pneumonia or interstitial edema and was stable from 2021. A CT of the chest was done and there was noted to have some middle lobe collapse possibly related to mucous plug, left basilar airspace disease may represent pneumonia and/or atelectasis.     The patient was admitted is currently on 4 L of oxygen via nasal cannula and I was asked to evaluate for the COVID-19 virus infection    Current evaluation:2021    /89   Pulse 96   Temp 98.4 °F (36.9 °C) (Oral)   Resp 22   Ht 5' 10\" (1.778 m)   Wt 187 lb (84.8 kg)   SpO2 95%   BMI 26.83 kg/m²     Temperature Range: Temp: 98.4 °F (36.9 °C) Temp  Av.1 °F (36.7 °C)  Min: 97.8 °F (36.6 °C)  Max: 98.4 °F (36.9 °C)  The patient is seen and evaluated at bedside and is awake and alert in no acute distress. No subjective fever or chills. No abdominal pain nausea vomiting or diarrhea. Still does have a cough and reports that it is productive. She is overall feeling better    Review of Systems   Constitutional: Negative. Respiratory: Positive for cough. Cardiovascular: Negative. Gastrointestinal: Negative. Genitourinary: Negative. Musculoskeletal: Negative. Skin: Negative. Neurological: Negative. Psychiatric/Behavioral: Negative. Physical Examination :     Physical Exam  HENT:      Head: Normocephalic and atraumatic. Eyes:      General: No scleral icterus. Pupils: Pupils are equal, round, and reactive to light. Cardiovascular:      Heart sounds: Normal heart sounds. No murmur heard. Pulmonary:      Effort: Pulmonary effort is normal.      Breath sounds: Rales present. No wheezing. Abdominal:      General: Bowel sounds are normal.      Palpations: Abdomen is soft. There is no mass. Tenderness: There is no abdominal tenderness. Musculoskeletal:         General: No deformity.    Lymphadenopathy:      Cervical: No cervical adenopathy. Skin:     General: Skin is warm and dry. Findings: No rash. Neurological:      Mental Status: She is oriented to person, place, and time. Laboratory data:   I have independently reviewed the followinglabs:  CBC with Differential:   Recent Labs     09/24/21  1445 09/24/21  1445 09/25/21  0416 09/26/21  0516   WBC 5.4   < > 5.2 5.9   HGB 12.5   < > 12.4 11.4*   HCT 39.4   < > 40.2 35.8*   PLT See Reflexed IPF Result   < > 187 180   LYMPHOPCT 16*  --  19*  --    MONOPCT 7  --  4  --     < > = values in this interval not displayed. BMP:   Recent Labs     09/25/21  0416 09/26/21  0516   * 129*   K 4.9 4.1   CL 97* 92*   CO2 27 30   BUN 9 10   CREATININE 0.47* 0.37*   MG  --  2.1     Hepatic Function Panel:   Recent Labs     09/24/21  1651 09/24/21  1651 09/25/21  0416 09/26/21  0516   PROT 6.5   < > 6.3* 5.8*   LABALBU 3.9   < > 3.5 3.4*  3.4*   BILIDIR 0.13  --   --  0.13   IBILI 0.15  --   --  0.16   BILITOT 0.28*   < > 0.30 0.29*   ALKPHOS 79   < > 79 72   ALT 51*   < > 48* 35*   AST 42*   < > 33* 20    < > = values in this interval not displayed.          Lab Results   Component Value Date    PROCAL 0.08 09/24/2021    PROCAL 0.09 09/24/2021     Lab Results   Component Value Date    CRP 12.7 09/26/2021    CRP 13.9 09/25/2021    CRP 17.2 09/24/2021     No results found for: Gem Gillespie      Lab Results   Component Value Date    DDIMER 0.44 09/25/2021    DDIMER 0.61 09/24/2021    DDIMER <0.19 04/28/2017    DDIMER <0.19 03/06/2016    DDIMER 0.22 06/16/2015     Lab Results   Component Value Date    FERRITIN 174 09/25/2021    FERRITIN 179 09/24/2021     Lab Results   Component Value Date     09/25/2021     Lab Results   Component Value Date    FIBRINOGEN 458 09/25/2021    FIBRINOGEN 332 09/24/2021       Results in Past 30 Days  Result Component Current Result Ref Range Previous Result Ref Range   SARS-CoV-2, Rapid DETECTED (A) (9/24/2021) Not Detected Detected (A) (9/20/2021) Lab Results   Component Value Date    COVID19 DETECTED 09/24/2021    COVID19 Detected 09/20/2021    COVID19 Not Detected 07/27/2021    COVID19 Not Detected 06/11/2021       No results for input(s): YUNIOR in the last 72 hours. Imaging Studies:   No new imaging    Cultures:     Culture, Urine [[id number]] Collected: 09/25/21 1335   Order Status: No result Specimen: Urine Updated: 09/25/21 1503   COVID-19, Rapid [[id number]] (Abnormal) Collected: 09/24/21 1911   Order Status: Completed Specimen: Nasopharyngeal Swab Updated: 09/24/21 1931    Specimen Description . NASOPHARYNGEAL SWAB    SARS-CoV-2, Rapid DETECTEDAbnormal     Comment:        Rapid NAAT: The specimen is POSITIVE for SARS-Cov-2, the novel coronavirus associated with   COVID-19.         This test has been authorized by the FDA under an Emergency Use Authorization (EUA) for use   by authorized laboratories.         The ID NOW COVID-19 assay is designed to detect the virus that causes COVID-19 in patients   with signs and symptoms of infection who are suspected of COVID-19. An individual without symptoms of COVID-19 and who is not shedding SARS-CoV-2 virus would   expect to have a negative (not detected) result in this assay.    Fact sheet for Healthcare Providers: Matteo   Fact sheet for Patients: Matteo           Methodology: Isothermal Nucleic Acid Amplification         Results reported to the appropriate Health Department             Medications:      pregabalin  100 mg Oral Daily    remdesivir IVPB  100 mg IntraVENous Q24H    paliperidone  6 mg Oral Daily    buprenorphine-naloxone  1 tablet SubLINGual Daily    nicotine  1 patch TransDERmal Daily    DULoxetine  60 mg Oral BID    sodium chloride flush  5-40 mL IntraVENous 2 times per day    enoxaparin  30 mg SubCUTAneous BID    dexamethasone  6 mg IntraVENous Q24H    Vitamin D  2,000 Units Oral Daily [facility]  Fany Vo MD  [employer] messaging  OFFICE: [phone]      Electronically signed by Fany Vo MD on 9/26/2021 at 11:26 AM  Thank you for allowing us to participate in the care of this patient. Please call with questions. This note iscreated with the assistance of a speech recognition program.  While intending to generate a document that actually reflects the content of the visit, the document can still have some errors including those of syntax andsound a like substitutions which may escape proof reading. In such instances, actual meaning can be extrapolated by contextual diversion.

## 2021-09-26 NOTE — PLAN OF CARE
Problem: Airway Clearance - Ineffective  Goal: Achieve or maintain patent airway  9/26/2021 0728 by Berenice Alvarez RN  Outcome: Ongoing  9/25/2021 2126 by Cassandra Gerardo RN  Outcome: Ongoing     Problem: Gas Exchange - Impaired  Goal: Absence of hypoxia  9/26/2021 0728 by Berenice Alvarez RN  Outcome: Ongoing  9/25/2021 2126 by Cassandra Gerardo RN  Outcome: Ongoing  Goal: Promote optimal lung function  9/26/2021 0728 by Berenice Alvarez RN  Outcome: Ongoing  9/25/2021 2126 by Cassandra Gerardo RN  Outcome: Ongoing     Problem: Breathing Pattern - Ineffective  Goal: Ability to achieve and maintain a regular respiratory rate  9/26/2021 0728 by Berenice Alvarez RN  Outcome: Ongoing  9/25/2021 2126 by Cassandra Gerardo RN  Outcome: Ongoing     Problem:  Body Temperature -  Risk of, Imbalanced  Goal: Ability to maintain a body temperature within defined limits  9/26/2021 0728 by Berenice Alvarez RN  Outcome: Ongoing  9/25/2021 2126 by Cassandra Gerardo RN  Outcome: Ongoing  Goal: Will regain or maintain usual level of consciousness  9/26/2021 0728 by Berenice Alvarez RN  Outcome: Ongoing  9/25/2021 2126 by Cassandra Gerardo RN  Outcome: Ongoing  Goal: Complications related to the disease process, condition or treatment will be avoided or minimized  9/26/2021 0728 by Berenice Alvarez RN  Outcome: Ongoing  9/25/2021 2126 by Cassandra Gerardo RN  Outcome: Ongoing     Problem: Isolation Precautions - Risk of Spread of Infection  Goal: Prevent transmission of infection  9/26/2021 0728 by Berenice Alvarez RN  Outcome: Ongoing  9/25/2021 2126 by Cassandra Gerardo RN  Outcome: Ongoing     Problem: Nutrition Deficits  Goal: Optimize nutritional status  9/26/2021 0728 by Berenice Alvarez RN  Outcome: Ongoing  9/25/2021 2126 by Cassandra Gerardo RN  Outcome: Ongoing     Problem: Risk for Fluid Volume Deficit  Goal: Maintain normal heart rhythm  9/26/2021 0728 by Berenice Alvarez RN  Outcome: Ongoing  9/25/2021 2126 by Cassandra Gerardo RN  Outcome: Ongoing  Goal: Maintain absence of muscle cramping  9/26/2021 0728 by Matthew Goldstein RN  Outcome: Ongoing  9/25/2021 2126 by Makenna Herman RN  Outcome: Ongoing  Goal: Maintain normal serum potassium, sodium, calcium, phosphorus, and pH  9/26/2021 0728 by Matthew Goldstein RN  Outcome: Ongoing  9/25/2021 2126 by Makenna Herman RN  Outcome: Ongoing     Problem: Loneliness or Risk for Loneliness  Goal: Demonstrate positive use of time alone when socialization is not possible  9/26/2021 0728 by Matthew Goldstein RN  Outcome: Ongoing  9/25/2021 2126 by Makenna Herman RN  Outcome: Ongoing     Problem: Fatigue  Goal: Verbalize increase energy and improved vitality  9/26/2021 0728 by Matthew Goldstein RN  Outcome: Ongoing  9/25/2021 2126 by Makenna Herman RN  Outcome: Ongoing     Problem: Patient Education: Go to Patient Education Activity  Goal: Patient/Family Education  9/26/2021 8562 by Matthew Goldstein RN  Outcome: Ongoing  9/25/2021 2126 by Makenna Herman RN  Outcome: Ongoing     Problem: Falls - Risk of:  Goal: Will remain free from falls  Description: Will remain free from falls  9/26/2021 0728 by Matthew Goldstein RN  Outcome: Ongoing  9/25/2021 2126 by Makenna Herman RN  Outcome: Ongoing  Goal: Absence of physical injury  Description: Absence of physical injury  9/26/2021 0728 by Matthew Goldstein RN  Outcome: Ongoing  9/25/2021 2126 by Makenna Herman RN  Outcome: Ongoing     Problem: Pain:  Goal: Pain level will decrease  Description: Pain level will decrease  9/26/2021 0728 by Matthew Goldstein RN  Outcome: Ongoing  9/25/2021 2126 by Makenna Herman RN  Outcome: Ongoing  Goal: Control of acute pain  Description: Control of acute pain  9/26/2021 0728 by Matthew Goldstein RN  Outcome: Ongoing  9/25/2021 2126 by Makenna Herman RN  Outcome: Ongoing  Goal: Control of chronic pain  Description: Control of chronic pain  9/26/2021 0728 by Matthew Goldstein RN  Outcome: Ongoing  9/25/2021 2126 by Laura Sim Maisha Macedo RN  Outcome: Ongoing

## 2021-09-26 NOTE — PROGRESS NOTES
[facility]  Office: [address], DO, Karla Worrellroe, DO, Norbertoned Dry, DO, Diego Tim Blood, DO, Aury Tyler MD, Fredi Courtney MD, Brisa Baron MD, Iza Paulson MD, Amanda Loera MD, Brian Rowe MD, Perla Mondragon MD, Cordell Quinn, DO, Sweta Alvarenga, DO, Tirso Loyd MD,  Lyudmila Cordero, DO, Fartun Judge MD, Kate Wong MD, Cyndy Ha MD, Tita Magana MD, , Ishaan Giang MD, Shireen Blank MD, Alex Hanks MD, Yuli Guillermo, [facility], [facility], CNP, Bunn Lafayette, CNP, Christy Hercules, CNS, Martine Ribera, CNP, Lucio Cronin, CNP, Sagar Montana, CNP, Jeyson Lucas, CNP, Donald Gross, CNP, Kelly Hemphill PA-C, Dedrick Charles, [facility] [place], Lakisha Lipoma, CNP, Tyler Juárez, CNP, Phyldyllan Zapata, CNP, Natalia Trujillo, CNP, Xiang Garcia, CNP, Monty Raman, CNP, Madeleine Kamara, CNP, Chacorta Candelaria, [address] [place] [address]    Progress Note    9/26/2021    4:54 PM    Name:   Jeane Aschoff  MRN:     [id number]     Acct:      [de-identified]   Room:   [address] [place] [address] Day:  2  Admit Date:  9/24/2021  1:31 PM    PCP:   GUIDO Sharma CNP  Code Status:  Full Code    Subjective:     C/C:   Chief Complaint   Patient presents with    Shortness of Breath     pt was found with sats in 70s-80s on room air; tolerating [email or web address] well at 98%    Positive For Covid-19     x2 days ago     Interval History Status: improved. Symptomatically seems to be doing fairly well, oxygen saturations are still low and she is still requiring submental oxygen with nasal cannula. No significant changes overnight. Brief History:     From H&P  Jeane Aschoff is a 46 y.o. female with a past medical history of HTN and depression who presented to the emergency department on 9/24/2021 complaining of shortness of breath. The patient tested positive for COVID-19 on 9/20. In the ED, she was hypoxic and requiring supplemental O2.  She is admitted to internal medicine for further management of COVID-19 pneumonia. Review of Systems:     Constitutional:  negative for chills, fevers, sweats  Respiratory: +sob  negative for cough, dyspnea on exertion, wheezing  Cardiovascular:  negative for chest pain, chest pressure/discomfort, lower extremity edema, palpitations  Gastrointestinal:  negative for abdominal pain, constipation, diarrhea, nausea, vomiting  Neurological:  negative for dizziness, headache    Medications: Allergies: Allergies   Allergen Reactions    Ketamine Other (See Comments)    Morphine Hives       Current Meds:   Scheduled Meds:    pregabalin  100 mg Oral Daily    remdesivir IVPB  100 mg IntraVENous Q24H    paliperidone  6 mg Oral Daily    buprenorphine-naloxone  1 tablet SubLINGual Daily    nicotine  1 patch TransDERmal Daily    DULoxetine  60 mg Oral BID    sodium chloride flush  5-40 mL IntraVENous 2 times per day    enoxaparin  30 mg SubCUTAneous BID    dexamethasone  6 mg IntraVENous Q24H    Vitamin D  2,000 Units Oral Daily     Continuous Infusions:    sodium chloride       PRN Meds: sodium chloride, cyclobenzaprine, hydrOXYzine, meloxicam, traZODone, sodium chloride flush, sodium chloride, ondansetron **OR** ondansetron, polyethylene glycol, acetaminophen **OR** acetaminophen, dextromethorphan-guaiFENesin    Data:     Past Medical History:   has a past medical history of Anxiety, Arthritis, Bipolar depression ([address] Dermatitis, Dysuria, Exposure to STD, Folliculitis, Hx of acute bronchitis, Hypertension, Marfan syndrome, MVP (mitral valve prolapse), Nausea & vomiting, Polysubstance abuse ([address] PONV (postoperative nausea and vomiting), Scoliosis, Seasonal allergies, Staph infection, and UTI (lower urinary tract infection). Social History:   reports that she has been smoking cigarettes. She has smoked for the past 1.50 years. She has never used smokeless tobacco. She reports previous drug use.  Drugs: Opiates , Marijuana, and Cocaine. She reports that she does not drink alcohol. Family History:   Family History   Problem Relation Age of Onset    Cancer Brother         testicular    Breast Cancer Maternal Grandmother     Hypertension Mother     Thyroid Disease Mother        Vitals:  BP (!) 119/91   Pulse 88   Temp 98.4 °F (36.9 °C) (Oral)   Resp 17   Ht 5' 10\" (1.778 m)   Wt 187 lb (84.8 kg)   SpO2 92%   BMI 26.83 kg/m²   Temp (24hrs), Av.1 °F (36.7 °C), Min:97.8 °F (36.6 °C), Max:98.4 °F (36.9 °C)    No results for input(s): POCGLU in the last 72 hours. I/O (24Hr): No intake or output data in the 24 hours ending 21    Labs:  Hematology:  Recent Labs     21  1445 21  [id number] 21  0516   WBC 5.4  --  5.2 5.9   RBC 4.44  --  4.46 4.08   HGB 12.5  --  12.4 11.4*   HCT 39.4  --  40.2 35.8*   MCV 88.7  --  90.1 87.7   MCH 28.2  --  27.8 27.9   MCHC 31.7  --  30.8 31.8   RDW 13.8  --  13.3 13.2   PLT See Reflexed IPF Result  --  187 180   MPV NOT REPORTED  --  9.6 9.6   CRP  --  17.2* 13.9* 12.7*   INR  --   --  0.9  --    DDIMER 0.61  --  0.44  --      Chemistry:  Recent Labs     21  1445 21  16521  0416 21  0516   NA  --  137 134* 129*   K  --  4.9 4.9 4.1   CL  --  96* 97* 92*   CO2  --  26 27 30   GLUCOSE  --  112* 167* 108*   BUN  --  7 9 10   CREATININE  --  0.52 0.47* 0.37*   MG  --   --   --  2.1   ANIONGAP  --  15 10 7*   LABGLOM  --  >60 >60 >60   GFRAA  --  >60 >60 >60   CALCIUM  --  8.7 8.5* 8.2*   PHOS  --   --   --  3.6   PROBNP PLEASE DISREGARD RESULTS. SPECIMEN CONTAMINATED. 257  --   --    TROPHS PLEASE DISREGARD RESULTS.   SPECIMEN CONTAMINATED. 8 <6  --    LACTACIDWB  --   --  0.9  --      Recent Labs     21  1651 21  0416 21  0516   PROT 6.5 6.3* 5.8*   LABALBU 3.9 3.5 3.4*  3.4*   AST 42* 33* 20   ALT 51* 48* 35*   LDH  --  230*  --    ALKPHOS 79 79 72   BILITOT 0.28* 0.30 0.29*   BILIDIR 0.13

## 2021-09-27 ENCOUNTER — APPOINTMENT (OUTPATIENT)
Dept: GENERAL RADIOLOGY | Age: 41
DRG: 177 | End: 2021-09-27
Payer: COMMERCIAL

## 2021-09-27 LAB
ALBUMIN SERPL-MCNC: 3.2 G/DL (ref 3.5–5.2)
ALBUMIN SERPL-MCNC: 3.2 G/DL (ref 3.5–5.2)
ALBUMIN/GLOBULIN RATIO: 1.3 (ref 1–2.5)
ALP BLD-CCNC: 70 U/L (ref 35–104)
ALT SERPL-CCNC: 26 U/L (ref 5–33)
ANION GAP SERPL CALCULATED.3IONS-SCNC: 9 MMOL/L (ref 9–17)
AST SERPL-CCNC: 14 U/L
BILIRUB SERPL-MCNC: 0.24 MG/DL (ref 0.3–1.2)
BILIRUBIN DIRECT: 0.11 MG/DL
BILIRUBIN, INDIRECT: 0.13 MG/DL (ref 0–1)
BUN BLDV-MCNC: 12 MG/DL (ref 6–20)
BUN/CREAT BLD: ABNORMAL (ref 9–20)
C-REACTIVE PROTEIN: 22.8 MG/L (ref 0–5)
CALCIUM SERPL-MCNC: 8.1 MG/DL (ref 8.6–10.4)
CHLORIDE BLD-SCNC: 100 MMOL/L (ref 98–107)
CO2: 29 MMOL/L (ref 20–31)
CREAT SERPL-MCNC: 0.34 MG/DL (ref 0.5–0.9)
CULTURE: ABNORMAL
GFR AFRICAN AMERICAN: >60 ML/MIN
GFR NON-AFRICAN AMERICAN: >60 ML/MIN
GFR SERPL CREATININE-BSD FRML MDRD: ABNORMAL ML/MIN/{1.73_M2}
GFR SERPL CREATININE-BSD FRML MDRD: ABNORMAL ML/MIN/{1.73_M2}
GLOBULIN: ABNORMAL G/DL (ref 1.5–3.8)
GLUCOSE BLD-MCNC: 124 MG/DL (ref 70–99)
HCT VFR BLD CALC: 36 % (ref 36.3–47.1)
HEMOGLOBIN: 11.5 G/DL (ref 11.9–15.1)
Lab: ABNORMAL
MAGNESIUM: 2.2 MG/DL (ref 1.6–2.6)
MCH RBC QN AUTO: 28.8 PG (ref 25.2–33.5)
MCHC RBC AUTO-ENTMCNC: 31.9 G/DL (ref 28.4–34.8)
MCV RBC AUTO: 90.2 FL (ref 82.6–102.9)
NRBC AUTOMATED: 0 PER 100 WBC
PDW BLD-RTO: 13.4 % (ref 11.8–14.4)
PHOSPHORUS: 3.4 MG/DL (ref 2.6–4.5)
PLATELET # BLD: 318 K/UL (ref 138–453)
PMV BLD AUTO: 10.8 FL (ref 8.1–13.5)
POTASSIUM SERPL-SCNC: 4.4 MMOL/L (ref 3.7–5.3)
RBC # BLD: 3.99 M/UL (ref 3.95–5.11)
SODIUM BLD-SCNC: 138 MMOL/L (ref 135–144)
SPECIMEN DESCRIPTION: ABNORMAL
TOTAL PROTEIN: 5.7 G/DL (ref 6.4–8.3)
WBC # BLD: 3.7 K/UL (ref 3.5–11.3)

## 2021-09-27 PROCEDURE — 6370000000 HC RX 637 (ALT 250 FOR IP): Performed by: NURSE PRACTITIONER

## 2021-09-27 PROCEDURE — 2580000003 HC RX 258: Performed by: NURSE PRACTITIONER

## 2021-09-27 PROCEDURE — 83735 ASSAY OF MAGNESIUM: CPT

## 2021-09-27 PROCEDURE — 80069 RENAL FUNCTION PANEL: CPT

## 2021-09-27 PROCEDURE — 86140 C-REACTIVE PROTEIN: CPT

## 2021-09-27 PROCEDURE — 80076 HEPATIC FUNCTION PANEL: CPT

## 2021-09-27 PROCEDURE — 6370000000 HC RX 637 (ALT 250 FOR IP): Performed by: INTERNAL MEDICINE

## 2021-09-27 PROCEDURE — 71045 X-RAY EXAM CHEST 1 VIEW: CPT

## 2021-09-27 PROCEDURE — 85027 COMPLETE CBC AUTOMATED: CPT

## 2021-09-27 PROCEDURE — 2500000003 HC RX 250 WO HCPCS: Performed by: INTERNAL MEDICINE

## 2021-09-27 PROCEDURE — 6360000002 HC RX W HCPCS: Performed by: NURSE PRACTITIONER

## 2021-09-27 PROCEDURE — 80053 COMPREHEN METABOLIC PANEL: CPT

## 2021-09-27 PROCEDURE — 36415 COLL VENOUS BLD VENIPUNCTURE: CPT

## 2021-09-27 PROCEDURE — 6360000002 HC RX W HCPCS: Performed by: INTERNAL MEDICINE

## 2021-09-27 PROCEDURE — 99232 SBSQ HOSP IP/OBS MODERATE 35: CPT | Performed by: INTERNAL MEDICINE

## 2021-09-27 PROCEDURE — 84100 ASSAY OF PHOSPHORUS: CPT

## 2021-09-27 PROCEDURE — 2580000003 HC RX 258: Performed by: INTERNAL MEDICINE

## 2021-09-27 PROCEDURE — 82248 BILIRUBIN DIRECT: CPT

## 2021-09-27 PROCEDURE — 2060000000 HC ICU INTERMEDIATE R&B

## 2021-09-27 RX ORDER — CIPROFLOXACIN 500 MG/1
500 TABLET, FILM COATED ORAL 2 TIMES DAILY
Qty: 6 TABLET | Refills: 0 | Status: SHIPPED | OUTPATIENT
Start: 2021-09-27 | End: 2021-09-28 | Stop reason: SDUPTHER

## 2021-09-27 RX ADMIN — SODIUM CHLORIDE, PRESERVATIVE FREE 10 ML: 5 INJECTION INTRAVENOUS at 21:28

## 2021-09-27 RX ADMIN — Medication 2000 UNITS: at 08:15

## 2021-09-27 RX ADMIN — REMDESIVIR 100 MG: 100 INJECTION, POWDER, LYOPHILIZED, FOR SOLUTION INTRAVENOUS at 10:51

## 2021-09-27 RX ADMIN — GUAIFENESIN DEXTROMETHORPHAN HYDROBROMIDE ORAL SOLUTION 5 ML: 200; 20 SOLUTION ORAL at 07:25

## 2021-09-27 RX ADMIN — HYDROXYZINE HYDROCHLORIDE 50 MG: 50 TABLET ORAL at 08:41

## 2021-09-27 RX ADMIN — ENOXAPARIN SODIUM 30 MG: 30 INJECTION SUBCUTANEOUS at 21:27

## 2021-09-27 RX ADMIN — BUPRENORPHINE AND NALOXONE 1 TABLET: 8; 2 TABLET SUBLINGUAL at 08:15

## 2021-09-27 RX ADMIN — TRAZODONE HYDROCHLORIDE 50 MG: 50 TABLET ORAL at 21:28

## 2021-09-27 RX ADMIN — CEFTRIAXONE SODIUM 1000 MG: 1 INJECTION, POWDER, FOR SOLUTION INTRAMUSCULAR; INTRAVENOUS at 14:30

## 2021-09-27 RX ADMIN — DULOXETINE HYDROCHLORIDE 60 MG: 30 CAPSULE, DELAYED RELEASE ORAL at 08:15

## 2021-09-27 RX ADMIN — PALIPERIDONE 6 MG: 3 TABLET, EXTENDED RELEASE ORAL at 08:15

## 2021-09-27 RX ADMIN — CYCLOBENZAPRINE 10 MG: 10 TABLET, FILM COATED ORAL at 10:50

## 2021-09-27 RX ADMIN — PREGABALIN 100 MG: 100 CAPSULE ORAL at 08:15

## 2021-09-27 RX ADMIN — DULOXETINE HYDROCHLORIDE 60 MG: 30 CAPSULE, DELAYED RELEASE ORAL at 21:28

## 2021-09-27 RX ADMIN — DEXAMETHASONE SODIUM PHOSPHATE 6 MG: 10 INJECTION INTRAMUSCULAR; INTRAVENOUS at 21:27

## 2021-09-27 RX ADMIN — SODIUM CHLORIDE, PRESERVATIVE FREE 10 ML: 5 INJECTION INTRAVENOUS at 08:16

## 2021-09-27 ASSESSMENT — PAIN SCALES - GENERAL
PAINLEVEL_OUTOF10: 0

## 2021-09-27 ASSESSMENT — ENCOUNTER SYMPTOMS
GASTROINTESTINAL NEGATIVE: 1
COUGH: 1

## 2021-09-27 NOTE — PLAN OF CARE
Problem: Airway Clearance - Ineffective  Goal: Achieve or maintain patent airway  Outcome: Ongoing     Problem: Gas Exchange - Impaired  Goal: Absence of hypoxia  Outcome: Ongoing  Goal: Promote optimal lung function  Outcome: Ongoing     Problem: Breathing Pattern - Ineffective  Goal: Ability to achieve and maintain a regular respiratory rate  Outcome: Ongoing     Problem:  Body Temperature -  Risk of, Imbalanced  Goal: Ability to maintain a body temperature within defined limits  Outcome: Ongoing  Goal: Will regain or maintain usual level of consciousness  Outcome: Ongoing  Goal: Complications related to the disease process, condition or treatment will be avoided or minimized  Outcome: Ongoing     Problem: Isolation Precautions - Risk of Spread of Infection  Goal: Prevent transmission of infection  Outcome: Ongoing     Problem: Nutrition Deficits  Goal: Optimize nutritional status  Outcome: Ongoing     Problem: Risk for Fluid Volume Deficit  Goal: Maintain normal heart rhythm  Outcome: Ongoing  Goal: Maintain absence of muscle cramping  Outcome: Ongoing  Goal: Maintain normal serum potassium, sodium, calcium, phosphorus, and pH  Outcome: Ongoing     Problem: Loneliness or Risk for Loneliness  Goal: Demonstrate positive use of time alone when socialization is not possible  Outcome: Ongoing     Problem: Fatigue  Goal: Verbalize increase energy and improved vitality  Outcome: Ongoing     Problem: Patient Education: Go to Patient Education Activity  Goal: Patient/Family Education  Outcome: Ongoing     Problem: Falls - Risk of:  Goal: Will remain free from falls  Description: Will remain free from falls  Outcome: Ongoing  Goal: Absence of physical injury  Description: Absence of physical injury  Outcome: Ongoing     Problem: Pain:  Goal: Pain level will decrease  Description: Pain level will decrease  Outcome: Ongoing  Goal: Control of acute pain  Description: Control of acute pain  Outcome: Ongoing  Goal: Control of chronic pain  Description: Control of chronic pain  Outcome: Ongoing     Electronically signed by Hermila Mendieta RN on 9/26/2021 at 10:16 PM

## 2021-09-27 NOTE — PROGRESS NOTES
[facility]  Progress Note    Patria Haas  MRN: [id number]  Date: 9/27/2021  LOS: 3     Reason for F/U :   COVID-19 virus infection    Impression :   1. COVID-19 virus infection tested + 9/24/2021  2. Acute hypoxic respiratory failure. 3. Possible early COVID-19 virus pneumonia  4. Bipolar disorder/schizophrenia  5. Polysubstance abuse  6. Asymptomatic bacteriuriaEnterococcus    Recommendations:   · The patient continues on Decadron at 6 mg IV daily  · The patient is on remdesivir given that this was early infection. · The patient has been able to wean off the oxygen altogether and is saturating well on room air. · She still does have a cough and this is to be expected. · While the CRP is slightly increased today the patient is actually done very well clinically to the point that she is no longer requiring oxygen I have no problems with her being discharge. · She will need to continue the Decadron to complete a 10-day course of therapy  · The patient does not need any antimicrobial therapy for the asymptomatic bacteriuria and the Rocephin can be discontinued  · The care was discussed with Dr. Radha He    Infection Control Recommendations:   Droplet plus precautions    Discharge Planning:   Patient will need Midline Catheter Insertion/ PICC line Insertion: No  Patient will need: Home IV , [place],  SNF,  LTAC: Undetermined  Patient willneed outpatient wound care: No    Medical Decision making / Summary of Stay:   Patria Haas is a 46y.o.-year-old female who was initially admitted on 9/24/2021. Maricel Causey has a history of bipolar/schizophrenia, polysubstance abuse with cocaine and heroin and reports she has been abstinent for about 2 months, Marfan syndrome with mitral valve prolapse and some chronic pain.   The patient resides in a sober house with other women reports that 2 other women were recently diagnosed Covid positive and she was in quarantine with her because she had also Cardiovascular:      Heart sounds: Normal heart sounds. No murmur heard. Pulmonary:      Effort: Pulmonary effort is normal.      Breath sounds: Rales present. No wheezing. Abdominal:      General: Bowel sounds are normal.      Palpations: Abdomen is soft. There is no mass. Tenderness: There is no abdominal tenderness. Musculoskeletal:         General: No deformity. Lymphadenopathy:      Cervical: No cervical adenopathy. Skin:     General: Skin is warm and dry. Findings: No rash. Neurological:      Mental Status: She is oriented to person, place, and time. Laboratory data:   I have independently reviewed the followinglabs:  CBC with Differential:   Recent Labs     09/24/21  1445 09/24/21  1445 09/25/21  0416 09/25/21  0416 09/26/21  0516 09/27/21  0052   WBC 5.4   < > 5.2   < > 5.9 3.7   HGB 12.5   < > 12.4   < > 11.4* 11.5*   HCT 39.4   < > 40.2   < > 35.8* 36.0*   PLT See Reflexed IPF Result   < > 187   < > 180 318   LYMPHOPCT 16*  --  19*  --   --   --    MONOPCT 7  --  4  --   --   --     < > = values in this interval not displayed.      BMP:   Recent Labs     09/26/21  0516 09/27/21  0052   * 138   K 4.1 4.4   CL 92* 100   CO2 30 29   BUN 10 12   CREATININE 0.37* 0.34*   MG 2.1 2.2     Hepatic Function Panel:   Recent Labs     09/26/21  0516 09/27/21  0052   PROT 5.8* 5.7*   LABALBU 3.4*  3.4* 3.2*  3.2*   BILIDIR 0.13 0.11   IBILI 0.16 0.13   BILITOT 0.29* 0.24*   ALKPHOS 72 70   ALT 35* 26   AST 20 14         Lab Results   Component Value Date    PROCAL 0.08 09/24/2021    PROCAL 0.09 09/24/2021     Lab Results   Component Value Date    CRP 22.8 09/27/2021    CRP 12.7 09/26/2021    CRP 13.9 09/25/2021     No results found for: SEDRATE      Lab Results   Component Value Date    DDIMER 0.44 09/25/2021    DDIMER 0.61 09/24/2021    DDIMER <0.19 04/28/2017    DDIMER <0.19 03/06/2016    DDIMER 0.22 06/16/2015     Lab Results   Component Value Date    FERRITIN 174 09/25/2021 FERRITIN 179 09/24/2021     Lab Results   Component Value Date     09/25/2021     Lab Results   Component Value Date    FIBRINOGEN 458 09/25/2021    FIBRINOGEN 332 09/24/2021       Results in Past 30 Days  Result Component Current Result Ref Range Previous Result Ref Range   SARS-CoV-2, Rapid DETECTED (A) (9/24/2021) Not Detected Detected (A) (9/20/2021)      Lab Results   Component Value Date    COVID19 DETECTED 09/24/2021    COVID19 Detected 09/20/2021    COVID19 Not Detected 07/27/2021    COVID19 Not Detected 06/11/2021       No results for input(s): VANCOTROUGH in the last 72 hours. Imaging Studies:   No new imaging    Cultures:     Culture, Urine [[id number]] (Abnormal)  Collected: 09/25/21 1335   Order Status: Completed Specimen: Urine Updated: 09/27/21 4790    Specimen Description . URINE    Special Requests NOT REPORTED    Culture ENTEROCOCCUS FAECALIS >[id number] CFU/MLAbnormal    Enterococcus  faecalis (1)    Antibiotic Interpretation TATYANA Status    ampicillin Sensitive <=2 Final    penicillin  NOT REPORTED Final    ciprofloxacin Sensitive <=0.5 Final    erythromycin  NOT REPORTED Final    Gentamicin, High Level  NOT REPORTED Final    levofloxacin Sensitive 0.5 Final    linezolid  NOT REPORTED Final    nitrofurantoin Sensitive <=16 Final    Synercid  NOT REPORTED Final    Streptomycin, Hi Level  NOT REPORTED Final    tetracycline Sensitive <=1 Final    tigecycline  NOT REPORTED Final    vancomycin Sensitive 1 Final        COVID-19, Rapid [[id number]] (Abnormal) Collected: 09/24/21 1911   Order Status: Completed Specimen: Nasopharyngeal Swab Updated: 09/24/21 1931    Specimen Description . NASOPHARYNGEAL SWAB    SARS-CoV-2, Rapid DETECTEDAbnormal     Comment:        Rapid NAAT: The specimen is POSITIVE for SARS-Cov-2, the novel coronavirus associated with   COVID-19.         This test has been authorized by the FDA under an Emergency Use Authorization (EUA) for use   by authorized laboratories.        The ID NOW COVID-19 assay is designed to detect the virus that causes COVID-19 in patients   with signs and symptoms of infection who are suspected of COVID-19. An individual without symptoms of COVID-19 and who is not shedding SARS-CoV-2 virus would   expect to have a negative (not detected) result in this assay. Fact sheet for Healthcare Providers: Matteo   Fact sheet for Patients: Alessia.saida           Methodology: Isothermal Nucleic Acid Amplification         Results reported to the appropriate Health Department             Medications:      cefTRIAXone (ROCEPHIN) IV  1,000 mg IntraVENous Q24H    pregabalin  100 mg Oral Daily    remdesivir IVPB  100 mg IntraVENous Q24H    paliperidone  6 mg Oral Daily    buprenorphine-naloxone  1 tablet SubLINGual Daily    nicotine  1 patch TransDERmal Daily    DULoxetine  60 mg Oral BID    sodium chloride flush  5-40 mL IntraVENous 2 times per day    enoxaparin  30 mg SubCUTAneous BID    dexamethasone  6 mg IntraVENous Q24H    Vitamin D  2,000 Units Oral Daily           [facility]  Billy Benavidez MD  Perfect [employer] messaging  OFFICE: [phone]      Electronically signed by Billy Benavidez MD on 9/27/2021 at 2:33 PM  Thank you for allowing us to participate in the care of this patient. Please call with questions. This note iscreated with the assistance of a speech recognition program.  While intending to generate a document that actually reflects the content of the visit, the document can still have some errors including those of syntax andsound a like substitutions which may escape proof reading. In such instances, actual meaning can be extrapolated by contextual diversion.

## 2021-09-27 NOTE — DISCHARGE SUMMARY
[facility]  Office: [address], DO, Anahi Ibrahim DO, Parrish Martines DO, Tierra Richards Blood, DO, Jud Ulrich MD, Nicole He MD, Traci Herman MD, Rita Sanford MD, Naomi Bauer MD, Natalie Ugalde MD, Karolyn Beckett MD, Shanon Wilkins, DO, Christina Maynard DO, Diomedes Carey MD,  Akira Thacker DO, Dona Kim MD, Kamari Ovalle MD, Rhianna Patten MD, Greg Mulelr MD, , Dilcia Anne MD, Ashley Morejon MD, Xiao Orellana MD, Dennise Mcclain [facility], [facility], CNP, Johnny Dela Cruz CNP, [facility] , CNS, Marta Wilhelm, CNP, Ofelia Kam, CNP, Julien Powell, CNP, Almaz Salmon, CNP, Irish Box CNP, Amy Bates PA-C, Lauren Eli, [facility], Michael Albarran, CNP, Will Driver, CNP, Rory Gonzalez, CNP, Daryl Oden, CNP, Maxwell Crawford, CNP, Oscar Longoria CNP, Leo Traylor, CNP, Radhika Lisa, [facility]    Discharge Summary     Patient ID: Kecia Morocho  :  1980   MRN: [id number]     ACCOUNT:  [de-identified]   Patient's PCP: GUIDO Gonzalez CNP  Admit Date: 2021   Discharge Date: 2021   Length of Stay: 5  Code Status:  Full Code  Admitting Physician: Christina Maynard DO  Discharge Physician: Josefina Rao DO     Active Discharge Diagnoses:     Hospital Problem Lists:  Principal Problem:    Acute hypoxemic respiratory failure due to COVID-19 [facility])  Active Problems:    COVID-19 virus infection    Dysphagia    Sleep-related breathing disorder    Opio[facility] ([facility]    H/O long-term treatment with high-risk medication    Marfan's syndrome    Severe recurrent major depression without psychotic features ([facility]  Resolved Problems:    * No resolved hospital problems.  *      Admission Condition:  good     Discharged Condition: good    Hospital Stay:     Hospital Course:  Kecia Morocho is a 46 y.o. female who was admitted for the management of   Acute hypoxemic respiratory failure due to COVID-19 [facility]) , presented to ER with Shortness of Breath (pt was found with sats in 70s-80s on room air; tolerating Video [email or web address] well at 98%) and Positive For Covid-19 (x2 days ago)  Patient was admitted for COVID pneumonia. She had slightly elevated CRP and was given decadron and remdesivir. Patient tolerated therapy well. She was discharged on 9/27/2021 as she had improvement in oxygenation. Significant delay in discharge secondary to issues with her rehab center and accommodating her COVID infection for isolation. Ultimately, she does not require oxygen. She needs to follow up with her PCP for evaluation of LUE swelling if it does not improve within the next 2 weeks.      Significant therapeutic interventions: none    Significant Diagnostic Studies:   Labs / Micro:  CBC:   Lab Results   Component Value Date    WBC 6.0 09/28/2021    RBC 4.22 09/28/2021    HGB 11.7 09/28/2021    HCT 37.8 09/28/2021    MCV 89.6 09/28/2021    MCH 27.7 09/28/2021    MCHC 31.0 09/28/2021    RDW 13.3 09/28/2021     09/28/2021     BMP:    Lab Results   Component Value Date    GLUCOSE 135 09/28/2021     09/28/2021    K 4.7 09/28/2021     09/28/2021    CO2 31 09/28/2021    ANIONGAP 7 09/28/2021    BUN 10 09/28/2021    CREATININE 0.35 09/28/2021    BUNCRER NOT REPORTED 09/28/2021    CALCIUM 8.8 09/28/2021    LABGLOM >60 09/28/2021    GFRAA >60 09/28/2021    GFR      09/28/2021    GFR NOT REPORTED 09/28/2021     HFP:    Lab Results   Component Value Date    PROT 5.7 09/28/2021     CMP:    Lab Results   Component Value Date    GLUCOSE 135 09/28/2021     09/28/2021    K 4.7 09/28/2021     09/28/2021    CO2 31 09/28/2021    BUN 10 09/28/2021    CREATININE 0.35 09/28/2021    ANIONGAP 7 09/28/2021    ALKPHOS 72 09/28/2021    ALT 24 09/28/2021    AST 15 09/28/2021    BILITOT 0.20 09/28/2021    LABALBU 3.4 09/28/2021    LABALBU 3.4 09/28/2021    ALBUMIN 1.5 09/28/2021    LABGLOM >60 09/28/2021    GFRAA >60 09/28/2021    GFR      09/28/2021    GFR NOT REPORTED 09/28/2021    PROT 5.7 09/28/2021    CALCIUM 8.8 09/28/2021     PT/INR:    Lab Results   Component Value Date    PROTIME 9.5 09/25/2021    INR 0.9 09/25/2021       Radiology:  CT CHEST WO CONTRAST    Result Date: 9/24/2021  Middle lobe collapse possibly related to a mucous plug. Left basilar airspace disease may represent pneumonia and or atelectasis. Stable ectasia of the ascending aorta. XR CHEST PORTABLE    Result Date: 9/24/2021  No cardiomegaly, pneumonia or interstitial edema. No significant change from 05/25/2021. Consultations:    Consults:     Final Specialist Recommendations/Findings:   IP CONSULT TO INFECTIOUS DISEASES  IP CONSULT TO INFECTIOUS DISEASES  IP CONSULT TO SOCIAL WORK  IP CONSULT TO IV TEAM      The patient was seen and examined on day of discharge and this discharge summary is in conjunction with any daily progress note from day of discharge. Discharge plan:     Disposition: To a non-[facility] facility    Physician Follow Up:   GUIDO Hicks - CNP  [address] [place] [address]  [phone]    Schedule an appointment as soon as possible for a visit in 1 week  hospital followup, please call in 2 weeks if your left arm swelling doesn't improve. you may need an ultrasound of that arm if the swelling persists.        Requiring Further Evaluation/Follow Up POST HOSPITALIZATION/Incidental Findings: none    Diet: regular diet    Activity: As tolerated    Instructions to Patient: none    Discharge Medications:      Medication List      START taking these medications    albuterol sulfate  (90 Base) MCG/ACT inhaler  Commonly known as: Ventolin HFA  Inhale 2 puffs into the lungs 4 times daily as needed for Wheezing     ciprofloxacin 500 MG tablet  Commonly known as: CIPRO  Take 1 tablet by mouth 2 times daily for 2 days     dexamethasone 6 MG tablet  Commonly known as: Decadron  Take 1 tablet by mouth daily (with breakfast) for 5 days        CONTINUE taking these medications    cyclobenzaprine 10 MG tablet  Commonly known as: FLEXERIL     DULoxetine 60 MG extended release capsule  Commonly known as: CYMBALTA     gabapentin 100 MG capsule  Commonly known as: NEURONTIN  Take 1 capsule by mouth 3 times daily for 30 days. hydroCHLOROthiazide 50 MG tablet  Commonly known as: HYDRODIURIL     hydrOXYzine 50 MG tablet  Commonly known as: ATARAX     meloxicam 15 MG tablet  Commonly known as: MOBIC  Take 1 tablet by mouth daily as needed for Pain     pregabalin 100 MG capsule  Commonly known as: LYRICA     Suboxone 8-2 MG Film SL film  Generic drug: buprenorphine-naloxone     traZODone 50 MG tablet  Commonly known as: DESYREL  Take 1 tablet by mouth nightly as needed for Sleep           Where to Get Your Medications      These medications were sent to [address], [address] [place] [id number]    Phone: [phone]   · albuterol sulfate  (90 Base) MCG/ACT inhaler  · ciprofloxacin 500 MG tablet  · dexamethasone 6 MG tablet         No discharge procedures on file. Time Spent on discharge is  37 mins in patient examination, evaluation, counseling as well as medication reconciliation, prescriptions for required medications, discharge plan and follow up. Electronically signed by   Josefina Rao DO  9/29/2021  12:59 PM      Thank you Dr. Jean-Pierre Streeter, APRN - CNP for the opportunity to be involved in this patient's care.

## 2021-09-27 NOTE — PROGRESS NOTES
[facility]  Office: [address], DO, Natalia Press, DO, Mitzi Cushing, DO, Cipriano Bermeo Blood, DO, Jay Mayer MD, Thao Srping MD, Kyle Doyle MD, Traci Casey MD, Jose Juan Ureña MD, Laina Bettencourt MD, Joanie Narayanan MD, Marquez Barakat, DO, Padmaja Mittal, DO, Osito Allen MD,  Gena Siemens, DO, Toby Shah MD, Janell Estrada MD, Flora Rodriguez MD, Betite Monsivais MD, , Wilian Talavera MD, Nimesh Howell MD, Celia Flores MD, Acacia Negron, [facility], [facility], CNP, Anna Gentile, CNP, Emi Xiong, CNS, Cassandra Magana, CNP, Al Pineda, CNP, Concepcion Lizama, CNP, Flavio Olivares, CNP, Nimisha Hardy, CNP, Roylene Goodell, PA-C, Juliet Miller, [facility], Devika Veras, CNP, Shukri Rider, CNP, Nicci Medina, CNP, Kerry Moody, CNP, Teresita Stockton, CNP, Lisa Dye, CNP, Estefani Alexis, [facility], Vicky Webster, [address] [place] [address]    Progress Note    9/27/2021    3:47 PM    Name:   Elder Townsend  MRN:     [id number]     Acct:      [de-identified]   Room:   [address] [place] [address] Day:  3  Admit Date:  9/24/2021  1:31 PM    PCP:   GUIDO Edge CNP  Code Status:  Full Code    Subjective:     C/C:   Chief Complaint   Patient presents with    Shortness of Breath     pt was found with sats in 70s-80s on room air; tolerating [email or web address] well at 98%    Positive For Covid-19     x2 days ago     Interval History Status: improved. Patient respiratory wise felt much better, spoke with infectious disease and from Covid standpoint can be discharged. Patient was started on Rocephin today for dysuria and suspected urinary tract infection. Culture has been already sent. Brief History:     From H&P  Elder Townsend is a 46 y.o. female with a past medical history of HTN and depression who presented to the emergency department on 9/24/2021 complaining of shortness of breath. The patient tested positive for COVID-19 on 9/20.  In the ED, she was hypoxic and requiring supplemental O2. She is admitted to internal medicine for further management of COVID-19 pneumonia. Review of Systems:     Constitutional:  negative for chills, fevers, sweats  Respiratory: +sob  negative for cough, dyspnea on exertion, wheezing  Cardiovascular:  negative for chest pain, chest pressure/discomfort, lower extremity edema, palpitations  Gastrointestinal:  negative for abdominal pain, constipation, diarrhea, nausea, vomiting  Neurological:  negative for dizziness, headache    Medications: Allergies: Allergies   Allergen Reactions    Ketamine Other (See Comments)    Morphine Hives       Current Meds:   Scheduled Meds:    cefTRIAXone (ROCEPHIN) IV  1,000 mg IntraVENous Q24H    pregabalin  100 mg Oral Daily    remdesivir IVPB  100 mg IntraVENous Q24H    paliperidone  6 mg Oral Daily    buprenorphine-naloxone  1 tablet SubLINGual Daily    nicotine  1 patch TransDERmal Daily    DULoxetine  60 mg Oral BID    sodium chloride flush  5-40 mL IntraVENous 2 times per day    enoxaparin  30 mg SubCUTAneous BID    dexamethasone  6 mg IntraVENous Q24H    Vitamin D  2,000 Units Oral Daily     Continuous Infusions:    sodium chloride       PRN Meds: sodium chloride, cyclobenzaprine, hydrOXYzine, meloxicam, traZODone, sodium chloride flush, sodium chloride, ondansetron **OR** ondansetron, polyethylene glycol, acetaminophen **OR** acetaminophen, dextromethorphan-guaiFENesin    Data:     Past Medical History:   has a past medical history of Anxiety, Arthritis, Bipolar depression ([address] Dermatitis, Dysuria, Exposure to STD, Folliculitis, Hx of acute bronchitis, Hypertension, Marfan syndrome, MVP (mitral valve prolapse), Nausea & vomiting, Polysubstance abuse ([address] PONV (postoperative nausea and vomiting), Scoliosis, Seasonal allergies, Staph infection, and UTI (lower urinary tract infection). Social History:   reports that she has been smoking cigarettes.  She has smoked for the past 1.50 years. She has never used smokeless tobacco. She reports previous drug use. Drugs: Opiates , Marijuana, and Cocaine. She reports that she does not drink alcohol. Family History:   Family History   Problem Relation Age of Onset    Cancer Brother         testicular    Breast Cancer Maternal Grandmother     Hypertension Mother     Thyroid Disease Mother        Vitals:  /83   Pulse 98   Temp 98.6 °F (37 °C) (Oral)   Resp 18   Ht 5' 10\" (1.778 m)   Wt 187 lb (84.8 kg)   SpO2 91%   BMI 26.83 kg/m²   Temp (24hrs), Av.1 °F (36.7 °C), Min:97.5 °F (36.4 °C), Max:98.6 °F (37 °C)    No results for input(s): POCGLU in the last 72 hours. I/O (24Hr): Intake/Output Summary (Last 24 hours) at 2021 1547  Last data filed at 2021 0800  Gross per 24 hour   Intake 1300 ml   Output    Net 1300 ml       Labs:  Hematology:  Recent Labs     21  0516 21  0052   WBC 5.2 5.9 3.7   RBC 4.46 4.08 3.99   HGB 12.4 11.4* 11.5*   HCT 40.2 35.8* 36.0*   MCV 90.1 87.7 90.2   MCH 27.8 27.9 28.8   MCHC 30.8 31.8 31.9   RDW 13.3 13.2 13.4    180 318   MPV 9.6 9.6 10.8   CRP 13.9* 12.7* 22.8*   INR 0.9  --   --    DDIMER 0.44  --   --      Chemistry:  Recent Labs     21  16521  [id number] 21  0516 21  0052      < > 134* 129* 138   K 4.9   < > 4.9 4.1 4.4   CL 96*   < > 97* 92* 100   CO2 26   < > 27 30 29   GLUCOSE 112*   < > 167* 108* 124*   BUN 7   < > 9 10 12   CREATININE 0.52   < > 0.47* 0.37* 0.34*   MG  --   --   --  2.1 2.2   ANIONGAP 15   < > 10 7* 9   LABGLOM >60   < > >60 >60 >60   GFRAA >60   < > >60 >60 >60   CALCIUM 8.7   < > 8.5* 8.2* 8.1*   PHOS  --   --   --  3.6 3.4   PROBNP 257  --   --   --   --    TROPHS 8  --  <6  --   --    LACTACIDWB  --   --  0.9  --   --     < > = values in this interval not displayed.      Recent Labs     21  1651 21  1651 21  0416 21  4169 09/27/21  0052   PROT 6.5   < > 6.3* 5.8* 5.7*   LABALBU 3.9   < > 3.5 3.4*  3.4* 3.2*  3.2*   AST 42*   < > 33* 20 14   ALT 51*   < > 48* 35* 26   LDH  --   --  230*  --   --    ALKPHOS 79   < > 79 72 70   BILITOT 0.28*   < > 0.30 0.29* 0.24*   BILIDIR 0.13  --   --  0.13 0.11    < > = values in this interval not displayed. ABG:  Lab Results   Component Value Date    FIO2 INFORMATION NOT PROVIDED 09/24/2021     Lab Results   Component Value Date/Time    SPECIAL NOT REPORTED 09/25/2021 01:35 PM     Lab Results   Component Value Date/Time    CULTURE ENTEROCOCCUS FAECALIS >[id number] CFU/ML (A) 09/25/2021 01:35 PM       Radiology:  CT CHEST WO CONTRAST    Result Date: 9/24/2021  Middle lobe collapse possibly related to a mucous plug. Left basilar airspace disease may represent pneumonia and or atelectasis. Stable ectasia of the ascending aorta. XR CHEST PORTABLE    Result Date: 9/24/2021  No cardiomegaly, pneumonia or interstitial edema. No significant change from 05/25/2021.        Physical Examination:        General appearance:  alert, cooperative and no distress  Mental Status:  oriented to person, place and time and normal affect  Lungs:  clear to auscultation bilaterally, normal effort  Heart:  regular rate and rhythm, no murmur  Abdomen:  soft, nontender, nondistended, normal bowel sounds, no masses, hepatomegaly, splenomegaly  Extremities:  no edema, redness, tenderness in the calves  Skin:  no gross lesions, rashes, induration    Assessment:        Hospital Problems         Last Modified POA    * (Principal) Acute hypoxemic respiratory failure due to COVID-19 ([facility] 9/25/2021 Yes    Opioid abuse ([facility] 9/25/2021 Yes    H/O long-term treatment with high-risk medication 9/25/2021 Yes    Marfan's syndrome 9/25/2021 Yes    Dysphagia 9/25/2021 Yes    Overview Signed 8/14/2017  1:34 PM by Rito Perez LPN     Food and water         Severe recurrent major depression without psychotic features ([facility] 9/25/2021 Yes          Plan:        1. Patient has been discharged but there are issues with returning back to her group home. They want her to be Covid negative and off oxygen which may not be possible. Case management is working on a discharge plan  2. Continue Remdesivir Decadron for now while she is inpatient  3. Cleared for discharge by infectious disease standpoint  4. Continue all current home medications  5. Continue Rocephin for urinary tract infection for 3 day course, cipro was ordered outpatient.     Olvin Macedo DO  9/27/2021  3:47 PM

## 2021-09-27 NOTE — CARE COORDINATION
Call to [facility]. If pt is requiring oxygen, she is not able to return to [facility]. Discussed with pt. She is agreeable to stay off oxygen if not required. Per Christiana RN, pt applies nasal cannula intermittently when she feels short of breath. Erica MEI updated    1600 received call from Halie Upton at [facility]. He stated per his boss, pt is not able to return until she tests negative for covid. Spoke to his boss, Radha Oro. He verified this information stating they cannot verify that pt quarantined here, they are unsure if pt is stable for discharge, and pt cannot reintroduce covid into their facility. Attempted to counter every reason they are unable to accept pt with no success. Pt updated. Erica MEI updated    1630 pt does not have anywhere to go at discharge. Dr Kasia Reynoso updated.  Ok to keep pt over night and follow up tomorrow

## 2021-09-28 PROBLEM — U07.1 COVID-19 VIRUS INFECTION: Status: ACTIVE | Noted: 2021-09-28

## 2021-09-28 LAB
ALBUMIN SERPL-MCNC: 3.4 G/DL (ref 3.5–5.2)
ALBUMIN SERPL-MCNC: 3.4 G/DL (ref 3.5–5.2)
ALBUMIN/GLOBULIN RATIO: 1.5 (ref 1–2.5)
ALP BLD-CCNC: 72 U/L (ref 35–104)
ALT SERPL-CCNC: 24 U/L (ref 5–33)
ANION GAP SERPL CALCULATED.3IONS-SCNC: 7 MMOL/L (ref 9–17)
AST SERPL-CCNC: 15 U/L
BILIRUB SERPL-MCNC: 0.2 MG/DL (ref 0.3–1.2)
BILIRUBIN DIRECT: 0.1 MG/DL
BILIRUBIN, INDIRECT: 0.1 MG/DL (ref 0–1)
BUN BLDV-MCNC: 10 MG/DL (ref 6–20)
BUN/CREAT BLD: ABNORMAL (ref 9–20)
CALCIUM SERPL-MCNC: 8.8 MG/DL (ref 8.6–10.4)
CHLORIDE BLD-SCNC: 100 MMOL/L (ref 98–107)
CO2: 31 MMOL/L (ref 20–31)
CREAT SERPL-MCNC: 0.35 MG/DL (ref 0.5–0.9)
GFR AFRICAN AMERICAN: >60 ML/MIN
GFR NON-AFRICAN AMERICAN: >60 ML/MIN
GFR SERPL CREATININE-BSD FRML MDRD: ABNORMAL ML/MIN/{1.73_M2}
GFR SERPL CREATININE-BSD FRML MDRD: ABNORMAL ML/MIN/{1.73_M2}
GLOBULIN: ABNORMAL G/DL (ref 1.5–3.8)
GLUCOSE BLD-MCNC: 135 MG/DL (ref 70–99)
HCT VFR BLD CALC: 37.8 % (ref 36.3–47.1)
HEMOGLOBIN: 11.7 G/DL (ref 11.9–15.1)
MAGNESIUM: 2.1 MG/DL (ref 1.6–2.6)
MCH RBC QN AUTO: 27.7 PG (ref 25.2–33.5)
MCHC RBC AUTO-ENTMCNC: 31 G/DL (ref 28.4–34.8)
MCV RBC AUTO: 89.6 FL (ref 82.6–102.9)
NRBC AUTOMATED: 0 PER 100 WBC
PDW BLD-RTO: 13.3 % (ref 11.8–14.4)
PHOSPHORUS: 3.5 MG/DL (ref 2.6–4.5)
PLATELET # BLD: 213 K/UL (ref 138–453)
PMV BLD AUTO: 9.9 FL (ref 8.1–13.5)
POTASSIUM SERPL-SCNC: 4.7 MMOL/L (ref 3.7–5.3)
RBC # BLD: 4.22 M/UL (ref 3.95–5.11)
SODIUM BLD-SCNC: 138 MMOL/L (ref 135–144)
TOTAL PROTEIN: 5.7 G/DL (ref 6.4–8.3)
WBC # BLD: 6 K/UL (ref 3.5–11.3)

## 2021-09-28 PROCEDURE — 80076 HEPATIC FUNCTION PANEL: CPT

## 2021-09-28 PROCEDURE — 6360000002 HC RX W HCPCS: Performed by: INTERNAL MEDICINE

## 2021-09-28 PROCEDURE — 2580000003 HC RX 258: Performed by: NURSE PRACTITIONER

## 2021-09-28 PROCEDURE — 85027 COMPLETE CBC AUTOMATED: CPT

## 2021-09-28 PROCEDURE — 83735 ASSAY OF MAGNESIUM: CPT

## 2021-09-28 PROCEDURE — 36415 COLL VENOUS BLD VENIPUNCTURE: CPT

## 2021-09-28 PROCEDURE — 99232 SBSQ HOSP IP/OBS MODERATE 35: CPT | Performed by: INTERNAL MEDICINE

## 2021-09-28 PROCEDURE — 6360000002 HC RX W HCPCS: Performed by: NURSE PRACTITIONER

## 2021-09-28 PROCEDURE — 76937 US GUIDE VASCULAR ACCESS: CPT

## 2021-09-28 PROCEDURE — 82248 BILIRUBIN DIRECT: CPT

## 2021-09-28 PROCEDURE — 6370000000 HC RX 637 (ALT 250 FOR IP): Performed by: INTERNAL MEDICINE

## 2021-09-28 PROCEDURE — 2500000003 HC RX 250 WO HCPCS: Performed by: INTERNAL MEDICINE

## 2021-09-28 PROCEDURE — 80069 RENAL FUNCTION PANEL: CPT

## 2021-09-28 PROCEDURE — 2060000000 HC ICU INTERMEDIATE R&B

## 2021-09-28 PROCEDURE — 6370000000 HC RX 637 (ALT 250 FOR IP): Performed by: NURSE PRACTITIONER

## 2021-09-28 PROCEDURE — 2580000003 HC RX 258: Performed by: INTERNAL MEDICINE

## 2021-09-28 PROCEDURE — 80053 COMPREHEN METABOLIC PANEL: CPT

## 2021-09-28 PROCEDURE — 84100 ASSAY OF PHOSPHORUS: CPT

## 2021-09-28 RX ORDER — GUAIFENESIN DEXTROMETHORPHAN HYDROBROMIDE ORAL SOLUTION 10; 100 MG/5ML; MG/5ML
10 SOLUTION ORAL EVERY 4 HOURS PRN
Status: DISCONTINUED | OUTPATIENT
Start: 2021-09-28 | End: 2021-09-30 | Stop reason: HOSPADM

## 2021-09-28 RX ORDER — CIPROFLOXACIN 500 MG/1
500 TABLET, FILM COATED ORAL 2 TIMES DAILY
Qty: 4 TABLET | Refills: 0 | Status: SHIPPED | OUTPATIENT
Start: 2021-09-28 | End: 2021-09-30

## 2021-09-28 RX ORDER — DEXAMETHASONE 6 MG/1
6 TABLET ORAL
Qty: 5 TABLET | Refills: 0 | Status: SHIPPED | OUTPATIENT
Start: 2021-09-29 | End: 2021-10-04

## 2021-09-28 RX ADMIN — HYDROXYZINE HYDROCHLORIDE 50 MG: 50 TABLET ORAL at 11:34

## 2021-09-28 RX ADMIN — PREGABALIN 100 MG: 100 CAPSULE ORAL at 08:17

## 2021-09-28 RX ADMIN — TRAZODONE HYDROCHLORIDE 50 MG: 50 TABLET ORAL at 20:18

## 2021-09-28 RX ADMIN — DULOXETINE HYDROCHLORIDE 60 MG: 30 CAPSULE, DELAYED RELEASE ORAL at 08:17

## 2021-09-28 RX ADMIN — SODIUM CHLORIDE, PRESERVATIVE FREE 10 ML: 5 INJECTION INTRAVENOUS at 08:17

## 2021-09-28 RX ADMIN — CYCLOBENZAPRINE 10 MG: 10 TABLET, FILM COATED ORAL at 11:34

## 2021-09-28 RX ADMIN — ENOXAPARIN SODIUM 30 MG: 30 INJECTION SUBCUTANEOUS at 08:17

## 2021-09-28 RX ADMIN — DULOXETINE HYDROCHLORIDE 60 MG: 30 CAPSULE, DELAYED RELEASE ORAL at 20:18

## 2021-09-28 RX ADMIN — REMDESIVIR 100 MG: 100 INJECTION, POWDER, LYOPHILIZED, FOR SOLUTION INTRAVENOUS at 08:31

## 2021-09-28 RX ADMIN — BUPRENORPHINE AND NALOXONE 1 TABLET: 8; 2 TABLET SUBLINGUAL at 08:17

## 2021-09-28 RX ADMIN — SODIUM CHLORIDE, PRESERVATIVE FREE 10 ML: 5 INJECTION INTRAVENOUS at 20:20

## 2021-09-28 RX ADMIN — GUAIFENESIN DEXTROMETHORPHAN HYDROBROMIDE ORAL SOLUTION 10 ML: 200; 20 SOLUTION ORAL at 20:23

## 2021-09-28 RX ADMIN — HYDROXYZINE HYDROCHLORIDE 50 MG: 50 TABLET ORAL at 20:19

## 2021-09-28 RX ADMIN — CYCLOBENZAPRINE 10 MG: 10 TABLET, FILM COATED ORAL at 20:19

## 2021-09-28 RX ADMIN — GUAIFENESIN DEXTROMETHORPHAN HYDROBROMIDE ORAL SOLUTION 5 ML: 200; 20 SOLUTION ORAL at 09:34

## 2021-09-28 RX ADMIN — Medication 2000 UNITS: at 08:17

## 2021-09-28 RX ADMIN — DEXAMETHASONE 6 MG: 4 TABLET ORAL at 20:19

## 2021-09-28 RX ADMIN — GUAIFENESIN DEXTROMETHORPHAN HYDROBROMIDE ORAL SOLUTION 10 ML: 200; 20 SOLUTION ORAL at 15:57

## 2021-09-28 RX ADMIN — PALIPERIDONE 6 MG: 3 TABLET, EXTENDED RELEASE ORAL at 08:16

## 2021-09-28 ASSESSMENT — PAIN SCALES - GENERAL
PAINLEVEL_OUTOF10: 0
PAINLEVEL_OUTOF10: 5

## 2021-09-28 ASSESSMENT — ENCOUNTER SYMPTOMS
ABDOMINAL DISTENTION: 0
APNEA: 0
EYE DISCHARGE: 0
EYE ITCHING: 0
COLOR CHANGE: 0

## 2021-09-28 ASSESSMENT — PAIN SCALES - WONG BAKER

## 2021-09-28 ASSESSMENT — PAIN DESCRIPTION - FREQUENCY: FREQUENCY: INTERMITTENT

## 2021-09-28 ASSESSMENT — PAIN DESCRIPTION - ORIENTATION: ORIENTATION: MID;LOWER

## 2021-09-28 ASSESSMENT — PAIN DESCRIPTION - PAIN TYPE: TYPE: CHRONIC PAIN

## 2021-09-28 ASSESSMENT — PAIN DESCRIPTION - DESCRIPTORS: DESCRIPTORS: SPASM

## 2021-09-28 ASSESSMENT — PAIN DESCRIPTION - LOCATION: LOCATION: BACK

## 2021-09-28 NOTE — PROGRESS NOTES
breath. The patient tested positive for COVID-19 on 9/20. In the ED, she was hypoxic and requiring supplemental O2. She is admitted to internal medicine for further management of COVID-19 pneumonia. Review of Systems:     Constitutional: negative for chills, fevers, sweats  Respiratory: Reports improvement of shortness of breath, reports more productive cough today; negative for wheezing  Cardiovascular: negative for chest pain, chest pressure/discomfort, lower extremity edema, palpitations  Gastrointestinal:  negative for abdominal pain, constipation, diarrhea, nausea, vomiting  Neurological:  negative for dizziness, headache    Medications: Allergies: Allergies   Allergen Reactions    Ketamine Other (See Comments)    Morphine Hives       Current Meds:   Scheduled Meds:    pregabalin  100 mg Oral Daily    remdesivir IVPB  100 mg IntraVENous Q24H    paliperidone  6 mg Oral Daily    buprenorphine-naloxone  1 tablet SubLINGual Daily    nicotine  1 patch TransDERmal Daily    DULoxetine  60 mg Oral BID    sodium chloride flush  5-40 mL IntraVENous 2 times per day    enoxaparin  30 mg SubCUTAneous BID    dexamethasone  6 mg IntraVENous Q24H    Vitamin D  2,000 Units Oral Daily     Continuous Infusions:    sodium chloride       PRN Meds: sodium chloride, cyclobenzaprine, hydrOXYzine, meloxicam, traZODone, sodium chloride flush, sodium chloride, ondansetron **OR** ondansetron, polyethylene glycol, acetaminophen **OR** acetaminophen, dextromethorphan-guaiFENesin    Data:     Past Medical History:   has a past medical history of Anxiety, Arthritis, Bipolar depression ([address] Dermatitis, Dysuria, Exposure to STD, Folliculitis, Hx of acute bronchitis, Hypertension, Marfan syndrome, MVP (mitral valve prolapse), Nausea & vomiting, Polysubstance abuse ([address] PONV (postoperative nausea and vomiting), Scoliosis, Seasonal allergies, Staph infection, and UTI (lower urinary tract infection).     Social History: reports that she has been smoking cigarettes. She has smoked for the past 1.50 years. She has never used smokeless tobacco. She reports previous drug use. Drugs: Opiates , Marijuana, and Cocaine. She reports that she does not drink alcohol. Family History:   Family History   Problem Relation Age of Onset    Cancer Brother         testicular    Breast Cancer Maternal Grandmother     Hypertension Mother     Thyroid Disease Mother        Vitals:  BP (!) 141/97   Pulse 95   Temp 98.2 °F (36.8 °C) (Oral)   Resp 16   Ht 5' 10\" (1.778 m)   Wt 187 lb (84.8 kg)   SpO2 94%   BMI 26.83 kg/m²   Temp (24hrs), Av.3 °F (36.8 °C), Min:98 °F (36.7 °C), Max:98.6 °F (37 °C)    No results for input(s): POCGLU in the last 72 hours. I/O (24Hr):     Intake/Output Summary (Last 24 hours) at 2021 1501  Last data filed at 2021 1600  Gross per 24 hour   Intake 270 ml   Output    Net 270 ml       Labs:  Hematology:  Recent Labs     21  0440   WBC 5.9 3.7 6.0   RBC 4.08 3.99 4.22   HGB 11.4* 11.5* 11.7*   HCT 35.8* 36.0* 37.8   MCV 87.7 90.2 89.6   MCH 27.9 28.8 27.7   MCHC 31.8 31.9 31.0   RDW 13.2 13.4 13.3    318 213   MPV 9.6 10.8 9.9   CRP 12.7* 22.8*  --      Chemistry:  Recent Labs     21  0440   * 138 138   K 4.1 4.4 4.7   CL 92* 100 100   CO2 30 29 31   GLUCOSE 108* 124* 135*   BUN 10 12 10   CREATININE 0.37* 0.34* 0.35*   MG 2.1 2.2 2.1   ANIONGAP 7* 9 7*   LABGLOM >60 >60 >60   GFRAA >60 >60 >60   CALCIUM 8.2* 8.1* 8.8   PHOS 3.6 3.4 3.5     Recent Labs     21  0516 21  0052 21  0440   PROT 5.8* 5.7* 5.7*   LABALBU 3.4*  3.4* 3.2*  3.2* 3.4*  3.4*   AST 20 14 15   ALT 35* 26 24   ALKPHOS 72 70 72   BILITOT 0.29* 0.24* 0.20*   BILIDIR 0.13 0.11 0.10     ABG:  Lab Results   Component Value Date    FIO2 INFORMATION NOT PROVIDED 2021     Lab Results   Component Value Date/Time    SPECIAL NOT infection for 3 day course, cipro was ordered outpatient. 6. Med rec complete  7. Home o2 eval tomorrow  8.  Nocturnal o2 eval tonight    33 Kim Rao DO  9/28/2021  3:01 PM

## 2021-09-28 NOTE — PROGRESS NOTES
Spoke with patients mom Raheem Guido, gave her updates on patient's condition and spoke with her regarding discharge plans. Mother had no additional questions at this time.

## 2021-09-28 NOTE — CARE COORDINATION
PS Dr. Macho Mckinney regarding need for home O2 eval    Plan for nocturnal pulse oximetry, SW to follow up with Tamra in the morning

## 2021-09-28 NOTE — CARE COORDINATION
Called [facility] and spoke with Mario. She stated pt can return as long as she does not need O2. Stated pt will go back into quarantine there. Mario would like to be notified on time of discharge. Called pt in her room to advise. Stated she was on O2 last night  Spoke with NITIN and she will get home O2 eval    0905 - Addendum  Received call from Faisal at [facility]. He stated that pt cannot return until she has a negative covid test and that [facility]. He stated pt can come  her belongings. Called [facility] to inquire if pt could go to their [facility] with covid. Spoke with Alayna and she stated pt needs to have a negative covid test as well. Called pt to advise she cannot return to [facility]. Explained will contact [address] to see if can get her into the hotel for covid + homeless pts. Pt stated she is homeless now. Called [employer] and spoke with Darya Jarquin - she stated [facility] has a quarantine process for covid + and to contact them. Called [facility] - left message on VM    Called Gold at the [employer]. He stated pt may be able to get into the hotel but there needs to be a 3rd party that pt is connected with , ie, [employer], [employer], etc in order for someone to be checking on her at the hotel. Explained that she came from [facility] and they will not allow her to return - Anneliese Dorsey stated he will be contacting [facility] as he does not agree with their decision.   Anneliese Dorsey will c/b SW afterwards

## 2021-09-29 PROBLEM — G47.30 SLEEP-RELATED BREATHING DISORDER: Status: ACTIVE | Noted: 2021-09-29

## 2021-09-29 PROCEDURE — 99232 SBSQ HOSP IP/OBS MODERATE 35: CPT | Performed by: INTERNAL MEDICINE

## 2021-09-29 PROCEDURE — 6370000000 HC RX 637 (ALT 250 FOR IP): Performed by: INTERNAL MEDICINE

## 2021-09-29 PROCEDURE — 6360000002 HC RX W HCPCS: Performed by: INTERNAL MEDICINE

## 2021-09-29 PROCEDURE — 6360000002 HC RX W HCPCS: Performed by: NURSE PRACTITIONER

## 2021-09-29 PROCEDURE — 6370000000 HC RX 637 (ALT 250 FOR IP): Performed by: NURSE PRACTITIONER

## 2021-09-29 PROCEDURE — 2500000003 HC RX 250 WO HCPCS: Performed by: INTERNAL MEDICINE

## 2021-09-29 PROCEDURE — 2060000000 HC ICU INTERMEDIATE R&B

## 2021-09-29 PROCEDURE — 2580000003 HC RX 258: Performed by: INTERNAL MEDICINE

## 2021-09-29 PROCEDURE — 2580000003 HC RX 258: Performed by: NURSE PRACTITIONER

## 2021-09-29 RX ORDER — PREGABALIN 100 MG/1
100 CAPSULE ORAL 2 TIMES DAILY
Status: DISCONTINUED | OUTPATIENT
Start: 2021-09-29 | End: 2021-09-30 | Stop reason: HOSPADM

## 2021-09-29 RX ORDER — ALBUTEROL SULFATE 90 UG/1
2 AEROSOL, METERED RESPIRATORY (INHALATION) 4 TIMES DAILY PRN
Qty: 18 G | Refills: 0 | Status: SHIPPED | OUTPATIENT
Start: 2021-09-29

## 2021-09-29 RX ADMIN — CYCLOBENZAPRINE 10 MG: 10 TABLET, FILM COATED ORAL at 13:49

## 2021-09-29 RX ADMIN — PREGABALIN 100 MG: 100 CAPSULE ORAL at 09:10

## 2021-09-29 RX ADMIN — BUPRENORPHINE AND NALOXONE 1 TABLET: 8; 2 TABLET SUBLINGUAL at 09:09

## 2021-09-29 RX ADMIN — DULOXETINE HYDROCHLORIDE 60 MG: 30 CAPSULE, DELAYED RELEASE ORAL at 09:09

## 2021-09-29 RX ADMIN — REMDESIVIR 100 MG: 100 INJECTION, POWDER, LYOPHILIZED, FOR SOLUTION INTRAVENOUS at 09:11

## 2021-09-29 RX ADMIN — SODIUM CHLORIDE, PRESERVATIVE FREE 10 ML: 5 INJECTION INTRAVENOUS at 20:45

## 2021-09-29 RX ADMIN — CYCLOBENZAPRINE 10 MG: 10 TABLET, FILM COATED ORAL at 20:45

## 2021-09-29 RX ADMIN — TRAZODONE HYDROCHLORIDE 50 MG: 50 TABLET ORAL at 20:45

## 2021-09-29 RX ADMIN — PREGABALIN 100 MG: 100 CAPSULE ORAL at 23:25

## 2021-09-29 RX ADMIN — Medication 2000 UNITS: at 09:09

## 2021-09-29 RX ADMIN — DULOXETINE HYDROCHLORIDE 60 MG: 30 CAPSULE, DELAYED RELEASE ORAL at 20:45

## 2021-09-29 RX ADMIN — ENOXAPARIN SODIUM 30 MG: 30 INJECTION SUBCUTANEOUS at 20:45

## 2021-09-29 RX ADMIN — SODIUM CHLORIDE, PRESERVATIVE FREE 10 ML: 5 INJECTION INTRAVENOUS at 09:11

## 2021-09-29 RX ADMIN — GUAIFENESIN DEXTROMETHORPHAN HYDROBROMIDE ORAL SOLUTION 10 ML: 200; 20 SOLUTION ORAL at 13:49

## 2021-09-29 RX ADMIN — PALIPERIDONE 6 MG: 3 TABLET, EXTENDED RELEASE ORAL at 09:09

## 2021-09-29 RX ADMIN — HYDROXYZINE HYDROCHLORIDE 50 MG: 50 TABLET ORAL at 20:45

## 2021-09-29 RX ADMIN — HYDROXYZINE HYDROCHLORIDE 50 MG: 50 TABLET ORAL at 13:49

## 2021-09-29 RX ADMIN — DEXAMETHASONE 6 MG: 4 TABLET ORAL at 09:10

## 2021-09-29 ASSESSMENT — PAIN SCALES - WONG BAKER
WONGBAKER_NUMERICALRESPONSE: 0

## 2021-09-29 ASSESSMENT — PAIN DESCRIPTION - DESCRIPTORS: DESCRIPTORS: ACHING

## 2021-09-29 ASSESSMENT — PAIN DESCRIPTION - PAIN TYPE: TYPE: CHRONIC PAIN

## 2021-09-29 ASSESSMENT — PAIN SCALES - GENERAL
PAINLEVEL_OUTOF10: 0
PAINLEVEL_OUTOF10: 7

## 2021-09-29 ASSESSMENT — ENCOUNTER SYMPTOMS
EYE DISCHARGE: 0
APNEA: 0
ABDOMINAL DISTENTION: 0
COLOR CHANGE: 0
EYE ITCHING: 0

## 2021-09-29 ASSESSMENT — PAIN DESCRIPTION - FREQUENCY: FREQUENCY: INTERMITTENT

## 2021-09-29 ASSESSMENT — PAIN DESCRIPTION - ORIENTATION: ORIENTATION: LOWER

## 2021-09-29 ASSESSMENT — PAIN DESCRIPTION - LOCATION: LOCATION: BACK

## 2021-09-29 NOTE — CARE COORDINATION
Nocturnal pulse ox study not completed. Erica MEI updated    3947 home O2 eval completed. Pt does not qualify for home oxygen.  Per Celestino MEI, pt is able to return to [facility] tomorrow at 10:30am to [address]. Transportation request faxed to [address] [place] [address]    1800 confirmed 10:30am  with Jerardo Evans at [facility]

## 2021-09-29 NOTE — PROGRESS NOTES
[facility]  Office: [address], DO, Yvon Dunaway, DO, Thao Villeda, DO, Austen Doe, DO, Ethan See MD, Nely Darnell MD, Marcin Moreno MD, Belkis Solitario MD, Stanford Luo MD, Bakari Cheema MD, Marita Nava MD, Lupillo Ruelas, DO, Mariano Hall, DO, Bryan Meehan MD,  Senthil Staff, DO, Ana Marcial MD, Teodoro Chacon MD, Sharon Arriola MD, Azam Rondon MD, , Meena Moreno MD, Deon Salinas MD, Eve Fajardo MD, Quita Caraballo, CNP, [facility] Erasmoosstheodore, CNP, Silvana Canbrayan, CNP, Marion Fret, CNS, Natalia Silva, CNP, Latricia Brain, CNP, Ofelia Blackwell, CNP, Mary Moss, CNP, Paddy Allen, CNP, Hakeem Altamirano PA-C, Alee Finley, [facility], Kimberlee Berrios, CNP, Neida Horton, CNP, Gayle Zambrano, CNP, Christina Pérez, CNP, Joe London, CNP, Melissa Shabazz, CNP, Padmini Ling, CNP, [facility], [address] [place] [address]    Progress Note    9/29/2021    12:53 PM    Name:   Yanet Shah  MRN:     [id number]     Acct:      [de-identified]   Room:   [address] [place] [address] Day:  5  Admit Date:  9/24/2021  1:31 PM    PCP:   GUIDO Alfredo CNP  Code Status:  Full Code    Subjective:     C/C:   Chief Complaint   Patient presents with    Shortness of Breath     pt was found with sats in 70s-80s on room air; tolerating [email or web address] well at 98%    Positive For Covid-19     x2 days ago     Interval History Status: improved. Pt seen and examined this morning. No acute events overnight. Oxygen dips to 88% when patient falls asleep. Does not qualify for nocturnal o2. Feeling better. Left arm swollen. Brief History:     Yanet hSah is a 46 y.o. female with a past medical history of HTN and depression who presented to the emergency department on 9/24/2021 complaining of shortness of breath. The patient tested positive for COVID-19 on 9/20. In the ED, she was hypoxic and requiring supplemental O2.  She is admitted to internal medicine for further management of COVID-19 pneumonia. Review of Systems:     Constitutional: negative for chills, fevers, sweats  Respiratory: Reports improvement of shortness of breath, reports more productive cough today; negative for wheezing  Cardiovascular: negative for chest pain, chest pressure/discomfort, lower extremity edema, palpitations  Gastrointestinal:  negative for abdominal pain, constipation, diarrhea, nausea, vomiting  Neurological:  negative for dizziness, headache  Extremities: reports left arm swelling    Medications: Allergies: Allergies   Allergen Reactions    Ketamine Other (See Comments)    Morphine Hives       Current Meds:   Scheduled Meds:    dexamethasone  6 mg Oral Daily    pregabalin  100 mg Oral Daily    paliperidone  6 mg Oral Daily    buprenorphine-naloxone  1 tablet SubLINGual Daily    nicotine  1 patch TransDERmal Daily    DULoxetine  60 mg Oral BID    sodium chloride flush  5-40 mL IntraVENous 2 times per day    enoxaparin  30 mg SubCUTAneous BID    Vitamin D  2,000 Units Oral Daily     Continuous Infusions:    sodium chloride       PRN Meds: dextromethorphan-guaiFENesin, sodium chloride, cyclobenzaprine, hydrOXYzine, meloxicam, traZODone, sodium chloride flush, sodium chloride, ondansetron **OR** ondansetron, polyethylene glycol, acetaminophen **OR** acetaminophen    Data:     Past Medical History:   has a past medical history of Anxiety, Arthritis, Bipolar depression ([address] Dermatitis, Dysuria, Exposure to STD, Folliculitis, Hx of acute bronchitis, Hypertension, Marfan syndrome, MVP (mitral valve prolapse), Nausea & vomiting, Polysubstance abuse ([address] PONV (postoperative nausea and vomiting), Scoliosis, Seasonal allergies, Staph infection, and UTI (lower urinary tract infection). Social History:   reports that she has been smoking cigarettes. She has smoked for the past 1.50 years.  She has never used smokeless tobacco. She reports previous drug use. Drugs: Opiates , Marijuana, and Cocaine. She reports that she does not drink alcohol. Family History:   Family History   Problem Relation Age of Onset    Cancer Brother         testicular    Breast Cancer Maternal Grandmother     Hypertension Mother     Thyroid Disease Mother        Vitals:  /83   Pulse 79   Temp 98.3 °F (36.8 °C) (Oral)   Resp 15   Ht 5' 10\" (1.778 m)   Wt 187 lb (84.8 kg)   SpO2 95%   BMI 26.83 kg/m²   Temp (24hrs), Av.4 °F (36.9 °C), Min:98.1 °F (36.7 °C), Max:98.6 °F (37 °C)    No results for input(s): POCGLU in the last 72 hours. I/O (24Hr): No intake or output data in the 24 hours ending 21 1253    Labs:  Hematology:  Recent Labs     210   WBC 3.7 6.0   RBC 3.99 4.22   HGB 11.5* 11.7*   HCT 36.0* 37.8   MCV 90.2 89.6   MCH 28.8 27.7   MCHC 31.9 31.0   RDW 13.4 13.3    213   MPV 10.8 9.9   CRP 22.8*  --      Chemistry:  Recent Labs     21  0440    138   K 4.4 4.7    100   CO2 29 31   GLUCOSE 124* 135*   BUN 12 10   CREATININE 0.34* 0.35*   MG 2.2 2.1   ANIONGAP 9 7*   LABGLOM >60 >60   GFRAA >60 >60   CALCIUM 8.1* 8.8   PHOS 3.4 3.5     Recent Labs     21  0440   PROT 5.7* 5.7*   LABALBU 3.2*  3.2* 3.4*  3.4*   AST 14 15   ALT 26 24   ALKPHOS 70 72   BILITOT 0.24* 0.20*   BILIDIR 0.11 0.10     ABG:  Lab Results   Component Value Date    FIO2 INFORMATION NOT PROVIDED 2021     Lab Results   Component Value Date/Time    SPECIAL NOT REPORTED 2021 01:35 PM     Lab Results   Component Value Date/Time    CULTURE ENTEROCOCCUS FAECALIS >859059 CFU/ML (A) 2021 01:35 PM       Radiology:  CT CHEST WO CONTRAST    Result Date: 2021  Middle lobe collapse possibly related to a mucous plug. Left basilar airspace disease may represent pneumonia and or atelectasis. Stable ectasia of the ascending aorta.      XR CHEST PORTABLE    Result Date: 2021  No cardiomegaly, pneumonia or interstitial edema. No significant change from 05/25/2021. Physical Examination:        General appearance:  alert, cooperative and no distress, sitting up in bed  Mental Status:  oriented to person, place and time and normal affect  Lungs:  clear to auscultation bilaterally, no wheezes/rales/ronchi; normal effort  Heart:  regular rate and rhythm, no murmur  Abdomen:  soft, nontender, nondistended, normal bowel sounds, no masses, hepatomegaly, splenomegaly  Extremities:  no edema, redness, tenderness in the calves, left arm more edematous than right, non-pitting edema  Skin:  no gross lesions, rashes, induration    Assessment:        Hospital Problems         Last Modified POA    * (Principal) Acute hypoxemic respiratory failure due to COVID-19 ([facility] 9/28/2021 Yes    COVID-19 virus infection 9/28/2021 Yes    Dysphagia 9/28/2021 Yes    Overview Signed 8/14/2017  1:34 PM by Austin Lyles LPN     Food and water         Opioid abuse ([facility] 9/28/2021 Yes    H/O long-term treatment with high-risk medication 9/28/2021 Yes    Marfan's syndrome 9/28/2021 Yes    Severe recurrent major depression without psychotic features ([facility] 9/28/2021 Yes          Plan:        1. Patient remains appropriate for discharge  2. Likely has sleep related breathing disorder - will need PSG in outpatient setting  3. Does not qualify for nocturnal or home o2  4. Continue decadron and cipro  5. Continue all current home medications  6. Continue suboxone  7. Med rec complete  8.  If she does not have improvement of her left arm swelling in 2 weeks, will need to follow up with PCP for possible LUE doppler to r/o DVT    33 Kim Rao DO  9/29/2021  12:53 PM

## 2021-09-29 NOTE — PROGRESS NOTES
Patient's mother, Indu Brooks, called for an update on discharge plans. Informed her we are working with social work and the facility to find a safe discharge plan for patient.

## 2021-09-29 NOTE — PLAN OF CARE
Problem: Airway Clearance - Ineffective  Goal: Achieve or maintain patent airway  9/29/2021 1407 by Lia Hardy RN  Outcome: Ongoing  9/29/2021 1221 by Lia Hardy RN  Outcome: Ongoing     Problem: Gas Exchange - Impaired  Goal: Absence of hypoxia  9/29/2021 1407 by Lia Hardy RN  Outcome: Ongoing  9/29/2021 1221 by Lia Hardy RN  Outcome: Ongoing  Goal: Promote optimal lung function  9/29/2021 1407 by Lia Hardy RN  Outcome: Ongoing  9/29/2021 1221 by Lia Hardy RN  Outcome: Ongoing     Problem: Breathing Pattern - Ineffective  Goal: Ability to achieve and maintain a regular respiratory rate  9/29/2021 1407 by Lia Hardy RN  Outcome: Ongoing  9/29/2021 1221 by Lia Hardy RN  Outcome: Ongoing     Problem:  Body Temperature -  Risk of, Imbalanced  Goal: Ability to maintain a body temperature within defined limits  9/29/2021 1407 by Lia Hardy RN  Outcome: Ongoing  9/29/2021 1221 by Lia Hardy RN  Outcome: Ongoing  Goal: Will regain or maintain usual level of consciousness  9/29/2021 1407 by Lia Hardy RN  Outcome: Ongoing  9/29/2021 1221 by Lia Hardy RN  Outcome: Ongoing  Goal: Complications related to the disease process, condition or treatment will be avoided or minimized  9/29/2021 1407 by Lia Hardy RN  Outcome: Ongoing  9/29/2021 1221 by Lia Hardy RN  Outcome: Ongoing     Problem: Isolation Precautions - Risk of Spread of Infection  Goal: Prevent transmission of infection  9/29/2021 1407 by Lia Hardy RN  Outcome: Ongoing  9/29/2021 1221 by Lia Hardy RN  Outcome: Ongoing     Problem: Nutrition Deficits  Goal: Optimize nutritional status  9/29/2021 1407 by Lia Hardy RN  Outcome: Ongoing  9/29/2021 1221 by Lia Hardy RN  Outcome: Ongoing     Problem: Risk for Fluid Volume Deficit  Goal: Maintain normal heart rhythm  9/29/2021 1407 by Lia Hardy RN  Outcome: Ongoing  9/29/2021 1221 by Yasmine Bone RN  Outcome: Ongoing  Goal: Maintain absence of muscle cramping  9/29/2021 1407 by Yasmine Bone RN  Outcome: Ongoing  9/29/2021 1221 by Yasmine Bone RN  Outcome: Ongoing  Goal: Maintain normal serum potassium, sodium, calcium, phosphorus, and pH  9/29/2021 1407 by Yasmine Bone RN  Outcome: Ongoing  9/29/2021 1221 by Yasmine Bone RN  Outcome: Ongoing     Problem: Loneliness or Risk for Loneliness  Goal: Demonstrate positive use of time alone when socialization is not possible  9/29/2021 1407 by Yasmine Bone RN  Outcome: Ongoing  9/29/2021 1221 by Yasmine Bone RN  Outcome: Ongoing     Problem: Fatigue  Goal: Verbalize increase energy and improved vitality  9/29/2021 1407 by Yasmine Bone RN  Outcome: Ongoing  9/29/2021 1221 by Yasmine Bone RN  Outcome: Ongoing     Problem: Patient Education: Go to Patient Education Activity  Goal: Patient/Family Education  9/29/2021 1407 by Yasmine Bone RN  Outcome: Ongoing  9/29/2021 1221 by Yasmine Bone RN  Outcome: Ongoing     Problem: Falls - Risk of:  Goal: Will remain free from falls  Description: Will remain free from falls  9/29/2021 1407 by Yasmine Bone RN  Outcome: Ongoing  9/29/2021 1221 by Yasmine Bone RN  Outcome: Ongoing  Goal: Absence of physical injury  Description: Absence of physical injury  9/29/2021 1407 by Yasmine Bone RN  Outcome: Ongoing  9/29/2021 1221 by Yasmine Bone RN  Outcome: Ongoing     Problem: Pain:  Goal: Pain level will decrease  Description: Pain level will decrease  9/29/2021 1407 by Yasmine Bone RN  Outcome: Ongoing  9/29/2021 1221 by Yasmine Bone RN  Outcome: Ongoing  Goal: Control of acute pain  Description: Control of acute pain  9/29/2021 1407 by Yasmine Bone RN  Outcome: Ongoing  9/29/2021 1221 by Yasmine Bone RN  Outcome: Ongoing  Goal: Control of chronic pain  Description: Control of chronic pain  9/29/2021 1407 by Cyrus Prasad RN  Outcome: Ongoing  9/29/2021 1221 by Cyrus Prasad RN  Outcome: Ongoing

## 2021-09-29 NOTE — PROGRESS NOTES
Home Oxygen Evaluation    Home Oxygen Evaluation completed. Patient is off oxygen    Resting SpO2 on room air = 95%    SpO2 on room air with exercise = 93%    Nocturnal Oximetry with patient on room air is recommended is SpO2 is between 89% and 95% (requires additional order).     Ariane Mejia RCP  11:48 AM

## 2021-09-29 NOTE — PLAN OF CARE
Problem: Airway Clearance - Ineffective  Goal: Achieve or maintain patent airway  9/28/2021 2258 by Kai Herrera RN  Outcome: Ongoing  9/28/2021 1719 by Misa Camacho RN  Outcome: Ongoing     Problem: Gas Exchange - Impaired  Goal: Absence of hypoxia  9/28/2021 2258 by Kai Herrera RN  Outcome: Ongoing  9/28/2021 1719 by Misa Camacho RN  Outcome: Ongoing  Goal: Promote optimal lung function  9/28/2021 2258 by Kai Herrera RN  Outcome: Ongoing  9/28/2021 1719 by Misa Camacho RN  Outcome: Ongoing     Problem: Breathing Pattern - Ineffective  Goal: Ability to achieve and maintain a regular respiratory rate  9/28/2021 2258 by Kai Herrera RN  Outcome: Ongoing  9/28/2021 1719 by Misa Camacho RN  Outcome: Ongoing     Problem:  Body Temperature -  Risk of, Imbalanced  Goal: Ability to maintain a body temperature within defined limits  9/28/2021 2258 by Kai Herrera RN  Outcome: Ongoing  9/28/2021 1719 by Misa Camacho RN  Outcome: Ongoing  Goal: Will regain or maintain usual level of consciousness  9/28/2021 2258 by Kai Herrera RN  Outcome: Ongoing  9/28/2021 1719 by Misa Camacho RN  Outcome: Ongoing  Goal: Complications related to the disease process, condition or treatment will be avoided or minimized  9/28/2021 2258 by Kai Herrera RN  Outcome: Ongoing  9/28/2021 1719 by Misa Camacho RN  Outcome: Ongoing     Problem: Isolation Precautions - Risk of Spread of Infection  Goal: Prevent transmission of infection  9/28/2021 2258 by Kai Herrera RN  Outcome: Ongoing  9/28/2021 1719 by Misa Camacho RN  Outcome: Ongoing     Problem: Nutrition Deficits  Goal: Optimize nutritional status  9/28/2021 2258 by Kai Herrera RN  Outcome: Ongoing  9/28/2021 1719 by Misa Camacho RN  Outcome: Ongoing     Problem: Risk for Fluid Volume Deficit  Goal: Maintain normal heart rhythm  9/28/2021 2258 by Kai Herrera RN  Outcome: Ongoing  9/28/2021 1719 by Misa Camacho RN  Outcome: Ongoing  Goal: Maintain absence of muscle cramping  9/28/2021 2258 by Jeovanny Duffy RN  Outcome: Ongoing  9/28/2021 1719 by Vesna Starr RN  Outcome: Ongoing  Goal: Maintain normal serum potassium, sodium, calcium, phosphorus, and pH  9/28/2021 2258 by Jeovanny Duffy RN  Outcome: Ongoing  9/28/2021 1719 by Vesna Starr RN  Outcome: Ongoing     Problem: Loneliness or Risk for Loneliness  Goal: Demonstrate positive use of time alone when socialization is not possible  9/28/2021 2258 by Jeovanny Duffy RN  Outcome: Ongoing  9/28/2021 1719 by Vesna Starr RN  Outcome: Ongoing     Problem: Fatigue  Goal: Verbalize increase energy and improved vitality  9/28/2021 2258 by Jeovanny Duffy RN  Outcome: Ongoing  9/28/2021 1719 by Vesna Starr RN  Outcome: Ongoing     Problem: Patient Education: Go to Patient Education Activity  Goal: Patient/Family Education  9/28/2021 2258 by Jeovanny Duffy RN  Outcome: Ongoing  9/28/2021 1719 by Vesna Starr RN  Outcome: Ongoing     Problem: Falls - Risk of:  Goal: Will remain free from falls  Description: Will remain free from falls  9/28/2021 2258 by Jeovanny Duffy RN  Outcome: Ongoing  9/28/2021 1719 by Vesna Satrr RN  Outcome: Ongoing  Goal: Absence of physical injury  Description: Absence of physical injury  9/28/2021 2258 by Jeovanny Duffy RN  Outcome: Ongoing  9/28/2021 1719 by Vesna Starr RN  Outcome: Ongoing     Problem: Pain:  Goal: Pain level will decrease  Description: Pain level will decrease  9/28/2021 2258 by Jeovanny Duffy RN  Outcome: Ongoing  9/28/2021 1719 by Vesna Starr RN  Outcome: Ongoing  Goal: Control of acute pain  Description: Control of acute pain  9/28/2021 2258 by Jeovanny Duffy RN  Outcome: Ongoing  9/28/2021 1719 by Vesna Starr RN  Outcome: Ongoing  Goal: Control of chronic pain  Description: Control of chronic pain  9/28/2021 2258 by Jeovanny Duffy RN  Outcome: Ongoing  9/28/2021 1719 by Vesna Starr RN  Outcome: Ongoing

## 2021-09-29 NOTE — CARE COORDINATION
Spoke with Senait Keller at [facility] again and Lashon Rivers at the [employer] both individually and then did conference call with the 2 of them as Sahil Fam seemed to be getting different messages from [place]. Senait Arianna stated pt can return once she is 10 days post initial covid + test (9/20 at their facility, was admitted here 9/24, and was in isolation there prior to admission) and a medical professional states that she can return under no further isolation and is medically stable. Discussed with Dr Cristofer Jenkins will fax needed info to [facility] and pt to be discharged tomorrow. Asked to set up discharge time and Senait Arianna stated that he needed to speak with his clinicians on a time and will call  back    CM updated - she will arrange for an ambulette for tomorrow once time given to  for return    1515 - Addendum  Received c/b from Senait Keller - stated pt can return at 10:30a tomorrow, provided they receive the fax for medical clearance, no longer needing isolation. Explained will be faxing soon. Will update CM so ambulette can be arranged    1620 - Addendum  Needed document faxed to [place]. Received message from pt's mother to call her. Called pt - ok to call mother. Called mother, Jhonathan Tyson,  Stated pt updated her that she will be returning to [facility] at 10:30a tomorrow.

## 2021-09-29 NOTE — PROGRESS NOTES
Writer spoke to patient about discharge plans. Patient states she will inform her mother about discharge planning.

## 2021-09-29 NOTE — DISCHARGE INSTR - DIET

## 2021-09-29 NOTE — PROGRESS NOTES
[facility] -   Infectious diseases evaluation  admission date 9/24/2021    reason for consultation:   covid    Impression :   Current:  Covid, pneumonia    Other:  ·   Discussion / summary of stay / plan of care   ·   Recommendations   · Remdesivir 5 days, completed for now  · Decadron 6 mg for now, total of 10 days of steroids  · Anticoagulation  · Discharge planning, she has responded in general.  And is stable    Infection Control Recommendations   · [place] Precautions  · Contact Isolation   · Airborne isolation  · Droplet Isolation      Antimicrobial Stewardship Recommendations   · Simplification of therapy  · off AB    Coordination ofOutpatient Care:   · Estimated Length of IV antimicrobials:  · Patient will need Midline / picc Catheter Insertion:   · Patient will need SNF:  · Patient will need outpatient wound care:     History of Present Illness:   Initial history:  Nicole Ugalde is a 46y.o.-year-old female     Interval changes  9/29/2021   Patient Vitals for the past 8 hrs:   BP Temp Temp src Pulse Resp SpO2   09/29/21 0454 125/83 98.3 °F (36.8 °C) Oral 79 15 95 %   09/29/21 0400    76     No fever alert eating very well, she is on room air, saturating 97%. Continues to have some psych issues, she also has placement issues. No new CRP today, the last was 22 on 9/27 9/29: Continues to be without oxygen, feeling good good appetite no abdominal pain, awaiting placement  No labs today      I have personally reviewed the past medical history, past surgical history, medications, social history, and family history, and I haveupdated the database accordingly. Allergies:   Ketamine and Morphine     Review of Systems:     Review of Systems   Constitutional: Negative for activity change and appetite change. HENT: Negative for congestion. Eyes: Negative for discharge, itching and visual disturbance. Respiratory: Negative for apnea. Cardiovascular: Negative for chest pain. Gastrointestinal: Negative for abdominal distention. Endocrine: Negative for cold intolerance and polyuria. Genitourinary: Negative for dysuria. Musculoskeletal: Negative for arthralgias. Skin: Negative for color change. Allergic/Immunologic: Negative for environmental allergies and immunocompromised state. Neurological: Negative for dizziness. Hematological: Negative for adenopathy. Psychiatric/Behavioral: Negative for agitation. Physical Examination :       Physical Exam  Constitutional:       Appearance: Normal appearance. She is not ill-appearing. HENT:      Head: Normocephalic and atraumatic. Nose: Nose normal.      Mouth/Throat:      Mouth: Mucous membranes are moist.   Eyes:      General: No scleral icterus. Conjunctiva/sclera: Conjunctivae normal.      Pupils: Pupils are equal, round, and reactive to light. Cardiovascular:      Rate and Rhythm: Normal rate and regular rhythm. Heart sounds: Normal heart sounds. No murmur heard. Pulmonary:      Breath sounds: No stridor. No rhonchi. Abdominal:      General: There is no distension. Palpations: Abdomen is soft. Genitourinary:     Comments: Urine hong  Musculoskeletal:         General: No swelling or deformity. Cervical back: Neck supple. No rigidity. Skin:     General: Skin is dry. Coloration: Skin is not jaundiced. Findings: No bruising or lesion. Neurological:      General: No focal deficit present. Mental Status: She is alert and oriented to person, place, and time. Psychiatric:         Mood and Affect: Mood normal.         Thought Content: Thought content normal.         Past Medical History:     Past Medical History:   Diagnosis Date    Anxiety     Arthritis     O. A.    Bipolar depression ([facility] [address]     Dermatitis     Dysuria     Exposure to STD     Folliculitis     Hx of acute bronchitis     Hypertension     Marfan syndrome     MVP (mitral valve prolapse)     Nausea & vomiting     Polysubstance abuse (HCC)     drug abuse includes cocaine, fentnyl, cannabis    PONV (postoperative nausea and vomiting)     Scoliosis     Seasonal allergies     Staph infection     HX OF ALSO BACTEREMIA    UTI (lower urinary tract infection)        Past Surgical  History:     Past Surgical History:   Procedure Laterality Date    BREAST REDUCTION SURGERY Bilateral     2012    FOOT CAPSULOTOMY Left 9/17/14    1st mppj    FOOT SURGERY Bilateral     RT HAS 2 SCREWS, LT HAD FUSION IN DEC WITH 2 SCREWS    HIP ARTHROSCOPY Left     KNEE ARTHROSCOPY Right     X 2    IN EGD TRANSORAL BIOPSY SINGLE/MULTIPLE N/A 8/29/2017    EGD BIOPSY WITH DILATATION WITH MALONEYS performed by Gigi Streeter MD at [facility] [address] Bilateral     harware in place bilat    TUBAL LIGATION      clips placed on fallopian tubes    UPPER GASTROINTESTINAL ENDOSCOPY  08/29/2017     EXTENSIVE SPASMATIC CONTRACTIONS; NO LUMINAL OBSTRUCTIONS; CLOSER TO THE DISTAL ESOPHAGUS AND DEVELOPING DIVERTICULUM IS NOTED AND BELIEVED TO BE SEC TO DSYSMOTILITY;  DALEY DIATIONS WERE DONE EMPIRICALLY WITH 56 FR       Medications:      dexamethasone  6 mg Oral Daily    pregabalin  100 mg Oral Daily    paliperidone  6 mg Oral Daily    buprenorphine-naloxone  1 tablet SubLINGual Daily    nicotine  1 patch TransDERmal Daily    DULoxetine  60 mg Oral BID    sodium chloride flush  5-40 mL IntraVENous 2 times per day    enoxaparin  30 mg SubCUTAneous BID    Vitamin D  2,000 Units Oral Daily       Social History:     Social History     Socioeconomic History    Marital status:      Spouse name: Not on file    Number of children: Not on file    Years of education: Not on file    Highest education level: Not on file   Occupational History    Not on file   Tobacco Use    Smoking status: Current Every Day Smoker     Years: 1.50     Types: Cigarettes     Last attempt to quit: 3/26/2015     Years since quittin.5    Smokeless tobacco: Never Used   Vaping Use    Vaping Use: Never used   Substance and Sexual Activity    Alcohol use: No    Drug use: Not Currently     Types: Opiates , Marijuana, Cocaine     Comment: drug abuse includes cocaine, fentnyl, cannabis    Sexual activity: Yes     Partners: Male   Other Topics Concern    Not on file   Social History Narrative    Not on file     Social Determinants of Health     Financial Resource Strain:     Difficulty of Paying Living Expenses:    Food Insecurity:     Worried About Running Out of Food in the Last Year:     Ran Out of Food in the Last Year:    Transportation Needs:     Lack of Transportation (Medical):      Lack of Transportation (Non-Medical):    Physical Activity:     Days of Exercise per Week:     Minutes of Exercise per Session:    Stress:     Feeling of Stress :    Social Connections:     Frequency of Communication with Friends and Family:     Frequency of Social Gatherings with Friends and Family:     Attends Orthodox Services:     Active Member of Clubs or Organizations:     Attends Club or Organization Meetings:     Marital Status:    Intimate Partner Violence:     Fear of Current or Ex-Partner:     Emotionally Abused:     Physically Abused:     Sexually Abused:        Family History:     Family History   Problem Relation Age of Onset    Cancer Brother         testicular    Breast Cancer Maternal Grandmother     Hypertension Mother     Thyroid Disease Mother       Medical Decision Making:   I have independently reviewed/ordered the following labs:    CBC with Differential:   Recent Labs     21  0052 21  0440   WBC 3.7 6.0   HGB 11.5* 11.7*   HCT 36.0* 37.8    213     BMP:  Recent Labs     212 21  0440    138   K 4.4 4.7    100   CO2 29 31   BUN 12 10   CREATININE 0.34* 0.35*   MG 2.2 2.1     Hepatic Function Panel:   Recent Labs 09/27/21  0052 09/28/21  0440   PROT 5.7* 5.7*   LABALBU 3.2*  3.2* 3.4*  3.4*   BILIDIR 0.11 0.10   IBILI 0.13 0.10   BILITOT 0.24* 0.20*   ALKPHOS 70 72   ALT 26 24   AST 14 15     No results for input(s): RPR in the last 72 hours. No results for input(s): HIV in the last 72 hours. No results for input(s): BC in the last 72 hours. Lab Results   Component Value Date    CREATININE 0.35 09/28/2021    GLUCOSE 135 09/28/2021       Detailed results: Thank you for allowing us to participate in the care of this patient. Please call with questions. This note is created with the assistance of a speech recognition program.  While intending to generate adocument that actually reflects the content of the visit, the document can still have some errors including those of syntax and sound a like substitutions which may escape proof reading. It such instances, actual meaningcan be extrapolated by contextual diversion.     Macy Jefferson MD  Office: [phone]  Perfect serve / office [phone]

## 2021-09-29 NOTE — CARE COORDINATION
Advised pt to be discharged today and did not qualify for home O2. Called [employer] and left message - await return call    [address] [facility] again - message left this time for Paolo Chery, supervisor  Await return call    [phone] - Addendum  Received call from Yola Rapp at [employer]. He stated pt cannot come back if she needs to be in isolation. Explained again that she was in isolation over there for covid before she was admitted. He stated they cannot confirm she has been in isolation since she left their facility - questioned him on the fact she has been in a covid hospital unit and we can verify she has been in isolation. He also stated that would not need a negative covid test, just cannot need isolation. He stated either she or her emergency contact can  her belongings. Called Gold at the [employer] inquiring about getting her into a hotel for the remainder of her quarantine.   Lizy Collazo stated he plans to contact someone else at [facility] and will call writer back     Called pt to update that still working on discharge arrangements

## 2021-09-30 VITALS
WEIGHT: 187 LBS | TEMPERATURE: 98.1 F | SYSTOLIC BLOOD PRESSURE: 122 MMHG | RESPIRATION RATE: 19 BRPM | DIASTOLIC BLOOD PRESSURE: 80 MMHG | OXYGEN SATURATION: 96 % | HEIGHT: 70 IN | HEART RATE: 103 BPM | BODY MASS INDEX: 26.77 KG/M2

## 2021-09-30 PROCEDURE — 6370000000 HC RX 637 (ALT 250 FOR IP): Performed by: NURSE PRACTITIONER

## 2021-09-30 PROCEDURE — 6360000002 HC RX W HCPCS: Performed by: INTERNAL MEDICINE

## 2021-09-30 PROCEDURE — 6360000002 HC RX W HCPCS: Performed by: NURSE PRACTITIONER

## 2021-09-30 PROCEDURE — 6370000000 HC RX 637 (ALT 250 FOR IP): Performed by: INTERNAL MEDICINE

## 2021-09-30 RX ADMIN — BUPRENORPHINE AND NALOXONE 1 TABLET: 8; 2 TABLET SUBLINGUAL at 09:20

## 2021-09-30 RX ADMIN — Medication 2000 UNITS: at 09:28

## 2021-09-30 RX ADMIN — GUAIFENESIN DEXTROMETHORPHAN HYDROBROMIDE ORAL SOLUTION 10 ML: 200; 20 SOLUTION ORAL at 06:50

## 2021-09-30 RX ADMIN — DEXAMETHASONE 6 MG: 4 TABLET ORAL at 09:21

## 2021-09-30 RX ADMIN — PALIPERIDONE 6 MG: 3 TABLET, EXTENDED RELEASE ORAL at 09:22

## 2021-09-30 RX ADMIN — PREGABALIN 100 MG: 100 CAPSULE ORAL at 09:22

## 2021-09-30 RX ADMIN — DULOXETINE HYDROCHLORIDE 60 MG: 30 CAPSULE, DELAYED RELEASE ORAL at 09:22

## 2021-09-30 ASSESSMENT — PAIN SCALES - WONG BAKER
WONGBAKER_NUMERICALRESPONSE: 0

## 2021-09-30 ASSESSMENT — PAIN SCALES - GENERAL: PAINLEVEL_OUTOF10: 0

## 2021-09-30 NOTE — CARE COORDINATION
Erica MEI notified of transportation scheduled for 10:30am. Spoke to pt via telephone to discuss transitional planning. She is agreeable with plan to return to [place]. She will update her mother.  She denies needs    Discharge [facility] Case Management Department  Written by: Miah Riojas RN    Patient Name: Yessy Navas  Attending Provider: Hilton Najera DO  Admit Date: 2021  1:31 PM  MRN: [id number]  Account: [de-identified]                     : 1980  Discharge Date: 2021      Disposition: Lisa Don RN

## 2021-09-30 NOTE — PLAN OF CARE
Problem: Airway Clearance - Ineffective  Goal: Achieve or maintain patent airway  9/30/2021 0024 by Azul Kimball RN  Outcome: Ongoing     Problem: Gas Exchange - Impaired  Goal: Absence of hypoxia  9/30/2021 0024 by Azul Kimball RN  Outcome: Ongoing     Problem: Gas Exchange - Impaired  Goal: Promote optimal lung function  9/30/2021 0024 by Azul Kimball RN  Outcome: Ongoing     Problem: Breathing Pattern - Ineffective  Goal: Ability to achieve and maintain a regular respiratory rate  9/30/2021 0024 by Azul Kimball RN  Outcome: Ongoing     Problem: Body Temperature -  Risk of, Imbalanced  Goal: Ability to maintain a body temperature within defined limits  9/30/2021 0024 by Azul Kimball RN  Outcome: Ongoing     Problem: Body Temperature -  Risk of, Imbalanced  Goal: Will regain or maintain usual level of consciousness  9/30/2021 0024 by Azul Kimball RN  Outcome: Ongoing     Problem:  Body Temperature -  Risk of, Imbalanced  Goal: Complications related to the disease process, condition or treatment will be avoided or minimized  9/30/2021 0024 by Azul Kimball RN  Outcome: Ongoing     Problem: Isolation Precautions - Risk of Spread of Infection  Goal: Prevent transmission of infection  9/30/2021 0024 by Azul Kimball RN  Outcome: Ongoing     Problem: Nutrition Deficits  Goal: Optimize nutritional status  9/30/2021 0024 by Azul Kimball RN  Outcome: Ongoing     Problem: Risk for Fluid Volume Deficit  Goal: Maintain normal heart rhythm  9/30/2021 0024 by Azul Kimball RN  Outcome: Ongoing     Problem: Fatigue  Goal: Verbalize increase energy and improved vitality  9/30/2021 0024 by Azul Kimball RN  Outcome: Ongoing     Problem: Falls - Risk of:  Goal: Will remain free from falls  Description: Will remain free from falls  9/30/2021 0024 by Azul Kimball RN  Outcome: Ongoing     Problem: Pain:  Goal: Pain level will decrease  Description: Pain level will decrease  9/30/2021 0024 by Azul Kimball RN  Outcome: Ongoing

## 2021-09-30 NOTE — PROGRESS NOTES
Patient discharged to [facility]. [facility] and [facility]. Went over discharge instructions including medication administration details and follow up appointments.      Electronically signed by Stan Echeverria RN on 9/30/2021 at 11:56 AM

## 2021-09-30 NOTE — FLOWSHEET NOTE
SPIRITUAL CARE PROGRESS NOTE     Spiritual Assessment: Patient was welcoming and receptive to a visit from [facility]. Patient engaged in conversation and shared struggles surrounding many personal, medical, and spiritual challenges that she is currently facing. .  Patient expressed feeling overwhelmed because of these. .   Patient's Genevieve Gonzalez christelle is a source of support and comfort during this challenging time. Patient denies experiencing any spiritual distress at this time. Patient is well connected with friends and family who are communicating with the patient regularly.  Intervention: I provided compassionate/active listening and validated patient's feelings, concerns, and experiences.  provided space for the patient to share important and significant life events.  inquired about patient's support network.  facilitated discussion related to christelle issues and offered a prayer. Patient welcomed a prayer and we prayed for her health and peace during this difficult time.  Outcome: Patient expressed gratitude for my visit. Patient cathartically expressed emotions and concerns. Patient is aware that [facility] is available 24\7 if she would like to receive another visit from a  or if there are any other spiritual services that our department could provide. 09/29/21 2006   Encounter Summary   Services provided to: Patient   Referral/Consult From: [facility] Family members   Continue Visiting   (9/29/21)   Complexity of Encounter Moderate   Length of Encounter 30 minutes   Spiritual Assessment Completed Yes   Routine   Type Initial   Assessment Tearful; Anxious   Intervention Explored feelings, thoughts, concerns; Discussed belief system/Hindu practices/christelle   Outcome Expressed gratitude; Less anxious, less agitated;New perspective       Electronically signed by Chaplain Resident Yuriy Hall MDiv.on 9/29/2021 at 8:30 PM

## 2021-12-03 NOTE — ED NOTES
Provisional Diagnosis:   Acute psychosis    Psychosocial and Contextual Factors:   Patient has been diagnosed with Marfan's syndrome. C-SSRS Summary:      Patient: X  Family:   Agency: [facility]    Substance Abuse: Patient reports she recent started using cocaine and fentanyl again. Patient has a long history of substance abuse. Patient reports she was clean for 20 months prior to her relapse about a week ago. Cocaine and Fentanyl was found on patient's person upon arrival.     Present Suicidal Behavior:  Patient denies. Verbal:     Attempt:    Past Suicidal Behavior: Patient reports past suicidal ideation, but denies past suicide attempts. Verbal:X    Attempt:      Self-Injurious/Self-Mutilation:Patient denies. Trauma Identified:  Patient denies. Protective Factors:    Patient has housing by herself. Patient has income. Risk Factors:    Patient has poor judgment. Clinical Summary:    Rory Claudio is a 46 y.o. female who presents to the ED by [facility] on an application for emergency admission. Per application for emergency admission Monica Jackson has recently had a change in her psychotropic medications. Serequel was discontinued due to excessive weight gain Vrylar 1.5 mg po gD was started 5/3/21 and continued 5/17/21. However client has not taken this medications so she has been experiencing auditory and visual hallucinations, primarily of a male about hurting her because she called the police after she believed him to have attempted to get into her apartment. However, no such male exists in reality. Sridhar Majano has been yelling at this hallucination, walked around the exterior of her apartment building screaming at people who are not there. Her thoughts are disorganized and paranoid due to the auditory and visual hallucinations. She has failed to properly eat or sleep for at least three days.  Based on clients marked decline in functioning; failure to attend to ADL's; impairment in [employer] on 01/12 arrive at 1100-nori More, gave prep inst in office on 12/03-advised pt time is subject to change [facility] will call   judgment due to psychotic symptoms and use of cocaine. She presents a danger to herself. Client requires inpatient hospital level of care to ensure her safety and treat psychosis. \"    Patient has been diagnosed with Marfan's syndrome. Patient reports she recent started using cocaine and fentanyl again. Patient has a long history of substance abuse. Patient states she was sober for 20 months and relapsed last week. Patient states she is active at [facility] and states she was at [facility] for 2 hours in groups and with her  yesterday. Patient states her auditory hallucinations began today. Patient states she started hearing \"all of my family meembers, then states she was hearing different noises and people talking to her which she believes were caused by her upstairs neighbor. Patient states \"My Ara Speak neighbor has kept me awake for the last two nights\". Patient states she called the police 3 times on her upstairs neighbor over the last two days due to the voices she was hearing that she believed was caused by her neighbor. Patient states the voices were \"being really mean to me, making fun of me and telling me that nobody was going to believe me. \". Patient states her mother came to her home today because she knew that patient had relapsed. Patient reports ongoing pain related to her Marfan's Syndrome. Patient is hyper verbal while in the ED, and singing. Patient has labile affect. Patient worried about being admitted to the hospital as her son graduates on Thursday, and states she planned to go to [facility] on Friday. Patient denies suicidal and homicidal ideation. Level of Care Disposition:    Writer gave report to oncoming .

## 2022-06-22 NOTE — H&P
[facility]  Office: [address], DO, Mukulho Lucero, DO, Bharath Nava, DO, Doris Ignacio Blood, DO, Mala Maldonado MD, Laura Mendoza MD, Keerthi Johnson MD, Digna Dubois MD, Elliot Baldwin MD, Hernesto Briscoe MD, Simba Myrick MD, Shawna Cruz, DO, Bib Tolentino, DO, Flavia Harkins MD,  Kang Mireles DO, Rafiq Goel MD, Lee Askew MD, Speedy Doe MD, Jasmin Grey MD, , Kwadwo Cumimns MD, Kaia Bloom MD, Jordan Barboza MD, Aron Henry, [facility], [facility], CNP, Jeanine Larson, CNP, Cesar Hanks, CNS, Ailene Lesches, CNP, Jaymie Bravo, CNP, Arielle Metcalf, CNP, Carlos Barr, CNP, Marcelina Peters, CNP, Garret Coyle PA-C, Amalia Moss, [facility], David An, CNP, Jamir Jones, CNP, Cleo Nugent, CNP, Onur Rader, CNP, Arlin Aguirre, CNP, Amanda Yen, CNP, America Ibarra, CNP, Sanchez Marino, CNP         [address]    HISTORY AND PHYSICAL EXAMINATION            Date:   9/24/2021  Patient name:  Regina Kumar  Date of admission:  9/24/2021  1:31 PM  MRN:   [id number]  Account:  [de-identified]  YOB: 1980  PCP:    GUIDO Dhillon CNP  Room:   47PED/47PED  Code Status:    Prior    Chief Complaint:     Chief Complaint   Patient presents with    Shortness of Breath     pt was found with sats in 70s-80s on room air; tolerating [employer] well at 98%    Positive For Covid-19     x2 days ago       History Obtained From:     reason patient could not give history:  altered mental status    History of Present Illness:     Regina Kumar is a 46 y.o. female with a past medical history of HTN and depression who presented to the emergency department on 9/24/2021 complaining of shortness of breath. The patient tested positive for COVID-19 on 9/20. In the ED, she was hypoxic and requiring supplemental O2. She is admitted to internal medicine for further management of COVID-19 pneumonia.      Past Medical History:     Past Medical History:   Diagnosis Date    Anxiety     Arthritis     O. A.    Bipolar depression ([address]     Dermatitis     Dysuria     Exposure to STD     Folliculitis     Hx of acute bronchitis     Hypertension     Marfan syndrome     MVP (mitral valve prolapse)     Nausea & vomiting     Polysubstance abuse (HCC)     drug abuse includes cocaine, fentnyl, cannabis    PONV (postoperative nausea and vomiting)     Scoliosis     Seasonal allergies     Staph infection     HX OF ALSO BACTEREMIA    UTI (lower urinary tract infection)         Past Surgical History:     Past Surgical History:   Procedure Laterality Date    BREAST REDUCTION SURGERY Bilateral     2012    [facility]    FOOT SURGERY Bilateral     RT HAS 2 SCREWS, LT HAD FUSION IN DEC WITH 2 SCREWS    HIP ARTHROSCOPY Left     KNEE ARTHROSCOPY Right     X 2    ID EGD TRANSORAL BIOPSY SINGLE/MULTIPLE N/A 8/29/2017    EGD BIOPSY WITH DILATATION WITH THUYS performed by David Fairbanks MD at [address] Bilateral     harware in place bilat    TUBAL LIGATION      clips placed on fallopian tubes    UPPER GASTROINTESTINAL ENDOSCOPY  08/29/2017     EXTENSIVE SPASMATIC CONTRACTIONS; NO LUMINAL OBSTRUCTIONS; CLOSER TO THE DISTAL ESOPHAGUS AND DEVELOPING DIVERTICULUM IS NOTED AND BELIEVED TO BE SEC TO DSYSMOTILITY;  DALEY DIATIONS WERE DONE EMPIRICALLY WITH 56 FR        Medications Prior to Admission:     Prior to Admission medications    Medication Sig Start Date End Date Taking? Authorizing Provider   traZODone (DESYREL) 50 MG tablet Take 1 tablet by mouth nightly as needed for Sleep 8/2/21   Rita Burns MD   gabapentin (NEURONTIN) 100 MG capsule Take 1 capsule by mouth 3 times daily for 30 days.  8/2/21 9/1/21  Rita Burns MD   meloxicam (MOBIC) 15 MG tablet Take 1 tablet by mouth daily as needed for Pain 8/2/21   Rita Burns MD   risperiDONE ER (PERSERIS) 90 MG PRSY injection Inject 0.6 mLs into the skin every 30 days 21   Gracy Brar MD   buprenorphine-naloxone (SUBOXONE) 8-2 MG FILM SL film Place 1 Film under the tongue daily. Historical Provider, MD   cyclobenzaprine (FLEXERIL) 10 MG tablet Take 10 mg by mouth 3 times daily as needed for Muscle spasms    Historical Provider, MD   hydroCHLOROthiazide (HYDRODIURIL) 50 MG tablet Take 50 mg by mouth daily    Historical Provider, MD   hydrOXYzine (ATARAX) 50 MG tablet Take 50 mg by mouth every 6 hours as needed    Historical Provider, MD   DULoxetine (CYMBALTA) 60 MG extended release capsule Take 60 mg by mouth 2 times daily    Historical Provider, MD        Allergies:     Ketamine and Morphine    Social History:     Tobacco:    reports that she has been smoking cigarettes. She has smoked for the past 1.50 years. She has never used smokeless tobacco.  Alcohol:      reports no history of alcohol use. Drug Use:  reports previous drug use. Drugs: Opiates , Marijuana, and Cocaine. Family History:     Family History   Problem Relation Age of Onset    Cancer Brother         testicular    Breast Cancer Maternal Grandmother     Hypertension Mother     Thyroid Disease Mother        Review of Systems:     Unable to assess 2/2 AMS    Physical Exam:   /73   Pulse 87   Temp 99.1 °F (37.3 °C) (Oral)   Resp 20   Ht 5' 10\" (1.778 m)   Wt 180 lb (81.6 kg)   SpO2 96%   BMI 25.83 kg/m²   Temp (24hrs), Av.1 °F (37.3 °C), Min:99.1 °F (37.3 °C), Max:99.1 °F (37.3 °C)    No results for input(s): POCGLU in the last 72 hours.   No intake or output data in the 24 hours ending 21 0238    General Appearance: Ill-appearing  Mental status: Patient is lethargic   Head: normocephalic, atraumatic  Eye: no icterus, redness, pupils equal and reactive, extraocular eye movements intact, conjunctiva clear  Ear: normal external ear, no discharge, hearing intact  Nose: no drainage noted  Mouth: mucous membranes moist  Neck: supple, no carotid bruits, thyroid not palpable  Lungs: Bilateral rhonchi appreciated.    Cardiovascular: normal rate, regular rhythm, no murmur, gallop, rub  Abdomen: Soft, nontender, nondistended, normal bowel sounds, no hepatomegaly or splenomegaly  Neurologic: Unable to assess 2/2 AMS  Skin: No gross lesions, rashes, bruising or bleeding on exposed skin area  Extremities: peripheral pulses palpable, no pedal edema or calf pain with palpation  Psych: Unable to assess 2/2 lethargy     Investigations:      Laboratory Testing:  Recent Results (from the past 24 hour(s))   EKG 12 Lead    Collection Time: 09/24/21  2:32 PM   Result Value Ref Range    Ventricular Rate 81 BPM    Atrial Rate 81 BPM    P-R Interval 144 ms    QRS Duration 70 ms    Q-T Interval 372 ms    QTc Calculation (Bazett) 432 ms    P Axis 51 degrees    R Axis 75 degrees    T Axis 90 degrees   CBC WITH AUTO DIFFERENTIAL    Collection Time: 09/24/21  2:45 PM   Result Value Ref Range    WBC 5.4 3.5 - 11.3 k/uL    RBC 4.44 3.95 - 5.11 m/uL    Hemoglobin 12.5 11.9 - 15.1 g/dL    Hematocrit 39.4 36.3 - 47.1 %    MCV 88.7 82.6 - 102.9 fL    MCH 28.2 25.2 - 33.5 pg    MCHC 31.7 28.4 - 34.8 g/dL    RDW 13.8 11.8 - 14.4 %    Platelets See Reflexed IPF Result 138 - 453 k/uL    MPV NOT REPORTED 8.1 - 13.5 fL    NRBC Automated 0.0 0.0 per 100 WBC    Differential Type NOT REPORTED     WBC Morphology NOT REPORTED     RBC Morphology NOT REPORTED     Platelet Estimate NOT REPORTED     Seg Neutrophils 76 (H) 36 - 65 %    Lymphocytes 16 (L) 24 - 43 %    Monocytes 7 3 - 12 %    Eosinophils % 0 (L) 1 - 4 %    Basophils 0 0 - 2 %    Immature Granulocytes 1 (H) 0 %    Segs Absolute 4.11 1.50 - 8.10 k/uL    Absolute Lymph # 0.84 (L) 1.10 - 3.70 k/uL    Absolute Mono # 0.39 0.10 - 1.20 k/uL    Absolute Eos # <0.03 0.00 - 0.44 k/uL    Basophils Absolute <0.03 0.00 - 0.20 k/uL    Absolute Immature Granulocyte 0.03 0.00 - 0.30 k/uL   Brain Natriuretic Peptide    Collection Time: 09/24/21  2:45 PM   Result Value Ref Range    Pro-BNP PLEASE DISREGARD RESULTS. SPECIMEN CONTAMINATED. <300 pg/mL    BNP Interpretation Pro-BNP Reference Range:    Troponin    Collection Time: 09/24/21  2:45 PM   Result Value Ref Range    Troponin, High Sensitivity PLEASE DISREGARD RESULTS. SPECIMEN CONTAMINATED. 0 - 14 ng/L    Troponin T NOT REPORTED <0.03 ng/mL    Troponin Interp NOT REPORTED    FIBRINOGEN    Collection Time: 09/24/21  2:45 PM   Result Value Ref Range    Fibrinogen 332 140 - 420 mg/dL   D-DIMER, QUANTITATIVE    Collection Time: 09/24/21  2:45 PM   Result Value Ref Range    D-Dimer, Quant 0.61 mg/L FEU   Immature Platelet Fraction    Collection Time: 09/24/21  2:45 PM   Result Value Ref Range    Platelet, Immature Fraction NOT REPORTED 1.1 - 10.3 %    Platelet, Fluorescence Platelet clumps present, count appears decreased. 138 - 453 k/uL   SPECIMEN REJECTION    Collection Time: 09/24/21  2:45 PM   Result Value Ref Range    Specimen Source . BLOOD     Ordered Test BMP,FERI,CRP     Reason for Rejection Unable to perform testing: Specimen contaminated.      - NOT REPORTED    Procalcitonin    Collection Time: 09/24/21  4:51 PM   Result Value Ref Range    Procalcitonin 0.09 (H) <0.09 ng/mL   C-Reactive Protein    Collection Time: 09/24/21  4:51 PM   Result Value Ref Range    CRP 17.2 (H) 0.0 - 5.0 mg/L   Ferritin    Collection Time: 09/24/21  4:51 PM   Result Value Ref Range    Ferritin 179 (H) 13 - 150 ug/L   Hepatic Function Panel    Collection Time: 09/24/21  4:51 PM   Result Value Ref Range    Albumin 3.9 3.5 - 5.2 g/dL    Alkaline Phosphatase 79 35 - 104 U/L    ALT 51 (H) 5 - 33 U/L    AST 42 (H) <32 U/L    Total Bilirubin 0.28 (L) 0.3 - 1.2 mg/dL    Bilirubin, Direct 0.13 <0.31 mg/dL    Bilirubin, Indirect 0.15 0.00 - 1.00 mg/dL    Total Protein 6.5 6.4 - 8.3 g/dL    Globulin NOT REPORTED 1.5 - 3.8 g/dL    Albumin/Globulin Ratio 1.5 1.0 - 2.5       Imaging/Diagnostics:  XR CHEST PORTABLE    Result Date: 9/24/2021  No cardiomegaly, pneumonia or interstitial edema. No significant change from 05/25/2021. Assessment :      Hospital Problems         Last Modified POA    Acute hypoxemic respiratory failure due to COVID-19 [facility]) 9/24/2021 Yes          Plan:     Patient status inpatient in the Progressive Unit/Step down    Acute hypoxic respiratory failure 2/2 COVID-19 PNA   -Start dexamethasone 6 mg daily x 10 days   -Supplemental O2 as needed (currently on 4 L NC)   -STAT VBG ordered 2/2 lethargy   -CRP 17.2   -Ferritin 179   -Procalcitonin 0.09   -Daily CBC  -Daily BMP     AMS  -Patient became increasingly lethargic in the ED   -Likely 2/2 hypercapnia  -VBG pending      Depression   -Continue home duloxetine   -Continue trazodone     HTN  -Holding home HCTZ due to relative hypotension     Consultations:   IP CONSULT TO HOSPITALIST  IP CONSULT TO INFECTIOUS DISEASES    Patient is admitted as inpatient status because of co-morbidities listed above, severity of signs and symptoms as outlined, requirement for current medical therapies and most importantly because of direct risk to patient if care not provided in a hospital setting. Expected length of stay > 48 hours.     Alicia Darnell MD  9/24/2021  6:34 PM    Copy sent to Dr. Emanuel Gonzalez, GUIDO - CNP abdomen

## 2023-03-26 ENCOUNTER — HOSPITAL ENCOUNTER (INPATIENT)
Age: 43
LOS: 8 days | Discharge: HOME HEALTH | DRG: 307 | End: 2023-04-03
Attending: EMERGENCY MEDICINE | Admitting: INTERNAL MEDICINE
Payer: COMMERCIAL

## 2023-03-26 ENCOUNTER — APPOINTMENT (OUTPATIENT)
Dept: GENERAL RADIOLOGY | Age: 43
DRG: 307 | End: 2023-03-26
Payer: COMMERCIAL

## 2023-03-26 DIAGNOSIS — R06.00 DYSPNEA, UNSPECIFIED TYPE: Primary | ICD-10-CM

## 2023-03-26 DIAGNOSIS — R07.9 CHEST PAIN, UNSPECIFIED TYPE: ICD-10-CM

## 2023-03-26 PROBLEM — R06.02 SHORTNESS OF BREATH: Status: ACTIVE | Noted: 2023-03-26

## 2023-03-26 PROBLEM — R42 DIZZINESS: Status: ACTIVE | Noted: 2023-03-26

## 2023-03-26 LAB
ABSOLUTE EOS #: 0.1 K/UL (ref 0–0.4)
ABSOLUTE LYMPH #: 0.9 K/UL (ref 1–4.8)
ABSOLUTE MONO #: 0.5 K/UL (ref 0.1–1.3)
ALBUMIN SERPL-MCNC: 4.1 G/DL (ref 3.5–5.2)
ALP SERPL-CCNC: 101 U/L (ref 35–104)
ALT SERPL-CCNC: 20 U/L (ref 5–33)
ANION GAP SERPL CALCULATED.3IONS-SCNC: ABNORMAL MMOL/L (ref 9–17)
AST SERPL-CCNC: 13 U/L
BACTERIA: ABNORMAL
BASOPHILS # BLD: 0 % (ref 0–2)
BASOPHILS ABSOLUTE: 0 K/UL (ref 0–0.2)
BILIRUB SERPL-MCNC: 0.5 MG/DL (ref 0.3–1.2)
BILIRUBIN URINE: NEGATIVE
BUN SERPL-MCNC: 8 MG/DL (ref 6–20)
CALCIUM SERPL-MCNC: 9.8 MG/DL (ref 8.6–10.4)
CARBOXYHEMOGLOBIN: 2.5 % (ref 0–5)
CASTS UA: ABNORMAL /LPF
CHLORIDE SERPL-SCNC: 93 MMOL/L (ref 98–107)
CO2 SERPL-SCNC: >45 MMOL/L (ref 20–31)
COLOR: YELLOW
CREAT SERPL-MCNC: 0.41 MG/DL (ref 0.5–0.9)
D DIMER BLD IA.RAPID-MCNC: 0.3 MG/L FEU (ref 0–0.59)
EOSINOPHILS RELATIVE PERCENT: 1 % (ref 0–4)
EPITHELIAL CELLS UA: ABNORMAL /HPF
FLUAV RNA RESP QL NAA+PROBE: NOT DETECTED
FLUBV RNA RESP QL NAA+PROBE: NOT DETECTED
GFR SERPL CREATININE-BSD FRML MDRD: >60 ML/MIN/1.73M2
GLUCOSE SERPL-MCNC: 120 MG/DL (ref 70–99)
GLUCOSE UR STRIP.AUTO-MCNC: NEGATIVE MG/DL
HCG(URINE) PREGNANCY TEST: NEGATIVE
HCO3 VENOUS: 45.8 MMOL/L (ref 24–30)
HCT VFR BLD AUTO: 36.8 % (ref 36–46)
HGB BLD-MCNC: 12.1 G/DL (ref 12–16)
INR PPP: 1
KETONES UR STRIP.AUTO-MCNC: NEGATIVE MG/DL
LEUKOCYTE ESTERASE UR QL STRIP.AUTO: ABNORMAL
LIPASE SERPL-CCNC: 9 U/L (ref 13–60)
LYMPHOCYTES # BLD: 14 % (ref 24–44)
MAGNESIUM SERPL-MCNC: 2 MG/DL (ref 1.6–2.6)
MCH RBC QN AUTO: 29 PG (ref 26–34)
MCHC RBC AUTO-ENTMCNC: 32.8 G/DL (ref 31–37)
MCV RBC AUTO: 88.4 FL (ref 80–100)
METHEMOGLOBIN: 0.3 % (ref 0–1.9)
MONOCYTES # BLD: 8 % (ref 1–7)
NITRITE UR QL STRIP.AUTO: NEGATIVE
O2 SAT, VEN: 71.1 % (ref 60–85)
PATIENT TEMP: 37
PCO2, VEN: 72.3 MM HG (ref 39–55)
PDW BLD-RTO: 13.8 % (ref 11.5–14.9)
PH VENOUS: 7.41 (ref 7.32–7.42)
PLATELET # BLD AUTO: 234 K/UL (ref 150–450)
PMV BLD AUTO: 7.3 FL (ref 6–12)
PO2, VEN: 29.6 MM HG (ref 30–50)
POSITIVE BASE EXCESS, VEN: 21.2 MMOL/L (ref 0–2)
POTASSIUM SERPL-SCNC: 5.2 MMOL/L (ref 3.7–5.3)
PROT SERPL-MCNC: 6.8 G/DL (ref 6.4–8.3)
PROT UR STRIP.AUTO-MCNC: 8 MG/DL (ref 5–8)
PROT UR STRIP.AUTO-MCNC: NEGATIVE MG/DL
PROTHROMBIN TIME: 13 SEC (ref 11.8–14.6)
RBC # BLD: 4.16 M/UL (ref 4–5.2)
RBC CLUMPS #/AREA URNS AUTO: ABNORMAL /HPF
REASON FOR REJECTION: NORMAL
SARS-COV-2 RNA RESP QL NAA+PROBE: NOT DETECTED
SEG NEUTROPHILS: 77 % (ref 36–66)
SEGMENTED NEUTROPHILS ABSOLUTE COUNT: 5 K/UL (ref 1.3–9.1)
SODIUM SERPL-SCNC: 142 MMOL/L (ref 135–144)
SOURCE: NORMAL
SPECIFIC GRAVITY UA: 1.01 (ref 1–1.03)
SPECIMEN DESCRIPTION: NORMAL
TEXT FOR RESPIRATORY: ABNORMAL
TROPONIN I SERPL DL<=0.01 NG/ML-MCNC: 18 NG/L (ref 0–14)
TROPONIN I SERPL DL<=0.01 NG/ML-MCNC: 21 NG/L (ref 0–14)
TURBIDITY: CLEAR
URINE HGB: NEGATIVE
UROBILINOGEN, URINE: ABNORMAL
WBC # BLD AUTO: 6.5 K/UL (ref 3.5–11)
WBC UA: ABNORMAL /HPF
ZZ NTE CLEAN UP: ORDERED TEST: NORMAL
ZZ NTE WITH NAME CLEAN UP: SPECIMEN SOURCE: NORMAL

## 2023-03-26 PROCEDURE — 80053 COMPREHEN METABOLIC PANEL: CPT

## 2023-03-26 PROCEDURE — 6360000002 HC RX W HCPCS

## 2023-03-26 PROCEDURE — 36415 COLL VENOUS BLD VENIPUNCTURE: CPT

## 2023-03-26 PROCEDURE — 99285 EMERGENCY DEPT VISIT HI MDM: CPT

## 2023-03-26 PROCEDURE — 71045 X-RAY EXAM CHEST 1 VIEW: CPT

## 2023-03-26 PROCEDURE — 82805 BLOOD GASES W/O2 SATURATION: CPT

## 2023-03-26 PROCEDURE — 99223 1ST HOSP IP/OBS HIGH 75: CPT | Performed by: INTERNAL MEDICINE

## 2023-03-26 PROCEDURE — 96366 THER/PROPH/DIAG IV INF ADDON: CPT

## 2023-03-26 PROCEDURE — 81025 URINE PREGNANCY TEST: CPT

## 2023-03-26 PROCEDURE — 83735 ASSAY OF MAGNESIUM: CPT

## 2023-03-26 PROCEDURE — 85379 FIBRIN DEGRADATION QUANT: CPT

## 2023-03-26 PROCEDURE — 93005 ELECTROCARDIOGRAM TRACING: CPT | Performed by: EMERGENCY MEDICINE

## 2023-03-26 PROCEDURE — 2060000000 HC ICU INTERMEDIATE R&B

## 2023-03-26 PROCEDURE — 96372 THER/PROPH/DIAG INJ SC/IM: CPT

## 2023-03-26 PROCEDURE — 6370000000 HC RX 637 (ALT 250 FOR IP)

## 2023-03-26 PROCEDURE — 6360000002 HC RX W HCPCS: Performed by: EMERGENCY MEDICINE

## 2023-03-26 PROCEDURE — 83690 ASSAY OF LIPASE: CPT

## 2023-03-26 PROCEDURE — 85025 COMPLETE CBC W/AUTO DIFF WBC: CPT

## 2023-03-26 PROCEDURE — 81001 URINALYSIS AUTO W/SCOPE: CPT

## 2023-03-26 PROCEDURE — 96375 TX/PRO/DX INJ NEW DRUG ADDON: CPT

## 2023-03-26 PROCEDURE — 96365 THER/PROPH/DIAG IV INF INIT: CPT

## 2023-03-26 PROCEDURE — 85610 PROTHROMBIN TIME: CPT

## 2023-03-26 PROCEDURE — 87636 SARSCOV2 & INF A&B AMP PRB: CPT

## 2023-03-26 PROCEDURE — 82800 BLOOD PH: CPT

## 2023-03-26 PROCEDURE — 2580000003 HC RX 258

## 2023-03-26 PROCEDURE — 84484 ASSAY OF TROPONIN QUANT: CPT

## 2023-03-26 RX ORDER — SODIUM CHLORIDE 9 MG/ML
INJECTION, SOLUTION INTRAVENOUS CONTINUOUS
Status: DISCONTINUED | OUTPATIENT
Start: 2023-03-26 | End: 2023-03-30

## 2023-03-26 RX ORDER — HYDROCHLOROTHIAZIDE 25 MG/1
50 TABLET ORAL DAILY
Status: DISCONTINUED | OUTPATIENT
Start: 2023-03-26 | End: 2023-03-26

## 2023-03-26 RX ORDER — DULOXETIN HYDROCHLORIDE 60 MG/1
60 CAPSULE, DELAYED RELEASE ORAL 2 TIMES DAILY
Status: DISCONTINUED | OUTPATIENT
Start: 2023-03-26 | End: 2023-04-01

## 2023-03-26 RX ORDER — FAMOTIDINE 20 MG/1
20 TABLET, FILM COATED ORAL 2 TIMES DAILY
Status: DISCONTINUED | OUTPATIENT
Start: 2023-03-26 | End: 2023-04-03 | Stop reason: HOSPADM

## 2023-03-26 RX ORDER — ACETAMINOPHEN 325 MG/1
650 TABLET ORAL EVERY 6 HOURS PRN
Status: DISCONTINUED | OUTPATIENT
Start: 2023-03-26 | End: 2023-04-03 | Stop reason: HOSPADM

## 2023-03-26 RX ORDER — SODIUM CHLORIDE 0.9 % (FLUSH) 0.9 %
5-40 SYRINGE (ML) INJECTION PRN
Status: DISCONTINUED | OUTPATIENT
Start: 2023-03-26 | End: 2023-04-03 | Stop reason: HOSPADM

## 2023-03-26 RX ORDER — POLYETHYLENE GLYCOL 3350 17 G/17G
17 POWDER, FOR SOLUTION ORAL DAILY PRN
Status: DISCONTINUED | OUTPATIENT
Start: 2023-03-26 | End: 2023-04-03 | Stop reason: HOSPADM

## 2023-03-26 RX ORDER — SODIUM CHLORIDE 0.9 % (FLUSH) 0.9 %
5-40 SYRINGE (ML) INJECTION EVERY 12 HOURS SCHEDULED
Status: DISCONTINUED | OUTPATIENT
Start: 2023-03-26 | End: 2023-04-03 | Stop reason: HOSPADM

## 2023-03-26 RX ORDER — SODIUM CHLORIDE 9 MG/ML
INJECTION, SOLUTION INTRAVENOUS PRN
Status: DISCONTINUED | OUTPATIENT
Start: 2023-03-26 | End: 2023-04-03 | Stop reason: HOSPADM

## 2023-03-26 RX ORDER — ALBUTEROL SULFATE 90 UG/1
2 AEROSOL, METERED RESPIRATORY (INHALATION) 4 TIMES DAILY PRN
Status: DISCONTINUED | OUTPATIENT
Start: 2023-03-26 | End: 2023-04-03 | Stop reason: HOSPADM

## 2023-03-26 RX ORDER — HYDROXYZINE HYDROCHLORIDE 25 MG/1
50 TABLET, FILM COATED ORAL EVERY 6 HOURS PRN
Status: DISCONTINUED | OUTPATIENT
Start: 2023-03-26 | End: 2023-04-03 | Stop reason: HOSPADM

## 2023-03-26 RX ORDER — ENOXAPARIN SODIUM 100 MG/ML
40 INJECTION SUBCUTANEOUS DAILY
Status: DISCONTINUED | OUTPATIENT
Start: 2023-03-26 | End: 2023-04-03 | Stop reason: HOSPADM

## 2023-03-26 RX ORDER — ONDANSETRON 2 MG/ML
4 INJECTION INTRAMUSCULAR; INTRAVENOUS EVERY 6 HOURS PRN
Status: DISCONTINUED | OUTPATIENT
Start: 2023-03-26 | End: 2023-04-03 | Stop reason: HOSPADM

## 2023-03-26 RX ORDER — ONDANSETRON 4 MG/1
4 TABLET, ORALLY DISINTEGRATING ORAL EVERY 8 HOURS PRN
Status: DISCONTINUED | OUTPATIENT
Start: 2023-03-26 | End: 2023-04-03 | Stop reason: HOSPADM

## 2023-03-26 RX ORDER — ACETAMINOPHEN 650 MG/1
650 SUPPOSITORY RECTAL EVERY 6 HOURS PRN
Status: DISCONTINUED | OUTPATIENT
Start: 2023-03-26 | End: 2023-04-03 | Stop reason: HOSPADM

## 2023-03-26 RX ORDER — FENTANYL CITRATE 0.05 MG/ML
25 INJECTION, SOLUTION INTRAMUSCULAR; INTRAVENOUS ONCE
Status: COMPLETED | OUTPATIENT
Start: 2023-03-26 | End: 2023-03-26

## 2023-03-26 RX ORDER — CYCLOBENZAPRINE HCL 10 MG
10 TABLET ORAL 3 TIMES DAILY PRN
Status: DISCONTINUED | OUTPATIENT
Start: 2023-03-26 | End: 2023-04-03 | Stop reason: HOSPADM

## 2023-03-26 RX ORDER — BUPRENORPHINE AND NALOXONE 8; 2 MG/1; MG/1
1 FILM, SOLUBLE BUCCAL; SUBLINGUAL DAILY
Status: DISCONTINUED | OUTPATIENT
Start: 2023-03-26 | End: 2023-04-03 | Stop reason: HOSPADM

## 2023-03-26 RX ADMIN — ENOXAPARIN SODIUM 40 MG: 100 INJECTION SUBCUTANEOUS at 21:23

## 2023-03-26 RX ADMIN — FENTANYL CITRATE 25 MCG: 0.05 INJECTION, SOLUTION INTRAMUSCULAR; INTRAVENOUS at 16:17

## 2023-03-26 RX ADMIN — CEFTRIAXONE SODIUM 1000 MG: 1 INJECTION, POWDER, FOR SOLUTION INTRAMUSCULAR; INTRAVENOUS at 16:24

## 2023-03-26 RX ADMIN — SODIUM CHLORIDE: 9 INJECTION, SOLUTION INTRAVENOUS at 16:20

## 2023-03-26 RX ADMIN — FAMOTIDINE 20 MG: 20 TABLET ORAL at 21:23

## 2023-03-26 ASSESSMENT — ENCOUNTER SYMPTOMS
WHEEZING: 0
NAUSEA: 0
BACK PAIN: 0
VOMITING: 0
CONSTIPATION: 1
EYE PAIN: 0
STRIDOR: 0
ABDOMINAL DISTENTION: 0
COUGH: 1
CHEST TIGHTNESS: 0
DIARRHEA: 0
COLOR CHANGE: 0
SHORTNESS OF BREATH: 1
RHINORRHEA: 0
ABDOMINAL PAIN: 0

## 2023-03-26 ASSESSMENT — PAIN DESCRIPTION - PAIN TYPE
TYPE: ACUTE PAIN
TYPE: CHRONIC PAIN

## 2023-03-26 ASSESSMENT — PAIN DESCRIPTION - DESCRIPTORS: DESCRIPTORS: PRESSURE

## 2023-03-26 ASSESSMENT — LIFESTYLE VARIABLES
HOW MANY STANDARD DRINKS CONTAINING ALCOHOL DO YOU HAVE ON A TYPICAL DAY: PATIENT DOES NOT DRINK
HOW MANY STANDARD DRINKS CONTAINING ALCOHOL DO YOU HAVE ON A TYPICAL DAY: PATIENT DOES NOT DRINK
HOW OFTEN DO YOU HAVE A DRINK CONTAINING ALCOHOL: NEVER
HOW OFTEN DO YOU HAVE A DRINK CONTAINING ALCOHOL: NEVER

## 2023-03-26 ASSESSMENT — PAIN - FUNCTIONAL ASSESSMENT: PAIN_FUNCTIONAL_ASSESSMENT: 0-10

## 2023-03-26 ASSESSMENT — PAIN SCALES - GENERAL
PAINLEVEL_OUTOF10: 8
PAINLEVEL_OUTOF10: 8
PAINLEVEL_OUTOF10: 5

## 2023-03-26 ASSESSMENT — PAIN DESCRIPTION - ORIENTATION: ORIENTATION: MID;UPPER

## 2023-03-26 ASSESSMENT — PAIN DESCRIPTION - LOCATION: LOCATION: CHEST

## 2023-03-26 NOTE — ED NOTES
Spoke with pt's sister Jailene Ceballos.  Jailene Ceballos asked to please be called with updates: [facility]  03/26/23 9979

## 2023-03-26 NOTE — H&P
and rash. Neurological:  Positive for dizziness and light-headedness. Negative for tremors, syncope and weakness. Psychiatric/Behavioral:  Negative for agitation and confusion. Physical Exam:     Vitals:    23 1333 23 1348 23 1403 23 1551   BP: (!) 149/109 (!) 151/102 (!) 147/107 126/85   Pulse:  93 90 (!) 108   Resp:  14 14 17   Temp:       TempSrc:       SpO2: 98% 98% 99% 99%   Weight:       Height:          Temp (24hrs), Av.8 °F (37.1 °C), Min:98.8 °F (37.1 °C), Max:98.8 °F (37.1 °C)    No results for input(s): POCGLU in the last 72 hours. No intake or output data in the 24 hours ending 23 1614    Physical Exam  Constitutional:       General: She is not in acute distress. Appearance: Normal appearance. HENT:      Head: Normocephalic and atraumatic. Nose: No congestion or rhinorrhea. Eyes:      Extraocular Movements: Extraocular movements intact. Conjunctiva/sclera: Conjunctivae normal.      Pupils: Pupils are equal, round, and reactive to light. Cardiovascular:      Rate and Rhythm: Normal rate and regular rhythm. Pulses: Normal pulses. Heart sounds: Normal heart sounds. No murmur heard. No friction rub. No gallop. Pulmonary:      Effort: Pulmonary effort is normal. No respiratory distress. Breath sounds: Normal breath sounds. No wheezing, rhonchi or rales. Abdominal:      General: Abdomen is flat. Bowel sounds are normal. There is no distension. Tenderness: There is no abdominal tenderness. There is no guarding. Musculoskeletal:      Right lower leg: No edema. Left lower leg: No edema. Skin:     Capillary Refill: Capillary refill takes less than 2 seconds. Coloration: Skin is not jaundiced or pale. Neurological:      General: No focal deficit present. Mental Status: She is alert and oriented to person, place, and time. Sensory: No sensory deficit. Motor: No weakness.    Psychiatric:

## 2023-03-26 NOTE — ED PROVIDER NOTES
EMERGENCY DEPARTMENT ENCOUNTER    Pt Name: Jonas Tai  MRN: [id number]  [place] 1980  Date of evaluation: 3/26/23  CHIEF COMPLAINT       Chief Complaint   Patient presents with    Shortness of Breath    Facial Swelling    Fatigue     HISTORY OF PRESENT ILLNESS   51-year-old female presents for complaints of shortness of breath and dizziness. Patient states that she been having symptoms for the last few days states that today she was feeling more short of breath increased dizziness. She describes the dizziness as feeling like she is going to pass out. Admits chest pain denies any nausea or vomiting. Denies abdominal pain denies any new numbness tingling or weakness to the extremities. Patient reports that she normally wears 2 and half liters of home O2 has not needed to go up on her oxygen she does report she has been coughing up yellow sputum recently    The history is provided by the patient. REVIEW OF SYSTEMS     Review of Systems   Constitutional:  Negative for fever. HENT:  Negative for congestion and ear pain. Eyes:  Negative for pain. Respiratory:  Positive for cough and shortness of breath. Cardiovascular:  Negative for chest pain, palpitations and leg swelling. Gastrointestinal:  Negative for abdominal pain. Genitourinary:  Negative for dysuria and flank pain. Musculoskeletal:  Negative for back pain. Skin:  Negative for color change. Neurological:  Positive for dizziness. Negative for numbness and headaches. Psychiatric/Behavioral:  Negative for confusion. All other systems reviewed and are negative. PASTMEDICAL HISTORY     Past Medical History:   Diagnosis Date    Anxiety     Arthritis     O. A.     Bipolar depression ([address]     Dermatitis     Dysuria     Exposure to STD     Folliculitis     Hx of acute bronchitis     Hypertension     Marfan syndrome     MVP (mitral valve prolapse)     Nausea & vomiting     Polysubstance abuse ([address]     drug abuse includes

## 2023-03-26 NOTE — ED NOTES
Pt was provided with sandwich, snack and water. Okay per dr. Grady Kerns.      Sylvan Pallas, RN  03/26/23 9799

## 2023-03-26 NOTE — ED TRIAGE NOTES
Mode of arrival (squad #, walk in, police, etc) : squad        Chief complaint(s): Pt c/o facial edema, dizziness and disorientation. Arrival Note (brief scenario, treatment PTA, etc). : Pt states symptoms began a few days ago. C= \"Have you ever felt that you should Cut down on your drinking? \"  No  A= \"Have people Annoyed you by criticizing your drinking? \"  No  G= \"Have you ever felt bad or Guilty about your drinking? \"  No  E= \"Have you ever had a drink as an Eye-opener first thing in the morning to steady your nerves or to help a hangover? \"  No      Deferred []      Reason for deferring: N/A    *If yes to two or more: probable alcohol abuse. *

## 2023-03-26 NOTE — ED NOTES
Handoff and report given to [facility] JEREMIAH CULVER in PCU. Called for transport.      Steve Carroll RN  03/26/23 [id number]

## 2023-03-27 ENCOUNTER — APPOINTMENT (OUTPATIENT)
Dept: NON INVASIVE DIAGNOSTICS | Age: 43
DRG: 307 | End: 2023-03-27
Payer: COMMERCIAL

## 2023-03-27 LAB
ABSOLUTE EOS #: 0.1 K/UL (ref 0–0.4)
ABSOLUTE LYMPH #: 1.2 K/UL (ref 1–4.8)
ABSOLUTE MONO #: 0.4 K/UL (ref 0.1–1.3)
ANION GAP SERPL CALCULATED.3IONS-SCNC: 2 MMOL/L (ref 9–17)
BASOPHILS # BLD: 0 % (ref 0–2)
BASOPHILS ABSOLUTE: 0 K/UL (ref 0–0.2)
BUN SERPL-MCNC: 8 MG/DL (ref 6–20)
CALCIUM SERPL-MCNC: 8.9 MG/DL (ref 8.6–10.4)
CHLORIDE SERPL-SCNC: 94 MMOL/L (ref 98–107)
CO2 SERPL-SCNC: 42 MMOL/L (ref 20–31)
CREAT SERPL-MCNC: <0.4 MG/DL (ref 0.5–0.9)
EKG ATRIAL RATE: 85 BPM
EKG ATRIAL RATE: 87 BPM
EKG P AXIS: 56 DEGREES
EKG P AXIS: 60 DEGREES
EKG P-R INTERVAL: 130 MS
EKG P-R INTERVAL: 136 MS
EKG Q-T INTERVAL: 318 MS
EKG Q-T INTERVAL: 334 MS
EKG QRS DURATION: 72 MS
EKG QRS DURATION: 72 MS
EKG QTC CALCULATION (BAZETT): 382 MS
EKG QTC CALCULATION (BAZETT): 397 MS
EKG R AXIS: 61 DEGREES
EKG R AXIS: 71 DEGREES
EKG T AXIS: 76 DEGREES
EKG T AXIS: 85 DEGREES
EKG VENTRICULAR RATE: 85 BPM
EKG VENTRICULAR RATE: 87 BPM
EOSINOPHILS RELATIVE PERCENT: 2 % (ref 0–4)
GFR SERPL CREATININE-BSD FRML MDRD: ABNORMAL ML/MIN/1.73M2
GLUCOSE SERPL-MCNC: 93 MG/DL (ref 70–99)
HCT VFR BLD AUTO: 31.9 % (ref 36–46)
HGB BLD-MCNC: 10.8 G/DL (ref 12–16)
LV EF: 55 %
LVEF MODALITY: NORMAL
LYMPHOCYTES # BLD: 26 % (ref 24–44)
MCH RBC QN AUTO: 29.6 PG (ref 26–34)
MCHC RBC AUTO-ENTMCNC: 33.8 G/DL (ref 31–37)
MCV RBC AUTO: 87.7 FL (ref 80–100)
MONOCYTES # BLD: 8 % (ref 1–7)
PDW BLD-RTO: 13.4 % (ref 11.5–14.9)
PLATELET # BLD AUTO: 191 K/UL (ref 150–450)
PMV BLD AUTO: 7.5 FL (ref 6–12)
POTASSIUM SERPL-SCNC: 4.3 MMOL/L (ref 3.7–5.3)
RBC # BLD: 3.64 M/UL (ref 4–5.2)
SEG NEUTROPHILS: 64 % (ref 36–66)
SEGMENTED NEUTROPHILS ABSOLUTE COUNT: 3 K/UL (ref 1.3–9.1)
SODIUM SERPL-SCNC: 138 MMOL/L (ref 135–144)
WBC # BLD AUTO: 4.6 K/UL (ref 3.5–11)

## 2023-03-27 PROCEDURE — 97530 THERAPEUTIC ACTIVITIES: CPT

## 2023-03-27 PROCEDURE — 97166 OT EVAL MOD COMPLEX 45 MIN: CPT

## 2023-03-27 PROCEDURE — 97162 PT EVAL MOD COMPLEX 30 MIN: CPT

## 2023-03-27 PROCEDURE — 85025 COMPLETE CBC W/AUTO DIFF WBC: CPT

## 2023-03-27 PROCEDURE — 96366 THER/PROPH/DIAG IV INF ADDON: CPT

## 2023-03-27 PROCEDURE — 99232 SBSQ HOSP IP/OBS MODERATE 35: CPT | Performed by: INTERNAL MEDICINE

## 2023-03-27 PROCEDURE — 2060000000 HC ICU INTERMEDIATE R&B

## 2023-03-27 PROCEDURE — 93306 TTE W/DOPPLER COMPLETE: CPT

## 2023-03-27 PROCEDURE — 96372 THER/PROPH/DIAG INJ SC/IM: CPT

## 2023-03-27 PROCEDURE — 87086 URINE CULTURE/COLONY COUNT: CPT

## 2023-03-27 PROCEDURE — 6370000000 HC RX 637 (ALT 250 FOR IP)

## 2023-03-27 PROCEDURE — 6360000002 HC RX W HCPCS

## 2023-03-27 PROCEDURE — 80048 BASIC METABOLIC PNL TOTAL CA: CPT

## 2023-03-27 PROCEDURE — 2580000003 HC RX 258

## 2023-03-27 PROCEDURE — 36415 COLL VENOUS BLD VENIPUNCTURE: CPT

## 2023-03-27 RX ORDER — GABAPENTIN 400 MG/1
400 CAPSULE ORAL 3 TIMES DAILY
Status: DISCONTINUED | OUTPATIENT
Start: 2023-03-27 | End: 2023-04-03 | Stop reason: HOSPADM

## 2023-03-27 RX ORDER — GABAPENTIN 100 MG/1
100 CAPSULE ORAL 3 TIMES DAILY
Status: DISCONTINUED | OUTPATIENT
Start: 2023-03-27 | End: 2023-03-27 | Stop reason: DRUGHIGH

## 2023-03-27 RX ORDER — GABAPENTIN 400 MG/1
400 CAPSULE ORAL 3 TIMES DAILY
COMMUNITY

## 2023-03-27 RX ORDER — MELOXICAM 15 MG/1
15 TABLET ORAL DAILY PRN
Status: DISCONTINUED | OUTPATIENT
Start: 2023-03-27 | End: 2023-04-03 | Stop reason: HOSPADM

## 2023-03-27 RX ORDER — NICOTINE 21 MG/24HR
1 PATCH, TRANSDERMAL 24 HOURS TRANSDERMAL DAILY
Status: DISCONTINUED | OUTPATIENT
Start: 2023-03-27 | End: 2023-04-03 | Stop reason: HOSPADM

## 2023-03-27 RX ADMIN — BUPRENORPHINE AND NALOXONE 1 FILM: 8; 2 FILM BUCCAL; SUBLINGUAL at 05:37

## 2023-03-27 RX ADMIN — POLYETHYLENE GLYCOL 3350 17 G: 17 POWDER, FOR SOLUTION ORAL at 11:37

## 2023-03-27 RX ADMIN — SODIUM CHLORIDE: 9 INJECTION, SOLUTION INTRAVENOUS at 06:15

## 2023-03-27 RX ADMIN — CYCLOBENZAPRINE 10 MG: 10 TABLET, FILM COATED ORAL at 17:59

## 2023-03-27 RX ADMIN — FAMOTIDINE 20 MG: 20 TABLET ORAL at 09:39

## 2023-03-27 RX ADMIN — ENOXAPARIN SODIUM 40 MG: 100 INJECTION SUBCUTANEOUS at 09:39

## 2023-03-27 RX ADMIN — FAMOTIDINE 20 MG: 20 TABLET ORAL at 21:42

## 2023-03-27 RX ADMIN — GABAPENTIN 400 MG: 400 CAPSULE ORAL at 13:58

## 2023-03-27 RX ADMIN — CEFTRIAXONE SODIUM 1000 MG: 1 INJECTION, POWDER, FOR SOLUTION INTRAMUSCULAR; INTRAVENOUS at 15:26

## 2023-03-27 ASSESSMENT — ENCOUNTER SYMPTOMS
STRIDOR: 0
SHORTNESS OF BREATH: 0
RHINORRHEA: 0
RECTAL PAIN: 0
ABDOMINAL DISTENTION: 0
VOMITING: 0
COUGH: 0
DIARRHEA: 0
WHEEZING: 0
ABDOMINAL PAIN: 0
NAUSEA: 0

## 2023-03-27 NOTE — DISCHARGE INSTR - COC
Continuity of Care Form    Patient Name: Radha Preciado   :  1980  MRN:  [id number]    Admit date:  3/26/2023  Discharge date:  4/3/23    Code Status Order: Full Code   Advance Directives:     Admitting Physician:  Ferdinand Pastrana MD  PCP: Ofelia Bowman, GUIDO - CNP    Discharging Nurse: Iris Pineda Unit/Room#: 2098/2098-01  Discharging Unit Phone Number: [phone]    Emergency Contact:   Extended Emergency Contact Information  Primary Emergency Contact: Bianca Dos Santos  Address: [address] Phone: [phone]  Relation: Parent  Hearing or visual needs: None  Other needs: None  Preferred language: English   needed?  No  Secondary Emergency Contact: [place] [employer] Phone: [phone]  Mobile Phone: [phone]  Relation: Brother/Sister    Past Surgical History:  Past Surgical History:   Procedure Laterality Date    BREAST REDUCTION SURGERY Bilateral     2012    [facility] 14    [facility]    [facility] SURGERY Bilateral     RT HAS 2 SCREWS, LT HAD FUSION IN DEC WITH 2 SCREWS    HIP ARTHROSCOPY Left     KNEE ARTHROSCOPY Right     X 2    NE EGD TRANSORAL BIOPSY SINGLE/MULTIPLE N/A 2017    EGD BIOPSY WITH DILATATION WITH MALONEYS performed by Piero Pandey MD at [address] Bilateral     harware in place bilat    TUBAL LIGATION      clips placed on fallopian tubes    UPPER GASTROINTESTINAL ENDOSCOPY  2017     EXTENSIVE SPASMATIC CONTRACTIONS; NO LUMINAL OBSTRUCTIONS; CLOSER TO THE DISTAL ESOPHAGUS AND DEVELOPING DIVERTICULUM IS NOTED AND BELIEVED TO BE SEC TO DSYSMOTILITY;  DALEY DIATIONS WERE DONE EMPIRICALLY WITH 56 FR       Immunization History:   Immunization History   Administered Date(s) Administered    Influenza, FLUARIX, FLULAVAL, FLUZONE (age 10 mo+) AND AFLURIA, (age 1 y+), PF, 0.5mL 10/17/2016, 2018, 2018    Pneumococcal, PPSV23, PNEUMOVAX 21, (age

## 2023-03-27 NOTE — ACP (ADVANCE CARE PLANNING)
agents  Copy of advance directive given to staff to scan into medical record.     Electronically signed by Chaplain Randy on 3/27/2023 at 3:39 PM

## 2023-03-28 LAB
ABSOLUTE EOS #: 0.1 K/UL (ref 0–0.4)
ABSOLUTE LYMPH #: 1.3 K/UL (ref 1–4.8)
ABSOLUTE MONO #: 0.4 K/UL (ref 0.1–1.3)
ANION GAP SERPL CALCULATED.3IONS-SCNC: 3 MMOL/L (ref 9–17)
BASOPHILS # BLD: 0 % (ref 0–2)
BASOPHILS ABSOLUTE: 0 K/UL (ref 0–0.2)
BUN SERPL-MCNC: 8 MG/DL (ref 6–20)
CALCIUM SERPL-MCNC: 9.1 MG/DL (ref 8.6–10.4)
CHLORIDE SERPL-SCNC: 99 MMOL/L (ref 98–107)
CO2 SERPL-SCNC: 41 MMOL/L (ref 20–31)
CREAT SERPL-MCNC: 0.44 MG/DL (ref 0.5–0.9)
EOSINOPHILS RELATIVE PERCENT: 2 % (ref 0–4)
GFR SERPL CREATININE-BSD FRML MDRD: >60 ML/MIN/1.73M2
GLUCOSE SERPL-MCNC: 97 MG/DL (ref 70–99)
HCT VFR BLD AUTO: 32.1 % (ref 36–46)
HGB BLD-MCNC: 10.8 G/DL (ref 12–16)
LYMPHOCYTES # BLD: 29 % (ref 24–44)
MCH RBC QN AUTO: 29.7 PG (ref 26–34)
MCHC RBC AUTO-ENTMCNC: 33.6 G/DL (ref 31–37)
MCV RBC AUTO: 88.4 FL (ref 80–100)
MICROORGANISM SPEC CULT: NORMAL
MONOCYTES # BLD: 10 % (ref 1–7)
PDW BLD-RTO: 14 % (ref 11.5–14.9)
PLATELET # BLD AUTO: 183 K/UL (ref 150–450)
PMV BLD AUTO: 7.4 FL (ref 6–12)
POTASSIUM SERPL-SCNC: 4 MMOL/L (ref 3.7–5.3)
RBC # BLD: 3.63 M/UL (ref 4–5.2)
SEG NEUTROPHILS: 59 % (ref 36–66)
SEGMENTED NEUTROPHILS ABSOLUTE COUNT: 2.6 K/UL (ref 1.3–9.1)
SODIUM SERPL-SCNC: 143 MMOL/L (ref 135–144)
SPECIMEN DESCRIPTION: NORMAL
WBC # BLD AUTO: 4.4 K/UL (ref 3.5–11)

## 2023-03-28 PROCEDURE — 97110 THERAPEUTIC EXERCISES: CPT

## 2023-03-28 PROCEDURE — 97116 GAIT TRAINING THERAPY: CPT

## 2023-03-28 PROCEDURE — 2060000000 HC ICU INTERMEDIATE R&B

## 2023-03-28 PROCEDURE — 2580000003 HC RX 258

## 2023-03-28 PROCEDURE — 99232 SBSQ HOSP IP/OBS MODERATE 35: CPT | Performed by: INTERNAL MEDICINE

## 2023-03-28 PROCEDURE — 6370000000 HC RX 637 (ALT 250 FOR IP)

## 2023-03-28 PROCEDURE — 85025 COMPLETE CBC W/AUTO DIFF WBC: CPT

## 2023-03-28 PROCEDURE — 80048 BASIC METABOLIC PNL TOTAL CA: CPT

## 2023-03-28 PROCEDURE — 36415 COLL VENOUS BLD VENIPUNCTURE: CPT

## 2023-03-28 PROCEDURE — 6360000002 HC RX W HCPCS

## 2023-03-28 PROCEDURE — 96366 THER/PROPH/DIAG IV INF ADDON: CPT

## 2023-03-28 RX ADMIN — SODIUM CHLORIDE: 9 INJECTION, SOLUTION INTRAVENOUS at 05:13

## 2023-03-28 RX ADMIN — SODIUM CHLORIDE: 9 INJECTION, SOLUTION INTRAVENOUS at 19:57

## 2023-03-28 RX ADMIN — GABAPENTIN 400 MG: 400 CAPSULE ORAL at 19:53

## 2023-03-28 RX ADMIN — FAMOTIDINE 20 MG: 20 TABLET ORAL at 09:28

## 2023-03-28 RX ADMIN — GABAPENTIN 400 MG: 400 CAPSULE ORAL at 15:16

## 2023-03-28 RX ADMIN — CEFTRIAXONE SODIUM 1000 MG: 1 INJECTION, POWDER, FOR SOLUTION INTRAMUSCULAR; INTRAVENOUS at 15:23

## 2023-03-28 RX ADMIN — GABAPENTIN 400 MG: 400 CAPSULE ORAL at 09:28

## 2023-03-28 RX ADMIN — FAMOTIDINE 20 MG: 20 TABLET ORAL at 19:53

## 2023-03-28 RX ADMIN — CYCLOBENZAPRINE 10 MG: 10 TABLET, FILM COATED ORAL at 16:12

## 2023-03-28 RX ADMIN — BUPRENORPHINE AND NALOXONE 1 FILM: 8; 2 FILM BUCCAL; SUBLINGUAL at 09:28

## 2023-03-28 ASSESSMENT — ENCOUNTER SYMPTOMS
ABDOMINAL PAIN: 0
RECTAL PAIN: 0
COUGH: 0
WHEEZING: 0
ABDOMINAL DISTENTION: 0
VOMITING: 0
RHINORRHEA: 0
DIARRHEA: 0
SHORTNESS OF BREATH: 0
NAUSEA: 0
STRIDOR: 0

## 2023-03-28 ASSESSMENT — PAIN DESCRIPTION - LOCATION
LOCATION: BACK
LOCATION: RIB CAGE

## 2023-03-28 ASSESSMENT — PAIN DESCRIPTION - DESCRIPTORS: DESCRIPTORS: THROBBING

## 2023-03-28 ASSESSMENT — PAIN SCALES - GENERAL
PAINLEVEL_OUTOF10: 6
PAINLEVEL_OUTOF10: 6

## 2023-03-28 ASSESSMENT — PAIN DESCRIPTION - ORIENTATION: ORIENTATION: LEFT

## 2023-03-28 NOTE — CONSULTS
examination normal LV size wall motion and wall function mildly dilated ascending aorta      Blood pressure on admission 151/111, heart rate 97, temperature 98.8, oxygen saturation 93% 2 L      Lab work on admission  High-sensitivity troponin 18  Sodium 142, potassium 5.2, CO2 more than 45, BUN 8, creatinine 0.41, glucose 120  Albumin 4.1, alkaline phosphate is 101, ALT 80, AST 13, lipase 9  Hemoglobin 12.1, WBC 6.5, MCV 88.4, platelets 466, INR 1.0  ABG venous  pH 7.410, PCO2 72.3, PO2 29.6, bicarb 45.8, base excess 21.2, oxygen saturation 71%  Influenza A and B-, COVID-19 negative    Current evaluation  Since seen and examined with the patient's nurse  She was resting in upright position  She was very anxious she was on oxygen  No chest pain at present  She is eating well  Hemodynamically stable no sign of cardiopulmonary decompensation    Blood pressure 130/90, heart rate 94, temperature 98.8, oxygen saturation 93% on 2 L  Today's labs hemoglobin 10.8, platelets 959, WBC 4.4  Glucose 97, sodium 143, potassium 4.0, chloride 99, CO2 44, BUN 8, creatinine 0.44, calcium 9.1      Medications:   Scheduled Meds:   gabapentin  400 mg Oral TID    nicotine  1 patch TransDERmal Daily    sodium chloride flush  5-40 mL IntraVENous 2 times per day    enoxaparin  40 mg SubCUTAneous Daily    famotidine  20 mg Oral BID    cefTRIAXone (ROCEPHIN) IV  1,000 mg IntraVENous Q24H    buprenorphine-naloxone  1 Film SubLINGual Daily    [Held by provider] DULoxetine  60 mg Oral BID     Continuous Infusions:   sodium chloride      sodium chloride 75 mL/hr at 03/28/23 0513     CBC:   Recent Labs     03/26/23  1015 03/27/23  0514 03/28/23  0532   WBC 6.5 4.6 4.4   HGB 12.1 10.8* 10.8*    191 183     BMP:    Recent Labs     03/26/23  1015 03/27/23  0514 03/28/23  0532    138 143   K 5.2 4.3 4.0   CL 93* 94* 99   CO2 >45* 42* 41*   BUN 8 8 8   CREATININE 0.41* <0.40* 0.44*   GLUCOSE 120* 93 97     Hepatic:   Recent Labs

## 2023-03-29 PROBLEM — H10.33 ACUTE BACTERIAL CONJUNCTIVITIS OF BOTH EYES: Status: ACTIVE | Noted: 2023-03-29

## 2023-03-29 LAB
ABSOLUTE EOS #: 0.1 K/UL (ref 0–0.4)
ABSOLUTE LYMPH #: 1.1 K/UL (ref 1–4.8)
ABSOLUTE MONO #: 0.4 K/UL (ref 0.1–1.3)
ANION GAP SERPL CALCULATED.3IONS-SCNC: 4 MMOL/L (ref 9–17)
BASOPHILS # BLD: 0 % (ref 0–2)
BASOPHILS ABSOLUTE: 0 K/UL (ref 0–0.2)
BUN SERPL-MCNC: 8 MG/DL (ref 6–20)
CALCIUM SERPL-MCNC: 9.1 MG/DL (ref 8.6–10.4)
CHLORIDE SERPL-SCNC: 98 MMOL/L (ref 98–107)
CO2 SERPL-SCNC: 41 MMOL/L (ref 20–31)
CREAT SERPL-MCNC: <0.4 MG/DL (ref 0.5–0.9)
EOSINOPHILS RELATIVE PERCENT: 3 % (ref 0–4)
GFR SERPL CREATININE-BSD FRML MDRD: ABNORMAL ML/MIN/1.73M2
GLUCOSE SERPL-MCNC: 110 MG/DL (ref 70–99)
HCT VFR BLD AUTO: 32.9 % (ref 36–46)
HGB BLD-MCNC: 11 G/DL (ref 12–16)
LYMPHOCYTES # BLD: 22 % (ref 24–44)
MCH RBC QN AUTO: 29.5 PG (ref 26–34)
MCHC RBC AUTO-ENTMCNC: 33.5 G/DL (ref 31–37)
MCV RBC AUTO: 87.9 FL (ref 80–100)
MONOCYTES # BLD: 9 % (ref 1–7)
PDW BLD-RTO: 13.8 % (ref 11.5–14.9)
PLATELET # BLD AUTO: 195 K/UL (ref 150–450)
PMV BLD AUTO: 7.1 FL (ref 6–12)
POTASSIUM SERPL-SCNC: 4.3 MMOL/L (ref 3.7–5.3)
RBC # BLD: 3.74 M/UL (ref 4–5.2)
SEG NEUTROPHILS: 66 % (ref 36–66)
SEGMENTED NEUTROPHILS ABSOLUTE COUNT: 3.3 K/UL (ref 1.3–9.1)
SODIUM SERPL-SCNC: 143 MMOL/L (ref 135–144)
WBC # BLD AUTO: 4.9 K/UL (ref 3.5–11)

## 2023-03-29 PROCEDURE — 96366 THER/PROPH/DIAG IV INF ADDON: CPT

## 2023-03-29 PROCEDURE — 99232 SBSQ HOSP IP/OBS MODERATE 35: CPT | Performed by: INTERNAL MEDICINE

## 2023-03-29 PROCEDURE — 85025 COMPLETE CBC W/AUTO DIFF WBC: CPT

## 2023-03-29 PROCEDURE — 2580000003 HC RX 258

## 2023-03-29 PROCEDURE — 6370000000 HC RX 637 (ALT 250 FOR IP): Performed by: INTERNAL MEDICINE

## 2023-03-29 PROCEDURE — 36415 COLL VENOUS BLD VENIPUNCTURE: CPT

## 2023-03-29 PROCEDURE — 96372 THER/PROPH/DIAG INJ SC/IM: CPT

## 2023-03-29 PROCEDURE — 6370000000 HC RX 637 (ALT 250 FOR IP)

## 2023-03-29 PROCEDURE — 6360000002 HC RX W HCPCS

## 2023-03-29 PROCEDURE — 2060000000 HC ICU INTERMEDIATE R&B

## 2023-03-29 PROCEDURE — 80048 BASIC METABOLIC PNL TOTAL CA: CPT

## 2023-03-29 RX ORDER — NICOTINE 21 MG/24HR
1 PATCH, TRANSDERMAL 24 HOURS TRANSDERMAL DAILY
Qty: 30 PATCH | Refills: 3 | Status: SHIPPED | OUTPATIENT
Start: 2023-03-30

## 2023-03-29 RX ORDER — AMOXICILLIN AND CLAVULANATE POTASSIUM 875; 125 MG/1; MG/1
1 TABLET, FILM COATED ORAL 2 TIMES DAILY
Qty: 14 TABLET | Refills: 0 | Status: SHIPPED | OUTPATIENT
Start: 2023-03-29 | End: 2023-04-05

## 2023-03-29 RX ORDER — TOBRAMYCIN AND DEXAMETHASONE 3; 1 MG/ML; MG/ML
1 SUSPENSION/ DROPS OPHTHALMIC
Status: DISCONTINUED | OUTPATIENT
Start: 2023-03-29 | End: 2023-04-03 | Stop reason: HOSPADM

## 2023-03-29 RX ADMIN — ENOXAPARIN SODIUM 40 MG: 100 INJECTION SUBCUTANEOUS at 08:47

## 2023-03-29 RX ADMIN — CYCLOBENZAPRINE 10 MG: 10 TABLET, FILM COATED ORAL at 08:48

## 2023-03-29 RX ADMIN — FAMOTIDINE 20 MG: 20 TABLET ORAL at 21:45

## 2023-03-29 RX ADMIN — GABAPENTIN 400 MG: 400 CAPSULE ORAL at 14:36

## 2023-03-29 RX ADMIN — GABAPENTIN 400 MG: 400 CAPSULE ORAL at 08:47

## 2023-03-29 RX ADMIN — ACETAMINOPHEN 650 MG: 325 TABLET ORAL at 08:48

## 2023-03-29 RX ADMIN — FAMOTIDINE 20 MG: 20 TABLET ORAL at 08:47

## 2023-03-29 RX ADMIN — TOBRAMYCIN AND DEXAMETHASONE 1 DROP: 3; 1 SUSPENSION/ DROPS OPHTHALMIC at 21:46

## 2023-03-29 RX ADMIN — TOBRAMYCIN AND DEXAMETHASONE 1 DROP: 3; 1 SUSPENSION/ DROPS OPHTHALMIC at 17:26

## 2023-03-29 RX ADMIN — CEFTRIAXONE SODIUM 1000 MG: 1 INJECTION, POWDER, FOR SOLUTION INTRAMUSCULAR; INTRAVENOUS at 15:57

## 2023-03-29 RX ADMIN — POLYETHYLENE GLYCOL 3350 17 G: 17 POWDER, FOR SOLUTION ORAL at 08:48

## 2023-03-29 RX ADMIN — BUPRENORPHINE AND NALOXONE 1 FILM: 8; 2 FILM BUCCAL; SUBLINGUAL at 05:56

## 2023-03-29 RX ADMIN — CYCLOBENZAPRINE 10 MG: 10 TABLET, FILM COATED ORAL at 21:45

## 2023-03-29 RX ADMIN — GABAPENTIN 400 MG: 400 CAPSULE ORAL at 21:45

## 2023-03-29 ASSESSMENT — PAIN SCALES - GENERAL
PAINLEVEL_OUTOF10: 6
PAINLEVEL_OUTOF10: 7

## 2023-03-29 ASSESSMENT — ENCOUNTER SYMPTOMS
COUGH: 0
DIARRHEA: 0
WHEEZING: 0
ABDOMINAL PAIN: 0
SHORTNESS OF BREATH: 0
RECTAL PAIN: 0
ABDOMINAL DISTENTION: 0
NAUSEA: 0
VOMITING: 0
RHINORRHEA: 0
STRIDOR: 0

## 2023-03-29 ASSESSMENT — PAIN DESCRIPTION - ORIENTATION: ORIENTATION: LOWER

## 2023-03-29 ASSESSMENT — PAIN DESCRIPTION - DESCRIPTORS: DESCRIPTORS: SHARP;THROBBING

## 2023-03-29 ASSESSMENT — PAIN DESCRIPTION - LOCATION: LOCATION: BACK

## 2023-03-29 NOTE — PLAN OF CARE
Problem: Pain  Goal: Verbalizes/displays adequate comfort level or baseline comfort level  Outcome: Progressing  Note: Pt able to verbalize comfort level     Problem: Safety - Adult  Goal: Free from fall injury  Outcome: Progressing  Note: Pt free from injury     Problem: ABCDS Injury Assessment  Goal: Absence of physical injury  Outcome: Progressing  Note: Pt absent of any physical injury

## 2023-03-30 LAB
ABSOLUTE EOS #: 0.1 K/UL (ref 0–0.4)
ABSOLUTE LYMPH #: 1 K/UL (ref 1–4.8)
ABSOLUTE MONO #: 0.4 K/UL (ref 0.1–1.3)
ANION GAP SERPL CALCULATED.3IONS-SCNC: 4 MMOL/L (ref 9–17)
BASOPHILS # BLD: 0 % (ref 0–2)
BASOPHILS ABSOLUTE: 0 K/UL (ref 0–0.2)
BUN SERPL-MCNC: 9 MG/DL (ref 6–20)
CALCIUM SERPL-MCNC: 9.2 MG/DL (ref 8.6–10.4)
CHLORIDE SERPL-SCNC: 98 MMOL/L (ref 98–107)
CO2 SERPL-SCNC: 40 MMOL/L (ref 20–31)
CREAT SERPL-MCNC: <0.4 MG/DL (ref 0.5–0.9)
EOSINOPHILS RELATIVE PERCENT: 3 % (ref 0–4)
GFR SERPL CREATININE-BSD FRML MDRD: ABNORMAL ML/MIN/1.73M2
GLUCOSE SERPL-MCNC: 102 MG/DL (ref 70–99)
HCT VFR BLD AUTO: 32.2 % (ref 36–46)
HGB BLD-MCNC: 10.9 G/DL (ref 12–16)
LYMPHOCYTES # BLD: 22 % (ref 24–44)
MCH RBC QN AUTO: 29.8 PG (ref 26–34)
MCHC RBC AUTO-ENTMCNC: 33.8 G/DL (ref 31–37)
MCV RBC AUTO: 88.1 FL (ref 80–100)
MONOCYTES # BLD: 8 % (ref 1–7)
PDW BLD-RTO: 13.3 % (ref 11.5–14.9)
PLATELET # BLD AUTO: 193 K/UL (ref 150–450)
PMV BLD AUTO: 6.9 FL (ref 6–12)
POTASSIUM SERPL-SCNC: 4.2 MMOL/L (ref 3.7–5.3)
RBC # BLD: 3.66 M/UL (ref 4–5.2)
SEG NEUTROPHILS: 67 % (ref 36–66)
SEGMENTED NEUTROPHILS ABSOLUTE COUNT: 3.2 K/UL (ref 1.3–9.1)
SODIUM SERPL-SCNC: 142 MMOL/L (ref 135–144)
WBC # BLD AUTO: 4.8 K/UL (ref 3.5–11)

## 2023-03-30 PROCEDURE — 99232 SBSQ HOSP IP/OBS MODERATE 35: CPT | Performed by: INTERNAL MEDICINE

## 2023-03-30 PROCEDURE — 97116 GAIT TRAINING THERAPY: CPT

## 2023-03-30 PROCEDURE — 6360000002 HC RX W HCPCS

## 2023-03-30 PROCEDURE — 85025 COMPLETE CBC W/AUTO DIFF WBC: CPT

## 2023-03-30 PROCEDURE — 36415 COLL VENOUS BLD VENIPUNCTURE: CPT

## 2023-03-30 PROCEDURE — 96366 THER/PROPH/DIAG IV INF ADDON: CPT

## 2023-03-30 PROCEDURE — 80048 BASIC METABOLIC PNL TOTAL CA: CPT

## 2023-03-30 PROCEDURE — 2060000000 HC ICU INTERMEDIATE R&B

## 2023-03-30 PROCEDURE — 2580000003 HC RX 258

## 2023-03-30 PROCEDURE — 6370000000 HC RX 637 (ALT 250 FOR IP)

## 2023-03-30 RX ADMIN — GABAPENTIN 400 MG: 400 CAPSULE ORAL at 22:31

## 2023-03-30 RX ADMIN — TOBRAMYCIN AND DEXAMETHASONE 1 DROP: 3; 1 SUSPENSION/ DROPS OPHTHALMIC at 08:31

## 2023-03-30 RX ADMIN — SODIUM CHLORIDE, PRESERVATIVE FREE 10 ML: 5 INJECTION INTRAVENOUS at 22:31

## 2023-03-30 RX ADMIN — TOBRAMYCIN AND DEXAMETHASONE 1 DROP: 3; 1 SUSPENSION/ DROPS OPHTHALMIC at 22:31

## 2023-03-30 RX ADMIN — GABAPENTIN 400 MG: 400 CAPSULE ORAL at 14:52

## 2023-03-30 RX ADMIN — BUPRENORPHINE AND NALOXONE 1 FILM: 8; 2 FILM BUCCAL; SUBLINGUAL at 08:29

## 2023-03-30 RX ADMIN — CEFTRIAXONE SODIUM 1000 MG: 1 INJECTION, POWDER, FOR SOLUTION INTRAMUSCULAR; INTRAVENOUS at 17:07

## 2023-03-30 RX ADMIN — SODIUM CHLORIDE, PRESERVATIVE FREE 10 ML: 5 INJECTION INTRAVENOUS at 08:30

## 2023-03-30 RX ADMIN — CYCLOBENZAPRINE 10 MG: 10 TABLET, FILM COATED ORAL at 08:28

## 2023-03-30 RX ADMIN — CYCLOBENZAPRINE 10 MG: 10 TABLET, FILM COATED ORAL at 14:57

## 2023-03-30 RX ADMIN — TOBRAMYCIN AND DEXAMETHASONE 1 DROP: 3; 1 SUSPENSION/ DROPS OPHTHALMIC at 18:22

## 2023-03-30 RX ADMIN — CYCLOBENZAPRINE 10 MG: 10 TABLET, FILM COATED ORAL at 22:31

## 2023-03-30 RX ADMIN — FAMOTIDINE 20 MG: 20 TABLET ORAL at 08:28

## 2023-03-30 RX ADMIN — FAMOTIDINE 20 MG: 20 TABLET ORAL at 22:31

## 2023-03-30 RX ADMIN — GABAPENTIN 400 MG: 400 CAPSULE ORAL at 08:28

## 2023-03-30 RX ADMIN — TOBRAMYCIN AND DEXAMETHASONE 1 DROP: 3; 1 SUSPENSION/ DROPS OPHTHALMIC at 14:52

## 2023-03-30 RX ADMIN — SODIUM CHLORIDE: 9 INJECTION, SOLUTION INTRAVENOUS at 01:06

## 2023-03-30 ASSESSMENT — ENCOUNTER SYMPTOMS
COUGH: 0
STRIDOR: 0
DIARRHEA: 0
VOMITING: 0
NAUSEA: 0
ABDOMINAL PAIN: 0
RHINORRHEA: 0
ABDOMINAL DISTENTION: 0
WHEEZING: 0
RECTAL PAIN: 0
SHORTNESS OF BREATH: 0

## 2023-03-30 ASSESSMENT — PAIN DESCRIPTION - DESCRIPTORS
DESCRIPTORS: THROBBING
DESCRIPTORS: THROBBING

## 2023-03-30 ASSESSMENT — PAIN DESCRIPTION - ORIENTATION: ORIENTATION: RIGHT;LEFT

## 2023-03-30 ASSESSMENT — PAIN DESCRIPTION - LOCATION
LOCATION: BACK
LOCATION: BACK
LOCATION: HIP;BACK

## 2023-03-30 ASSESSMENT — PAIN SCALES - GENERAL
PAINLEVEL_OUTOF10: 6
PAINLEVEL_OUTOF10: 6
PAINLEVEL_OUTOF10: 7
PAINLEVEL_OUTOF10: 6

## 2023-03-31 LAB
ABSOLUTE EOS #: 0.1 K/UL (ref 0–0.4)
ABSOLUTE LYMPH #: 1 K/UL (ref 1–4.8)
ABSOLUTE MONO #: 0.5 K/UL (ref 0.1–1.3)
ANION GAP SERPL CALCULATED.3IONS-SCNC: 2 MMOL/L (ref 9–17)
BASOPHILS # BLD: 1 % (ref 0–2)
BASOPHILS ABSOLUTE: 0 K/UL (ref 0–0.2)
BUN SERPL-MCNC: 10 MG/DL (ref 6–20)
CALCIUM SERPL-MCNC: 9.1 MG/DL (ref 8.6–10.4)
CHLORIDE SERPL-SCNC: 96 MMOL/L (ref 98–107)
CO2 SERPL-SCNC: 42 MMOL/L (ref 20–31)
CREAT SERPL-MCNC: <0.4 MG/DL (ref 0.5–0.9)
EOSINOPHILS RELATIVE PERCENT: 2 % (ref 0–4)
GFR SERPL CREATININE-BSD FRML MDRD: ABNORMAL ML/MIN/1.73M2
GLUCOSE SERPL-MCNC: 110 MG/DL (ref 70–99)
HCT VFR BLD AUTO: 34.3 % (ref 36–46)
HGB BLD-MCNC: 11.3 G/DL (ref 12–16)
LYMPHOCYTES # BLD: 21 % (ref 24–44)
MCH RBC QN AUTO: 29.1 PG (ref 26–34)
MCHC RBC AUTO-ENTMCNC: 32.9 G/DL (ref 31–37)
MCV RBC AUTO: 88.5 FL (ref 80–100)
MONOCYTES # BLD: 9 % (ref 1–7)
PDW BLD-RTO: 13.7 % (ref 11.5–14.9)
PLATELET # BLD AUTO: 205 K/UL (ref 150–450)
PMV BLD AUTO: 7.7 FL (ref 6–12)
POTASSIUM SERPL-SCNC: 4.3 MMOL/L (ref 3.7–5.3)
RBC # BLD: 3.88 M/UL (ref 4–5.2)
SEG NEUTROPHILS: 67 % (ref 36–66)
SEGMENTED NEUTROPHILS ABSOLUTE COUNT: 3.4 K/UL (ref 1.3–9.1)
SODIUM SERPL-SCNC: 140 MMOL/L (ref 135–144)
WBC # BLD AUTO: 5.1 K/UL (ref 3.5–11)

## 2023-03-31 PROCEDURE — 6360000002 HC RX W HCPCS

## 2023-03-31 PROCEDURE — 85025 COMPLETE CBC W/AUTO DIFF WBC: CPT

## 2023-03-31 PROCEDURE — 80048 BASIC METABOLIC PNL TOTAL CA: CPT

## 2023-03-31 PROCEDURE — 97110 THERAPEUTIC EXERCISES: CPT

## 2023-03-31 PROCEDURE — 96372 THER/PROPH/DIAG INJ SC/IM: CPT

## 2023-03-31 PROCEDURE — 97535 SELF CARE MNGMENT TRAINING: CPT

## 2023-03-31 PROCEDURE — 99232 SBSQ HOSP IP/OBS MODERATE 35: CPT | Performed by: INTERNAL MEDICINE

## 2023-03-31 PROCEDURE — 6370000000 HC RX 637 (ALT 250 FOR IP)

## 2023-03-31 PROCEDURE — 36415 COLL VENOUS BLD VENIPUNCTURE: CPT

## 2023-03-31 PROCEDURE — 2060000000 HC ICU INTERMEDIATE R&B

## 2023-03-31 PROCEDURE — 2580000003 HC RX 258

## 2023-03-31 RX ADMIN — SODIUM CHLORIDE, PRESERVATIVE FREE 10 ML: 5 INJECTION INTRAVENOUS at 11:04

## 2023-03-31 RX ADMIN — GABAPENTIN 400 MG: 400 CAPSULE ORAL at 11:03

## 2023-03-31 RX ADMIN — TOBRAMYCIN AND DEXAMETHASONE 1 DROP: 3; 1 SUSPENSION/ DROPS OPHTHALMIC at 20:58

## 2023-03-31 RX ADMIN — TOBRAMYCIN AND DEXAMETHASONE 1 DROP: 3; 1 SUSPENSION/ DROPS OPHTHALMIC at 06:47

## 2023-03-31 RX ADMIN — TOBRAMYCIN AND DEXAMETHASONE 1 DROP: 3; 1 SUSPENSION/ DROPS OPHTHALMIC at 15:00

## 2023-03-31 RX ADMIN — TOBRAMYCIN AND DEXAMETHASONE 1 DROP: 3; 1 SUSPENSION/ DROPS OPHTHALMIC at 11:04

## 2023-03-31 RX ADMIN — TOBRAMYCIN AND DEXAMETHASONE 1 DROP: 3; 1 SUSPENSION/ DROPS OPHTHALMIC at 17:43

## 2023-03-31 RX ADMIN — FAMOTIDINE 20 MG: 20 TABLET ORAL at 20:54

## 2023-03-31 RX ADMIN — CYCLOBENZAPRINE 10 MG: 10 TABLET, FILM COATED ORAL at 11:03

## 2023-03-31 RX ADMIN — FAMOTIDINE 20 MG: 20 TABLET ORAL at 11:03

## 2023-03-31 RX ADMIN — GABAPENTIN 400 MG: 400 CAPSULE ORAL at 15:00

## 2023-03-31 RX ADMIN — CYCLOBENZAPRINE 10 MG: 10 TABLET, FILM COATED ORAL at 20:54

## 2023-03-31 RX ADMIN — ENOXAPARIN SODIUM 40 MG: 100 INJECTION SUBCUTANEOUS at 11:03

## 2023-03-31 RX ADMIN — GABAPENTIN 400 MG: 400 CAPSULE ORAL at 20:54

## 2023-03-31 RX ADMIN — SODIUM CHLORIDE, PRESERVATIVE FREE 10 ML: 5 INJECTION INTRAVENOUS at 20:54

## 2023-03-31 RX ADMIN — BUPRENORPHINE AND NALOXONE 1 FILM: 8; 2 FILM BUCCAL; SUBLINGUAL at 06:45

## 2023-03-31 ASSESSMENT — PAIN DESCRIPTION - LOCATION: LOCATION: BACK;NECK

## 2023-03-31 ASSESSMENT — PAIN SCALES - GENERAL: PAINLEVEL_OUTOF10: 6

## 2023-03-31 NOTE — PLAN OF CARE
Problem: Discharge Planning  Goal: Discharge to home or other facility with appropriate resources  Outcome: Progressing     Problem: Pain  Goal: Verbalizes/displays adequate comfort level or baseline comfort level  Outcome: Progressing     Problem: Safety - Adult  Goal: Free from fall injury  Outcome: Progressing     Problem: ABCDS Injury Assessment  Goal: Absence of physical injury  Outcome: Progressing  Flowsheets (Taken 3/31/2023 0012)  Absence of Physical Injury: Implement safety measures based on patient assessment

## 2023-03-31 NOTE — PLAN OF CARE
Problem: Discharge Planning  Goal: Discharge to home or other facility with appropriate resources  3/31/2023 1712 by Marcial Pino RN  Outcome: Progressing  Flowsheets (Taken 3/31/2023 0930)  Discharge to home or other facility with appropriate resources:   Identify barriers to discharge with patient and caregiver   Arrange for needed discharge resources and transportation as appropriate   Identify discharge learning needs (meds, wound care, etc)   Refer to discharge planning if patient needs post-hospital services based on physician order or complex needs related to functional status, cognitive ability or social support system     Problem: Pain  Goal: Verbalizes/displays adequate comfort level or baseline comfort level  3/31/2023 1712 by Marcial Pino RN  Outcome: Progressing     Problem: Safety - Adult  Goal: Free from fall injury  3/31/2023 1712 by Marcial Pino RN  Outcome: Progressing  Flowsheets (Taken 3/31/2023 0930)  Free From Fall Injury:   Instruct family/caregiver on patient safety   Based on caregiver fall risk screen, instruct family/caregiver to ask for assistance with transferring infant if caregiver noted to have fall risk factors     Problem: ABCDS Injury Assessment  Goal: Absence of physical injury  3/31/2023 1712 by Marcial Pino RN  Outcome: Progressing

## 2023-04-01 PROCEDURE — 2580000003 HC RX 258

## 2023-04-01 PROCEDURE — 99232 SBSQ HOSP IP/OBS MODERATE 35: CPT | Performed by: INTERNAL MEDICINE

## 2023-04-01 PROCEDURE — 6370000000 HC RX 637 (ALT 250 FOR IP)

## 2023-04-01 PROCEDURE — 1200000000 HC SEMI PRIVATE

## 2023-04-01 PROCEDURE — 6360000002 HC RX W HCPCS

## 2023-04-01 PROCEDURE — 6370000000 HC RX 637 (ALT 250 FOR IP): Performed by: INTERNAL MEDICINE

## 2023-04-01 RX ADMIN — ACETAMINOPHEN 650 MG: 325 TABLET ORAL at 20:42

## 2023-04-01 RX ADMIN — BUPRENORPHINE AND NALOXONE 1 FILM: 8; 2 FILM BUCCAL; SUBLINGUAL at 08:09

## 2023-04-01 RX ADMIN — GABAPENTIN 400 MG: 400 CAPSULE ORAL at 08:09

## 2023-04-01 RX ADMIN — CYCLOBENZAPRINE 10 MG: 10 TABLET, FILM COATED ORAL at 20:42

## 2023-04-01 RX ADMIN — SODIUM CHLORIDE, PRESERVATIVE FREE 10 ML: 5 INJECTION INTRAVENOUS at 20:43

## 2023-04-01 RX ADMIN — TOBRAMYCIN AND DEXAMETHASONE 1 DROP: 3; 1 SUSPENSION/ DROPS OPHTHALMIC at 11:50

## 2023-04-01 RX ADMIN — CYCLOBENZAPRINE 10 MG: 10 TABLET, FILM COATED ORAL at 14:41

## 2023-04-01 RX ADMIN — TOBRAMYCIN AND DEXAMETHASONE 1 DROP: 3; 1 SUSPENSION/ DROPS OPHTHALMIC at 22:36

## 2023-04-01 RX ADMIN — GABAPENTIN 400 MG: 400 CAPSULE ORAL at 14:40

## 2023-04-01 RX ADMIN — TOBRAMYCIN AND DEXAMETHASONE 1 DROP: 3; 1 SUSPENSION/ DROPS OPHTHALMIC at 14:41

## 2023-04-01 RX ADMIN — GABAPENTIN 400 MG: 400 CAPSULE ORAL at 20:41

## 2023-04-01 RX ADMIN — TOBRAMYCIN AND DEXAMETHASONE 1 DROP: 3; 1 SUSPENSION/ DROPS OPHTHALMIC at 18:19

## 2023-04-01 RX ADMIN — FAMOTIDINE 20 MG: 20 TABLET ORAL at 20:42

## 2023-04-01 RX ADMIN — FAMOTIDINE 20 MG: 20 TABLET ORAL at 08:09

## 2023-04-01 RX ADMIN — SODIUM CHLORIDE, PRESERVATIVE FREE 10 ML: 5 INJECTION INTRAVENOUS at 08:14

## 2023-04-01 ASSESSMENT — PAIN DESCRIPTION - DESCRIPTORS: DESCRIPTORS: ACHING

## 2023-04-01 ASSESSMENT — PAIN DESCRIPTION - LOCATION: LOCATION: NECK;SHOULDER

## 2023-04-01 ASSESSMENT — PAIN SCALES - GENERAL: PAINLEVEL_OUTOF10: 7

## 2023-04-01 ASSESSMENT — PAIN DESCRIPTION - ORIENTATION: ORIENTATION: MID

## 2023-04-01 NOTE — PLAN OF CARE
Problem: Discharge Planning  Goal: Discharge to home or other facility with appropriate resources  Outcome: Progressing  Flowsheets (Taken 4/1/2023 0800)  Discharge to home or other facility with appropriate resources:   Identify barriers to discharge with patient and caregiver   Arrange for needed discharge resources and transportation as appropriate   Identify discharge learning needs (meds, wound care, etc)   Refer to discharge planning if patient needs post-hospital services based on physician order or complex needs related to functional status, cognitive ability or social support system     Problem: Pain  Goal: Verbalizes/displays adequate comfort level or baseline comfort level  Outcome: Progressing     Problem: Safety - Adult  Goal: Free from fall injury  Outcome: Progressing  Flowsheets (Taken 4/1/2023 0800)  Free From Fall Injury:   Instruct family/caregiver on patient safety   Based on caregiver fall risk screen, instruct family/caregiver to ask for assistance with transferring infant if caregiver noted to have fall risk factors     Problem: ABCDS Injury Assessment  Goal: Absence of physical injury  Outcome: Progressing

## 2023-04-02 PROCEDURE — 96372 THER/PROPH/DIAG INJ SC/IM: CPT

## 2023-04-02 PROCEDURE — 1200000000 HC SEMI PRIVATE

## 2023-04-02 PROCEDURE — 99232 SBSQ HOSP IP/OBS MODERATE 35: CPT | Performed by: INTERNAL MEDICINE

## 2023-04-02 PROCEDURE — 2580000003 HC RX 258

## 2023-04-02 PROCEDURE — 6360000002 HC RX W HCPCS

## 2023-04-02 PROCEDURE — 6370000000 HC RX 637 (ALT 250 FOR IP): Performed by: INTERNAL MEDICINE

## 2023-04-02 PROCEDURE — 6370000000 HC RX 637 (ALT 250 FOR IP)

## 2023-04-02 RX ORDER — LIDOCAINE 4 G/G
1 PATCH TOPICAL DAILY
Status: DISCONTINUED | OUTPATIENT
Start: 2023-04-02 | End: 2023-04-03 | Stop reason: HOSPADM

## 2023-04-02 RX ADMIN — CYCLOBENZAPRINE 10 MG: 10 TABLET, FILM COATED ORAL at 21:17

## 2023-04-02 RX ADMIN — TOBRAMYCIN AND DEXAMETHASONE 1 DROP: 3; 1 SUSPENSION/ DROPS OPHTHALMIC at 14:15

## 2023-04-02 RX ADMIN — TOBRAMYCIN AND DEXAMETHASONE 1 DROP: 3; 1 SUSPENSION/ DROPS OPHTHALMIC at 17:49

## 2023-04-02 RX ADMIN — CYCLOBENZAPRINE 10 MG: 10 TABLET, FILM COATED ORAL at 08:12

## 2023-04-02 RX ADMIN — MELOXICAM 15 MG: 15 TABLET ORAL at 08:10

## 2023-04-02 RX ADMIN — ENOXAPARIN SODIUM 40 MG: 100 INJECTION SUBCUTANEOUS at 08:09

## 2023-04-02 RX ADMIN — GABAPENTIN 400 MG: 400 CAPSULE ORAL at 08:10

## 2023-04-02 RX ADMIN — GABAPENTIN 400 MG: 400 CAPSULE ORAL at 21:17

## 2023-04-02 RX ADMIN — FAMOTIDINE 20 MG: 20 TABLET ORAL at 21:17

## 2023-04-02 RX ADMIN — BUPRENORPHINE AND NALOXONE 1 FILM: 8; 2 FILM BUCCAL; SUBLINGUAL at 06:24

## 2023-04-02 RX ADMIN — TOBRAMYCIN AND DEXAMETHASONE 1 DROP: 3; 1 SUSPENSION/ DROPS OPHTHALMIC at 23:17

## 2023-04-02 RX ADMIN — GABAPENTIN 400 MG: 400 CAPSULE ORAL at 14:14

## 2023-04-02 RX ADMIN — FAMOTIDINE 20 MG: 20 TABLET ORAL at 08:09

## 2023-04-02 RX ADMIN — SODIUM CHLORIDE, PRESERVATIVE FREE 10 ML: 5 INJECTION INTRAVENOUS at 21:18

## 2023-04-02 RX ADMIN — CYCLOBENZAPRINE 10 MG: 10 TABLET, FILM COATED ORAL at 14:14

## 2023-04-02 RX ADMIN — TOBRAMYCIN AND DEXAMETHASONE 1 DROP: 3; 1 SUSPENSION/ DROPS OPHTHALMIC at 06:26

## 2023-04-02 RX ADMIN — TOBRAMYCIN AND DEXAMETHASONE 1 DROP: 3; 1 SUSPENSION/ DROPS OPHTHALMIC at 08:10

## 2023-04-02 ASSESSMENT — PAIN DESCRIPTION - DESCRIPTORS: DESCRIPTORS: ACHING

## 2023-04-02 ASSESSMENT — PAIN SCALES - GENERAL
PAINLEVEL_OUTOF10: 0
PAINLEVEL_OUTOF10: 6
PAINLEVEL_OUTOF10: 4

## 2023-04-02 ASSESSMENT — PAIN DESCRIPTION - PAIN TYPE: TYPE: CHRONIC PAIN

## 2023-04-02 ASSESSMENT — PAIN DESCRIPTION - LOCATION
LOCATION: BACK
LOCATION: BACK

## 2023-04-02 ASSESSMENT — PAIN DESCRIPTION - ORIENTATION: ORIENTATION: LOWER

## 2023-04-02 NOTE — PLAN OF CARE
Problem: Discharge Planning  Goal: Discharge to home or other facility with appropriate resources  Outcome: Progressing  Flowsheets (Taken 4/2/2023 0819)  Discharge to home or other facility with appropriate resources:   Identify barriers to discharge with patient and caregiver   Arrange for needed discharge resources and transportation as appropriate   Identify discharge learning needs (meds, wound care, etc)   Arrange for interpreters to assist at discharge as needed   Refer to discharge planning if patient needs post-hospital services based on physician order or complex needs related to functional status, cognitive ability or social support system     Problem: Pain  Goal: Verbalizes/displays adequate comfort level or baseline comfort level  Outcome: Progressing     Problem: Safety - Adult  Goal: Free from fall injury  Outcome: Progressing  Flowsheets (Taken 4/2/2023 0820)  Free From Fall Injury:   Based on caregiver fall risk screen, instruct family/caregiver to ask for assistance with transferring infant if caregiver noted to have fall risk factors   Instruct family/caregiver on patient safety     Problem: ABCDS Injury Assessment  Goal: Absence of physical injury  Outcome: Progressing  Flowsheets (Taken 4/2/2023 0820)  Absence of Physical Injury: Implement safety measures based on patient assessment

## 2023-04-03 VITALS
DIASTOLIC BLOOD PRESSURE: 80 MMHG | SYSTOLIC BLOOD PRESSURE: 121 MMHG | OXYGEN SATURATION: 97 % | TEMPERATURE: 98.7 F | HEIGHT: 70 IN | HEART RATE: 90 BPM | BODY MASS INDEX: 31.15 KG/M2 | RESPIRATION RATE: 17 BRPM | WEIGHT: 217.59 LBS

## 2023-04-03 PROCEDURE — 97110 THERAPEUTIC EXERCISES: CPT

## 2023-04-03 PROCEDURE — 6370000000 HC RX 637 (ALT 250 FOR IP)

## 2023-04-03 PROCEDURE — 99239 HOSP IP/OBS DSCHRG MGMT >30: CPT | Performed by: INTERNAL MEDICINE

## 2023-04-03 PROCEDURE — 97116 GAIT TRAINING THERAPY: CPT

## 2023-04-03 PROCEDURE — 6370000000 HC RX 637 (ALT 250 FOR IP): Performed by: INTERNAL MEDICINE

## 2023-04-03 PROCEDURE — 2580000003 HC RX 258

## 2023-04-03 RX ORDER — TOBRAMYCIN AND DEXAMETHASONE 3; 1 MG/ML; MG/ML
1 SUSPENSION/ DROPS OPHTHALMIC
Qty: 1 EACH | Refills: 1 | Status: SHIPPED | OUTPATIENT
Start: 2023-04-03 | End: 2023-04-13

## 2023-04-03 RX ORDER — LIDOCAINE 4 G/G
1 PATCH TOPICAL DAILY
Qty: 10 PATCH | Refills: 0 | Status: SHIPPED | OUTPATIENT
Start: 2023-04-04

## 2023-04-03 RX ADMIN — GABAPENTIN 400 MG: 400 CAPSULE ORAL at 08:09

## 2023-04-03 RX ADMIN — CYCLOBENZAPRINE 10 MG: 10 TABLET, FILM COATED ORAL at 08:17

## 2023-04-03 RX ADMIN — BUPRENORPHINE AND NALOXONE 1 FILM: 8; 2 FILM BUCCAL; SUBLINGUAL at 06:51

## 2023-04-03 RX ADMIN — CYCLOBENZAPRINE 10 MG: 10 TABLET, FILM COATED ORAL at 13:24

## 2023-04-03 RX ADMIN — SODIUM CHLORIDE, PRESERVATIVE FREE 10 ML: 5 INJECTION INTRAVENOUS at 08:11

## 2023-04-03 RX ADMIN — TOBRAMYCIN AND DEXAMETHASONE 1 DROP: 3; 1 SUSPENSION/ DROPS OPHTHALMIC at 06:51

## 2023-04-03 RX ADMIN — FAMOTIDINE 20 MG: 20 TABLET ORAL at 08:09

## 2023-04-03 RX ADMIN — TOBRAMYCIN AND DEXAMETHASONE 1 DROP: 3; 1 SUSPENSION/ DROPS OPHTHALMIC at 13:24

## 2023-04-03 RX ADMIN — TOBRAMYCIN AND DEXAMETHASONE 1 DROP: 3; 1 SUSPENSION/ DROPS OPHTHALMIC at 08:10

## 2023-04-03 RX ADMIN — GABAPENTIN 400 MG: 400 CAPSULE ORAL at 13:24

## 2023-04-03 ASSESSMENT — PAIN SCALES - GENERAL
PAINLEVEL_OUTOF10: 6
PAINLEVEL_OUTOF10: 2
PAINLEVEL_OUTOF10: 6

## 2023-04-03 ASSESSMENT — PAIN DESCRIPTION - LOCATION
LOCATION: BACK;HIP
LOCATION: NECK
LOCATION: BACK

## 2023-04-03 ASSESSMENT — PAIN DESCRIPTION - DESCRIPTORS: DESCRIPTORS: ACHING

## 2023-04-03 ASSESSMENT — PAIN DESCRIPTION - ORIENTATION
ORIENTATION: LOWER
ORIENTATION: RIGHT;LEFT
ORIENTATION: UPPER

## 2023-04-03 NOTE — PROGRESS NOTES
03/28/23 1431   Encounter Summary   Encounter Overview/Reason  Spiritual/Emotional Needs   Service Provided For: Patient   Referral/Consult From: Nurse   Last Encounter  03/28/23   Complexity of Encounter Low   Spiritual/Emotional needs   Type Spiritual Support   Assessment/Intervention/Outcome   Assessment Calm;Coping   Intervention Active listening;Nurtured Hope;Prayer (assurance of)/[place]   Outcome Receptive; Expressed Gratitude;Engaged in conversation;Coping
[address]    PROGRESS NOTE             3/30/2023    1:16 PM    Name:   Jonas Tai  MRN:     [id number]     [place]:      [de-identified]   Room:   2098/2098-01  IP Day:  4  Admit Date:  3/26/2023  9:51 AM    PCP:  GUIDO Marvin CNP  Code Status:  Full Code    Subjective:     C/C:   Chief Complaint   Patient presents with    Shortness of Breath    Facial Swelling    Fatigue     Interval History Status: improved. The patient was seen and examined at the bedside    Vital signs stable: Currently saturating 96% on room air (baseline 2-1/2 L of oxygen at home)    Hemoglobin 10.8 today    Continue Rocephin for UTI    The patient endorses dizziness/lightheadedness present for the past few days. Denies further shortness of breath. Denies headache, syncope, chest pain, shortness of breath, nausea, vomiting, abdominal pain    Brief History:     The patient is a 41 y.o. Non- / non  female with a past medical history of Marfan syndrome, opioid abuse on home Suboxone, hypertension, and depression who presents to the ED with shortness of breath and dizziness for the past few days. The patient is normally on 2-1/2 L of home oxygen. She reports that she has not needed to go up on her oxygen recently. She reports cough with yellow sputum recently. The patient describes the dizziness as a feeling of lightheadedness like she is going to pass out. She experiences dizziness when standing or moving around. She reports constipation. She denies headache, chest pain, nausea, vomiting, abdominal pain, diarrhea. The ED, the patient was hypertensive (/111). CBC was unremarkable. VBG's showed hypercapnia (PCO2 72.3), however pH was 7.410. Bicarb was elevated at 45.8. Lipase was 9. Troponin was elevated at 21. Chest x-ray showed no acute abnormality. D-dimer was normal at 0.30.   UA was positive for trace leukocyte
Assumed pt care at [phone]. Pt arrived via NALLELY Hinkle. Pt assessment completed, pt is comfortable. Bed low, bed alarm on, call light within reach.     Marita Rivas RN
[place] [id number] Internal Medicine    Progress Note     4/3/2023    9:40 AM    Name:   Becca Cavanaugh  MRN:     [id number]     [place]:      [de-identified]   Room:   2065/2065-01  IP Day:  8  Admit Date:  3/26/2023  9:51 AM    PCP:   GUIDO Beatty CNP  Code Status:  Full Code    Subjective:     C/C:   Chief Complaint   Patient presents with    Shortness of Breath    Facial Swelling    Fatigue     Principal Problem:    Shortness of breath  Active Problems:    Dizziness    Acute bacterial conjunctivitis of both eyes  Resolved Problems:    * No resolved hospital problems. *        Patient, has past medical multimedical problems of hypertension, depression, concern for opiate dependence, chronic hypoxic respiratory failure on oxygen, patient admitted with worsening shortness of breath there was concern of UTI treated with Rocephin, urine culture is negative  On admission she has compensated respiratory failure  Patient feeling much better today  Complaining of constipation  4/1  Patient, clinically doing better  Shortness of breath is baseline, on oxygen  Patient told me she was not using Cymbalta, will discontinue Cymbalta  Complaining of back pain,           No results found for this or any previous visit (from the past 24 hour(s)). No results for input(s): POCGLU in the last 72 hours. No results found. On admission         Review of Systems:     As recorded in HPI          Physical Examination:        Vitals:    04/02/23 0052 04/02/23 0607 04/02/23 1840 04/03/23 0720   BP: (!) 138/96 113/78 (!) 140/87 121/80   Pulse: 91 81 (!) 105 90   Resp:  18 20 17   Temp:  98.1 °F (36.7 °C) 98.6 °F (37 °C) 98.7 °F (37.1 °C)   TempSrc:   Oral Oral   SpO2:   96% 97%   Weight:       Height:           No results for input(s): POCGLU in the last 72 hours.   No intake or output data in the 24 hours ending 04/03/23 0940      General Appearance:  alert, well appearing, and in no
Dr. Kimberly Doshi (Cardiology) put on patient list for consult.
[place] [id number] Internal Medicine    Progress Note     4/1/2023    10:16 AM    Name:   Radha Preciado  MRN:     [id number]     Acct:      [de-identified]   Room:   2098/2098-01   Day:  6  Admit Date:  3/26/2023  9:51 AM    PCP:   GUIDO Allison CNP  Code Status:  Full Code    Subjective:     C/C:   Chief Complaint   Patient presents with    Shortness of Breath    Facial Swelling    Fatigue     Principal Problem:    Shortness of breath  Active Problems:    Dizziness    Acute bacterial conjunctivitis of both eyes  Resolved Problems:    * No resolved hospital problems. *      Patient, has past medical multimedical problems of hypertension, depression, concern for opiate dependence, chronic hypoxic respiratory failure on oxygen, patient admitted with worsening shortness of breath there was concern of UTI treated with Rocephin, urine culture is negative  On admission she has compensated respiratory failure  Patient feeling much better today  Complaining of constipation  4/1  Patient, clinically doing better  Shortness of breath is baseline, on oxygen  Patient told me she was not using Cymbalta, will discontinue Cymbalta  Complaining of back pain,           No results found for this or any previous visit (from the past 24 hour(s)). No results for input(s): POCGLU in the last 72 hours. No results found. On admission         Review of Systems:     As recorded in HPI          Physical Examination:        Vitals:    03/31/23 1215 03/31/23 1904 03/31/23 2311 04/01/23 0630   BP: 136/86 (!) 146/94 (!) 125/94 130/86   Pulse: (!) 105 98 88 (!) 106   Resp: 18 18 16 16   Temp: 98.3 °F (36.8 °C) 98.5 °F (36.9 °C) 98.2 °F (36.8 °C) 98 °F (36.7 °C)   TempSrc: Oral Oral Axillary    SpO2: 100% 99% 96% 97%   Weight:    217 lb 9.5 oz (98.7 kg)   Height:           No results for input(s): POCGLU in the last 72 hours.     Intake/Output Summary (Last 24 hours) at 4/1/2023 1016  Last data
Nutrition Assessment     Type and Reason for Visit: RD Nutrition Re-Screen/LOS    Nutrition Recommendations/Plan:   Will continue to provide Regular diet     Malnutrition Assessment:  Malnutrition Status:  None    Nutrition Assessment:  Pt states she is consuming all food provided and has no complaints. Plan is for discharge soon. Nutrition Related Findings:     Wound Type: None    Current Nutrition Therapies:    ADULT DIET;  Regular    Anthropometric Measures:  Height: 5' 10\" (177.8 cm)  Current Body Wt: 217 lb (98.4 kg)   BMI: 31.1    Nutrition Diagnosis:   No nutrition diagnosis at this time     Nutrition Interventions:   Food and/or Nutrient Delivery: Continue Current Diet                      Nutrition Monitoring and Evaluation:      Food/Nutrient Intake Outcomes: Food and Nutrient Intake       Discharge Planning:    Continue current diet     Ester Boykin, [address],   Contact: [phone]
PT and her only sister, the Primary decision maker, were in the room and completed ACP documents. It was a rough, but patient wanted to finished the paper. Writer encouraged PT to talk more with the family, and she can write a new ACP document if she changed her mind. Both of them welcomed the visit and prayer.
PT and her only sister, the Primary decision maker, were in the room and completed ACP documents. It was a rough, but patient wanted to finished the paper. Writer encouraged PT to talk more with the family, and she can write a new ACP document if she changed her mind. Both of them welcomed the visit and prayer. 03/27/23 1553   Encounter Summary   Encounter Overview/Reason  Advance Care Planning   Service Provided For: Patient and family together   Referral/Consult From: Patient   Support System Children;Parent; Family members   Last Encounter  03/27/23   Complexity of Encounter Moderate   Begin Time 1410   End Time  1500   Total Time Calculated 50 min   Advance Care Planning   Type Completed AD/ACP document(s)   Assessment/Intervention/Outcome   Assessment Calm;Coping   Intervention Active listening;Prayer (assurance of)/[place];Sustaining Presence/Ministry of presence   Outcome Coping;Engaged in conversation;Peace; Receptive
Patient sleeping in bed with vape pen in hand. Nurse woke patient and informed her we can not have her vaping. Nurse told patient that sticker label can be placed on it and locked in nurse . Patient verbalizes understanding. Vape locked in nurse .
[place] [id number] Internal Medicine    Progress Note     4/2/2023    2:36 PM    Name:   Marla Tiwari  MRN:     [id number]     [place]:      [de-identified]   Room:   2065/2065-01  IP Day:  7  Admit Date:  3/26/2023  9:51 AM    PCP:   GUIDO Chaves CNP  Code Status:  Full Code    Subjective:     C/C:   Chief Complaint   Patient presents with    Shortness of Breath    Facial Swelling    Fatigue     Principal Problem:    Shortness of breath  Active Problems:    Dizziness    Acute bacterial conjunctivitis of both eyes  Resolved Problems:    * No resolved hospital problems. *        Patient, has past medical multimedical problems of hypertension, depression, concern for opiate dependence, chronic hypoxic respiratory failure on oxygen, patient admitted with worsening shortness of breath there was concern of UTI treated with Rocephin, urine culture is negative  On admission she has compensated respiratory failure  Patient feeling much better today  Complaining of constipation  4/1  Patient, clinically doing better  Shortness of breath is baseline, on oxygen  Patient told me she was not using Cymbalta, will discontinue Cymbalta  Complaining of back pain,           No results found for this or any previous visit (from the past 24 hour(s)). No results for input(s): POCGLU in the last 72 hours. No results found. On admission         Review of Systems:     As recorded in HPI          Physical Examination:        Vitals:    04/01/23 1830 04/01/23 2226 04/02/23 0052 04/02/23 0607   BP: (!) 141/98 (!) 149/103 (!) 138/96 113/78   Pulse: (!) 106 (!) 101 91 81   Resp: 17 18  18   Temp: 98.9 °F (37.2 °C) 97.9 °F (36.6 °C)  98.1 °F (36.7 °C)   TempSrc: Oral      SpO2: 97% 98%     Weight:       Height:           No results for input(s): POCGLU in the last 72 hours.   No intake or output data in the 24 hours ending 04/02/23 1436      General Appearance:  alert, well appearing, and in no
Transfer of care:    72-year-old female past medical history of Marfan syndrome, opioid abuse, hypertension depression presenting due to shortness of breath and dizziness. She normally takes 2 L of oxygen at home, she also had a cough with yellow sputum. VBG's showed hypercapnia (PCO2 72.3), however pH was 7.410. Bicarb was elevated at 45.8. Troponins were 21, repeat 18. D-dimer was 0.3 Cardiac work-up-echo was completed pending results,    Also found to have UTI-on Rocephin. On Suboxone for opiate abuse, home dose. Saturating 100% involved on 2.
Writer went over discharge instructions. Pt understood. All questions and concerns answered. Pt ambulated to wheelchair with 2L NC and steady gait. Pt brought to car by wheelchair transporter in no distress.
Dressing: Minimal assistance  Toileting: Minimal assistance  Additional Comments: ADL scores are based on skilled observation and clinical reasoning unless otherwise noted. Pt is currently limited by decreased strength, endurance, activity tolerance, safety awareness, increased pain, and cognitive barriers which impacts the patient's ability to safely and independently complete self-care tasks. Balance  Balance  Sitting Balance: Stand by assistance  Standing Balance: Stand by assistance  Standing Balance  Time: 11 min  Activity: standing sinkside for grooming tasks. Mobility bed<>sink  Comment: 1-2 UE support. Transfers/Mobility  Bed mobility  Supine to Sit: Contact guard assistance (cueing for technique, increased time. HOB elevated, use of bed rail.)  Sit to Supine:  (Pt remained seated EOB for PT session at end of OT session.)  Transfers  Sit to stand: Contact guard assistance  Stand to sit: Stand by assistance    Functional Mobility  Functional - Mobility Device: Rolling Walker  Activity: To/from bathroom  Assist Level: Contact guard assistance (SBA-CGA)  Functional Mobility Comments: VCs for safety, slight unsteadiness noted. OT Exercises  Exercise Treatment: OSBORNE faciliated pt engagement in seated UB ex's to promote incresed strength and endurance for improved self care tolerance and independence. Pt able to complete elbow mvt planes with 1# weight however requested AROM for all shoulder planes of movement. Pt with seated lean during ex's due to L side discomfort. Fair technique thoughout, x10-15 reps.       Patient Education  Patient Education  Education Given To: Patient  Education Provided: Role of Therapy, Plan of Care  Education Method: Demonstration, Verbal  Barriers to Learning: Cognition  Education Outcome: Verbalized understanding, Continued education needed      Goals  Short Term Goals  Time Frame for Short Term Goals: By discharge, patient will  Short Term Goal 1: Perform UB
Precautions; Up as Tolerated    Subjective: Pt long sitting in bed upon arrival, agreeable to therapy  Comments: NANCY Lora approved therapy       Pain Assessment: 0-10  Pain Level: 6  Pain Location: Back     Oxygen Therapy  O2 Device: Nasal cannula  O2 Flow Rate (L/min): 2 L/min    Bed Mobility  Rolling: Stand by assistance  Supine to Sit: Stand by assistance  Sit to Supine: Minimal assistance  Scooting: Stand by assistance  Bed mobility  Scooting: Stand by assistance    Transfers:  Sit to Stand: Stand by assistance  Stand to Sit: Stand by assistance    EXERCISES    Other exercises?: Yes  Other exercises 1: bed mobility x2  Other exercises 2: seated B LE exercises x2 sets x10 reps  Other exercises 4: STS x2 from bed  Other exercises 5: pt ambulated 48ft and 35ft CGA with RW. seated rest break in between           Activity Tolerance: Patient limited by fatigue, Patient limited by endurance    Current Treatment Recommendations: Strengthening, Balance training, Functional mobility training, Transfer training, Endurance training, Gait training, Safety education & training, Home exercise program, Patient/Caregiver education & training, Positioning, Therapeutic activities    Conditions Requiring Skilled Therapeutic Intervention  Treatment Diagnosis: Impaired fucntion. Discharge Recommendations: Patient would benefit from continued therapy after discharge; Therapy recommended at discharge    [address] [place] [id number] - Inpatient   How much help is needed turning from your back to your side while in a flat bed without using bedrails?: A Little  How much help is needed moving from lying on your back to sitting on the side of a flat bed without using bedrails?: A Little  How much help is needed moving to and from a bed to a chair?: A Little  How much help is needed standing up from a chair using your arms?: A Little  How much help is needed walking in hospital room?: A Little  How much help is needed climbing 3-5 steps with a
Precautions; Up as Tolerated    Subjective: Pt sitting EOB upon arrival working with OT. Agreeable to therapy  Comments: NANCY Begum approved therapy       Pain Assessment: None - Denies Pain     Oxygen Therapy  O2 Device: Nasal cannula  O2 Flow Rate (L/min): 2 L/min    Bed Mobility  Sit to Supine: Minimal assistance    Transfers:  Sit to Stand: Stand by assistance  Stand to Sit: Stand by assistance    EXERCISES    Other exercises?: Yes  Other exercises 1: STS x1 from bed  Other exercises 2: seated B LE exercises x2 sets x10 reps  Other exercises 3: seated B LE exercises x2 sets x10 reps with orange tband  Other exercises 4: bed mobility  Other exercises 5: pt ambulated 35ft CGA with RW. seated rest break in between           Activity Tolerance: Patient limited by fatigue, Patient limited by endurance    Current Treatment Recommendations: Strengthening, Balance training, Functional mobility training, Transfer training, Endurance training, Gait training, Safety education & training, Home exercise program, Patient/Caregiver education & training, Positioning, Therapeutic activities    Conditions Requiring Skilled Therapeutic Intervention  Treatment Diagnosis: Impaired fucntion. Discharge Recommendations: Patient would benefit from continued therapy after discharge; Therapy recommended at discharge    [address] - Inpatient   How much help is needed turning from your back to your side while in a flat bed without using bedrails?: A Little  How much help is needed moving from lying on your back to sitting on the side of a flat bed without using bedrails?: A Little  How much help is needed moving to and from a bed to a chair?: A Little  How much help is needed standing up from a chair using your arms?: A Little  How much help is needed walking in hospital room?: A Little  How much help is needed climbing 3-5 steps with a railing?: Total  AM-PAC Inpatient Mobility Raw Score : 16  AM-PAC Inpatient T-Scale Score :
esterase and few bacteria. The patient was admitted for cardiac work-up in the setting of Marfan's syndrome    Review of Systems:     Review of Systems   Constitutional:  Negative for activity change, chills, diaphoresis, fatigue and fever. HENT:  Negative for congestion and rhinorrhea. Respiratory:  Negative for cough, shortness of breath, wheezing and stridor. Cardiovascular:  Negative for chest pain, palpitations and leg swelling. Gastrointestinal:  Negative for abdominal distention, abdominal pain, diarrhea, nausea, rectal pain and vomiting. Endocrine: Negative for cold intolerance and heat intolerance. Genitourinary:  Negative for dysuria, frequency and urgency. Musculoskeletal:  Negative for arthralgias and myalgias. Skin:  Negative for pallor and rash. Neurological:  Positive for dizziness and light-headedness. Negative for tremors, syncope, weakness and headaches. Psychiatric/Behavioral:  Negative for agitation and confusion. Medications: Allergies:     Allergies   Allergen Reactions    Ketamine Other (See Comments)    Morphine Hives       Current Meds:   Scheduled Meds:    gabapentin  100 mg Oral TID    sodium chloride flush  5-40 mL IntraVENous 2 times per day    enoxaparin  40 mg SubCUTAneous Daily    famotidine  20 mg Oral BID    cefTRIAXone (ROCEPHIN) IV  1,000 mg IntraVENous Q24H    buprenorphine-naloxone  1 Film SubLINGual Daily    [Held by provider] DULoxetine  60 mg Oral BID     Continuous Infusions:    sodium chloride      sodium chloride 75 mL/hr at 03/27/23 0615     PRN Meds: sodium chloride flush, sodium chloride, ondansetron **OR** ondansetron, polyethylene glycol, acetaminophen **OR** acetaminophen, [Held by provider] albuterol sulfate HFA, [Held by provider] cyclobenzaprine, [Held by provider] hydrOXYzine HCl    Data:     Past Medical History:   has a past medical history of Anxiety, Arthritis, Bipolar depression ([facility] [address] Dermatitis, Dysuria, Exposure to STD,
esterase and few bacteria. The patient was admitted for cardiac work-up in the setting of Marfan's syndrome    Review of Systems:     Review of Systems   Constitutional:  Negative for activity change, chills, diaphoresis, fatigue and fever. HENT:  Negative for congestion and rhinorrhea. Respiratory:  Negative for cough, shortness of breath, wheezing and stridor. Cardiovascular:  Negative for chest pain, palpitations and leg swelling. Gastrointestinal:  Negative for abdominal distention, abdominal pain, diarrhea, nausea, rectal pain and vomiting. Endocrine: Negative for cold intolerance and heat intolerance. Genitourinary:  Negative for dysuria, frequency and urgency. Musculoskeletal:  Negative for arthralgias and myalgias. Skin:  Negative for pallor and rash. Neurological:  Positive for dizziness and light-headedness. Negative for tremors, syncope, weakness and headaches. Psychiatric/Behavioral:  Negative for agitation and confusion. Medications: Allergies:     Allergies   Allergen Reactions    Ketamine Other (See Comments)    Morphine Hives       Current Meds:   Scheduled Meds:    gabapentin  400 mg Oral TID    nicotine  1 patch TransDERmal Daily    sodium chloride flush  5-40 mL IntraVENous 2 times per day    enoxaparin  40 mg SubCUTAneous Daily    famotidine  20 mg Oral BID    cefTRIAXone (ROCEPHIN) IV  1,000 mg IntraVENous Q24H    buprenorphine-naloxone  1 Film SubLINGual Daily    [Held by provider] DULoxetine  60 mg Oral BID     Continuous Infusions:    sodium chloride      sodium chloride 75 mL/hr at 03/28/23 0513     PRN Meds: meloxicam, perflutren lipid microspheres, sodium chloride flush, sodium chloride, ondansetron **OR** ondansetron, polyethylene glycol, acetaminophen **OR** acetaminophen, [Held by provider] albuterol sulfate HFA, cyclobenzaprine, [Held by provider] hydrOXYzine HCl    Data:     Past Medical History:   has a past medical history of Anxiety, Arthritis,
Equipment: Wheelchair-electric, Grab bars, Oxygen  Receives Help From: Personal care attendant  ADL Assistance: Needs assistance  Homemaking Assistance: Needs assistance  Homemaking Responsibilities: No  Ambulation Assistance: Needs assistance (Patient reports taking some steps in the home but mostly uses power chair)  Transfer Assistance: Needs assistance  Active : No  Patient's  Info: Aides or gets transportation  Occupation: On disability  IADL Comments: Patient reports  Additional Comments: Patient reports having aides come several days per week to assist, otherwise, she is alone  Vision/Hearing  Vision  Vision: Impaired  Vision Exceptions:  (does not wear glasses)  Hearing  Hearing: Exceptions to [facility]  Hearing Exceptions: No hearing aid    Cognition   Orientation  Orientation Level: Oriented to place;Oriented to situation;Oriented to person;Disoriented to time (States it is 2032)  Cognition  Overall Cognitive Status: Exceptions  Arousal/Alertness: Delayed responses to stimuli;Inconsistent responses to stimuli  Following Commands: Follows one step commands with increased time; Follows one step commands with repetition  Attention Span: Attends with cues to redirect  Safety Judgement: Decreased awareness of need for assistance;Decreased awareness of need for safety  Problem Solving: Assistance required to identify errors made;Assistance required to correct errors made  Insights: Decreased awareness of deficits  Initiation: Requires cues for some  Sequencing: Requires cues for some     Objective   Heart Rate: (!) 102  Heart Rate Source: Monitor  BP: (!) 143/91  BP Location: Left upper arm  MAP (Calculated): 108  Resp: 18  SpO2: 100 %  O2 Device: Nasal cannula     Observation/Palpation  Observation: Peripheral IV R antecubital        AROM RLE (degrees)  RLE AROM: WFL  RLE General AROM: AAROM  AROM LLE (degrees)  LLE AROM : WFL  LLE General AROM: AAROM  Strength RLE  Comment: Grossly Hip 2+/5, knee/ankle
History:   has a past medical history of Anxiety, Arthritis, Bipolar depression ([address] Dermatitis, Dysuria, Exposure to STD, Folliculitis, Hx of acute bronchitis, Hypertension, Marfan syndrome, MVP (mitral valve prolapse), Nausea & vomiting, Polysubstance abuse ([address] PONV (postoperative nausea and vomiting), Scoliosis, Seasonal allergies, Staph infection, and UTI (lower urinary tract infection). Social History:   reports that she has been smoking cigarettes. She has never used smokeless tobacco. She reports that she does not currently use drugs after having used the following drugs: Opiates , Marijuana (Earna Gonsalez), and Cocaine. She reports that she does not drink alcohol. Family History:   Family History   Problem Relation Age of Onset    Cancer Brother         testicular    Breast Cancer Maternal Grandmother     Hypertension Mother     Thyroid Disease Mother        Vitals:  BP (!) 140/89   Pulse 96   Temp 98.5 °F (36.9 °C) (Oral)   Resp 16   Ht 5' 10\" (1.778 m)   Wt 214 lb 11.7 oz (97.4 kg)   SpO2 95%   BMI 30.81 kg/m²   Temp (24hrs), Av.3 °F (36.8 °C), Min:98.1 °F (36.7 °C), Max:98.5 °F (36.9 °C)    No results for input(s): POCGLU in the last 72 hours. I/O(24Hr): No intake or output data in the 24 hours ending 23 1647      Labs:  [unfilled]    Lab Results   Component Value Date/Time    SPECIAL NOT REPORTED 2021 01:35 PM     Lab Results   Component Value Date/Time    CULTURE NO SIGNIFICANT GROWTH 2023 09:49 AM       [unfilled]    Radiology:    XR CHEST PORTABLE    Result Date: 3/26/2023  EXAMINATION: ONE XRAY VIEW OF THE CHEST 3/26/2023 10:19 am COMPARISON: 2021. HISTORY: ORDERING SYSTEM PROVIDED HISTORY: sob TECHNOLOGIST PROVIDED HISTORY: sob Reason for Exam: sob FINDINGS: Overlying items external to the patient somewhat limit evaluation. Patient is rotated. Diffuse haziness to right hemithorax felt to relate to patient positioning and superimposed normal soft tissues.
a chair using your arms?: A Little  How much help is needed walking in hospital room?: A Little  How much help is needed climbing 3-5 steps with a railing?: Total  AM-PAC Inpatient Mobility Raw Score : 16  AM-PAC Inpatient T-Scale Score : 40.78  Mobility Inpatient CMS 0-100% Score: 54.16  Mobility Inpatient CMS G-Code Modifier : CK      Goals  Short Term Goals  Time Frame for Short Term Goals: 10 visits  Short Term Goal 1: Pt able to perform rolling in bed mod-I  Short Term Goal 2: Pt able to transfer in/out of bed at [facility]  Short Term Goal 3: Transfers CGA  Short Term Goal 4: Pt able to take 5 to 10 steps with RW, min A       04/03/23 1525   PT Individual Minutes   Time In 0845   Time Out 0908   Minutes 23         Electronically signed by Ale Vaelnzuela PTA on 4/3/23 at 3:26 PM EDT
chair?: A Little  How much help is needed standing up from a chair using your arms?: A Little  How much help is needed walking in hospital room?: A Little  How much help is needed climbing 3-5 steps with a railing?: Total  AM-PAC Inpatient Mobility Raw Score : 16  AM-PAC Inpatient T-Scale Score : 40.78  Mobility Inpatient CMS 0-100% Score: 54.16  Mobility Inpatient CMS G-Code Modifier : CK      Goals  Short Term Goals  Time Frame for Short Term Goals: 10 visits  Short Term Goal 1: Pt able to perform rolling in bed mod-I  Short Term Goal 2: Pt able to transfer in/out of bed at [facility]  Short Term Goal 3: Transfers CGA  Short Term Goal 4: Pt able to take 5 to 10 steps with RW, min A       03/28/23 1111   PT Individual Minutes   Time In 1010   Time Out 1037   Minutes 27       Electronically signed by Raisa Messina PTA on 3/28/23 at 11:11 AM EDT
Suboxone and gabapentin   Awaiting placement, precertification pending   .   Hospital Problems             Last Modified POA    * (Principal) Shortness of breath 3/26/2023 Yes    Dizziness 3/26/2023 Yes    Acute bacterial conjunctivitis of both eyes 3/29/2023 Yes                      CHIRSTA Martinez MD  3/31/2023
much help is needed for toileting (which includes using toilet, bedpan, or urinal)?: A Lot  How much help is needed for putting on and taking off regular upper body clothing?: A Little  How much help is needed for taking care of personal grooming?: A Little  How much help for eating meals?: A Little  AM-Merged with [facility] Inpatient Daily Activity Raw Score: 15  AM-PAC Inpatient ADL T-Scale Score : 34.69  ADL Inpatient CMS 0-100% Score: 56.46  ADL Inpatient CMS G-Code Modifier : CK    Goals  Short Term Goals  Time Frame for Short Term Goals: By discharge, patient will  Short Term Goal 1: Perform UB bathing/dressing with SBA and LB bathing/dressing/toileting with Min A, good safety and use of AE/DME/Modifed techniques as needed  Short Term Goal 2: Perform functional transfers/few steps with CGA, good safety, and use of least restrictive device  Short Term Goal 3: Participate in 15+ minutes of therapeutic exercise/functional activity to improve safety and independence with self-care  Short Term Goal 4: Tolerate standing for 5+ minutes, CGA, with 0-1 UE support and no LOB during self-care/functional activity of choice  Short Term Goal 5: Verbalize/demonstrate good understanding of home safety/fall prevention strategies to improve safety and independence with self-care    Plan  Occupational Therapy Plan  Times Per Week: 5-7  Times Per Day:  Once a day  Current Treatment Recommendations: Self-Care / ADL, Strengthening, ROM, Balance training, Functional mobility training, Endurance training, Cognitive reorientation, Pain management, Safety education & training, Patient/Caregiver education & training, Equipment evaluation, education, & procurement, Positioning, Home management training, Coordination training    OT Individual Minutes  OT Individual Minutes  Time In: 0940  Time Out: 1009  Minutes: 29  Time Code Minutes   Timed Code Treatment Minutes: 15 Minutes    Electronically signed by PAT Herrera on 3/27/23 at 10:49 AM

## 2023-04-03 NOTE — PLAN OF CARE
Problem: Discharge Planning  Goal: Discharge to home or other facility with appropriate resources  Outcome: Progressing  Flowsheets (Taken 4/2/2023 2109)  Discharge to home or other facility with appropriate resources: Identify barriers to discharge with patient and caregiver     Problem: Pain  Goal: Verbalizes/displays adequate comfort level or baseline comfort level  Outcome: Progressing     Problem: Safety - Adult  Goal: Free from fall injury  Outcome: Progressing  Flowsheets (Taken 4/3/2023 0241)  Free From Fall Injury: Based on caregiver fall risk screen, instruct family/caregiver to ask for assistance with transferring infant if caregiver noted to have fall risk factors     Problem: ABCDS Injury Assessment  Goal: Absence of physical injury  Outcome: Progressing  Flowsheets (Taken 4/3/2023 0241)  Absence of Physical Injury: Implement safety measures based on patient assessment

## 2023-04-03 NOTE — CARE COORDINATION
[facility] patient, and will submit for prior authorization 3/30. Provided patient with updates in regards to discharge plans, and no objections.      Electronically signed by IVAN Mason on 3/30/2023 at 2:54 PM
Dr Miladys Dickey present at meeting and states patient can be set up with visiting nurse and home pt/ot. LSW continuing to see if we can find an alternative SNF that accepts her with suboxone.  Electronically signed by Monica Jade RN on 3/29/2023 at 10:21 AM
I spoke with patient and her sister, Diana Jama, on speaker phone about discharge planning. Patient gets 23 hours of aide service/week. Currently attempting to get more hours. Diana Jama states she can borrow a bedside commode, but the concern is patient lives alone and has no help at night. She can get VNS with therapy at home. Patient would be agreeable to SNF for a short period, but denies [facility] due to a previous stay. I am currently looking for another facility to accept her suboxone. I will continue to follow.  Electronically signed by Curtis Chavis RN on 3/28/2023 at 4:52 PM
[facility] has accepted patient, and will submit for prior authorization 3/30. Provided patient with updates in regards to discharge plans, and no objections.      Electronically signed by IVAN Alamo on 3/31/2023 at 1:00 PM
ONGOING DISCHARGE PLAN:    Patient is alert and oriented x4. Spoke with patient regarding discharge plan and discussed therapy recommendations for SNF. List given. Will follow for choice. Will need a facility that can accept suboxone. IV rocephin-UTI  100% ON 2L        Will continue to follow for additional discharge needs. If patient is discharged prior to next notation, then this note serves as note for discharge by case management.     Electronically signed by Aden Zamora RN on 3/28/2023 at 10:09 AM
ONGOING DISCHARGE PLAN:    Patient is alert and oriented x4. Spoke with patient regarding discharge plan and patient confirms that plan is home with [facility]NS - [facility]. Pt also has HHA every other day from [address] [place] [address] from [place] that pt will be discharged home today. Pt states she will need w/c transport home with O2 @ 2L. Arranged transportation with [address] [place] [address] for today at 1:30pm. Notified pt who notified her dad, Anup Oneil. He will be there to unlock the door. Will continue to follow for additional discharge needs. If patient is discharged prior to next notation, then this note serves as note for discharge by case management. Electronically signed by Sahara Corley RN on 4/3/2023 at 11:30 AM    Faxed GIAN and d/c med list to [place].     Electronically signed by Sahara Corley RN on 4/3/2023 at 11:35 AM
ONGOING DISCHARGE PLANNING NOTE:     Writer reviewed LSW notes, and discharge plan is [facility] with pre-cert started 2/58    Electronically signed by Sherly Carrasquillo RN on 4/2/2023 at 11:47 AM
ONGOING DISCHARGE PLANNING NOTE:    Writer reviewed LSW notes, and discharge plan is [facility] with pre-cert started 8/88.     Electronically signed by Gregorio Lyons RN on 4/1/2023 at 5:27 PM
Patient's home health aide present in the room. Patient states she wants to go home with VNS and her aide services. She states she is going to have a electric chair with lift when she gets home. The aide is calling patient's  to see if she can have additional aide hours. I did discuss with the patient that we will need to find a facility to accept her with suboxone. She does not want [facility]. She has to see the doctor for her suboxone RX every 2 weeks.  Electronically signed by Thad Capone RN on 3/28/2023 at 12:15 PM
Post Acute Facility/Agency List     Provided patient with the following list, the list includes the overall star ratings obtained from CMS per the Medicare Web site (www.Medicare.gov):     [] [address]  [] Acute Inpatient Rehabilitation Facilities  [x] Skilled Nursing Facilities  [] Home Care    Provided verbal instructions on how to utilize the QR Code to obtain additional detailed star ratings from www. Medicare. gov     offered to print and provide the detailed list:    [x]Accepted   []Declined    Patient is reviewing facilities in [facility] and will provide choices.  Electronically signed by Britton Palma RN on 3/28/2023 at 9:53 AM
Received a VM from [facility] that patient has been denied by insurance. [facility] did not provide any information in regards to a peer to peer being completed, therefore, SW is unsure if there is an option for that.       Electronically signed by IVAN Stearns on 4/3/2023 at 11:04 AM
SW spoke with patient regarding facilities that accept suboxone script. SW informed patient that SW spoke with [facility] and [facility] and they allow the script, but have to clinically review the referral to make sure they can accept. Patient states that she would be \"happy\" with sending a referral to [facility] or [facility]. SW sent referral to [facility].      Electronically signed by IVAN Salmeron on 3/29/2023 at 4:19 PM
patient representative Alysia Duran and her family were provided with a choice of provider and agrees with the discharge plan. Freedom of choice list with basic dialogue that supports the patient's individualized plan of care/goals and shares the quality data associated with the providers was provided to:     Patient Representative Name:       The Patient and/or Patient Representative Agree with the Discharge Plan?       Lissy Kilgore RN  Case Management Department  Ph: [phone] Fax: [phone]
known as: NEURONTIN  Take 400 mg by mouth 3 times daily.    meloxicam 15 MG tablet  Commonly known as: MOBIC  Take 1 tablet by mouth daily as needed for Pain   traZODone 50 MG tablet  Commonly known as: DESYREL  Take 1 tablet by mouth nightly as needed for Sleep     STOP taking these medications    STOP taking these medications   DULoxetine 60 MG extended release capsule  Commonly known as: CYMBALTA   hydroCHLOROthiazide 50 MG tablet  Commonly known as: HYDRODIURIL   hydrOXYzine HCl 50 MG tablet  Commonly known as: ATARAX   pregabalin 100 MG capsule  Commonly known as: LYRICA   Where to Get Your Medications      These medications were sent to [facility], [address] [place] [address],  [address]  [address]  Phone: [phone]       amoxicillin-clavulanate 875-125 MG per tablet        lidocaine 4 % external patch        nicotine 21 MG/24HR        tobramycin-dexamethasone 0.3-0.1 % ophthalmic suspension

## 2023-04-04 NOTE — DISCHARGE SUMMARY
No focal consolidations, pleural effusions or pulmonary edema noted. No pneumothoraces are seen. Cardiac and mediastinal silhouettes are within normal limits. No acute bony abnormality. No acute abnormality. Consultations:    Consults:     Final Specialist Recommendations/Findings:   IP CONSULT TO INTERNAL MEDICINE  IP CONSULT TO CARDIOLOGY  IP CONSULT TO SOCIAL WORK  IP CONSULT TO SPIRITUAL SERVICES      The patient was seen and examined on day of discharge and this discharge summary is in conjunction with any daily progress note from day of discharge. Discharge plan:     Disposition: Home    Physician Follow Up:     [address] [facility] [address] [facility]  [address]  [phone]  Follow up  They will call you at home to set up a time to come to your house. [address] [facility]  [phone]           Requiring Further Evaluation/Follow Up POST HOSPITALIZATION/Incidental Findings:    Diet: cardiac diet    Activity: As tolerated    Instructions to Patient:     Discharge Medications:      Medication List        START taking these medications      amoxicillin-clavulanate 875-125 MG per tablet  Commonly known as:  Augmentin  Take 1 tablet by mouth 2 times daily for 7 days     lidocaine 4 % external patch  Place 1 patch onto the skin daily     nicotine 21 MG/24HR  Commonly known as: NICODERM CQ  Place 1 patch onto the skin daily     tobramycin-dexamethasone 0.3-0.1 % ophthalmic suspension  Commonly known as: TOBRADEX  Place 1 drop into both eyes every 4 hours (while awake) for 10 days            CONTINUE taking these medications      albuterol sulfate  (90 Base) MCG/ACT inhaler  Commonly known as: Ventolin HFA  Inhale 2 puffs into the lungs 4 times daily as needed for Wheezing     buprenorphine-naloxone 8-2 MG Film SL film  Commonly known as: SUBOXONE     cyclobenzaprine 10 MG tablet  Commonly known as: FLEXERIL     gabapentin 400 MG

## 2023-05-11 ENCOUNTER — APPOINTMENT (OUTPATIENT)
Dept: ULTRASOUND IMAGING | Age: 43
DRG: 091 | End: 2023-05-11
Payer: COMMERCIAL

## 2023-05-11 ENCOUNTER — APPOINTMENT (OUTPATIENT)
Dept: GENERAL RADIOLOGY | Age: 43
DRG: 091 | End: 2023-05-11
Payer: COMMERCIAL

## 2023-05-11 ENCOUNTER — HOSPITAL ENCOUNTER (EMERGENCY)
Age: 43
Discharge: HOME | DRG: 091 | End: 2023-05-12
Attending: EMERGENCY MEDICINE
Payer: COMMERCIAL

## 2023-05-11 ENCOUNTER — APPOINTMENT (OUTPATIENT)
Dept: CT IMAGING | Age: 43
DRG: 091 | End: 2023-05-11
Payer: COMMERCIAL

## 2023-05-11 VITALS
BODY MASS INDEX: 30.06 KG/M2 | HEART RATE: 98 BPM | DIASTOLIC BLOOD PRESSURE: 88 MMHG | WEIGHT: 210 LBS | SYSTOLIC BLOOD PRESSURE: 147 MMHG | HEIGHT: 70 IN | RESPIRATION RATE: 16 BRPM | OXYGEN SATURATION: 97 % | TEMPERATURE: 98.1 F

## 2023-05-11 DIAGNOSIS — M79.89 LEG SWELLING: Primary | ICD-10-CM

## 2023-05-11 DIAGNOSIS — Z91.89 HISTORY OF CHRONIC CARBON DIOXIDE RETENTION: ICD-10-CM

## 2023-05-11 DIAGNOSIS — Z91.199 NONCOMPLIANCE WITH CPAP TREATMENT: ICD-10-CM

## 2023-05-11 LAB
ABSOLUTE EOS #: 0.23 K/UL (ref 0–0.4)
ABSOLUTE LYMPH #: 1.05 K/UL (ref 1–4.8)
ABSOLUTE MONO #: 0.43 K/UL (ref 0.1–1.2)
ACTION: NORMAL
ALLEN TEST: POSITIVE
ANION GAP SERPL CALCULATED.3IONS-SCNC: 5 MMOL/L (ref 9–17)
BASOPHILS # BLD: 0 % (ref 0–2)
BASOPHILS ABSOLUTE: 0.02 K/UL (ref 0–0.2)
BUN SERPL-MCNC: 6 MG/DL (ref 6–20)
CALCIUM SERPL-MCNC: 9.7 MG/DL (ref 8.6–10.4)
CHLORIDE SERPL-SCNC: 95 MMOL/L (ref 98–107)
CO2 SERPL-SCNC: 44 MMOL/L (ref 20–31)
CREAT SERPL-MCNC: 0.44 MG/DL (ref 0.5–0.9)
D DIMER BLD IA.RAPID-MCNC: 0.59 UG/ML FEU
DATE AND TIME: NORMAL
EOSINOPHILS RELATIVE PERCENT: 3 % (ref 1–4)
FIO2: 2
GFR SERPL CREATININE-BSD FRML MDRD: >60 ML/MIN/1.73M2
GLUCOSE SERPL-MCNC: 139 MG/DL (ref 70–99)
HCG QUALITATIVE: NEGATIVE
HCO3 VENOUS: 52.6 MMOL/L (ref 22–29)
HCT VFR BLD AUTO: 39.4 % (ref 36–46)
HGB BLD-MCNC: 12.5 G/DL (ref 12–16)
LYMPHOCYTES # BLD: 15 % (ref 24–44)
MCH RBC QN AUTO: 29.8 PG (ref 26–34)
MCHC RBC AUTO-ENTMCNC: 31.7 G/DL (ref 31–37)
MCV RBC AUTO: 93.8 FL (ref 80–100)
MONOCYTES # BLD: 6 % (ref 2–11)
NOTIFY: NORMAL
O2 DEVICE/FLOW/%: ABNORMAL
O2 SAT, VEN: 46 % (ref 60–85)
PCO2, VEN: 104.7 MM HG (ref 41–51)
PDW BLD-RTO: 13.2 % (ref 12.5–15.4)
PH VENOUS: 7.31 (ref 7.32–7.43)
PLATELET # BLD AUTO: ABNORMAL K/UL (ref 140–450)
PO2, VEN: 30.2 MM HG (ref 30–50)
POC HCO3: 52.5 MMOL/L (ref 21–28)
POC O2 SATURATION: 88 % (ref 94–98)
POC PCO2: 96.7 MM HG (ref 35–48)
POC PH: 7.34 (ref 7.35–7.45)
POC PO2: 63.6 MM HG (ref 83–108)
POSITIVE BASE EXCESS, ART: 22 (ref 0–3)
POSITIVE BASE EXCESS, VEN: 21 (ref 0–3)
POTASSIUM SERPL-SCNC: 4.1 MMOL/L (ref 3.7–5.3)
RBC # BLD: 4.2 M/UL (ref 4–5.2)
READ BACK: YES
SAMPLE SITE: ABNORMAL
SEG NEUTROPHILS: 76 % (ref 36–66)
SEGMENTED NEUTROPHILS ABSOLUTE COUNT: 5.27 K/UL (ref 1.8–7.7)
SODIUM SERPL-SCNC: 144 MMOL/L (ref 135–144)
TROPONIN I SERPL DL<=0.01 NG/ML-MCNC: 23 NG/L (ref 0–14)
WBC # BLD AUTO: 7 K/UL (ref 3.5–11)

## 2023-05-11 PROCEDURE — 80048 BASIC METABOLIC PNL TOTAL CA: CPT

## 2023-05-11 PROCEDURE — 93005 ELECTROCARDIOGRAM TRACING: CPT | Performed by: EMERGENCY MEDICINE

## 2023-05-11 PROCEDURE — 85025 COMPLETE CBC W/AUTO DIFF WBC: CPT

## 2023-05-11 PROCEDURE — 82803 BLOOD GASES ANY COMBINATION: CPT

## 2023-05-11 PROCEDURE — 6360000004 HC RX CONTRAST MEDICATION: Performed by: EMERGENCY MEDICINE

## 2023-05-11 PROCEDURE — 99285 EMERGENCY DEPT VISIT HI MDM: CPT

## 2023-05-11 PROCEDURE — 84484 ASSAY OF TROPONIN QUANT: CPT

## 2023-05-11 PROCEDURE — 71045 X-RAY EXAM CHEST 1 VIEW: CPT

## 2023-05-11 PROCEDURE — 71260 CT THORAX DX C+: CPT

## 2023-05-11 PROCEDURE — 2580000003 HC RX 258: Performed by: EMERGENCY MEDICINE

## 2023-05-11 PROCEDURE — 93971 EXTREMITY STUDY: CPT

## 2023-05-11 PROCEDURE — 85379 FIBRIN DEGRADATION QUANT: CPT

## 2023-05-11 PROCEDURE — 36415 COLL VENOUS BLD VENIPUNCTURE: CPT

## 2023-05-11 PROCEDURE — 84703 CHORIONIC GONADOTROPIN ASSAY: CPT

## 2023-05-11 RX ORDER — SODIUM CHLORIDE 0.9 % (FLUSH) 0.9 %
10 SYRINGE (ML) INJECTION ONCE
Status: COMPLETED | OUTPATIENT
Start: 2023-05-11 | End: 2023-05-11

## 2023-05-11 RX ORDER — 0.9 % SODIUM CHLORIDE 0.9 %
80 INTRAVENOUS SOLUTION INTRAVENOUS ONCE
Status: DISCONTINUED | OUTPATIENT
Start: 2023-05-11 | End: 2023-05-12 | Stop reason: HOSPADM

## 2023-05-11 RX ADMIN — SODIUM CHLORIDE, PRESERVATIVE FREE 10 ML: 5 INJECTION INTRAVENOUS at 20:23

## 2023-05-11 RX ADMIN — Medication 80 ML: at 20:23

## 2023-05-11 RX ADMIN — IOPAMIDOL 75 ML: 755 INJECTION, SOLUTION INTRAVENOUS at 20:23

## 2023-05-11 ASSESSMENT — ENCOUNTER SYMPTOMS
WHEEZING: 0
NAUSEA: 0
SHORTNESS OF BREATH: 0
CHEST TIGHTNESS: 0
RHINORRHEA: 0
COUGH: 0
ABDOMINAL DISTENTION: 0
SORE THROAT: 0
EYE PAIN: 0
VOMITING: 0

## 2023-05-11 NOTE — ED PROVIDER NOTES
[address] Emergency Department  [address] [address] [facility] [address] [facility] [address]  Phone: [phone]  Fax: [phone]        Pt Name: Allan Morrell  MRN: [id number]  [place] 1980  Date of evaluation: 5/11/23      CHIEF COMPLAINT     Chief Complaint   Patient presents with    Swelling     Pt states she noticed swelling of right foot and left side of face last night. Denies injury or pain. Brought in via squad         HISTORY OF PRESENT ILLNESS  (Location/Symptom, Timing/Onset, Context/Setting, Quality, Duration, Modifying Factors, Severity.)    Allan Morrell is a 41 y.o. female who presents with swelling. Patient complains of right calf and leg swelling. Symptoms have been present since last night. She denies fevers or chills or chest pain or cough or shortness of breath she is on oxygen chronically for respiratory disorder denies any history of DVT or PE low no prolonged immobilization or recent surgery. Denies injury or trauma. REVIEW OF SYSTEMS    (2-9 systems for level 4, 10 or more for level 5)     Review of Systems   Constitutional:  Negative for chills and fever. HENT:  Negative for congestion, rhinorrhea and sore throat. Eyes:  Negative for pain. Respiratory:  Negative for cough, chest tightness, shortness of breath and wheezing. Cardiovascular:  Negative for chest pain. Gastrointestinal:  Negative for abdominal distention, nausea and vomiting. Genitourinary:  Negative for difficulty urinating and dysuria. Skin:  Negative for rash. Neurological:  Negative for dizziness and headaches.      PAST MEDICAL HISTORY    has a past medical history of Anxiety, Arthritis, Bipolar depression ([address] Dermatitis, Dysuria, Exposure to STD, Folliculitis, Hx of acute bronchitis, Hypertension, Marfan syndrome, MVP (mitral valve prolapse), Nausea & vomiting, Polysubstance abuse ([address] PONV (postoperative nausea and vomiting), Scoliosis, Seasonal allergies, Staph

## 2023-05-11 NOTE — DISCHARGE INSTRUCTIONS
Please use your CPAP for your chronic CO2 retention. Please follow-up with your family physician. Your work-up today was normal except for chronic CO2 retention which is unchanged from baseline.

## 2023-05-12 ENCOUNTER — APPOINTMENT (OUTPATIENT)
Dept: GENERAL RADIOLOGY | Age: 43
DRG: 091 | End: 2023-05-12
Payer: COMMERCIAL

## 2023-05-12 ENCOUNTER — HOSPITAL ENCOUNTER (INPATIENT)
Age: 43
LOS: 7 days | Discharge: HOME | DRG: 091 | End: 2023-05-19
Attending: EMERGENCY MEDICINE | Admitting: INTERNAL MEDICINE
Payer: COMMERCIAL

## 2023-05-12 DIAGNOSIS — R06.89 HYPERCAPNIA: Primary | ICD-10-CM

## 2023-05-12 PROBLEM — J96.10 CHRONIC RESPIRATORY FAILURE (HCC): Status: ACTIVE | Noted: 2023-05-12

## 2023-05-12 LAB
ABSOLUTE EOS #: 0.09 K/UL (ref 0–0.44)
ABSOLUTE IMMATURE GRANULOCYTE: 0.03 K/UL (ref 0–0.3)
ABSOLUTE LYMPH #: 0.96 K/UL (ref 1.1–3.7)
ABSOLUTE MONO #: 0.39 K/UL (ref 0.1–1.2)
ALLEN TEST: POSITIVE
ANION GAP: ABNORMAL MMOL/L (ref 7–16)
ANION GAP: ABNORMAL MMOL/L (ref 7–16)
BASOPHILS # BLD: 1 % (ref 0–2)
BASOPHILS ABSOLUTE: 0.03 K/UL (ref 0–0.2)
BNP SERPL-MCNC: <36 PG/ML
CARBOXYHEMOGLOBIN: 3.1 % (ref 0–5)
CARBOXYHEMOGLOBIN: 3.4 % (ref 0–5)
CK SERPL-CCNC: 24 U/L (ref 26–192)
D DIMER BLD IA.RAPID-MCNC: 0.63 UG/ML FEU (ref 0–0.57)
EGFR, POC: >60 ML/MIN/1.73M2
EGFR, POC: >60 ML/MIN/1.73M2
EKG ATRIAL RATE: 92 BPM
EKG P AXIS: 59 DEGREES
EKG P-R INTERVAL: 140 MS
EKG Q-T INTERVAL: 332 MS
EKG QRS DURATION: 74 MS
EKG QTC CALCULATION (BAZETT): 410 MS
EKG R AXIS: 80 DEGREES
EKG T AXIS: 86 DEGREES
EKG VENTRICULAR RATE: 92 BPM
EOSINOPHILS RELATIVE PERCENT: 2 % (ref 1–4)
ERYTHROCYTE [SEDIMENTATION RATE] IN BLOOD BY WESTERGREN METHOD: 22 MM/HR (ref 0–20)
FIO2: ABNORMAL
FIO2: ABNORMAL
GLUCOSE BLD-MCNC: 112 MG/DL (ref 74–100)
GLUCOSE BLD-MCNC: 247 MG/DL (ref 74–100)
HCO3 VENOUS: 41.6 MMOL/L (ref 24–30)
HCO3 VENOUS: 43.6 MMOL/L (ref 24–30)
HCO3 VENOUS: 56.7 MMOL/L (ref 22–29)
HCT VFR BLD AUTO: 38.7 % (ref 36.3–47.1)
HGB BLD-MCNC: 11.8 G/DL (ref 11.9–15.1)
IMMATURE GRANULOCYTES: 1 %
LDLC SERPL-MCNC: 212 U/L (ref 135–214)
LYMPHOCYTES # BLD: 16 % (ref 24–43)
MCH RBC QN AUTO: 29.5 PG (ref 25.2–33.5)
MCHC RBC AUTO-ENTMCNC: 30.5 G/DL (ref 28.4–34.8)
MCV RBC AUTO: 96.8 FL (ref 82.6–102.9)
MONOCYTES # BLD: 6 % (ref 3–12)
MYOGLOBIN SERPL-MCNC: 25 NG/ML (ref 25–58)
NRBC AUTOMATED: 0 PER 100 WBC
O2 SAT, VEN: 55 % (ref 60–85)
O2 SAT, VEN: 94.2 % (ref 60–85)
O2 SAT, VEN: 98.4 % (ref 60–85)
PATIENT TEMP: 37
PATIENT TEMP: 37
PCO2, VEN: 123.9 MM HG (ref 41–51)
PCO2, VEN: 76.5 MM HG (ref 39–55)
PCO2, VEN: 86.6 MM HG (ref 39–55)
PDW BLD-RTO: 12.7 % (ref 11.8–14.4)
PH VENOUS: 7.27 (ref 7.32–7.43)
PH VENOUS: 7.32 (ref 7.32–7.42)
PH VENOUS: 7.35 (ref 7.32–7.42)
PLATELET # BLD AUTO: 221 K/UL (ref 138–453)
PMV BLD AUTO: 9.7 FL (ref 8.1–13.5)
PO2, VEN: 109 MM HG (ref 30–50)
PO2, VEN: 36.6 MM HG (ref 30–50)
PO2, VEN: 66.1 MM HG (ref 30–50)
POC BUN: 4 MG/DL (ref 8–26)
POC BUN: 5 MG/DL (ref 8–26)
POC CHLORIDE: 90 MMOL/L (ref 98–107)
POC CHLORIDE: 91 MMOL/L (ref 98–107)
POC CREATININE: 0.32 MG/DL (ref 0.51–1.19)
POC CREATININE: 0.58 MG/DL (ref 0.51–1.19)
POC HCO3: 50.4 MMOL/L (ref 21–28)
POC HEMATOCRIT: 36 % (ref 36–46)
POC HEMATOCRIT: 37 % (ref 36–46)
POC HEMOGLOBIN: 12.1 G/DL (ref 12–16)
POC HEMOGLOBIN: 12.5 G/DL (ref 12–16)
POC IONIZED CALCIUM: 1.25 MMOL/L (ref 1.15–1.33)
POC IONIZED CALCIUM: 1.26 MMOL/L (ref 1.15–1.33)
POC LACTIC ACID: 0.31 MMOL/L (ref 0.56–1.39)
POC LACTIC ACID: 1.38 MMOL/L (ref 0.56–1.39)
POC O2 SATURATION: 92 % (ref 94–98)
POC PCO2: 104.4 MM HG (ref 35–48)
POC PH: 7.29 (ref 7.35–7.45)
POC PO2: 79.6 MM HG (ref 83–108)
POC POTASSIUM: 3.7 MMOL/L (ref 3.5–4.5)
POC POTASSIUM: 4.3 MMOL/L (ref 3.5–4.5)
POC SODIUM: 137 MMOL/L (ref 138–146)
POC SODIUM: 141 MMOL/L (ref 138–146)
POC TCO2: >50 MMOL/L (ref 22–30)
POC TCO2: >50 MMOL/L (ref 22–30)
POSITIVE BASE EXCESS, ART: 19 (ref 0–3)
POSITIVE BASE EXCESS, VEN: 13.1 MMOL/L (ref 0–2)
POSITIVE BASE EXCESS, VEN: 14.3 MMOL/L (ref 0–2)
POSITIVE BASE EXCESS, VEN: 24 (ref 0–3)
RBC # BLD: 4 M/UL (ref 3.95–5.11)
SAMPLE SITE: ABNORMAL
SEG NEUTROPHILS: 74 % (ref 36–65)
SEGMENTED NEUTROPHILS ABSOLUTE COUNT: 4.63 K/UL (ref 1.5–8.1)
TROPONIN I SERPL DL<=0.01 NG/ML-MCNC: 22 NG/L (ref 0–14)
TROPONIN I SERPL DL<=0.01 NG/ML-MCNC: 22 NG/L (ref 0–14)
WBC # BLD AUTO: 6.1 K/UL (ref 3.5–11.3)

## 2023-05-12 PROCEDURE — 82330 ASSAY OF CALCIUM: CPT

## 2023-05-12 PROCEDURE — 85014 HEMATOCRIT: CPT

## 2023-05-12 PROCEDURE — 96374 THER/PROPH/DIAG INJ IV PUSH: CPT

## 2023-05-12 PROCEDURE — 99285 EMERGENCY DEPT VISIT HI MDM: CPT

## 2023-05-12 PROCEDURE — 99291 CRITICAL CARE FIRST HOUR: CPT | Performed by: INTERNAL MEDICINE

## 2023-05-12 PROCEDURE — 82803 BLOOD GASES ANY COMBINATION: CPT

## 2023-05-12 PROCEDURE — 80051 ELECTROLYTE PANEL: CPT

## 2023-05-12 PROCEDURE — 99222 1ST HOSP IP/OBS MODERATE 55: CPT | Performed by: PSYCHIATRY & NEUROLOGY

## 2023-05-12 PROCEDURE — 83874 ASSAY OF MYOGLOBIN: CPT

## 2023-05-12 PROCEDURE — 83880 ASSAY OF NATRIURETIC PEPTIDE: CPT

## 2023-05-12 PROCEDURE — 84484 ASSAY OF TROPONIN QUANT: CPT

## 2023-05-12 PROCEDURE — 36600 WITHDRAWAL OF ARTERIAL BLOOD: CPT

## 2023-05-12 PROCEDURE — 85025 COMPLETE CBC W/AUTO DIFF WBC: CPT

## 2023-05-12 PROCEDURE — 82947 ASSAY GLUCOSE BLOOD QUANT: CPT

## 2023-05-12 PROCEDURE — 2700000000 HC OXYGEN THERAPY PER DAY

## 2023-05-12 PROCEDURE — 82565 ASSAY OF CREATININE: CPT

## 2023-05-12 PROCEDURE — 71045 X-RAY EXAM CHEST 1 VIEW: CPT

## 2023-05-12 PROCEDURE — 94660 CPAP INITIATION&MGMT: CPT

## 2023-05-12 PROCEDURE — 2580000003 HC RX 258

## 2023-05-12 PROCEDURE — 6360000002 HC RX W HCPCS

## 2023-05-12 PROCEDURE — 83519 RIA NONANTIBODY: CPT

## 2023-05-12 PROCEDURE — 85652 RBC SED RATE AUTOMATED: CPT

## 2023-05-12 PROCEDURE — 36415 COLL VENOUS BLD VENIPUNCTURE: CPT

## 2023-05-12 PROCEDURE — 82550 ASSAY OF CK (CPK): CPT

## 2023-05-12 PROCEDURE — 82805 BLOOD GASES W/O2 SATURATION: CPT

## 2023-05-12 PROCEDURE — 85379 FIBRIN DEGRADATION QUANT: CPT

## 2023-05-12 PROCEDURE — 83516 IMMUNOASSAY NONANTIBODY: CPT

## 2023-05-12 PROCEDURE — 83605 ASSAY OF LACTIC ACID: CPT

## 2023-05-12 PROCEDURE — 6360000002 HC RX W HCPCS: Performed by: STUDENT IN AN ORGANIZED HEALTH CARE EDUCATION/TRAINING PROGRAM

## 2023-05-12 PROCEDURE — 2060000000 HC ICU INTERMEDIATE R&B

## 2023-05-12 PROCEDURE — 83615 LACTATE (LD) (LDH) ENZYME: CPT

## 2023-05-12 PROCEDURE — 84520 ASSAY OF UREA NITROGEN: CPT

## 2023-05-12 PROCEDURE — 86255 FLUORESCENT ANTIBODY SCREEN: CPT

## 2023-05-12 RX ORDER — SODIUM CHLORIDE 0.9 % (FLUSH) 0.9 %
5-40 SYRINGE (ML) INJECTION PRN
Status: DISCONTINUED | OUTPATIENT
Start: 2023-05-12 | End: 2023-05-19 | Stop reason: HOSPADM

## 2023-05-12 RX ORDER — ALBUTEROL SULFATE 90 UG/1
2 AEROSOL, METERED RESPIRATORY (INHALATION) 4 TIMES DAILY PRN
Status: DISCONTINUED | OUTPATIENT
Start: 2023-05-12 | End: 2023-05-19 | Stop reason: HOSPADM

## 2023-05-12 RX ORDER — ACETAMINOPHEN 650 MG/1
650 SUPPOSITORY RECTAL EVERY 6 HOURS PRN
Status: DISCONTINUED | OUTPATIENT
Start: 2023-05-12 | End: 2023-05-19 | Stop reason: HOSPADM

## 2023-05-12 RX ORDER — LORAZEPAM 2 MG/ML
1 INJECTION INTRAMUSCULAR
Status: COMPLETED | OUTPATIENT
Start: 2023-05-12 | End: 2023-05-12

## 2023-05-12 RX ORDER — ACETAMINOPHEN 325 MG/1
650 TABLET ORAL EVERY 6 HOURS PRN
Status: DISCONTINUED | OUTPATIENT
Start: 2023-05-12 | End: 2023-05-19 | Stop reason: HOSPADM

## 2023-05-12 RX ORDER — ENOXAPARIN SODIUM 100 MG/ML
40 INJECTION SUBCUTANEOUS DAILY
Status: DISCONTINUED | OUTPATIENT
Start: 2023-05-12 | End: 2023-05-19 | Stop reason: HOSPADM

## 2023-05-12 RX ORDER — LORAZEPAM 2 MG/ML
1 INJECTION INTRAMUSCULAR ONCE
Status: COMPLETED | OUTPATIENT
Start: 2023-05-12 | End: 2023-05-12

## 2023-05-12 RX ORDER — AMLODIPINE BESYLATE 5 MG/1
5 TABLET ORAL DAILY
Status: DISCONTINUED | OUTPATIENT
Start: 2023-05-12 | End: 2023-05-19 | Stop reason: HOSPADM

## 2023-05-12 RX ORDER — SODIUM CHLORIDE 9 MG/ML
INJECTION, SOLUTION INTRAVENOUS PRN
Status: DISCONTINUED | OUTPATIENT
Start: 2023-05-12 | End: 2023-05-19 | Stop reason: HOSPADM

## 2023-05-12 RX ORDER — SODIUM CHLORIDE 0.9 % (FLUSH) 0.9 %
5-40 SYRINGE (ML) INJECTION EVERY 12 HOURS SCHEDULED
Status: DISCONTINUED | OUTPATIENT
Start: 2023-05-12 | End: 2023-05-19 | Stop reason: HOSPADM

## 2023-05-12 RX ORDER — POLYETHYLENE GLYCOL 3350 17 G/17G
17 POWDER, FOR SOLUTION ORAL DAILY PRN
Status: DISCONTINUED | OUTPATIENT
Start: 2023-05-12 | End: 2023-05-19 | Stop reason: HOSPADM

## 2023-05-12 RX ORDER — ONDANSETRON 2 MG/ML
4 INJECTION INTRAMUSCULAR; INTRAVENOUS EVERY 6 HOURS PRN
Status: DISCONTINUED | OUTPATIENT
Start: 2023-05-12 | End: 2023-05-19 | Stop reason: HOSPADM

## 2023-05-12 RX ORDER — ONDANSETRON 4 MG/1
4 TABLET, ORALLY DISINTEGRATING ORAL EVERY 8 HOURS PRN
Status: DISCONTINUED | OUTPATIENT
Start: 2023-05-12 | End: 2023-05-19 | Stop reason: HOSPADM

## 2023-05-12 RX ADMIN — LORAZEPAM 1 MG: 2 INJECTION INTRAMUSCULAR at 21:56

## 2023-05-12 RX ADMIN — LORAZEPAM 1 MG: 2 INJECTION INTRAMUSCULAR; INTRAVENOUS at 14:07

## 2023-05-12 RX ADMIN — SODIUM CHLORIDE, PRESERVATIVE FREE 10 ML: 5 INJECTION INTRAVENOUS at 21:57

## 2023-05-12 ASSESSMENT — ENCOUNTER SYMPTOMS
SHORTNESS OF BREATH: 1
COUGH: 0
ABDOMINAL PAIN: 0
FACIAL SWELLING: 1

## 2023-05-12 ASSESSMENT — PAIN - FUNCTIONAL ASSESSMENT: PAIN_FUNCTIONAL_ASSESSMENT: NONE - DENIES PAIN

## 2023-05-13 LAB
ACTION: NORMAL
ACTION: NORMAL
ALLEN TEST: POSITIVE
ALLEN TEST: POSITIVE
CARBOXYHEMOGLOBIN: 1 % (ref 0–5)
CARBOXYHEMOGLOBIN: 1.1 % (ref 0–5)
DATE AND TIME: NORMAL
DATE AND TIME: NORMAL
FIO2: 35
FIO2: 35
FIO2: ABNORMAL
FIO2: ABNORMAL
GLUCOSE BLD-MCNC: 102 MG/DL (ref 74–100)
GLUCOSE BLD-MCNC: 104 MG/DL (ref 74–100)
HCO3 VENOUS: 44.6 MMOL/L (ref 24–30)
HCO3 VENOUS: 46.2 MMOL/L (ref 24–30)
MODE: ABNORMAL
MODE: ABNORMAL
NOTIFY: NORMAL
NOTIFY: NORMAL
O2 DEVICE/FLOW/%: ABNORMAL
O2 DEVICE/FLOW/%: ABNORMAL
O2 SAT, VEN: 53.1 % (ref 60–85)
O2 SAT, VEN: 97.2 % (ref 60–85)
PATIENT TEMP: 37
PATIENT TEMP: 37
PCO2, VEN: 91.3 MM HG (ref 39–55)
PCO2, VEN: 94.6 MM HG (ref 39–55)
PH VENOUS: 7.31 (ref 7.32–7.42)
PH VENOUS: 7.31 (ref 7.32–7.42)
PO2, VEN: 30.7 MM HG (ref 30–50)
PO2, VEN: 87 MM HG (ref 30–50)
POC HCO3: 50.9 MMOL/L (ref 21–28)
POC HCO3: 51.9 MMOL/L (ref 21–28)
POC O2 SATURATION: 91 % (ref 94–98)
POC O2 SATURATION: 93 % (ref 94–98)
POC PCO2: 101.2 MM HG (ref 35–48)
POC PCO2: 103.2 MM HG (ref 35–48)
POC PH: 7.3 (ref 7.35–7.45)
POC PH: 7.32 (ref 7.35–7.45)
POC PO2: 71.6 MM HG (ref 83–108)
POC PO2: 81.9 MM HG (ref 83–108)
POSITIVE BASE EXCESS, ART: 19 (ref 0–3)
POSITIVE BASE EXCESS, ART: 20 (ref 0–3)
POSITIVE BASE EXCESS, VEN: 13.9 MMOL/L (ref 0–2)
POSITIVE BASE EXCESS, VEN: 16.2 MMOL/L (ref 0–2)
READ BACK: YES
READ BACK: YES
SAMPLE SITE: ABNORMAL
SAMPLE SITE: ABNORMAL

## 2023-05-13 PROCEDURE — 2580000003 HC RX 258: Performed by: INTERNAL MEDICINE

## 2023-05-13 PROCEDURE — 6370000000 HC RX 637 (ALT 250 FOR IP)

## 2023-05-13 PROCEDURE — 99233 SBSQ HOSP IP/OBS HIGH 50: CPT | Performed by: PSYCHIATRY & NEUROLOGY

## 2023-05-13 PROCEDURE — 94660 CPAP INITIATION&MGMT: CPT

## 2023-05-13 PROCEDURE — 94761 N-INVAS EAR/PLS OXIMETRY MLT: CPT

## 2023-05-13 PROCEDURE — 82803 BLOOD GASES ANY COMBINATION: CPT

## 2023-05-13 PROCEDURE — 6360000002 HC RX W HCPCS: Performed by: STUDENT IN AN ORGANIZED HEALTH CARE EDUCATION/TRAINING PROGRAM

## 2023-05-13 PROCEDURE — 82805 BLOOD GASES W/O2 SATURATION: CPT

## 2023-05-13 PROCEDURE — 6360000002 HC RX W HCPCS: Performed by: INTERNAL MEDICINE

## 2023-05-13 PROCEDURE — 36600 WITHDRAWAL OF ARTERIAL BLOOD: CPT

## 2023-05-13 PROCEDURE — 6370000000 HC RX 637 (ALT 250 FOR IP): Performed by: INTERNAL MEDICINE

## 2023-05-13 PROCEDURE — 2060000000 HC ICU INTERMEDIATE R&B

## 2023-05-13 PROCEDURE — 6360000002 HC RX W HCPCS

## 2023-05-13 PROCEDURE — 36415 COLL VENOUS BLD VENIPUNCTURE: CPT

## 2023-05-13 PROCEDURE — 99291 CRITICAL CARE FIRST HOUR: CPT | Performed by: INTERNAL MEDICINE

## 2023-05-13 PROCEDURE — 6370000000 HC RX 637 (ALT 250 FOR IP): Performed by: STUDENT IN AN ORGANIZED HEALTH CARE EDUCATION/TRAINING PROGRAM

## 2023-05-13 PROCEDURE — 82947 ASSAY GLUCOSE BLOOD QUANT: CPT

## 2023-05-13 RX ORDER — CYCLOBENZAPRINE HCL 10 MG
10 TABLET ORAL 3 TIMES DAILY PRN
Status: DISCONTINUED | OUTPATIENT
Start: 2023-05-13 | End: 2023-05-17

## 2023-05-13 RX ORDER — BUPRENORPHINE HYDROCHLORIDE AND NALOXONE HYDROCHLORIDE DIHYDRATE 8; 2 MG/1; MG/1
1 TABLET SUBLINGUAL DAILY
Status: DISCONTINUED | OUTPATIENT
Start: 2023-05-13 | End: 2023-05-19 | Stop reason: HOSPADM

## 2023-05-13 RX ORDER — LORAZEPAM 2 MG/ML
0.5 INJECTION INTRAMUSCULAR ONCE
Status: COMPLETED | OUTPATIENT
Start: 2023-05-13 | End: 2023-05-13

## 2023-05-13 RX ORDER — NICOTINE 21 MG/24HR
1 PATCH, TRANSDERMAL 24 HOURS TRANSDERMAL DAILY
Status: DISCONTINUED | OUTPATIENT
Start: 2023-05-13 | End: 2023-05-19 | Stop reason: HOSPADM

## 2023-05-13 RX ORDER — GABAPENTIN 100 MG/1
100 CAPSULE ORAL 3 TIMES DAILY
Status: DISCONTINUED | OUTPATIENT
Start: 2023-05-13 | End: 2023-05-19 | Stop reason: HOSPADM

## 2023-05-13 RX ADMIN — GABAPENTIN 100 MG: 100 CAPSULE ORAL at 17:19

## 2023-05-13 RX ADMIN — ENOXAPARIN SODIUM 40 MG: 40 INJECTION SUBCUTANEOUS at 10:31

## 2023-05-13 RX ADMIN — LORAZEPAM 0.5 MG: 2 INJECTION INTRAMUSCULAR; INTRAVENOUS at 22:56

## 2023-05-13 RX ADMIN — SODIUM CHLORIDE, PRESERVATIVE FREE 10 ML: 5 INJECTION INTRAVENOUS at 21:20

## 2023-05-13 RX ADMIN — SODIUM CHLORIDE, PRESERVATIVE FREE 10 ML: 5 INJECTION INTRAVENOUS at 10:31

## 2023-05-13 RX ADMIN — GABAPENTIN 100 MG: 100 CAPSULE ORAL at 21:20

## 2023-05-13 RX ADMIN — CYCLOBENZAPRINE 10 MG: 10 TABLET, FILM COATED ORAL at 17:20

## 2023-05-13 RX ADMIN — BUPRENORPHINE AND NALOXONE 1 TABLET: 8; 2 TABLET SUBLINGUAL at 11:17

## 2023-05-13 RX ADMIN — LORAZEPAM 0.5 MG: 2 INJECTION INTRAMUSCULAR; INTRAVENOUS at 23:25

## 2023-05-13 RX ADMIN — CYCLOBENZAPRINE 10 MG: 10 TABLET, FILM COATED ORAL at 11:14

## 2023-05-13 RX ADMIN — AMLODIPINE BESYLATE 5 MG: 5 TABLET ORAL at 10:31

## 2023-05-14 PROBLEM — G72.9 MYOPATHY: Status: ACTIVE | Noted: 2023-05-14

## 2023-05-14 LAB
ABSOLUTE EOS #: 0.09 K/UL (ref 0–0.44)
ABSOLUTE IMMATURE GRANULOCYTE: <0.03 K/UL (ref 0–0.3)
ABSOLUTE LYMPH #: 1.03 K/UL (ref 1.1–3.7)
ABSOLUTE MONO #: 0.38 K/UL (ref 0.1–1.2)
ANION GAP SERPL CALCULATED.3IONS-SCNC: 6 MMOL/L (ref 9–17)
BASOPHILS # BLD: 0 % (ref 0–2)
BASOPHILS ABSOLUTE: <0.03 K/UL (ref 0–0.2)
BUN SERPL-MCNC: 15 MG/DL (ref 6–20)
CALCIUM SERPL-MCNC: 9.3 MG/DL (ref 8.6–10.4)
CARBOXYHEMOGLOBIN: 1.1 % (ref 0–5)
CHLORIDE SERPL-SCNC: 93 MMOL/L (ref 98–107)
CO2 SERPL-SCNC: 41 MMOL/L (ref 20–31)
CREAT SERPL-MCNC: 0.38 MG/DL (ref 0.5–0.9)
EOSINOPHILS RELATIVE PERCENT: 2 % (ref 1–4)
FIO2: ABNORMAL
GFR SERPL CREATININE-BSD FRML MDRD: >60 ML/MIN/1.73M2
GLUCOSE SERPL-MCNC: 99 MG/DL (ref 70–99)
HCO3 VENOUS: 43.4 MMOL/L (ref 24–30)
HCT VFR BLD AUTO: 36.1 % (ref 36.3–47.1)
HGB BLD-MCNC: 11.5 G/DL (ref 11.9–15.1)
IMMATURE GRANULOCYTES: 0 %
LYMPHOCYTES # BLD: 19 % (ref 24–43)
MCH RBC QN AUTO: 29.4 PG (ref 25.2–33.5)
MCHC RBC AUTO-ENTMCNC: 31.9 G/DL (ref 28.4–34.8)
MCV RBC AUTO: 92.3 FL (ref 82.6–102.9)
MONOCYTES # BLD: 7 % (ref 3–12)
NRBC AUTOMATED: 0 PER 100 WBC
O2 SAT, VEN: 70.8 % (ref 60–85)
PATIENT TEMP: 37
PCO2, VEN: 99.9 MM HG (ref 39–55)
PDW BLD-RTO: 13 % (ref 11.8–14.4)
PH VENOUS: 7.26 (ref 7.32–7.42)
PLATELET # BLD AUTO: 208 K/UL (ref 138–453)
PMV BLD AUTO: 9.6 FL (ref 8.1–13.5)
PO2, VEN: 41.6 MM HG (ref 30–50)
POSITIVE BASE EXCESS, VEN: 13 MMOL/L (ref 0–2)
POTASSIUM SERPL-SCNC: 4.4 MMOL/L (ref 3.7–5.3)
RBC # BLD: 3.91 M/UL (ref 3.95–5.11)
SEG NEUTROPHILS: 72 % (ref 36–65)
SEGMENTED NEUTROPHILS ABSOLUTE COUNT: 4.03 K/UL (ref 1.5–8.1)
SODIUM SERPL-SCNC: 140 MMOL/L (ref 135–144)
WBC # BLD AUTO: 5.6 K/UL (ref 3.5–11.3)

## 2023-05-14 PROCEDURE — 6360000002 HC RX W HCPCS: Performed by: INTERNAL MEDICINE

## 2023-05-14 PROCEDURE — 6360000002 HC RX W HCPCS

## 2023-05-14 PROCEDURE — 80048 BASIC METABOLIC PNL TOTAL CA: CPT

## 2023-05-14 PROCEDURE — 82085 ASSAY OF ALDOLASE: CPT

## 2023-05-14 PROCEDURE — 99232 SBSQ HOSP IP/OBS MODERATE 35: CPT | Performed by: PSYCHIATRY & NEUROLOGY

## 2023-05-14 PROCEDURE — 94660 CPAP INITIATION&MGMT: CPT

## 2023-05-14 PROCEDURE — 6370000000 HC RX 637 (ALT 250 FOR IP): Performed by: INTERNAL MEDICINE

## 2023-05-14 PROCEDURE — 82805 BLOOD GASES W/O2 SATURATION: CPT

## 2023-05-14 PROCEDURE — 85025 COMPLETE CBC W/AUTO DIFF WBC: CPT

## 2023-05-14 PROCEDURE — 36415 COLL VENOUS BLD VENIPUNCTURE: CPT

## 2023-05-14 PROCEDURE — 6370000000 HC RX 637 (ALT 250 FOR IP): Performed by: STUDENT IN AN ORGANIZED HEALTH CARE EDUCATION/TRAINING PROGRAM

## 2023-05-14 PROCEDURE — 2580000003 HC RX 258: Performed by: INTERNAL MEDICINE

## 2023-05-14 PROCEDURE — 6370000000 HC RX 637 (ALT 250 FOR IP)

## 2023-05-14 PROCEDURE — 2700000000 HC OXYGEN THERAPY PER DAY

## 2023-05-14 PROCEDURE — 2060000000 HC ICU INTERMEDIATE R&B

## 2023-05-14 PROCEDURE — 6360000002 HC RX W HCPCS: Performed by: STUDENT IN AN ORGANIZED HEALTH CARE EDUCATION/TRAINING PROGRAM

## 2023-05-14 PROCEDURE — 99291 CRITICAL CARE FIRST HOUR: CPT | Performed by: INTERNAL MEDICINE

## 2023-05-14 RX ORDER — FENTANYL CITRATE 50 UG/ML
25 INJECTION, SOLUTION INTRAMUSCULAR; INTRAVENOUS ONCE
Status: COMPLETED | OUTPATIENT
Start: 2023-05-14 | End: 2023-05-14

## 2023-05-14 RX ORDER — LORAZEPAM 2 MG/ML
0.5 INJECTION INTRAMUSCULAR ONCE
Status: COMPLETED | OUTPATIENT
Start: 2023-05-14 | End: 2023-05-14

## 2023-05-14 RX ORDER — LORAZEPAM 2 MG/ML
1 INJECTION INTRAMUSCULAR EVERY 8 HOURS PRN
Status: DISCONTINUED | OUTPATIENT
Start: 2023-05-14 | End: 2023-05-19 | Stop reason: HOSPADM

## 2023-05-14 RX ADMIN — GABAPENTIN 100 MG: 100 CAPSULE ORAL at 19:47

## 2023-05-14 RX ADMIN — FENTANYL CITRATE 25 MCG: 50 INJECTION, SOLUTION INTRAMUSCULAR; INTRAVENOUS at 06:26

## 2023-05-14 RX ADMIN — CYCLOBENZAPRINE 10 MG: 10 TABLET, FILM COATED ORAL at 12:07

## 2023-05-14 RX ADMIN — LORAZEPAM 0.5 MG: 2 INJECTION INTRAMUSCULAR; INTRAVENOUS at 19:48

## 2023-05-14 RX ADMIN — ENOXAPARIN SODIUM 40 MG: 40 INJECTION SUBCUTANEOUS at 12:08

## 2023-05-14 RX ADMIN — AMLODIPINE BESYLATE 5 MG: 5 TABLET ORAL at 12:07

## 2023-05-14 RX ADMIN — CYCLOBENZAPRINE 10 MG: 10 TABLET, FILM COATED ORAL at 16:37

## 2023-05-14 RX ADMIN — SODIUM CHLORIDE, PRESERVATIVE FREE 10 ML: 5 INJECTION INTRAVENOUS at 19:49

## 2023-05-14 RX ADMIN — BUPRENORPHINE AND NALOXONE 1 TABLET: 8; 2 TABLET SUBLINGUAL at 12:07

## 2023-05-14 RX ADMIN — GABAPENTIN 100 MG: 100 CAPSULE ORAL at 12:07

## 2023-05-14 RX ADMIN — GABAPENTIN 100 MG: 100 CAPSULE ORAL at 16:37

## 2023-05-14 RX ADMIN — LORAZEPAM 1 MG: 2 INJECTION INTRAMUSCULAR at 14:07

## 2023-05-14 RX ADMIN — SODIUM CHLORIDE, PRESERVATIVE FREE 10 ML: 5 INJECTION INTRAVENOUS at 12:08

## 2023-05-15 ENCOUNTER — APPOINTMENT (OUTPATIENT)
Dept: GENERAL RADIOLOGY | Age: 43
DRG: 091 | End: 2023-05-15
Payer: COMMERCIAL

## 2023-05-15 LAB
25(OH)D3 SERPL-MCNC: 8.4 NG/ML
ABSOLUTE EOS #: 0.13 K/UL (ref 0–0.44)
ABSOLUTE IMMATURE GRANULOCYTE: 0.03 K/UL (ref 0–0.3)
ABSOLUTE LYMPH #: 1.39 K/UL (ref 1.1–3.7)
ABSOLUTE MONO #: 0.65 K/UL (ref 0.1–1.2)
ALDOLASE SERPL-CCNC: 5.9 U/L (ref 1.2–7.6)
ANION GAP SERPL CALCULATED.3IONS-SCNC: 8 MMOL/L (ref 9–17)
BASOPHILS # BLD: 0 % (ref 0–2)
BASOPHILS ABSOLUTE: 0.03 K/UL (ref 0–0.2)
BUN SERPL-MCNC: 15 MG/DL (ref 6–20)
CALCIUM SERPL-MCNC: 8.8 MG/DL (ref 8.6–10.4)
CARBOXYHEMOGLOBIN: 0.9 % (ref 0–5)
CHLORIDE SERPL-SCNC: 95 MMOL/L (ref 98–107)
CO2 SERPL-SCNC: 34 MMOL/L (ref 20–31)
CREAT SERPL-MCNC: 0.4 MG/DL (ref 0.5–0.9)
EOSINOPHILS RELATIVE PERCENT: 2 % (ref 1–4)
FIO2: ABNORMAL
GFR SERPL CREATININE-BSD FRML MDRD: >60 ML/MIN/1.73M2
GLUCOSE SERPL-MCNC: 142 MG/DL (ref 70–99)
HCO3 VENOUS: 38.9 MMOL/L (ref 24–30)
HCT VFR BLD AUTO: 36.9 % (ref 36.3–47.1)
HGB BLD-MCNC: 11.3 G/DL (ref 11.9–15.1)
IMMATURE GRANULOCYTES: 0 %
LYMPHOCYTES # BLD: 18 % (ref 24–43)
MCH RBC QN AUTO: 29.7 PG (ref 25.2–33.5)
MCHC RBC AUTO-ENTMCNC: 30.6 G/DL (ref 28.4–34.8)
MCV RBC AUTO: 97.1 FL (ref 82.6–102.9)
MONOCYTES # BLD: 8 % (ref 3–12)
NRBC AUTOMATED: 0 PER 100 WBC
O2 SAT, VEN: 88.8 % (ref 60–85)
PATIENT TEMP: 37
PCO2, VEN: 65.5 MM HG (ref 39–55)
PDW BLD-RTO: 13 % (ref 11.8–14.4)
PH VENOUS: 7.39 (ref 7.32–7.42)
PLATELET # BLD AUTO: 204 K/UL (ref 138–453)
PMV BLD AUTO: 9.5 FL (ref 8.1–13.5)
PO2, VEN: 52.6 MM HG (ref 30–50)
POSITIVE BASE EXCESS, VEN: 11.9 MMOL/L (ref 0–2)
POTASSIUM SERPL-SCNC: 4.2 MMOL/L (ref 3.7–5.3)
RBC # BLD: 3.8 M/UL (ref 3.95–5.11)
SEG NEUTROPHILS: 72 % (ref 36–65)
SEGMENTED NEUTROPHILS ABSOLUTE COUNT: 5.49 K/UL (ref 1.5–8.1)
SODIUM SERPL-SCNC: 137 MMOL/L (ref 135–144)
TSH SERPL-ACNC: 4.21 UIU/ML (ref 0.3–5)
WBC # BLD AUTO: 7.7 K/UL (ref 3.5–11.3)

## 2023-05-15 PROCEDURE — 80048 BASIC METABOLIC PNL TOTAL CA: CPT

## 2023-05-15 PROCEDURE — 6360000002 HC RX W HCPCS

## 2023-05-15 PROCEDURE — 85025 COMPLETE CBC W/AUTO DIFF WBC: CPT

## 2023-05-15 PROCEDURE — 84443 ASSAY THYROID STIM HORMONE: CPT

## 2023-05-15 PROCEDURE — 6360000002 HC RX W HCPCS: Performed by: INTERNAL MEDICINE

## 2023-05-15 PROCEDURE — 94761 N-INVAS EAR/PLS OXIMETRY MLT: CPT

## 2023-05-15 PROCEDURE — 2580000003 HC RX 258: Performed by: INTERNAL MEDICINE

## 2023-05-15 PROCEDURE — 76000 FLUOROSCOPY <1 HR PHYS/QHP: CPT

## 2023-05-15 PROCEDURE — 99232 SBSQ HOSP IP/OBS MODERATE 35: CPT | Performed by: PSYCHIATRY & NEUROLOGY

## 2023-05-15 PROCEDURE — 2060000000 HC ICU INTERMEDIATE R&B

## 2023-05-15 PROCEDURE — 6370000000 HC RX 637 (ALT 250 FOR IP): Performed by: INTERNAL MEDICINE

## 2023-05-15 PROCEDURE — 6370000000 HC RX 637 (ALT 250 FOR IP): Performed by: STUDENT IN AN ORGANIZED HEALTH CARE EDUCATION/TRAINING PROGRAM

## 2023-05-15 PROCEDURE — 94660 CPAP INITIATION&MGMT: CPT

## 2023-05-15 PROCEDURE — 36415 COLL VENOUS BLD VENIPUNCTURE: CPT

## 2023-05-15 PROCEDURE — 71045 X-RAY EXAM CHEST 1 VIEW: CPT

## 2023-05-15 PROCEDURE — 82805 BLOOD GASES W/O2 SATURATION: CPT

## 2023-05-15 PROCEDURE — 6370000000 HC RX 637 (ALT 250 FOR IP)

## 2023-05-15 PROCEDURE — 99291 CRITICAL CARE FIRST HOUR: CPT | Performed by: INTERNAL MEDICINE

## 2023-05-15 PROCEDURE — 82306 VITAMIN D 25 HYDROXY: CPT

## 2023-05-15 RX ORDER — ERGOCALCIFEROL 1.25 MG/1
50000 CAPSULE ORAL WEEKLY
Status: DISCONTINUED | OUTPATIENT
Start: 2023-05-15 | End: 2023-05-19 | Stop reason: HOSPADM

## 2023-05-15 RX ORDER — FUROSEMIDE 10 MG/ML
40 INJECTION INTRAMUSCULAR; INTRAVENOUS ONCE
Status: COMPLETED | OUTPATIENT
Start: 2023-05-15 | End: 2023-05-15

## 2023-05-15 RX ORDER — CALCIUM CARBONATE-CHOLECALCIFEROL TAB 250 MG-125 UNIT 250-125 MG-UNIT
1 TAB ORAL DAILY
Status: DISCONTINUED | OUTPATIENT
Start: 2023-05-15 | End: 2023-05-19 | Stop reason: HOSPADM

## 2023-05-15 RX ADMIN — GABAPENTIN 100 MG: 100 CAPSULE ORAL at 20:28

## 2023-05-15 RX ADMIN — ERGOCALCIFEROL 50000 UNITS: 1.25 CAPSULE ORAL at 17:46

## 2023-05-15 RX ADMIN — SODIUM CHLORIDE, PRESERVATIVE FREE 10 ML: 5 INJECTION INTRAVENOUS at 09:57

## 2023-05-15 RX ADMIN — BUPRENORPHINE AND NALOXONE 1 TABLET: 8; 2 TABLET SUBLINGUAL at 09:57

## 2023-05-15 RX ADMIN — AMLODIPINE BESYLATE 5 MG: 5 TABLET ORAL at 09:57

## 2023-05-15 RX ADMIN — LORAZEPAM 1 MG: 2 INJECTION INTRAMUSCULAR at 04:03

## 2023-05-15 RX ADMIN — CYCLOBENZAPRINE 10 MG: 10 TABLET, FILM COATED ORAL at 10:01

## 2023-05-15 RX ADMIN — ENOXAPARIN SODIUM 40 MG: 40 INJECTION SUBCUTANEOUS at 09:57

## 2023-05-15 RX ADMIN — CYCLOBENZAPRINE 10 MG: 10 TABLET, FILM COATED ORAL at 20:33

## 2023-05-15 RX ADMIN — LORAZEPAM 1 MG: 2 INJECTION INTRAMUSCULAR at 15:57

## 2023-05-15 RX ADMIN — GABAPENTIN 100 MG: 100 CAPSULE ORAL at 14:10

## 2023-05-15 RX ADMIN — GABAPENTIN 100 MG: 100 CAPSULE ORAL at 09:57

## 2023-05-15 RX ADMIN — SODIUM CHLORIDE, PRESERVATIVE FREE 10 ML: 5 INJECTION INTRAVENOUS at 20:28

## 2023-05-15 RX ADMIN — CALCIUM CARBONATE-CHOLECALCIFEROL TAB 250 MG-125 UNIT 1 TABLET: 250-125 TAB at 17:46

## 2023-05-15 RX ADMIN — CYCLOBENZAPRINE 10 MG: 10 TABLET, FILM COATED ORAL at 00:43

## 2023-05-15 RX ADMIN — FUROSEMIDE 40 MG: 10 INJECTION, SOLUTION INTRAMUSCULAR; INTRAVENOUS at 12:04

## 2023-05-15 ASSESSMENT — PAIN DESCRIPTION - LOCATION
LOCATION: BACK
LOCATION: BACK;SHOULDER

## 2023-05-15 ASSESSMENT — PAIN DESCRIPTION - ORIENTATION: ORIENTATION: MID;UPPER

## 2023-05-15 ASSESSMENT — PAIN - FUNCTIONAL ASSESSMENT: PAIN_FUNCTIONAL_ASSESSMENT: PREVENTS OR INTERFERES SOME ACTIVE ACTIVITIES AND ADLS

## 2023-05-15 ASSESSMENT — PAIN SCALES - GENERAL
PAINLEVEL_OUTOF10: 5
PAINLEVEL_OUTOF10: 5
PAINLEVEL_OUTOF10: 8

## 2023-05-15 ASSESSMENT — PAIN DESCRIPTION - DESCRIPTORS: DESCRIPTORS: ACHING;DISCOMFORT

## 2023-05-15 NOTE — CARE COORDINATION
Received voicemail from patient's mother, Partha Magana, requesting SNF placement. Met with patient to discuss transitional planning. She does not want to go to SNF. She wants to return home with [facility]. Partha Dhillonsaida updated via telephone.  Awaiting PT/OT best

## 2023-05-15 NOTE — PLAN OF CARE
Problem: Respiratory - Adult  Goal: Achieves optimal ventilation and oxygenation  5/15/2023 1427 by Victor Manuel Gonzalez RN  Outcome: Progressing  5/15/2023 0929 by Marilu Guadalupe RCP  Outcome: Progressing     Problem: Discharge Planning  Goal: Discharge to home or other facility with appropriate resources  Outcome: Progressing     Problem: Skin/Tissue Integrity  Goal: Absence of new skin breakdown  Description: 1. Monitor for areas of redness and/or skin breakdown  2. Assess vascular access sites hourly  3. Every 4-6 hours minimum:  Change oxygen saturation probe site  4. Every 4-6 hours:  If on nasal continuous positive airway pressure, respiratory therapy assess nares and determine need for appliance change or resting period.   Outcome: Progressing     Problem: ABCDS Injury Assessment  Goal: Absence of physical injury  Outcome: Progressing     Problem: Safety - Adult  Goal: Free from fall injury  Outcome: Progressing

## 2023-05-15 NOTE — PLAN OF CARE
Problem: Respiratory - Adult  Goal: Achieves optimal ventilation and oxygenation  5/15/2023 0929 by Rula Martínez RCP  NON INVASIVE VENTILATION  PROVIDE OPTIMAL VENTILATION/ACCEPTABLE SP02  IMPLEMENT NON INVASIVE VENTILATION PROTOCOL  ASSESSMENT SKIN INTEGRITY  PATIENT EDUCATION AS NEEDED  BIPAP AS NEEDED NON INVASIVE VENTILATION  PROVIDE OPTIMAL VENTILATION/ACCEPTABLE SP02  IMPLEMENT NON INVASIVE VENTILATION PROTOCOL  ASSESSMENT SKIN INTEGRITY  PATIENT EDUCATION AS NEEDED  BIPAP CONTINUOUS Q4 CHECKS MECHANICAL VENTILATION

## 2023-05-15 NOTE — PLAN OF CARE
Problem: Respiratory - Adult  Goal: Achieves optimal ventilation and oxygenation  Outcome: Progressing     Problem: Discharge Planning  Goal: Discharge to home or other facility with appropriate resources  Outcome: Progressing     Problem: Skin/Tissue Integrity  Goal: Absence of new skin breakdown  Description: 1. Monitor for areas of redness and/or skin breakdown  2. Assess vascular access sites hourly  3. Every 4-6 hours minimum:  Change oxygen saturation probe site  4. Every 4-6 hours:  If on nasal continuous positive airway pressure, respiratory therapy assess nares and determine need for appliance change or resting period.   Outcome: Progressing     Problem: ABCDS Injury Assessment  Goal: Absence of physical injury  Outcome: Progressing     Problem: Safety - Adult  Goal: Free from fall injury  Outcome: Progressing

## 2023-05-16 ENCOUNTER — APPOINTMENT (OUTPATIENT)
Dept: GENERAL RADIOLOGY | Age: 43
DRG: 091 | End: 2023-05-16
Payer: COMMERCIAL

## 2023-05-16 LAB
ACETYLCHOL BLOCK AB: 15 % (ref 0–26)
ACETYLCHOLINE BINDING ANTIBODY: 0 NMOL/L (ref 0–0.4)
ANION GAP SERPL CALCULATED.3IONS-SCNC: 8 MMOL/L (ref 9–17)
BUN SERPL-MCNC: 17 MG/DL (ref 6–20)
CALCIUM SERPL-MCNC: 9.6 MG/DL (ref 8.6–10.4)
CARBOXYHEMOGLOBIN: 1.5 % (ref 0–5)
CHLORIDE SERPL-SCNC: 92 MMOL/L (ref 98–107)
CO2 SERPL-SCNC: 36 MMOL/L (ref 20–31)
CREAT SERPL-MCNC: 0.41 MG/DL (ref 0.5–0.9)
FIO2: ABNORMAL
GFR SERPL CREATININE-BSD FRML MDRD: >60 ML/MIN/1.73M2
GLUCOSE SERPL-MCNC: 94 MG/DL (ref 70–99)
HCO3 VENOUS: 39.3 MMOL/L (ref 24–30)
O2 SAT, VEN: 93.7 % (ref 60–85)
PATIENT TEMP: 37
PCO2, VEN: 75 MM HG (ref 39–55)
PH VENOUS: 7.34 (ref 7.32–7.42)
PO2, VEN: 73.5 MM HG (ref 30–50)
POSITIVE BASE EXCESS, VEN: 10.5 MMOL/L (ref 0–2)
POTASSIUM SERPL-SCNC: 3.8 MMOL/L (ref 3.7–5.3)
SODIUM SERPL-SCNC: 136 MMOL/L (ref 135–144)
STRIATED MUSCLE AB, IGG SCREEN: NORMAL
TITIN ANTIBODY: 0.1 IV (ref 0–0.45)

## 2023-05-16 PROCEDURE — 6370000000 HC RX 637 (ALT 250 FOR IP): Performed by: INTERNAL MEDICINE

## 2023-05-16 PROCEDURE — 2580000003 HC RX 258: Performed by: INTERNAL MEDICINE

## 2023-05-16 PROCEDURE — 94761 N-INVAS EAR/PLS OXIMETRY MLT: CPT

## 2023-05-16 PROCEDURE — 94660 CPAP INITIATION&MGMT: CPT

## 2023-05-16 PROCEDURE — 6360000002 HC RX W HCPCS: Performed by: INTERNAL MEDICINE

## 2023-05-16 PROCEDURE — 97162 PT EVAL MOD COMPLEX 30 MIN: CPT

## 2023-05-16 PROCEDURE — 99291 CRITICAL CARE FIRST HOUR: CPT | Performed by: INTERNAL MEDICINE

## 2023-05-16 PROCEDURE — 82805 BLOOD GASES W/O2 SATURATION: CPT

## 2023-05-16 PROCEDURE — 36415 COLL VENOUS BLD VENIPUNCTURE: CPT

## 2023-05-16 PROCEDURE — 2060000000 HC ICU INTERMEDIATE R&B

## 2023-05-16 PROCEDURE — 6370000000 HC RX 637 (ALT 250 FOR IP): Performed by: STUDENT IN AN ORGANIZED HEALTH CARE EDUCATION/TRAINING PROGRAM

## 2023-05-16 PROCEDURE — 6360000002 HC RX W HCPCS

## 2023-05-16 PROCEDURE — 2700000000 HC OXYGEN THERAPY PER DAY

## 2023-05-16 PROCEDURE — 71045 X-RAY EXAM CHEST 1 VIEW: CPT

## 2023-05-16 PROCEDURE — 97530 THERAPEUTIC ACTIVITIES: CPT

## 2023-05-16 PROCEDURE — 6370000000 HC RX 637 (ALT 250 FOR IP)

## 2023-05-16 PROCEDURE — 80048 BASIC METABOLIC PNL TOTAL CA: CPT

## 2023-05-16 RX ORDER — LORAZEPAM 2 MG/ML
1 INJECTION INTRAMUSCULAR ONCE
Status: COMPLETED | OUTPATIENT
Start: 2023-05-16 | End: 2023-05-16

## 2023-05-16 RX ADMIN — BUPRENORPHINE AND NALOXONE 1 TABLET: 8; 2 TABLET SUBLINGUAL at 08:54

## 2023-05-16 RX ADMIN — GABAPENTIN 100 MG: 100 CAPSULE ORAL at 08:49

## 2023-05-16 RX ADMIN — LORAZEPAM 1 MG: 2 INJECTION INTRAMUSCULAR; INTRAVENOUS at 20:07

## 2023-05-16 RX ADMIN — AMLODIPINE BESYLATE 5 MG: 5 TABLET ORAL at 08:49

## 2023-05-16 RX ADMIN — SODIUM CHLORIDE, PRESERVATIVE FREE 10 ML: 5 INJECTION INTRAVENOUS at 20:06

## 2023-05-16 RX ADMIN — CALCIUM CARBONATE-CHOLECALCIFEROL TAB 250 MG-125 UNIT 1 TABLET: 250-125 TAB at 08:50

## 2023-05-16 RX ADMIN — CYCLOBENZAPRINE 10 MG: 10 TABLET, FILM COATED ORAL at 08:50

## 2023-05-16 RX ADMIN — ENOXAPARIN SODIUM 40 MG: 40 INJECTION SUBCUTANEOUS at 08:49

## 2023-05-16 RX ADMIN — LORAZEPAM 1 MG: 2 INJECTION INTRAMUSCULAR at 00:50

## 2023-05-16 RX ADMIN — SODIUM CHLORIDE, PRESERVATIVE FREE 10 ML: 5 INJECTION INTRAVENOUS at 08:49

## 2023-05-16 RX ADMIN — GABAPENTIN 100 MG: 100 CAPSULE ORAL at 20:06

## 2023-05-16 RX ADMIN — GABAPENTIN 100 MG: 100 CAPSULE ORAL at 14:20

## 2023-05-16 RX ADMIN — LORAZEPAM 1 MG: 2 INJECTION INTRAMUSCULAR at 14:20

## 2023-05-16 ASSESSMENT — PAIN SCALES - GENERAL: PAINLEVEL_OUTOF10: 4

## 2023-05-17 ENCOUNTER — APPOINTMENT (OUTPATIENT)
Dept: MRI IMAGING | Age: 43
DRG: 091 | End: 2023-05-17
Payer: COMMERCIAL

## 2023-05-17 LAB
ANION GAP SERPL CALCULATED.3IONS-SCNC: 12 MMOL/L (ref 9–17)
BUN SERPL-MCNC: 16 MG/DL (ref 6–20)
CALCIUM SERPL-MCNC: 9.4 MG/DL (ref 8.6–10.4)
CHLORIDE SERPL-SCNC: 96 MMOL/L (ref 98–107)
CO2 SERPL-SCNC: 31 MMOL/L (ref 20–31)
CREAT SERPL-MCNC: 0.48 MG/DL (ref 0.5–0.9)
GFR SERPL CREATININE-BSD FRML MDRD: >60 ML/MIN/1.73M2
GLUCOSE SERPL-MCNC: 96 MG/DL (ref 70–99)
POTASSIUM SERPL-SCNC: 3.8 MMOL/L (ref 3.7–5.3)
SODIUM SERPL-SCNC: 139 MMOL/L (ref 135–144)

## 2023-05-17 PROCEDURE — 6360000002 HC RX W HCPCS

## 2023-05-17 PROCEDURE — 73718 MRI LOWER EXTREMITY W/O DYE: CPT

## 2023-05-17 PROCEDURE — 2580000003 HC RX 258: Performed by: INTERNAL MEDICINE

## 2023-05-17 PROCEDURE — 6370000000 HC RX 637 (ALT 250 FOR IP): Performed by: STUDENT IN AN ORGANIZED HEALTH CARE EDUCATION/TRAINING PROGRAM

## 2023-05-17 PROCEDURE — 6360000002 HC RX W HCPCS: Performed by: INTERNAL MEDICINE

## 2023-05-17 PROCEDURE — 99291 CRITICAL CARE FIRST HOUR: CPT | Performed by: INTERNAL MEDICINE

## 2023-05-17 PROCEDURE — 6370000000 HC RX 637 (ALT 250 FOR IP): Performed by: INTERNAL MEDICINE

## 2023-05-17 PROCEDURE — 36415 COLL VENOUS BLD VENIPUNCTURE: CPT

## 2023-05-17 PROCEDURE — 94660 CPAP INITIATION&MGMT: CPT

## 2023-05-17 PROCEDURE — 2700000000 HC OXYGEN THERAPY PER DAY

## 2023-05-17 PROCEDURE — 97116 GAIT TRAINING THERAPY: CPT

## 2023-05-17 PROCEDURE — 94761 N-INVAS EAR/PLS OXIMETRY MLT: CPT

## 2023-05-17 PROCEDURE — 97110 THERAPEUTIC EXERCISES: CPT

## 2023-05-17 PROCEDURE — 80048 BASIC METABOLIC PNL TOTAL CA: CPT

## 2023-05-17 PROCEDURE — 6370000000 HC RX 637 (ALT 250 FOR IP)

## 2023-05-17 PROCEDURE — 2060000000 HC ICU INTERMEDIATE R&B

## 2023-05-17 RX ADMIN — GABAPENTIN 100 MG: 100 CAPSULE ORAL at 10:54

## 2023-05-17 RX ADMIN — SODIUM CHLORIDE, PRESERVATIVE FREE 10 ML: 5 INJECTION INTRAVENOUS at 20:22

## 2023-05-17 RX ADMIN — ENOXAPARIN SODIUM 40 MG: 40 INJECTION SUBCUTANEOUS at 10:55

## 2023-05-17 RX ADMIN — SODIUM CHLORIDE, PRESERVATIVE FREE 10 ML: 5 INJECTION INTRAVENOUS at 11:05

## 2023-05-17 RX ADMIN — GABAPENTIN 100 MG: 100 CAPSULE ORAL at 13:36

## 2023-05-17 RX ADMIN — LORAZEPAM 1 MG: 2 INJECTION INTRAMUSCULAR at 08:40

## 2023-05-17 RX ADMIN — GABAPENTIN 100 MG: 100 CAPSULE ORAL at 20:22

## 2023-05-17 RX ADMIN — CALCIUM CARBONATE-CHOLECALCIFEROL TAB 250 MG-125 UNIT 1 TABLET: 250-125 TAB at 10:55

## 2023-05-17 RX ADMIN — CYCLOBENZAPRINE 10 MG: 10 TABLET, FILM COATED ORAL at 02:14

## 2023-05-17 RX ADMIN — ACETAMINOPHEN 650 MG: 325 TABLET ORAL at 16:12

## 2023-05-17 RX ADMIN — LORAZEPAM 1 MG: 2 INJECTION INTRAMUSCULAR at 17:59

## 2023-05-17 RX ADMIN — BUPRENORPHINE AND NALOXONE 1 TABLET: 8; 2 TABLET SUBLINGUAL at 10:54

## 2023-05-17 RX ADMIN — AMLODIPINE BESYLATE 5 MG: 5 TABLET ORAL at 10:54

## 2023-05-17 ASSESSMENT — PAIN SCALES - GENERAL
PAINLEVEL_OUTOF10: 7
PAINLEVEL_OUTOF10: 6
PAINLEVEL_OUTOF10: 6

## 2023-05-17 ASSESSMENT — PAIN DESCRIPTION - LOCATION
LOCATION: BACK
LOCATION: BACK
LOCATION: SHOULDER

## 2023-05-17 ASSESSMENT — PAIN DESCRIPTION - ORIENTATION: ORIENTATION: LEFT

## 2023-05-17 ASSESSMENT — PAIN DESCRIPTION - DESCRIPTORS: DESCRIPTORS: DISCOMFORT

## 2023-05-17 ASSESSMENT — PAIN - FUNCTIONAL ASSESSMENT: PAIN_FUNCTIONAL_ASSESSMENT: ACTIVITIES ARE NOT PREVENTED

## 2023-05-17 NOTE — PLAN OF CARE
Problem: Respiratory - Adult  Goal: Achieves optimal ventilation and oxygenation  Outcome: Progressing   Adult Non-Invasive Positive Pressure Ventilation for Acute Respiratory Distress  Patient & Family Education Note     Patient: Marla Tiwari  Age: 41 y.o. The patient and/or family has been educated on the following items and have verbalized understanding and agreement:    [x]Patient and/or family have been educated on the purpose and advantages of NIV and have verbalized understanding and agreement. [x]Patient and/or family is in agreement that the patient will be NPO (Nothing by Mouth) for the duration of NIV use. [x]Patient and/or  family is in agreement that NIV will not be routinely disrupted except to complete oral care. [x]Patient and/or family have been educated on the level of care, vital signs frequency and monitoring requirements for NIV and are in agreement. [x]Patient and/or family have been educated on reporting any nausea, chest discomfort, sudden increase in shortness of breath, or a severe headache to the staff immediately.

## 2023-05-17 NOTE — CARE COORDINATION
Met with patient to discuss transitional planning. She plans to return home with [facility]. She declines SNF.  She wears 2L O2 at home supplied by [employer]

## 2023-05-18 PROBLEM — J96.22 ACUTE ON CHRONIC RESPIRATORY FAILURE WITH HYPOXIA AND HYPERCAPNIA (HCC): Status: ACTIVE | Noted: 2023-05-18

## 2023-05-18 PROBLEM — J96.21 ACUTE ON CHRONIC RESPIRATORY FAILURE WITH HYPOXIA AND HYPERCAPNIA (HCC): Status: ACTIVE | Noted: 2023-05-18

## 2023-05-18 PROBLEM — F41.9 ANXIETY DISORDER: Status: ACTIVE | Noted: 2023-05-18

## 2023-05-18 LAB
ANION GAP SERPL CALCULATED.3IONS-SCNC: 8 MMOL/L (ref 9–17)
BUN SERPL-MCNC: 15 MG/DL (ref 6–20)
CALCIUM SERPL-MCNC: 9.4 MG/DL (ref 8.6–10.4)
CARBOXYHEMOGLOBIN: 1.7 % (ref 0–5)
CHLORIDE SERPL-SCNC: 97 MMOL/L (ref 98–107)
CO2 SERPL-SCNC: 31 MMOL/L (ref 20–31)
CREAT SERPL-MCNC: 0.37 MG/DL (ref 0.5–0.9)
FIO2: ABNORMAL
GFR SERPL CREATININE-BSD FRML MDRD: >60 ML/MIN/1.73M2
GLUCOSE SERPL-MCNC: 90 MG/DL (ref 70–99)
HCO3 VENOUS: 37.1 MMOL/L (ref 24–30)
O2 SAT, VEN: 93.6 % (ref 60–85)
PATIENT TEMP: 37
PCO2, VEN: 62.7 MM HG (ref 39–55)
PH VENOUS: 7.39 (ref 7.32–7.42)
PO2, VEN: 68.2 MM HG (ref 30–50)
POSITIVE BASE EXCESS, VEN: 10.3 MMOL/L (ref 0–2)
POTASSIUM SERPL-SCNC: 3.9 MMOL/L (ref 3.7–5.3)
SODIUM SERPL-SCNC: 136 MMOL/L (ref 135–144)

## 2023-05-18 PROCEDURE — 6370000000 HC RX 637 (ALT 250 FOR IP): Performed by: INTERNAL MEDICINE

## 2023-05-18 PROCEDURE — 6360000002 HC RX W HCPCS

## 2023-05-18 PROCEDURE — 36415 COLL VENOUS BLD VENIPUNCTURE: CPT

## 2023-05-18 PROCEDURE — 6370000000 HC RX 637 (ALT 250 FOR IP): Performed by: STUDENT IN AN ORGANIZED HEALTH CARE EDUCATION/TRAINING PROGRAM

## 2023-05-18 PROCEDURE — 2060000000 HC ICU INTERMEDIATE R&B

## 2023-05-18 PROCEDURE — 82805 BLOOD GASES W/O2 SATURATION: CPT

## 2023-05-18 PROCEDURE — 99291 CRITICAL CARE FIRST HOUR: CPT | Performed by: INTERNAL MEDICINE

## 2023-05-18 PROCEDURE — 80048 BASIC METABOLIC PNL TOTAL CA: CPT

## 2023-05-18 PROCEDURE — 94660 CPAP INITIATION&MGMT: CPT

## 2023-05-18 PROCEDURE — 94761 N-INVAS EAR/PLS OXIMETRY MLT: CPT

## 2023-05-18 PROCEDURE — 2580000003 HC RX 258: Performed by: INTERNAL MEDICINE

## 2023-05-18 PROCEDURE — 97530 THERAPEUTIC ACTIVITIES: CPT

## 2023-05-18 PROCEDURE — 6370000000 HC RX 637 (ALT 250 FOR IP)

## 2023-05-18 PROCEDURE — 6360000002 HC RX W HCPCS: Performed by: INTERNAL MEDICINE

## 2023-05-18 PROCEDURE — 2700000000 HC OXYGEN THERAPY PER DAY

## 2023-05-18 RX ORDER — CYCLOBENZAPRINE HCL 5 MG
5 TABLET ORAL 3 TIMES DAILY PRN
Status: DISCONTINUED | OUTPATIENT
Start: 2023-05-18 | End: 2023-05-19 | Stop reason: HOSPADM

## 2023-05-18 RX ADMIN — LORAZEPAM 1 MG: 2 INJECTION INTRAMUSCULAR at 11:12

## 2023-05-18 RX ADMIN — ENOXAPARIN SODIUM 40 MG: 40 INJECTION SUBCUTANEOUS at 08:42

## 2023-05-18 RX ADMIN — CYCLOBENZAPRINE HYDROCHLORIDE 5 MG: 5 TABLET, FILM COATED ORAL at 15:13

## 2023-05-18 RX ADMIN — GABAPENTIN 100 MG: 100 CAPSULE ORAL at 15:14

## 2023-05-18 RX ADMIN — CALCIUM CARBONATE-CHOLECALCIFEROL TAB 250 MG-125 UNIT 1 TABLET: 250-125 TAB at 08:42

## 2023-05-18 RX ADMIN — AMLODIPINE BESYLATE 5 MG: 5 TABLET ORAL at 08:42

## 2023-05-18 RX ADMIN — SODIUM CHLORIDE, PRESERVATIVE FREE 10 ML: 5 INJECTION INTRAVENOUS at 19:39

## 2023-05-18 RX ADMIN — GABAPENTIN 100 MG: 100 CAPSULE ORAL at 08:42

## 2023-05-18 RX ADMIN — BUPRENORPHINE AND NALOXONE 1 TABLET: 8; 2 TABLET SUBLINGUAL at 08:42

## 2023-05-18 RX ADMIN — LORAZEPAM 1 MG: 2 INJECTION INTRAMUSCULAR at 23:20

## 2023-05-18 RX ADMIN — GABAPENTIN 100 MG: 100 CAPSULE ORAL at 19:38

## 2023-05-18 RX ADMIN — ACETAMINOPHEN 650 MG: 325 TABLET ORAL at 15:14

## 2023-05-18 RX ADMIN — CYCLOBENZAPRINE HYDROCHLORIDE 5 MG: 5 TABLET, FILM COATED ORAL at 19:38

## 2023-05-18 RX ADMIN — ACETAMINOPHEN 650 MG: 325 TABLET ORAL at 08:42

## 2023-05-18 RX ADMIN — LORAZEPAM 1 MG: 2 INJECTION INTRAMUSCULAR at 02:10

## 2023-05-18 RX ADMIN — SODIUM CHLORIDE, PRESERVATIVE FREE 10 ML: 5 INJECTION INTRAVENOUS at 08:43

## 2023-05-18 ASSESSMENT — PAIN SCALES - GENERAL
PAINLEVEL_OUTOF10: 6
PAINLEVEL_OUTOF10: 6
PAINLEVEL_OUTOF10: 5
PAINLEVEL_OUTOF10: 8
PAINLEVEL_OUTOF10: 6
PAINLEVEL_OUTOF10: 5
PAINLEVEL_OUTOF10: 7

## 2023-05-18 ASSESSMENT — PAIN DESCRIPTION - ONSET
ONSET: ON-GOING
ONSET: ON-GOING

## 2023-05-18 ASSESSMENT — PAIN DESCRIPTION - PAIN TYPE
TYPE: CHRONIC PAIN
TYPE: CHRONIC PAIN

## 2023-05-18 ASSESSMENT — PAIN DESCRIPTION - ORIENTATION
ORIENTATION: MID;UPPER
ORIENTATION: MID;UPPER

## 2023-05-18 ASSESSMENT — PAIN DESCRIPTION - FREQUENCY
FREQUENCY: CONTINUOUS
FREQUENCY: CONTINUOUS

## 2023-05-18 ASSESSMENT — PAIN DESCRIPTION - DESCRIPTORS
DESCRIPTORS: ACHING
DESCRIPTORS: ACHING

## 2023-05-18 ASSESSMENT — PAIN DESCRIPTION - LOCATION
LOCATION: BACK

## 2023-05-18 ASSESSMENT — PAIN - FUNCTIONAL ASSESSMENT
PAIN_FUNCTIONAL_ASSESSMENT: ACTIVITIES ARE NOT PREVENTED
PAIN_FUNCTIONAL_ASSESSMENT: ACTIVITIES ARE NOT PREVENTED

## 2023-05-18 NOTE — PLAN OF CARE
Problem: Respiratory - Adult  Goal: Achieves optimal ventilation and oxygenation  Outcome: Progressing     Problem: Discharge Planning  Goal: Discharge to home or other facility with appropriate resources  Outcome: Progressing     Problem: Skin/Tissue Integrity  Goal: Absence of new skin breakdown  Description: 1. Monitor for areas of redness and/or skin breakdown  2. Assess vascular access sites hourly  3. Every 4-6 hours minimum:  Change oxygen saturation probe site  4. Every 4-6 hours:  If on nasal continuous positive airway pressure, respiratory therapy assess nares and determine need for appliance change or resting period.   Outcome: Progressing     Problem: ABCDS Injury Assessment  Goal: Absence of physical injury  Outcome: Progressing     Problem: Safety - Adult  Goal: Free from fall injury  Outcome: Progressing     Problem: Pain  Goal: Verbalizes/displays adequate comfort level or baseline comfort level  Outcome: Progressing

## 2023-05-18 NOTE — PLAN OF CARE
Problem: Respiratory - Adult  Goal: Achieves optimal ventilation and oxygenation  5/17/2023 2041 by Francee Romberg, RN  Outcome: Progressing  5/17/2023 1217 by Kevin Saldana RCP  Outcome: Progressing     Problem: Discharge Planning  Goal: Discharge to home or other facility with appropriate resources  Outcome: Progressing     Problem: Skin/Tissue Integrity  Goal: Absence of new skin breakdown  Description: 1. Monitor for areas of redness and/or skin breakdown  2. Assess vascular access sites hourly  3. Every 4-6 hours minimum:  Change oxygen saturation probe site  4. Every 4-6 hours:  If on nasal continuous positive airway pressure, respiratory therapy assess nares and determine need for appliance change or resting period.   Outcome: Progressing     Problem: ABCDS Injury Assessment  Goal: Absence of physical injury  Outcome: Progressing     Problem: Safety - Adult  Goal: Free from fall injury  Outcome: Progressing     Problem: Pain  Goal: Verbalizes/displays adequate comfort level or baseline comfort level  Outcome: Progressing

## 2023-05-19 VITALS
SYSTOLIC BLOOD PRESSURE: 104 MMHG | OXYGEN SATURATION: 99 % | TEMPERATURE: 97.9 F | WEIGHT: 229.5 LBS | DIASTOLIC BLOOD PRESSURE: 78 MMHG | HEART RATE: 108 BPM | HEIGHT: 70 IN | RESPIRATION RATE: 16 BRPM | BODY MASS INDEX: 32.86 KG/M2

## 2023-05-19 LAB
ANION GAP SERPL CALCULATED.3IONS-SCNC: 9 MMOL/L (ref 9–17)
BUN SERPL-MCNC: 14 MG/DL (ref 6–20)
CALCIUM SERPL-MCNC: 10 MG/DL (ref 8.6–10.4)
CHLORIDE SERPL-SCNC: 98 MMOL/L (ref 98–107)
CO2 SERPL-SCNC: 32 MMOL/L (ref 20–31)
CREAT SERPL-MCNC: 0.45 MG/DL (ref 0.5–0.9)
EKG ATRIAL RATE: 93 BPM
EKG P AXIS: 51 DEGREES
EKG P-R INTERVAL: 120 MS
EKG Q-T INTERVAL: 352 MS
EKG QRS DURATION: 88 MS
EKG QTC CALCULATION (BAZETT): 437 MS
EKG R AXIS: 59 DEGREES
EKG T AXIS: 72 DEGREES
EKG VENTRICULAR RATE: 93 BPM
GFR SERPL CREATININE-BSD FRML MDRD: >60 ML/MIN/1.73M2
GLUCOSE SERPL-MCNC: 96 MG/DL (ref 70–99)
POTASSIUM SERPL-SCNC: 4 MMOL/L (ref 3.7–5.3)
SODIUM SERPL-SCNC: 139 MMOL/L (ref 135–144)

## 2023-05-19 PROCEDURE — 36415 COLL VENOUS BLD VENIPUNCTURE: CPT

## 2023-05-19 PROCEDURE — 6360000002 HC RX W HCPCS: Performed by: INTERNAL MEDICINE

## 2023-05-19 PROCEDURE — 80048 BASIC METABOLIC PNL TOTAL CA: CPT

## 2023-05-19 PROCEDURE — 6370000000 HC RX 637 (ALT 250 FOR IP)

## 2023-05-19 PROCEDURE — 6370000000 HC RX 637 (ALT 250 FOR IP): Performed by: STUDENT IN AN ORGANIZED HEALTH CARE EDUCATION/TRAINING PROGRAM

## 2023-05-19 PROCEDURE — 94660 CPAP INITIATION&MGMT: CPT

## 2023-05-19 PROCEDURE — 6360000002 HC RX W HCPCS

## 2023-05-19 PROCEDURE — 2580000003 HC RX 258: Performed by: INTERNAL MEDICINE

## 2023-05-19 PROCEDURE — 6370000000 HC RX 637 (ALT 250 FOR IP): Performed by: INTERNAL MEDICINE

## 2023-05-19 RX ORDER — DULOXETIN HYDROCHLORIDE 20 MG/1
20 CAPSULE, DELAYED RELEASE ORAL DAILY
Qty: 30 CAPSULE | Refills: 3 | Status: SHIPPED | OUTPATIENT
Start: 2023-05-19

## 2023-05-19 RX ORDER — MELATONIN
1000 DAILY
Qty: 30 TABLET | Refills: 5 | Status: SHIPPED | OUTPATIENT
Start: 2023-07-03 | End: 2023-08-02

## 2023-05-19 RX ORDER — HYDROXYZINE 50 MG/1
50 TABLET, FILM COATED ORAL EVERY 6 HOURS PRN
Qty: 120 TABLET | Refills: 1 | Status: SHIPPED | OUTPATIENT
Start: 2023-05-19 | End: 2023-06-18

## 2023-05-19 RX ORDER — GABAPENTIN 100 MG/1
100 CAPSULE ORAL 3 TIMES DAILY
Qty: 90 CAPSULE | Refills: 1 | Status: SHIPPED | OUTPATIENT
Start: 2023-05-19 | End: 2023-06-18

## 2023-05-19 RX ORDER — ERGOCALCIFEROL 1.25 MG/1
50000 CAPSULE ORAL WEEKLY
Qty: 7 CAPSULE | Refills: 0 | Status: SHIPPED | OUTPATIENT
Start: 2023-05-22 | End: 2023-07-04

## 2023-05-19 RX ORDER — DULOXETIN HYDROCHLORIDE 20 MG/1
20 CAPSULE, DELAYED RELEASE ORAL DAILY
Status: DISCONTINUED | OUTPATIENT
Start: 2023-05-19 | End: 2023-05-19 | Stop reason: HOSPADM

## 2023-05-19 RX ORDER — HYDROXYZINE 50 MG/1
50 TABLET, FILM COATED ORAL 4 TIMES DAILY PRN
Status: DISCONTINUED | OUTPATIENT
Start: 2023-05-19 | End: 2023-05-19 | Stop reason: HOSPADM

## 2023-05-19 RX ORDER — CYCLOBENZAPRINE HCL 5 MG
5 TABLET ORAL 2 TIMES DAILY PRN
Qty: 60 TABLET | Refills: 1 | Status: SHIPPED | OUTPATIENT
Start: 2023-05-19 | End: 2023-06-18

## 2023-05-19 RX ADMIN — AMLODIPINE BESYLATE 5 MG: 5 TABLET ORAL at 08:41

## 2023-05-19 RX ADMIN — GABAPENTIN 100 MG: 100 CAPSULE ORAL at 08:42

## 2023-05-19 RX ADMIN — ENOXAPARIN SODIUM 40 MG: 40 INJECTION SUBCUTANEOUS at 08:42

## 2023-05-19 RX ADMIN — CYCLOBENZAPRINE HYDROCHLORIDE 5 MG: 5 TABLET, FILM COATED ORAL at 08:42

## 2023-05-19 RX ADMIN — SODIUM CHLORIDE, PRESERVATIVE FREE 10 ML: 5 INJECTION INTRAVENOUS at 08:42

## 2023-05-19 RX ADMIN — BUPRENORPHINE AND NALOXONE 1 TABLET: 8; 2 TABLET SUBLINGUAL at 08:41

## 2023-05-19 RX ADMIN — CALCIUM CARBONATE-CHOLECALCIFEROL TAB 250 MG-125 UNIT 1 TABLET: 250-125 TAB at 08:41

## 2023-05-19 ASSESSMENT — PAIN DESCRIPTION - ONSET: ONSET: ON-GOING

## 2023-05-19 ASSESSMENT — PAIN DESCRIPTION - PAIN TYPE: TYPE: CHRONIC PAIN

## 2023-05-19 ASSESSMENT — PAIN SCALES - GENERAL
PAINLEVEL_OUTOF10: 8
PAINLEVEL_OUTOF10: 5
PAINLEVEL_OUTOF10: 0

## 2023-05-19 ASSESSMENT — PAIN DESCRIPTION - LOCATION
LOCATION: BACK
LOCATION: BACK

## 2023-05-19 ASSESSMENT — PAIN DESCRIPTION - ORIENTATION
ORIENTATION: MID;UPPER
ORIENTATION: MID;UPPER

## 2023-05-19 ASSESSMENT — PAIN DESCRIPTION - DESCRIPTORS
DESCRIPTORS: ACHING
DESCRIPTORS: ACHING

## 2023-05-19 ASSESSMENT — PAIN DESCRIPTION - FREQUENCY: FREQUENCY: CONTINUOUS

## 2023-05-19 ASSESSMENT — PAIN - FUNCTIONAL ASSESSMENT: PAIN_FUNCTIONAL_ASSESSMENT: ACTIVITIES ARE NOT PREVENTED
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
## 2023-05-19 NOTE — PLAN OF CARE
Problem: Respiratory - Adult  Goal: Achieves optimal ventilation and oxygenation  5/18/2023 2055 by Caren Che RN  Outcome: Progressing  5/18/2023 1613 by Toma Anders RN  Outcome: Progressing     Problem: Discharge Planning  Goal: Discharge to home or other facility with appropriate resources  5/18/2023 2055 by Caren Che RN  Outcome: Progressing  5/18/2023 1613 by Toma Anders RN  Outcome: Progressing     Problem: Skin/Tissue Integrity  Goal: Absence of new skin breakdown  Description: 1. Monitor for areas of redness and/or skin breakdown  2. Assess vascular access sites hourly  3. Every 4-6 hours minimum:  Change oxygen saturation probe site  4. Every 4-6 hours:  If on nasal continuous positive airway pressure, respiratory therapy assess nares and determine need for appliance change or resting period.   5/18/2023 2055 by Caren Che RN  Outcome: Progressing  5/18/2023 1613 by Toma Anders RN  Outcome: Progressing     Problem: ABCDS Injury Assessment  Goal: Absence of physical injury  5/18/2023 2055 by Caren Che RN  Outcome: Progressing  5/18/2023 1613 by Toma Anders RN  Outcome: Progressing     Problem: Safety - Adult  Goal: Free from fall injury  5/18/2023 2055 by Caren Che RN  Outcome: Progressing  5/18/2023 1613 by Toma Anders RN  Outcome: Progressing     Problem: Pain  Goal: Verbalizes/displays adequate comfort level or baseline comfort level  5/18/2023 2055 by Caren Che RN  Outcome: Progressing  5/18/2023 1613 by Toma Anders RN  Outcome: Progressing

## 2023-05-19 NOTE — DISCHARGE INSTRUCTIONS
-You were admitted because your carbon dioxide levels were high likely secondary to myopathy.   -Please continue using your CPAP at home to avoid high levels of carbon dioxide in your blood.  -Do not use Ativan.   -We decreased your gabapentin dose to 100 mg thrice daily, as it may cause respiratory depression at higher doses. -Cymbalta was added to your medication regimen.  -Take Flexeril as needed only. -Use Atarax which is added to your medication list for breakthrough anxiety as needed.  -Follow-up with neurology, make an appointment as soon as possible. -Please take medications as prescribed and complete the full course. -Please come to ED if your symptoms worsen or call 911.   -Please make an appointment with your family doctor for post- hospital follow up.

## 2023-05-19 NOTE — PLAN OF CARE
Problem: Respiratory - Adult  Goal: Achieves optimal ventilation and oxygenation  Outcome: Completed     Problem: Discharge Planning  Goal: Discharge to home or other facility with appropriate resources  Outcome: Completed     Problem: Skin/Tissue Integrity  Goal: Absence of new skin breakdown  Description: 1. Monitor for areas of redness and/or skin breakdown  2. Assess vascular access sites hourly  3. Every 4-6 hours minimum:  Change oxygen saturation probe site  4. Every 4-6 hours:  If on nasal continuous positive airway pressure, respiratory therapy assess nares and determine need for appliance change or resting period.   Outcome: Completed     Problem: ABCDS Injury Assessment  Goal: Absence of physical injury  Outcome: Completed     Problem: Safety - Adult  Goal: Free from fall injury  Outcome: Completed     Problem: Pain  Goal: Verbalizes/displays adequate comfort level or baseline comfort level  Outcome: Completed     Problem: Nutrition Deficit:  Goal: Optimize nutritional status  5/19/2023 1227 by Juni Rahman RN  Outcome: Completed  5/19/2023 1136 by Lucio Vigil RD  Flowsheets (Taken 5/19/2023 1136)  Nutrient intake appropriate for improving, restoring, or maintaining nutritional needs:   Assess nutritional status and recommend course of action   Recommend appropriate diets, oral nutritional supplements, and vitamin/mineral supplements   Monitor oral intake, labs, and treatment plans

## 2023-05-19 NOTE — DISCHARGE INSTR - COC
information only, NOT a DME order):  walker and bedside commode  Other Treatments:       Patient's personal belongings (please select all that are sent with patient):  Cesar Da Silva, Clothes, Shoes    RN SIGNATURE:  Electronically signed by Nini Caceres RN on 5/19/23 at 11:18 AM EDT    CASE MANAGEMENT/SOCIAL WORK SECTION    Inpatient Status Date: 05/12/2023    Readmission Risk Assessment Score:  Readmission Risk              Risk of Unplanned Readmission:  13           Discharging to Facility/ Agency   Name: [facility]  Address:  Phone: [phone]  Fax:    Dialysis Facility (if applicable)   Name:  Address:  Dialysis Schedule:  Phone:  Fax:    / signature: Electronically signed by Ambrosio Contreras RN on 5/19/23 at 9:40 AM EDT    PHYSICIAN SECTION    Prognosis: Good    Condition at Discharge: Stable    Rehab Potential (if transferring to Rehab): Good    Recommended Labs or Other Treatments After Discharge:   -You were admitted because your carbon dioxide levels were high likely secondary to myopathy.   -Please continue using your CPAP at home to avoid high levels of carbon dioxide in your blood.  -Do not use Ativan.   -We decreased your gabapentin dose to 100 mg thrice daily, as it may cause respiratory depression at higher doses. -Cymbalta was added to your medication regimen.  -Take Flexeril as needed only. -Use Atarax which is added to your medication list for breakthrough anxiety as needed.  -Follow-up with neurology, make an appointment as soon as possible. -Please take medications as prescribed and complete the full course. -Please come to ED if your symptoms worsen or call 911.   -Please make an appointment with your family doctor for post- hospital follow up. Physician Certification: I certify the above information and transfer of Lv Munoz  is necessary for the continuing treatment of the diagnosis listed and that she requires Home Care for greater 30 days.

## 2023-05-19 NOTE — CARE COORDINATION
Discharge 1 [facility] Case Management Department  Written by: Carina Hernadez RN    Patient Name: Riana Blake  Attending Provider: Salvador Mcnair MD  Admit Date: 2023 10:53 AM  MRN: [id number]  Account: [de-identified]                     : 1980  Discharge Date: 2023      [facility], [facility] via Issac Tubbs RN

## 2023-05-25 NOTE — PROGRESS NOTES
Adult Non-Invasive Positive Pressure Ventilation for Acute Respiratory Distress  Patient & Family Education Note     Patient: Maureen Wilkinson  Age: 41 y.o. The patient and/or family has been educated on the following items and have verbalized understanding and agreement:    [x]Patient and/or family have been educated on the purpose and advantages of NIV and have verbalized understanding and agreement. [x]Patient and/or family is in agreement that the patient will be NPO (Nothing by Mouth) for the duration of NIV use. [x]Patient and/or  family is in agreement that NIV will not be routinely disrupted except to complete oral care. [x]Patient and/or family have been educated on the level of care, vital signs frequency and monitoring requirements for NIV and are in agreement. [x]Patient and/or family have been educated on reporting any nausea, chest discomfort, sudden increase in shortness of breath, or a severe headache to the staff immediately.
CLINICAL PHARMACY NOTE: MEDS TO BEDS    Total # of Prescriptions Filled: 6   The following medications were delivered to the patient:  Duloxetine 20mg  Vitamin D3 25mcg  Hydroxyzine 50mg  Gabapentin 100mg  Vitamin D2 1.25mg  Cyclobenzaprine 5mg    Additional Documentation:  Delivered to patient at 12:07pm, no copay. Counseled to start weekly vit D caps first, then take tabs daily after finishing caps.  HR
Comprehensive Nutrition Assessment    Type and Reason for Visit:  Initial, RD Nutrition Re-Screen/LOS (7 day length of stay)    Nutrition Recommendations/Plan:   Start a standard high calorie/high protein oral nutrition supplement once/day to aid in meeting nutrition needs. Continue to monitor weights, labs, intakes and follow up. Malnutrition Assessment:  Malnutrition Status:  No malnutrition (05/19/23 1134)    Context:  Chronic Illness     Findings of the 6 clinical characteristics of malnutrition:  Energy Intake:  Mild decrease in energy intake (Comment)  Weight Loss:  No significant weight loss     Body Fat Loss:  No significant body fat loss     Muscle Mass Loss:  No significant muscle mass loss    Fluid Accumulation:  No significant fluid accumulation     Strength:  Not Performed    Nutrition Assessment:    40 y/o female, admitted related to respiratory failure. PMH includes Margan sundrome, anxiety, dysphagia, malnutrition. Patient seen today for seven day length of stay. Patient reports meal intakes are fair, did not consume breakfast this morning due to pain. Patient agreeable to oral nutrition supplement when PO intakes are low. LBM 5/18. Meds/Labs reviewed    Nutrition Related Findings:    Meds/Labs reviewed Wound Type: None       Current Nutrition Intake & Therapies:    Average Meal Intake: 26-50%, 1-25%  Average Supplements Intake: None Ordered  ADULT DIET; Regular    Anthropometric Measures:  Height: 5' 10\" (177.8 cm)  Ideal Body Weight (IBW): 150 lbs (68 kg)    Current Body Weight: 229 lb 8 oz (104.1 kg), 153 % IBW. Weight Source: Bed Scale  Current BMI (kg/m2): 32.9  Weight Adjustment For: No Adjustment  BMI Categories: Obese Class 1 (BMI 30.0-34. 9)    Estimated Daily Nutrient Needs:  Energy Requirements Based On: Formula  Weight Used for Energy Requirements: Current  Energy (kcal/day): 1800-2000kcal/day  Weight Used for Protein Requirements: Ideal  Protein (g/day): 90-100gmsPRO/day  Fluid
[facility]  Internal Medicine Teaching Residency Program  Inpatient Daily Progress Note  ______________________________________________________________________________    Patient: Becca Cavanaugh  YOB: 1980   [id number]:[id number]    Acct: [de-identified]     Room: 2015/2015-01  Admit date: 5/12/2023  Today's date: 05/18/23  Number of days in the hospital: 6    SUBJECTIVE   Admitting Diagnosis: Acute on chronic respiratory failure with hypoxia and hypercapnia ([address]    CC: Altered mental status and shortness of breath    Pt examined at bedside. Chart & results reviewed. Overnight patient used BiPAP. Otherwise remained afebrile and hemodynamically stable. Patient symptoms has improved significantly, alert, oriented x4 and denies any acute complaints. ROS:  Constitutional:  negative for chills, fevers, sweats  Respiratory:  negative for cough, dyspnea on exertion, hemoptysis, shortness of breath, wheezing  Cardiovascular:  negative for chest pain, chest pressure/discomfort, lower extremity edema, palpitations  Gastrointestinal:  negative for abdominal pain, constipation, diarrhea, nausea, vomiting  Neurological:  negative for dizziness, headache    BRIEF HISTORY     Patient is a 46-year-old female with a past medical history significant for central core myopathy, on Suboxone presented with worsening shortness of breath, and lethargy. She initially presented to [facility] ER where blood gas showed PCO2 retention 96 and was started on BiPAP. In the ER patient was afebrile and hemodynamically stable. Initially patient was maintaining saturation on 1 to 2 L of oxygen via nasal cannula. VBG showed pH of 7.27, PCO2 123, PO2 36, bicarb 44, proBNP less than 36, troponins 23-22, chest x-ray negative, CT PE was negative. Neurology was consulted and underwent extensive work-up for myopathy. Inflammatory markers is unremarkable.   Patient was having
MRI of thigh reviewed and discussed with patient. MRI clearly indicates a primary myopathic problem and the pattern of relative sparing of the rectus femoris and gracilis is known to be seen with central core disease. In addition, she does not have arachnodactly (negative thumb sign, negative pinky sign) and again no significant aortic root dilation on TTE which would be expected at this age if she really did have Marfan's syndrome. Her long face and high arched palat as well as all her other musculoskeletal issues are typical of those seen with a congenital myopathy. Taken altogether, I think it is quite clear that she also has central core myopathy and this is most commonly due to an RYR1 mutation and such mutations can also increase the risk for malignant hyperthermia. I see no reason to implicate any alternative neuromuscular process such as myasthenia gravis or any myositis. Her respiratory issues are also certainly related to central core disease in combination with her scoliosis. No further neurologic work-up is indicated  She is ok for d/c from this standpoint. Will sign off. Ignacio Duran. Red Halsted M.D.   Clinical Neurophysiologist  Neuromuscular Medicine
[facility]  Internal Medicine Teaching Residency Program  Inpatient Daily Progress Note  ______________________________________________________________________________    Patient: Татьяна Fish  YOB: 1980   ZDC:[id number]    Acct: [de-identified]     Room: 2015/2015-01  Admit date: 5/12/2023  Today's date: 05/17/23  Number of days in the hospital: 5    SUBJECTIVE   Admitting Diagnosis: Chronic respiratory failure ([address]  CC: Altered mental status and shortness of breath  Pt examined at bedside. Chart & results reviewed. Overnight patient used BiPAP. Otherwise remained afebrile and hemodynamically stable. Patient appears better, alert, oriented x4 and denies any acute complaints. Denies any acute complaints. ROS:  Constitutional:  negative for chills, fevers, sweats  Respiratory:  negative for cough, dyspnea on exertion, hemoptysis, shortness of breath, wheezing  Cardiovascular:  negative for chest pain, chest pressure/discomfort, lower extremity edema, palpitations  Gastrointestinal:  negative for abdominal pain, constipation, diarrhea, nausea, vomiting  Neurological:  negative for dizziness, headache  BRIEF HISTORY     The patient is a pleasant 41 y.o. female with past medical history of Marfan syndrome presents with a chief complaint of lethargy and shortness of breath. As per sister for last 1 year patient has been having frequent episodes of respiratory failure with CO2 retention. This time it was started yesterday and patient initially went to [facility] ER where ABG showed PCO2 of 96. Today patient followed up with primary care who recommended patient to come to the ER. Patient was prescribed CPAP but she was not using it due to PTSD. In the ER patient was afebrile and hemodynamically stable. Initially patient was maintaining saturation on 1 to 2 L of oxygen via nasal cannula.  VBG showed pH of 7.27, PCO2 123, PO2 36, bicarb 44,
NEUROLOGY INPATIENT PROGRESS NOTE      5/14/2023              Subjective: Becca Cavanaugh is a  41 y.o. female admitted on 5/12/2023 with Chronic respiratory failure (HCC) [J96.10]  Hypercapnia [R06.89]      Briefly, this is a  41 y.o. female with htn, bipolar dx and marfan syndrome was admitted on 5/12/2023 with lethargy and SOB found to have acute hypoxic and hypercapnic respiratory failure. She has been on BiPAP. Neurology is consulted for evaluation of myopathy. Patient reports that she has been having increasing generalized weakness for the past several months. She has increasing weakness getting up from a seated chair. She also has difficulty walking upstairs. Weakness seems to be predominantly proximal as per description. Her younger sister was diagnosed with central core myopathy. Upon further questioning; patient was told to be floppy baby in childhood. Patient also has had muscle biopsy at  [facility] clinic in remote past and patient is not aware of diagnosis. She was diagnosed with Marfan syndrome; was [facility] for longstanding history of multiple arthralgias along with back pain and spine pain for which she was on pain meds including fentanyl. 5/14/2023: Patient offers no new complaints. She has not had MRI brain yet but she is on schedule today as per caregivers. No current facility-administered medications on file prior to encounter. Current Outpatient Medications on File Prior to Encounter   Medication Sig Dispense Refill    lidocaine 4 % external patch Place 1 patch onto the skin daily (Patient not taking: Reported on 5/13/2023) 10 patch 0    nicotine (NICODERM CQ) 21 MG/24HR Place 1 patch onto the skin daily (Patient not taking: Reported on 5/13/2023) 30 patch 3    gabapentin (NEURONTIN) 400 MG capsule Take 1 capsule by mouth 3 times daily.       albuterol sulfate HFA (VENTOLIN HFA) 108 (90 Base) MCG/ACT inhaler Inhale 2 puffs into the lungs 4 times daily as
NEUROLOGY INPATIENT PROGRESS NOTE      5/15/2023              Subjective: Cora Preciado is a  41 y.o. female admitted on 5/12/2023 with Chronic respiratory failure (HCC) [J96.10]  Hypercapnia [R06.89]      Briefly, this is a  41 y.o. female with htn, bipolar dx and marfan syndrome was admitted on 5/12/2023 with lethargy and SOB found to have acute hypoxic and hypercapnic respiratory failure. She has been on BiPAP. Neurology is consulted for evaluation of myopathy. Patient reports that she has been having increasing generalized weakness for the past several months to years. She has increasing weakness getting up from a seated chair. She also has difficulty walking upstairs. Weakness seems to be predominantly proximal as per description. Her younger sister was diagnosed with central core myopathy at around the age of 45. She is still living and per report seems be mildly to moderately affected. Upon further questioning; patient was told to be floppy baby in childhood. Patient also has had muscle biopsy at  [facility] clinic in remote past in her teens and patient is not aware of diagnosis. She was diagnosed with Marfan syndrome; was [facility] for longstanding history of multiple arthralgias along with back pain and spine pain for which she was on pain meds including fentanyl. She does admit to some issues with chewing and states she will get fatigued with eating steak but still eats it. She also notes some dysphagia and has to drink more water with eating. She denies any ocular problems including double vision or ptosis. She does state she has some difficulties holding her head up at times. No current facility-administered medications on file prior to encounter.      Current Outpatient Medications on File Prior to Encounter   Medication Sig Dispense Refill    lidocaine 4 % external patch Place 1 patch onto the skin daily (Patient not taking: Reported on 5/13/2023) 10 patch
NON INVASIVE VENTILATION  PROVIDE OPTIMAL VENTILATION/ACCEPTABLE SP02  IMPLEMENT NON INVASIVE VENTILATION PROTOCOL  ASSESSMENT SKIN INTEGRITY  PATIENT EDUCATION AS NEEDED  BIPAP AS NEEDED
[facility]  Internal Medicine Teaching Residency Program  Inpatient Daily Progress Note  ______________________________________________________________________________    Patient: Williams Bush  YOB: 1980   UEI:[id number]    Acct: [de-identified]     Room: 2015/2015-01  Admit date: 5/12/2023  Today's date: 05/16/23  Number of days in the hospital: 4    SUBJECTIVE   Admitting Diagnosis: Chronic respiratory failure ([facility] [address]  CC: Altered mental status and shortness of breath  Pt examined at bedside. Chart & results reviewed. Overnight patient used BiPAP. Otherwise remained afebrile and hemodynamically stable. Patient appears much better, alert, oriented x4 and denies any acute complaints. Denies any acute complaints. Wants to go home. ROS:  Constitutional:  negative for chills, fevers, sweats  Respiratory:  negative for cough, dyspnea on exertion, hemoptysis, shortness of breath, wheezing  Cardiovascular:  negative for chest pain, chest pressure/discomfort, lower extremity edema, palpitations  Gastrointestinal:  negative for abdominal pain, constipation, diarrhea, nausea, vomiting  Neurological:  negative for dizziness, headache  BRIEF HISTORY     The patient is a pleasant 41 y.o. female with past medical history of Marfan syndrome presents with a chief complaint of lethargy and shortness of breath. As per sister for last 1 year patient has been having frequent episodes of respiratory failure with CO2 retention. This time it was started yesterday and patient initially went to [facility] ER where ABG showed PCO2 of 96. Today patient followed up with primary care who recommended patient to come to the ER. Patient was prescribed CPAP but she was not using it due to PTSD. In the ER patient was afebrile and hemodynamically stable. Initially patient was maintaining saturation on 1 to 2 L of oxygen via nasal cannula.  VBG showed pH of 7.27, PCO2 123,
Patient much more alert today. MRI of left thigh is still pending. Will plan for a limited bedside EMG tomorrow and will plan to speak to her sister who reportedly has central core myopathy but unclear how and when this was diagnosed. More than likely patient will require a muscle biopsy. Dom Zarate M.D. Clinical Neurophysiologist  Neuromuscular Medicine       I spoke with the patient's sister, John Mishra and she was able to provide much essential history. So it appears that both the patient and her sister are morphologically similar and were even mistaken for each other growing up. Both with high arched palat, narrow faces and orthopedic issues including easy hip dislocations. 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 The patient was given the diagnosis of Marfan's syndrome by her PCM at some point but this was never confirmed. The patient's sister was evaluated at [facility] clinic by muscle bx and genetic testing and was diagnosed with Central core myopathy and with an RYR1 (Ryanodine receptor) mutation. On further review of Ms Valladares's TTE and CT chest, I do not really see much supportive of actual marfan's disease with no significant aortic root dilation or mitral valve prolapse. All of her musculoskeletal features are quite consistent with those seen with a congenital myopathy. Also respiratory issues are not uncommon with central core disease. Given all of the above, it is almost certain that this patient also has central core disease and probably does NOT have Marfan's disease/syndrome. Dom Zarate M.D.   Clinical Neurophysiologist  Neuromuscular Medicine
Physical Therapy  Facility/Department: [facility] CAR 2- STEPDOWN  Physical Therapy Initial Assessment    Name: Caridad Herbert  : 1980  MRN: [id number]  Date of Service: 2023    Discharge Recommendations: Further therapy recommended at discharge. Chief Complaint   Patient presents with    Abnormal Lab     The patient is a pleasant 41 y.o. female with past medical history of Marfan syndrome presents with a chief complaint of lethargy and shortness of breath. As per sister for last 1 year patient has been having frequent episodes of respiratory failure with CO2 retention. PT Equipment Recommendations  Equipment Needed: No (Pt. owns rollator)      Patient Diagnosis(es): The encounter diagnosis was Hypercapnia. Past Medical History:  has a past medical history of Anxiety, Arthritis, Bipolar depression ([address] Dermatitis, Dysuria, Exposure to STD, Folliculitis, Hx of acute bronchitis, Hypertension, Marfan syndrome, MVP (mitral valve prolapse), Nausea & vomiting, Polysubstance abuse ([address] PONV (postoperative nausea and vomiting), Scoliosis, Seasonal allergies, Staph infection, and UTI (lower urinary tract infection). Past Surgical History:  has a past surgical history that includes Foot surgery (Bilateral); Hip arthroscopy (Left); Knee arthroscopy (Right); Tubal ligation; Breast reduction surgery (Bilateral); Foot Capsulotomy (Left, 14); Shoulder Arthroplasty (Bilateral); pr egd transoral biopsy single/multiple (N/A, 2017); and Upper gastrointestinal endoscopy (2017). Assessment   Body Structures, Functions, Activity Limitations Requiring Skilled Therapeutic Intervention: Decreased functional mobility ; Decreased endurance;Decreased balance;Decreased body mechanics; Decreased strength;Decreased ADL status; Decreased ROM; Decreased safe awareness;Decreased coordination  Assessment: Pt. amb 10' w/ min A and RW. Pt. minx1 and modx1 for STS.  Pt. is a high fall risk and is unsafe to return to
Physical Therapy  Facility/Department: [facility] CAR 2- STEPDOWN  Physical Therapy Daily treatment  note  Name: Becca Cavanaugh  : 1980  MRN: [id number]  Date of Service: 2023    Discharge Recommendations:  Patient would benefit from continued therapy after discharge   PT Equipment Recommendations  Equipment Needed: No (Pt. owns rollator)      Patient Diagnosis(es): The encounter diagnosis was Hypercapnia. Past Medical History:  has a past medical history of Anxiety, Arthritis, Bipolar depression ([address] Dermatitis, Dysuria, Exposure to STD, Folliculitis, Hx of acute bronchitis, Hypertension, Marfan syndrome, MVP (mitral valve prolapse), Nausea & vomiting, Polysubstance abuse ([address] PONV (postoperative nausea and vomiting), Scoliosis, Seasonal allergies, Staph infection, and UTI (lower urinary tract infection). Past Surgical History:  has a past surgical history that includes Foot surgery (Bilateral); Hip arthroscopy (Left); Knee arthroscopy (Right); Tubal ligation; Breast reduction surgery (Bilateral); Foot Capsulotomy (Left, 14); Shoulder Arthroplasty (Bilateral); pr egd transoral biopsy single/multiple (N/A, 2017); and Upper gastrointestinal endoscopy (2017). Assessment   Body Structures, Functions, Activity Limitations Requiring Skilled Therapeutic Intervention: Decreased functional mobility ; Decreased endurance;Decreased balance;Decreased body mechanics; Decreased strength;Decreased ADL status; Decreased ROM; Decreased safe awareness;Decreased coordination    Assessment: Pt. amb 10' x2  w/ CGA and RW. Pt. minx1 and modx1 for STS, very slow moving, increased time and effort. Pt. is a high fall risk and is unsafe to return to prior living situation d/t decreased balance and endurance. Pt. would benefit from continued acute PT to promote balance, strength, and endurance.   Specific Instructions for Next Treatment: increase amb distance as able  Therapy Prognosis: Good  Decision Making:
Physician Progress Note      Bruce Saunders  [facility] #:                  [id number]  :                       1980  ADMIT DATE:       2023 10:53 AM  DISCH DATE:        2023 1:11 PM  RESPONDING  PROVIDER #:        Marlen Osborn          QUERY TEXT:    Patient admitted with Acute on chronic respiratory failure with hypoxia and   hypercapnia. Noted documentation of type II MI in H&P and IM PN's ,. In order to support the diagnosis of type II MI, please refer to 4th universal   definition of MI below and include additional clinical indicators in your   documentation. Or please document if the diagnosis of type II MI has been   ruled out after study. ? The medical record reflects the following:  Risk Factors: Marfan syndrome, HTN, obesity, PTSD  Clinical Indicators: VBG showed pH of 7.268, PCO2 123.9, PO2 36.6, HCO3 56.7   O2 sat 46 pos base 24, TCO2 >50 troponins 23-22, total CK 24  Treatment: BiPAP FiO2 35, Ativan , amlodipine, monitorintg    Fourth Universal Definition of Myocardial Infarction:  Clearly separates MI   from myocardial injury. Patients with elevated blood troponin levels but   without clinical evidence of ischemia are said to have had a myocardial   injury. ? To have a myocardial infarction requires both an elevated troponin   blood test along with at least one of the following:  - Symptoms of acute myocardial ischemia (Types 1 - 5 MI)  - Clinical evidence of ischemia, as evidenced in an electrocardiogram (EKG)   showing new ischemic changes (Type 1, Type 2, Type 3, or Type 4a MI)  - Development of pathological Q waves (Types 1 - 5 MI)  - Imaging evidence of new loss of viable myocardium or new regional wall   motion abnormality in a pattern consistent with an ischemic etiology (Types 1   - 5 MI)    Thank you, Please call if questions  Nedra Garcia RN CDS  [phone]  Options provided:  -- MI type II due to Acute on chronic respiratory
SPIRITUAL CARE DEPARTMENT - [address]  PROGRESS NOTE    Shift date: 05/17/2023    Shift day: Wednesday   Shift # 2    Room # 2015/2015-01   Name: Annel Holley                [place]: [address] of [employer]: unknown    Referral: Routine Visit    Admit Date & Time: 5/12/2023 10:53 AM    Assessment:     Intervention:      Annel Holley is a 41 y.o. female in the hospital because several significant chronic conditions. Upon entering the room writer observes Patient sitting up in bed finishing meal.  Writer introduced self and title as  from [facility] coming to visit and support patient. Writer offered space for Patient   to express feelings, needs, and concerns and provided a ministry presence. Patient spoke somewhat slowly and quietly;  she was expressive, but calm and somewhat reserved in speech. Patient felt she was making some progress, but was n ot aware of plans about the future. Patient noted that she and her  were  and .  asked if she would like to receive a prayer. Patient said she would. When asked if she would want me to add anything to there prayer she said \"Her son\"   Patient spoke of her son Michael Kil little bit autistic\") Who had recently expressed that [facility] hoped I didn't think he did not think of me, but that he did often, and that he loved me. He doesn't share hs feelings sometimes. \"  We talked about this some. Patient also mentioned how much she enjoys her new apartment. Outcome:   ended with an offer to sing the [place]. The patient listened with er eyes closed in a meditative posture. Patient became more free as the visit went on. She seemed encouraged and calmed by the time together. Plan:  Chaplains encouraged to visit regularly- perhaps each or every other day to listen and encourage.        Electronically signed by Brennan Lucero on 5/17/2023 at 6:09 PM.  [address] [facility].
[facility]  Internal Medicine Teaching Residency Program  Inpatient Daily Progress Note  ______________________________________________________________________________    Patient: Becca Cavanaugh  YOB: 1980   RET:[id number]    Acct: [de-identified]     Room: 2015/2015-01  Admit date: 5/12/2023  Today's date: 05/19/23  Number of days in the hospital: 7    SUBJECTIVE   Admitting Diagnosis: Acute on chronic respiratory failure with hypoxia and hypercapnia ([address]    CC: Altered mental status and shortness of breath    Pt examined at bedside. Chart & results reviewed. Overnight patient used BiPAP. remained afebrile and hemodynamically stable. Patient is feeling well overall. Denies any acute complaints. Psychiatry was consulted yesterday and recommended starting Cymbalta 20 mg daily and Atarax for breakthrough anxiety. Recommended discontinuing Ativan    ROS:  Constitutional:  negative for chills, fevers, sweats  Respiratory:  negative for cough, dyspnea on exertion, hemoptysis, shortness of breath, wheezing  Cardiovascular:  negative for chest pain, chest pressure/discomfort, lower extremity edema, palpitations  Gastrointestinal:  negative for abdominal pain, constipation, diarrhea, nausea, vomiting  Neurological:  negative for dizziness, headache    BRIEF HISTORY     Patient is a 46-year-old female with a past medical history significant for central core myopathy, on Suboxone presented with worsening shortness of breath, and lethargy. She initially presented to [facility] ER where blood gas showed PCO2 retention 96 and was started on BiPAP. In the ER patient was afebrile and hemodynamically stable. Initially patient was maintaining saturation on 1 to 2 L of oxygen via nasal cannula. VBG showed pH of 7.27, PCO2 123, PO2 36, bicarb 44, proBNP less than 36, troponins 23-22, chest x-ray negative, CT PE was negative.       Neurology was consulted and
clubbing  Neurological/Psychiatric: The patient's general behavior, level of consciousness, thought content and emotional status is normal.      Medications:  Scheduled Medications:    buprenorphine-naloxone  1 tablet SubLINGual Daily    gabapentin  100 mg Oral TID    nicotine  1 patch TransDERmal Daily    sodium chloride flush  5-40 mL IntraVENous 2 times per day    enoxaparin  40 mg SubCUTAneous Daily    amLODIPine  5 mg Oral Daily     Continuous Infusions:    sodium chloride       PRN MedicationsLORazepam, 1 mg, Q8H PRN  cyclobenzaprine, 10 mg, TID PRN  albuterol sulfate HFA, 2 puff, 4x Daily PRN  sodium chloride flush, 5-40 mL, PRN  sodium chloride, , PRN  ondansetron, 4 mg, Q8H PRN   Or  ondansetron, 4 mg, Q6H PRN  polyethylene glycol, 17 g, Daily PRN  acetaminophen, 650 mg, Q6H PRN   Or  acetaminophen, 650 mg, Q6H PRN        Diagnostic Labs:  CBC:   Recent Labs     05/12/23  1110 05/14/23  0635 05/15/23  0533   WBC 6.1 5.6 7.7   RBC 4.00 3.91* 3.80*   HGB 11.8* 11.5* 11.3*   HCT 38.7 36.1* 36.9   MCV 96.8 92.3 97.1   RDW 12.7 13.0 13.0    208 204       BMP:   Recent Labs     05/12/23  2331 05/14/23  0635 05/15/23  0533   NA  --  140 137   K  --  4.4 4.2   CL  --  93* 95*   CO2  --  41* 34*   BUN  --  15 15   CREATININE 0.58 0.38* 0.40*       BNP: No results for input(s): BNP in the last 72 hours. PT/INR: No results for input(s): PROTIME, INR in the last 72 hours. APTT: No results for input(s): APTT in the last 72 hours. CARDIAC ENZYMES: No results for input(s): CKMB, CKMBINDEX, TROPONINI in the last 72 hours. Invalid input(s): CKTOTAL;3  FASTING LIPID PANEL:  Lab Results   Component Value Date    CHOL 168 08/22/2019    HDL 53 08/22/2019    TRIG 79 08/22/2019     LIVER PROFILE: No results for input(s): AST, ALT, ALB, BILIDIR, BILITOT, ALKPHOS in the last 72 hours.    MICROBIOLOGY:   Lab Results   Component Value Date/Time    CULTURE NO SIGNIFICANT GROWTH 03/27/2023 09:49 AM       Imaging:    XR

## 2023-07-25 ENCOUNTER — HOSPITAL ENCOUNTER (OUTPATIENT)
Age: 43
Setting detail: SPECIMEN
Discharge: HOME | End: 2023-07-25
Payer: COMMERCIAL

## 2023-07-25 LAB
BILIRUB UR QL STRIP: NEGATIVE
CLARITY UR: CLEAR
COLOR UR: YELLOW
COMMENT: NORMAL
GLUCOSE UR STRIP-MCNC: NEGATIVE MG/DL
HGB UR QL STRIP.AUTO: NEGATIVE
KETONES UR STRIP-MCNC: NEGATIVE MG/DL
LEUKOCYTE ESTERASE UR QL STRIP: NEGATIVE
NITRITE UR QL STRIP: NEGATIVE
PH UR STRIP: 6 [PH] (ref 5–8)
PROT UR STRIP-MCNC: NEGATIVE MG/DL
SP GR UR STRIP: 1.01 (ref 1–1.03)
UROBILINOGEN UR STRIP-ACNC: NORMAL EU/DL (ref 0–1)

## 2023-07-25 PROCEDURE — 87186 SC STD MICRODIL/AGAR DIL: CPT

## 2023-07-25 PROCEDURE — 87086 URINE CULTURE/COLONY COUNT: CPT

## 2023-07-25 PROCEDURE — 87077 CULTURE AEROBIC IDENTIFY: CPT

## 2023-07-25 PROCEDURE — 81003 URINALYSIS AUTO W/O SCOPE: CPT

## 2023-07-27 LAB
MICROORGANISM SPEC CULT: ABNORMAL
SPECIMEN DESCRIPTION: ABNORMAL

## 2023-08-10 ENCOUNTER — HOSPITAL ENCOUNTER (OUTPATIENT)
Age: 43
Setting detail: SPECIMEN
Discharge: HOME | End: 2023-08-10
Payer: COMMERCIAL

## 2023-08-10 LAB
BILIRUB UR QL STRIP: NEGATIVE
CLARITY UR: CLEAR
COLOR UR: YELLOW
COMMENT: NORMAL
GLUCOSE UR STRIP-MCNC: NEGATIVE MG/DL
HGB UR QL STRIP.AUTO: NEGATIVE
KETONES UR STRIP-MCNC: NEGATIVE MG/DL
LEUKOCYTE ESTERASE UR QL STRIP: NEGATIVE
NITRITE UR QL STRIP: NEGATIVE
PH UR STRIP: 5 [PH] (ref 5–8)
PROT UR STRIP-MCNC: NEGATIVE MG/DL
SP GR UR STRIP: 1.02 (ref 1–1.03)
UROBILINOGEN UR STRIP-ACNC: NORMAL EU/DL (ref 0–1)

## 2023-08-10 PROCEDURE — 87086 URINE CULTURE/COLONY COUNT: CPT

## 2023-08-10 PROCEDURE — 81003 URINALYSIS AUTO W/O SCOPE: CPT

## 2023-08-11 LAB
MICROORGANISM SPEC CULT: NO GROWTH
SPECIMEN DESCRIPTION: NORMAL

## 2023-08-16 ENCOUNTER — HOSPITAL ENCOUNTER (OUTPATIENT)
Age: 43
Setting detail: SPECIMEN
Discharge: HOME | End: 2023-08-16
Payer: COMMERCIAL

## 2023-08-16 LAB
BILIRUB UR QL STRIP: NEGATIVE
CLARITY UR: CLEAR
COLOR UR: YELLOW
COMMENT: NORMAL
GLUCOSE UR STRIP-MCNC: NEGATIVE MG/DL
HGB UR QL STRIP.AUTO: NEGATIVE
KETONES UR STRIP-MCNC: NEGATIVE MG/DL
LEUKOCYTE ESTERASE UR QL STRIP: NEGATIVE
NITRITE UR QL STRIP: NEGATIVE
PH UR STRIP: 6 [PH] (ref 5–8)
PROT UR STRIP-MCNC: NEGATIVE MG/DL
SP GR UR STRIP: 1.02 (ref 1–1.03)
UROBILINOGEN UR STRIP-ACNC: NORMAL EU/DL (ref 0–1)

## 2023-08-16 PROCEDURE — 87086 URINE CULTURE/COLONY COUNT: CPT

## 2023-08-16 PROCEDURE — 81003 URINALYSIS AUTO W/O SCOPE: CPT

## 2023-08-16 NOTE — TELEPHONE ENCOUNTER
"Mid Missouri Mental Health Center Call placed to Andres to let him know prescription is available for . Verbalized understanding.    [address]ar[address] Transitions Initial Follow Up Call    Outreach made within 2 business days of discharge: Yes    Patient: Stacy Farias Patient : 1980   MRN: [id number]  Reason for Admission: There are no discharge diagnoses documented for the most recent discharge. Discharge Date: 19       Spoke with: [facility]    Discharge department/facility: [address] [place] [address] Interactive Patient Contact:  Was patient able to fill all prescriptions: Yes  Was patient instructed to bring all medications to the follow-up visit: Yes  Is patient taking all medications as directed in the discharge summary?  Yes  Does patient understand their discharge instructions: Yes  Does patient have questions or concerns that need addressed prior to 7-14 day follow up office visit: no        Scheduled appointment with PCP within 7-14 days    Follow Up  Future Appointments   Date Time Provider Yonatan Zambrano   2019 11:20 AM MD MONTRELL Crabtree, [place] "Margarette Bell, [facility] 3/6/2023  9:42 AM CST - Filed by Margarette Bell [facility] 12/2/2022  9:16 AM CST - Filed by Margarette Bell [facility]   Margarette Bell, [facility]  8/16/2023    _____________________________________________________________________    Individual Treatment Plan    Patient's Name: Saira Shane  YOB: 2004    Date of Creation: 9/20/Margarette Bell, [facility]  

## 2023-08-17 LAB
MICROORGANISM SPEC CULT: NORMAL
SPECIMEN DESCRIPTION: NORMAL

## 2023-08-22 ENCOUNTER — HOSPITAL ENCOUNTER (OUTPATIENT)
Age: 43
Setting detail: SPECIMEN
Discharge: HOME | End: 2023-08-22
Payer: COMMERCIAL

## 2023-08-22 LAB
25(OH)D3 SERPL-MCNC: 39.9 NG/ML
ALBUMIN SERPL-MCNC: 3.9 G/DL (ref 3.5–5.2)
ALP SERPL-CCNC: 95 U/L (ref 35–104)
ALT SERPL-CCNC: 14 U/L (ref 5–33)
ANION GAP SERPL CALCULATED.3IONS-SCNC: 5 MMOL/L (ref 9–17)
AST SERPL-CCNC: 12 U/L
BASOPHILS # BLD: 0 K/UL (ref 0–0.2)
BASOPHILS NFR BLD: 0 % (ref 0–2)
BILIRUB SERPL-MCNC: 0.4 MG/DL (ref 0.3–1.2)
BUN SERPL-MCNC: 9 MG/DL (ref 6–20)
CALCIUM SERPL-MCNC: 10 MG/DL (ref 8.6–10.4)
CHLORIDE SERPL-SCNC: 97 MMOL/L (ref 98–107)
CHOLEST SERPL-MCNC: 210 MG/DL
CHOLESTEROL/HDL RATIO: 3.9
CO2 SERPL-SCNC: 41 MMOL/L (ref 20–31)
CREAT SERPL-MCNC: <0.4 MG/DL (ref 0.5–0.9)
EOSINOPHIL # BLD: 0.1 K/UL (ref 0–0.4)
EOSINOPHILS RELATIVE PERCENT: 1 % (ref 0–4)
ERYTHROCYTE [DISTWIDTH] IN BLOOD BY AUTOMATED COUNT: 13 % (ref 11.5–14.9)
EST. AVERAGE GLUCOSE BLD GHB EST-MCNC: 97 MG/DL
GFR SERPL CREATININE-BSD FRML MDRD: ABNORMAL ML/MIN/1.73M2
GLUCOSE SERPL-MCNC: 92 MG/DL (ref 70–99)
HBA1C MFR BLD: 5 % (ref 4–6)
HCT VFR BLD AUTO: 35.6 % (ref 36–46)
HDLC SERPL-MCNC: 54 MG/DL
HGB BLD-MCNC: 11.7 G/DL (ref 12–16)
LDLC SERPL CALC-MCNC: 134 MG/DL (ref 0–130)
LYMPHOCYTES NFR BLD: 1.2 K/UL (ref 1–4.8)
LYMPHOCYTES RELATIVE PERCENT: 21 % (ref 24–44)
MAGNESIUM SERPL-MCNC: 1.9 MG/DL (ref 1.6–2.6)
MCH RBC QN AUTO: 29.1 PG (ref 26–34)
MCHC RBC AUTO-ENTMCNC: 32.9 G/DL (ref 31–37)
MCV RBC AUTO: 88.4 FL (ref 80–100)
MONOCYTES NFR BLD: 0.3 K/UL (ref 0.1–1.3)
MONOCYTES NFR BLD: 6 % (ref 1–7)
NEUTROPHILS NFR BLD: 72 % (ref 36–66)
NEUTS SEG NFR BLD: 4.1 K/UL (ref 1.3–9.1)
PLATELET # BLD AUTO: 247 K/UL (ref 150–450)
PMV BLD AUTO: 7.7 FL (ref 6–12)
POTASSIUM SERPL-SCNC: 4.8 MMOL/L (ref 3.7–5.3)
PROT SERPL-MCNC: 6.8 G/DL (ref 6.4–8.3)
RBC # BLD AUTO: 4.02 M/UL (ref 4–5.2)
SODIUM SERPL-SCNC: 143 MMOL/L (ref 135–144)
T4 FREE SERPL-MCNC: 1.3 NG/DL (ref 0.9–1.7)
TRIGL SERPL-MCNC: 109 MG/DL
TSH SERPL DL<=0.05 MIU/L-ACNC: 0.8 UIU/ML (ref 0.3–5)
WBC OTHER # BLD: 5.7 K/UL (ref 3.5–11)

## 2023-08-22 PROCEDURE — 80053 COMPREHEN METABOLIC PANEL: CPT

## 2023-08-22 PROCEDURE — 80061 LIPID PANEL: CPT

## 2023-08-22 PROCEDURE — 83036 HEMOGLOBIN GLYCOSYLATED A1C: CPT

## 2023-08-22 PROCEDURE — 83735 ASSAY OF MAGNESIUM: CPT

## 2023-08-22 PROCEDURE — 82306 VITAMIN D 25 HYDROXY: CPT

## 2023-08-22 PROCEDURE — 84443 ASSAY THYROID STIM HORMONE: CPT

## 2023-08-22 PROCEDURE — 84439 ASSAY OF FREE THYROXINE: CPT

## 2023-08-22 PROCEDURE — 85025 COMPLETE CBC W/AUTO DIFF WBC: CPT

## 2023-10-26 ENCOUNTER — TELEPHONE (OUTPATIENT)
Dept: PULMONOLOGY | Age: 43
End: 2023-10-26

## 2023-10-26 NOTE — TELEPHONE ENCOUNTER
During chart prep it was noticed that this patient was seen in the hospital back in May. Appt was made in August to see you in [facility] 10/31. However, we noticed that you never saw her while in the Hospital so we arent sure how she got scheduled with you. So, question- Are you willing to see her or should we schedule her with Dr Mery Nichols or Racquel Love instead?

## 2023-11-02 ENCOUNTER — OFFICE VISIT (OUTPATIENT)
Dept: PULMONOLOGY | Age: 43
End: 2023-11-02
Payer: COMMERCIAL

## 2023-11-02 VITALS
HEART RATE: 94 BPM | HEIGHT: 70 IN | OXYGEN SATURATION: 100 % | RESPIRATION RATE: 14 BRPM | WEIGHT: 229 LBS | SYSTOLIC BLOOD PRESSURE: 141 MMHG | DIASTOLIC BLOOD PRESSURE: 97 MMHG | BODY MASS INDEX: 32.78 KG/M2

## 2023-11-02 DIAGNOSIS — R06.89 HYPERCAPNIA: ICD-10-CM

## 2023-11-02 DIAGNOSIS — U07.1 ACUTE HYPOXEMIC RESPIRATORY FAILURE DUE TO COVID-19 (HCC): ICD-10-CM

## 2023-11-02 DIAGNOSIS — R06.02 SHORTNESS OF BREATH: Primary | ICD-10-CM

## 2023-11-02 DIAGNOSIS — J96.01 ACUTE HYPOXEMIC RESPIRATORY FAILURE DUE TO COVID-19 (HCC): ICD-10-CM

## 2023-11-02 PROCEDURE — 3080F DIAST BP >= 90 MM HG: CPT | Performed by: INTERNAL MEDICINE

## 2023-11-02 PROCEDURE — 90677 PCV20 VACCINE IM: CPT | Performed by: INTERNAL MEDICINE

## 2023-11-02 PROCEDURE — 4004F PT TOBACCO SCREEN RCVD TLK: CPT | Performed by: INTERNAL MEDICINE

## 2023-11-02 PROCEDURE — G8417 CALC BMI ABV UP PARAM F/U: HCPCS | Performed by: INTERNAL MEDICINE

## 2023-11-02 PROCEDURE — G8482 FLU IMMUNIZE ORDER/ADMIN: HCPCS | Performed by: INTERNAL MEDICINE

## 2023-11-02 PROCEDURE — G0008 ADMIN INFLUENZA VIRUS VAC: HCPCS | Performed by: INTERNAL MEDICINE

## 2023-11-02 PROCEDURE — G0009 ADMIN PNEUMOCOCCAL VACCINE: HCPCS | Performed by: INTERNAL MEDICINE

## 2023-11-02 PROCEDURE — 99214 OFFICE O/P EST MOD 30 MIN: CPT | Performed by: INTERNAL MEDICINE

## 2023-11-02 PROCEDURE — G8427 DOCREV CUR MEDS BY ELIG CLIN: HCPCS | Performed by: INTERNAL MEDICINE

## 2023-11-02 PROCEDURE — 90674 CCIIV4 VAC NO PRSV 0.5 ML IM: CPT | Performed by: INTERNAL MEDICINE

## 2023-11-02 PROCEDURE — 3077F SYST BP >= 140 MM HG: CPT | Performed by: INTERNAL MEDICINE

## 2023-11-02 RX ORDER — CYCLOBENZAPRINE HCL 10 MG
10 TABLET ORAL 3 TIMES DAILY PRN
COMMUNITY

## 2023-11-02 RX ORDER — ALBUTEROL SULFATE 90 UG/1
2 AEROSOL, METERED RESPIRATORY (INHALATION) 4 TIMES DAILY PRN
Qty: 54 G | Refills: 1 | Status: SHIPPED | OUTPATIENT
Start: 2023-11-02

## 2023-11-02 NOTE — PROGRESS NOTES
[facility]  Internal Medicine Teaching Residency Program  Inpatient Daily Progress Note  ______________________________________________________________________________    Patient: Rosa Upton  YOB: 1980   QFP:[id number]    Acct:      Room: [de-identified]  Admit date: (Not on file)  Today's date: 11/02/23  Number of days in the hospital: 0    SUBJECTIVE   Admitting Diagnosis: Acute on chronic respiratory failure with hypoxia and hypercapnia (HCC)  Limb Girdal Myopathy  Chronic respiratory failure  Marfan syndrome  Home oxygen  ROS:  Constitutional:  negative for chills, fevers, sweats  Respiratory:  negative for cough, dyspnea on exertion, hemoptysis, shortness of breath, wheezing  Cardiovascular:  negative for chest pain, chest pressure/discomfort, lower extremity edema, palpitations  Gastrointestinal:  negative for abdominal pain, constipation, diarrhea, nausea, vomiting  Neurological:  negative for dizziness, headache    BRIEF HISTORY     Patient is a 74-year-old female with a past medical history significant for central core myopathy, on Suboxone presented with worsening shortness of breath, and lethargy. She initially presented to [facility] ER where blood gas showed PCO2 retention 96 and was started on BiPAP. In the ER patient was afebrile and hemodynamically stable. Initially patient was maintaining saturation on 1 to 2 L of oxygen via nasal cannula. VBG showed pH of 7.27, PCO2 123, PO2 36, bicarb 44, proBNP less than 36, troponins 23-22, chest x-ray negative, CT PE was negative. Neurology was consulted and underwent extensive work-up for myopathy. Inflammatory markers is unremarkable. Patient was having difficulty keeping BiPAP and required Ativan to 8 hours. On review of her medication, she was found to have multiple sedatives including Suboxone, Neurontin, Flexeril and Ativan.     OBJECTIVE     Vital Signs:  BP (!) 141/97 (Site:

## 2024-01-07 ENCOUNTER — HOSPITAL ENCOUNTER (INPATIENT)
Age: 44
LOS: 3 days | Discharge: NURSING, REHAB OR LONG-TERM CARE | End: 2024-01-11
Attending: EMERGENCY MEDICINE | Admitting: STUDENT IN AN ORGANIZED HEALTH CARE EDUCATION/TRAINING PROGRAM
Payer: COMMERCIAL

## 2024-01-07 ENCOUNTER — APPOINTMENT (OUTPATIENT)
Dept: CT IMAGING | Age: 44
End: 2024-01-07
Payer: COMMERCIAL

## 2024-01-07 ENCOUNTER — APPOINTMENT (OUTPATIENT)
Dept: GENERAL RADIOLOGY | Age: 44
End: 2024-01-07
Payer: COMMERCIAL

## 2024-01-07 DIAGNOSIS — J44.1 COPD EXACERBATION (HCC): Primary | ICD-10-CM

## 2024-01-07 DIAGNOSIS — R06.89 HYPERCAPNIA: ICD-10-CM

## 2024-01-07 LAB
ANION GAP SERPL CALCULATED.3IONS-SCNC: 10 MMOL/L (ref 9–17)
BASOPHILS # BLD: 0.03 K/UL (ref 0–0.2)
BASOPHILS NFR BLD: 0 % (ref 0–2)
BODY TEMPERATURE: 37
BUN SERPL-MCNC: 13 MG/DL (ref 6–20)
CALCIUM SERPL-MCNC: 9.9 MG/DL (ref 8.6–10.4)
CHLORIDE SERPL-SCNC: 93 MMOL/L (ref 98–107)
CO2 SERPL-SCNC: 37 MMOL/L (ref 20–31)
COHGB MFR BLD: 2.6 % (ref 0–5)
CREAT SERPL-MCNC: 0.3 MG/DL (ref 0.5–0.9)
EOSINOPHIL # BLD: <0.03 K/UL (ref 0–0.44)
EOSINOPHILS RELATIVE PERCENT: 0 % (ref 1–4)
ERYTHROCYTE [DISTWIDTH] IN BLOOD BY AUTOMATED COUNT: 12.1 % (ref 11.8–14.4)
FIO2 ON VENT: ABNORMAL %
GFR SERPL CREATININE-BSD FRML MDRD: >60 ML/MIN/1.73M2
GLUCOSE SERPL-MCNC: 162 MG/DL (ref 70–99)
HCG SERPL QL: NEGATIVE
HCO3 VENOUS: 41.8 MMOL/L (ref 24–30)
HCT VFR BLD AUTO: 41.8 % (ref 36.3–47.1)
HGB BLD-MCNC: 13.2 G/DL (ref 11.9–15.1)
IMM GRANULOCYTES # BLD AUTO: 0.08 K/UL (ref 0–0.3)
IMM GRANULOCYTES NFR BLD: 1 %
LYMPHOCYTES NFR BLD: 0.76 K/UL (ref 1.1–3.7)
LYMPHOCYTES RELATIVE PERCENT: 11 % (ref 24–43)
MCH RBC QN AUTO: 29.3 PG (ref 25.2–33.5)
MCHC RBC AUTO-ENTMCNC: 31.6 G/DL (ref 28.4–34.8)
MCV RBC AUTO: 92.7 FL (ref 82.6–102.9)
MONOCYTES NFR BLD: 0.22 K/UL (ref 0.1–1.2)
MONOCYTES NFR BLD: 3 % (ref 3–12)
NEUTROPHILS NFR BLD: 85 % (ref 36–65)
NEUTS SEG NFR BLD: 6.1 K/UL (ref 1.5–8.1)
NRBC BLD-RTO: 0 PER 100 WBC
O2 SAT, VEN: 75.6 % (ref 60–85)
PCO2, VEN: 92.3 MM HG (ref 39–55)
PH VENOUS: 7.28 (ref 7.32–7.42)
PLATELET # BLD AUTO: 296 K/UL (ref 138–453)
PMV BLD AUTO: 9.5 FL (ref 8.1–13.5)
PO2, VEN: 34.7 MM HG (ref 30–50)
POSITIVE BASE EXCESS, VEN: 11.2 MMOL/L (ref 0–2)
POTASSIUM SERPL-SCNC: 4.7 MMOL/L (ref 3.7–5.3)
RBC # BLD AUTO: 4.51 M/UL (ref 3.95–5.11)
SODIUM SERPL-SCNC: 140 MMOL/L (ref 135–144)
TROPONIN I SERPL HS-MCNC: 13 NG/L (ref 0–14)
WBC OTHER # BLD: 7.2 K/UL (ref 3.5–11.3)

## 2024-01-07 PROCEDURE — 84703 CHORIONIC GONADOTROPIN ASSAY: CPT

## 2024-01-07 PROCEDURE — 6370000000 HC RX 637 (ALT 250 FOR IP)

## 2024-01-07 PROCEDURE — 96374 THER/PROPH/DIAG INJ IV PUSH: CPT

## 2024-01-07 PROCEDURE — 36415 COLL VENOUS BLD VENIPUNCTURE: CPT

## 2024-01-07 PROCEDURE — 85025 COMPLETE CBC W/AUTO DIFF WBC: CPT

## 2024-01-07 PROCEDURE — 82805 BLOOD GASES W/O2 SATURATION: CPT

## 2024-01-07 PROCEDURE — 6360000002 HC RX W HCPCS

## 2024-01-07 PROCEDURE — 84484 ASSAY OF TROPONIN QUANT: CPT

## 2024-01-07 PROCEDURE — 71046 X-RAY EXAM CHEST 2 VIEWS: CPT

## 2024-01-07 PROCEDURE — 99285 EMERGENCY DEPT VISIT HI MDM: CPT

## 2024-01-07 PROCEDURE — 80048 BASIC METABOLIC PNL TOTAL CA: CPT

## 2024-01-07 PROCEDURE — 2700000000 HC OXYGEN THERAPY PER DAY

## 2024-01-07 PROCEDURE — 94640 AIRWAY INHALATION TREATMENT: CPT

## 2024-01-07 RX ORDER — NICOTINE 21 MG/24HR
1 PATCH, TRANSDERMAL 24 HOURS TRANSDERMAL DAILY
Status: DISCONTINUED | OUTPATIENT
Start: 2024-01-08 | End: 2024-01-07

## 2024-01-07 RX ORDER — METHYLPREDNISOLONE SODIUM SUCCINATE 125 MG/2ML
125 INJECTION, POWDER, LYOPHILIZED, FOR SOLUTION INTRAMUSCULAR; INTRAVENOUS ONCE
Status: COMPLETED | OUTPATIENT
Start: 2024-01-07 | End: 2024-01-07

## 2024-01-07 RX ORDER — IPRATROPIUM BROMIDE AND ALBUTEROL SULFATE 2.5; .5 MG/3ML; MG/3ML
1 SOLUTION RESPIRATORY (INHALATION) EVERY 4 HOURS PRN
Status: DISCONTINUED | OUTPATIENT
Start: 2024-01-07 | End: 2024-01-11

## 2024-01-07 RX ORDER — NICOTINE 21 MG/24HR
1 PATCH, TRANSDERMAL 24 HOURS TRANSDERMAL DAILY
Status: DISCONTINUED | OUTPATIENT
Start: 2024-01-08 | End: 2024-01-11 | Stop reason: HOSPADM

## 2024-01-07 RX ADMIN — IPRATROPIUM BROMIDE AND ALBUTEROL SULFATE 1 DOSE: .5; 2.5 SOLUTION RESPIRATORY (INHALATION) at 23:29

## 2024-01-07 RX ADMIN — ALBUTEROL SULFATE 5 MG: 2.5 SOLUTION RESPIRATORY (INHALATION) at 23:29

## 2024-01-07 RX ADMIN — METHYLPREDNISOLONE SODIUM SUCCINATE 125 MG: 125 INJECTION INTRAMUSCULAR; INTRAVENOUS at 23:56

## 2024-01-08 ENCOUNTER — APPOINTMENT (OUTPATIENT)
Dept: CT IMAGING | Age: 44
End: 2024-01-08
Payer: COMMERCIAL

## 2024-01-08 ENCOUNTER — APPOINTMENT (OUTPATIENT)
Dept: GENERAL RADIOLOGY | Age: 44
End: 2024-01-08
Payer: COMMERCIAL

## 2024-01-08 PROBLEM — J96.92 HYPERCAPNIC RESPIRATORY FAILURE (HCC): Status: ACTIVE | Noted: 2024-01-08

## 2024-01-08 PROBLEM — G71.29: Status: ACTIVE | Noted: 2024-01-08

## 2024-01-08 LAB
ANION GAP SERPL CALCULATED.3IONS-SCNC: 10 MMOL/L (ref 9–17)
B PARAP IS1001 DNA NPH QL NAA+NON-PROBE: NOT DETECTED
B PERT DNA SPEC QL NAA+PROBE: NOT DETECTED
BODY TEMPERATURE: 37
BUN SERPL-MCNC: 13 MG/DL (ref 6–20)
C PNEUM DNA NPH QL NAA+NON-PROBE: NOT DETECTED
CALCIUM SERPL-MCNC: 9.8 MG/DL (ref 8.6–10.4)
CHLORIDE SERPL-SCNC: 95 MMOL/L (ref 98–107)
CO2 SERPL-SCNC: 37 MMOL/L (ref 20–31)
COHGB MFR BLD: 1.2 % (ref 0–5)
CREAT SERPL-MCNC: 0.3 MG/DL (ref 0.5–0.9)
D DIMER PPP FEU-MCNC: 0.28 UG/ML FEU (ref 0–0.57)
FIO2 ON VENT: ABNORMAL %
FLUAV RNA NPH QL NAA+NON-PROBE: NOT DETECTED
FLUBV RNA NPH QL NAA+NON-PROBE: NOT DETECTED
GFR SERPL CREATININE-BSD FRML MDRD: >60 ML/MIN/1.73M2
GLUCOSE SERPL-MCNC: 136 MG/DL (ref 70–99)
HADV DNA NPH QL NAA+NON-PROBE: NOT DETECTED
HCO3 VENOUS: 39.3 MMOL/L (ref 22–29)
HCO3 VENOUS: 39.5 MMOL/L (ref 24–30)
HCOV 229E RNA NPH QL NAA+NON-PROBE: NOT DETECTED
HCOV HKU1 RNA NPH QL NAA+NON-PROBE: NOT DETECTED
HCOV NL63 RNA NPH QL NAA+NON-PROBE: NOT DETECTED
HCOV OC43 RNA NPH QL NAA+NON-PROBE: NOT DETECTED
HMPV RNA NPH QL NAA+NON-PROBE: NOT DETECTED
HPIV1 RNA NPH QL NAA+NON-PROBE: NOT DETECTED
HPIV2 RNA NPH QL NAA+NON-PROBE: NOT DETECTED
HPIV3 RNA NPH QL NAA+NON-PROBE: NOT DETECTED
HPIV4 RNA NPH QL NAA+NON-PROBE: NOT DETECTED
M PNEUMO DNA NPH QL NAA+NON-PROBE: NOT DETECTED
O2 SAT, VEN: 39.4 % (ref 60–85)
O2 SAT, VEN: 98.2 % (ref 60–85)
PCO2, VEN: 65.6 MM HG (ref 41–51)
PCO2, VEN: 86.7 MM HG (ref 39–55)
PH VENOUS: 7.28 (ref 7.32–7.42)
PH VENOUS: 7.39 (ref 7.32–7.43)
PO2, VEN: 108 MM HG (ref 30–50)
PO2, VEN: 24.1 MM HG (ref 30–50)
POSITIVE BASE EXCESS, VEN: 10 MMOL/L (ref 0–2)
POSITIVE BASE EXCESS, VEN: 11.6 MMOL/L (ref 0–3)
POTASSIUM SERPL-SCNC: 4.3 MMOL/L (ref 3.7–5.3)
RSV RNA NPH QL NAA+NON-PROBE: NOT DETECTED
RV+EV RNA NPH QL NAA+NON-PROBE: NOT DETECTED
SARS-COV-2 RNA NPH QL NAA+NON-PROBE: NOT DETECTED
SODIUM SERPL-SCNC: 142 MMOL/L (ref 135–144)
SPECIMEN DESCRIPTION: NORMAL
TROPONIN I SERPL HS-MCNC: 12 NG/L (ref 0–14)

## 2024-01-08 PROCEDURE — 6360000002 HC RX W HCPCS: Performed by: NURSE PRACTITIONER

## 2024-01-08 PROCEDURE — 85379 FIBRIN DEGRADATION QUANT: CPT

## 2024-01-08 PROCEDURE — 2580000003 HC RX 258: Performed by: NURSE PRACTITIONER

## 2024-01-08 PROCEDURE — 71045 X-RAY EXAM CHEST 1 VIEW: CPT

## 2024-01-08 PROCEDURE — 2700000000 HC OXYGEN THERAPY PER DAY

## 2024-01-08 PROCEDURE — 36415 COLL VENOUS BLD VENIPUNCTURE: CPT

## 2024-01-08 PROCEDURE — 70450 CT HEAD/BRAIN W/O DYE: CPT

## 2024-01-08 PROCEDURE — 94640 AIRWAY INHALATION TREATMENT: CPT

## 2024-01-08 PROCEDURE — 0202U NFCT DS 22 TRGT SARS-COV-2: CPT

## 2024-01-08 PROCEDURE — 94761 N-INVAS EAR/PLS OXIMETRY MLT: CPT

## 2024-01-08 PROCEDURE — 6370000000 HC RX 637 (ALT 250 FOR IP): Performed by: FAMILY MEDICINE

## 2024-01-08 PROCEDURE — 82805 BLOOD GASES W/O2 SATURATION: CPT

## 2024-01-08 PROCEDURE — 80048 BASIC METABOLIC PNL TOTAL CA: CPT

## 2024-01-08 PROCEDURE — 70498 CT ANGIOGRAPHY NECK: CPT

## 2024-01-08 PROCEDURE — 99223 1ST HOSP IP/OBS HIGH 75: CPT | Performed by: FAMILY MEDICINE

## 2024-01-08 PROCEDURE — 99222 1ST HOSP IP/OBS MODERATE 55: CPT | Performed by: INTERNAL MEDICINE

## 2024-01-08 PROCEDURE — 6370000000 HC RX 637 (ALT 250 FOR IP): Performed by: NURSE PRACTITIONER

## 2024-01-08 PROCEDURE — 2060000000 HC ICU INTERMEDIATE R&B

## 2024-01-08 PROCEDURE — 82803 BLOOD GASES ANY COMBINATION: CPT

## 2024-01-08 PROCEDURE — 2580000003 HC RX 258

## 2024-01-08 PROCEDURE — 6360000004 HC RX CONTRAST MEDICATION

## 2024-01-08 PROCEDURE — 94660 CPAP INITIATION&MGMT: CPT

## 2024-01-08 PROCEDURE — 6360000002 HC RX W HCPCS: Performed by: FAMILY MEDICINE

## 2024-01-08 RX ORDER — SODIUM CHLORIDE 9 MG/ML
INJECTION, SOLUTION INTRAVENOUS PRN
Status: DISCONTINUED | OUTPATIENT
Start: 2024-01-08 | End: 2024-01-11 | Stop reason: HOSPADM

## 2024-01-08 RX ORDER — VITAMIN B COMPLEX
1000 TABLET ORAL DAILY
Status: DISCONTINUED | OUTPATIENT
Start: 2024-01-08 | End: 2024-01-11 | Stop reason: HOSPADM

## 2024-01-08 RX ORDER — PREDNISONE 20 MG/1
40 TABLET ORAL DAILY
Status: DISCONTINUED | OUTPATIENT
Start: 2024-01-10 | End: 2024-01-08

## 2024-01-08 RX ORDER — ACETAMINOPHEN 650 MG/1
650 SUPPOSITORY RECTAL EVERY 6 HOURS PRN
Status: DISCONTINUED | OUTPATIENT
Start: 2024-01-08 | End: 2024-01-11 | Stop reason: HOSPADM

## 2024-01-08 RX ORDER — BUPRENORPHINE HYDROCHLORIDE AND NALOXONE HYDROCHLORIDE DIHYDRATE 8; 2 MG/1; MG/1
1 TABLET SUBLINGUAL DAILY
Status: DISCONTINUED | OUTPATIENT
Start: 2024-01-08 | End: 2024-01-10

## 2024-01-08 RX ORDER — GABAPENTIN 600 MG/1
600 TABLET ORAL 3 TIMES DAILY
COMMUNITY

## 2024-01-08 RX ORDER — WATER 10 ML/10ML
INJECTION INTRAMUSCULAR; INTRAVENOUS; SUBCUTANEOUS
Status: DISPENSED
Start: 2024-01-08 | End: 2024-01-09

## 2024-01-08 RX ORDER — SODIUM CHLORIDE 0.9 % (FLUSH) 0.9 %
5-40 SYRINGE (ML) INJECTION PRN
Status: DISCONTINUED | OUTPATIENT
Start: 2024-01-08 | End: 2024-01-11 | Stop reason: HOSPADM

## 2024-01-08 RX ORDER — DULOXETIN HYDROCHLORIDE 30 MG/1
30 CAPSULE, DELAYED RELEASE ORAL DAILY
COMMUNITY

## 2024-01-08 RX ORDER — ENOXAPARIN SODIUM 100 MG/ML
40 INJECTION SUBCUTANEOUS DAILY
Status: DISCONTINUED | OUTPATIENT
Start: 2024-01-08 | End: 2024-01-10

## 2024-01-08 RX ORDER — PREDNISONE 20 MG/1
40 TABLET ORAL DAILY
Status: DISCONTINUED | OUTPATIENT
Start: 2024-01-10 | End: 2024-01-09

## 2024-01-08 RX ORDER — POLYETHYLENE GLYCOL 3350 17 G/17G
17 POWDER, FOR SOLUTION ORAL DAILY PRN
Status: DISCONTINUED | OUTPATIENT
Start: 2024-01-08 | End: 2024-01-11 | Stop reason: HOSPADM

## 2024-01-08 RX ORDER — SODIUM CHLORIDE 9 MG/ML
INJECTION, SOLUTION INTRAVENOUS CONTINUOUS
Status: DISCONTINUED | OUTPATIENT
Start: 2024-01-08 | End: 2024-01-09

## 2024-01-08 RX ORDER — METHYLPREDNISOLONE SODIUM SUCCINATE 1 G/16ML
40 INJECTION, POWDER, LYOPHILIZED, FOR SOLUTION INTRAMUSCULAR; INTRAVENOUS EVERY 6 HOURS
Status: DISCONTINUED | OUTPATIENT
Start: 2024-01-08 | End: 2024-01-09

## 2024-01-08 RX ORDER — DULOXETIN HYDROCHLORIDE 20 MG/1
20 CAPSULE, DELAYED RELEASE ORAL DAILY
Status: DISCONTINUED | OUTPATIENT
Start: 2024-01-08 | End: 2024-01-11 | Stop reason: HOSPADM

## 2024-01-08 RX ORDER — ONDANSETRON 2 MG/ML
4 INJECTION INTRAMUSCULAR; INTRAVENOUS EVERY 6 HOURS PRN
Status: DISCONTINUED | OUTPATIENT
Start: 2024-01-08 | End: 2024-01-11 | Stop reason: HOSPADM

## 2024-01-08 RX ORDER — ACETAMINOPHEN 325 MG/1
650 TABLET ORAL EVERY 6 HOURS PRN
Status: DISCONTINUED | OUTPATIENT
Start: 2024-01-08 | End: 2024-01-11 | Stop reason: HOSPADM

## 2024-01-08 RX ORDER — WATER 10 ML/10ML
INJECTION INTRAMUSCULAR; INTRAVENOUS; SUBCUTANEOUS
Status: COMPLETED
Start: 2024-01-08 | End: 2024-01-08

## 2024-01-08 RX ORDER — ALBUTEROL SULFATE 2.5 MG/3ML
2.5 SOLUTION RESPIRATORY (INHALATION)
Status: DISCONTINUED | OUTPATIENT
Start: 2024-01-08 | End: 2024-01-11 | Stop reason: HOSPADM

## 2024-01-08 RX ORDER — ONDANSETRON 4 MG/1
4 TABLET, ORALLY DISINTEGRATING ORAL EVERY 8 HOURS PRN
Status: DISCONTINUED | OUTPATIENT
Start: 2024-01-08 | End: 2024-01-11 | Stop reason: HOSPADM

## 2024-01-08 RX ORDER — SODIUM CHLORIDE 0.9 % (FLUSH) 0.9 %
5-40 SYRINGE (ML) INJECTION EVERY 12 HOURS SCHEDULED
Status: DISCONTINUED | OUTPATIENT
Start: 2024-01-08 | End: 2024-01-11 | Stop reason: HOSPADM

## 2024-01-08 RX ORDER — IPRATROPIUM BROMIDE AND ALBUTEROL SULFATE 2.5; .5 MG/3ML; MG/3ML
1 SOLUTION RESPIRATORY (INHALATION)
Status: DISCONTINUED | OUTPATIENT
Start: 2024-01-08 | End: 2024-01-11 | Stop reason: HOSPADM

## 2024-01-08 RX ADMIN — IOPAMIDOL 75 ML: 755 INJECTION, SOLUTION INTRAVENOUS at 01:36

## 2024-01-08 RX ADMIN — IPRATROPIUM BROMIDE AND ALBUTEROL SULFATE 1 DOSE: 2.5; .5 SOLUTION RESPIRATORY (INHALATION) at 20:00

## 2024-01-08 RX ADMIN — BUPRENORPHINE AND NALOXONE 1 TABLET: 8; 2 TABLET SUBLINGUAL at 09:33

## 2024-01-08 RX ADMIN — CHOLECALCIFEROL TAB 25 MCG (1000 UNIT) 1000 UNITS: 25 TAB at 09:35

## 2024-01-08 RX ADMIN — ENOXAPARIN SODIUM 40 MG: 100 INJECTION SUBCUTANEOUS at 09:32

## 2024-01-08 RX ADMIN — DULOXETINE HYDROCHLORIDE 20 MG: 20 CAPSULE, DELAYED RELEASE ORAL at 09:35

## 2024-01-08 RX ADMIN — IPRATROPIUM BROMIDE AND ALBUTEROL SULFATE 1 DOSE: 2.5; .5 SOLUTION RESPIRATORY (INHALATION) at 07:42

## 2024-01-08 RX ADMIN — SODIUM CHLORIDE: 9 INJECTION, SOLUTION INTRAVENOUS at 04:50

## 2024-01-08 RX ADMIN — IPRATROPIUM BROMIDE AND ALBUTEROL SULFATE 1 DOSE: 2.5; .5 SOLUTION RESPIRATORY (INHALATION) at 15:02

## 2024-01-08 RX ADMIN — AZITHROMYCIN MONOHYDRATE 500 MG: 500 INJECTION, POWDER, LYOPHILIZED, FOR SOLUTION INTRAVENOUS at 04:51

## 2024-01-08 RX ADMIN — IPRATROPIUM BROMIDE AND ALBUTEROL SULFATE 1 DOSE: 2.5; .5 SOLUTION RESPIRATORY (INHALATION) at 11:16

## 2024-01-08 RX ADMIN — WATER 1 ML: 1 INJECTION INTRAMUSCULAR; INTRAVENOUS; SUBCUTANEOUS at 06:19

## 2024-01-08 RX ADMIN — METHYLPREDNISOLONE SODIUM SUCCINATE 40 MG: 40 INJECTION, POWDER, LYOPHILIZED, FOR SOLUTION INTRAMUSCULAR; INTRAVENOUS at 06:19

## 2024-01-08 RX ADMIN — Medication 1000 MG: at 04:45

## 2024-01-08 RX ADMIN — METHYLPREDNISOLONE SODIUM SUCCINATE 40 MG: 40 INJECTION, POWDER, LYOPHILIZED, FOR SOLUTION INTRAMUSCULAR; INTRAVENOUS at 18:57

## 2024-01-08 RX ADMIN — METHYLPREDNISOLONE SODIUM SUCCINATE 40 MG: 40 INJECTION, POWDER, LYOPHILIZED, FOR SOLUTION INTRAMUSCULAR; INTRAVENOUS at 12:24

## 2024-01-08 ASSESSMENT — ENCOUNTER SYMPTOMS
NAUSEA: 0
WHEEZING: 0
COUGH: 0
DIARRHEA: 0
CONSTIPATION: 0
BLOOD IN STOOL: 0
SHORTNESS OF BREATH: 0
STRIDOR: 0
ABDOMINAL PAIN: 0
VOMITING: 0
SHORTNESS OF BREATH: 1

## 2024-01-08 NOTE — ED NOTES
Pt resting on stretcher, attached to full cardiac monitor and continuous spo2, call light in reach, RR even and non labored, verbalizes no needs at this time

## 2024-01-08 NOTE — ED NOTES
The following labs were labeled with appropriate pt sticker and tubed to lab:     [] Blue     [] Lavender   [] on ice  [] Green/yellow  [] Green/black [] on ice  [] Grey  [] on ice  [] Yellow  [] Red  [] Pink  [] Type/ Screen  [] ABG  [] VBG    [] COVID-19 swab    [] Rapid  [] PCR  [] Flu swab  [] Peds Viral Panel     [x] Urine Sample  [] Fecal Sample  [] Pelvic Cultures  [] Blood Cultures  [] X 2  [] STREP Cultures  [] Wound Cultures

## 2024-01-08 NOTE — ED NOTES
Pt taken off of bedpan, unable to obtain urine sample as pt also had small bowel movement. Pt placed back onto bed pan per request of pt

## 2024-01-08 NOTE — H&P
Parainfluenza 2 PCR Not Detected Not Detected    Parainfluenza 3 PCR Not Detected Not Detected    Parainfluenza 4 PCR Not Detected Not Detected    Resp Syncytial Virus PCR Not Detected Not Detected    Bordetella parapertussis by PCR Not Detected Not Detected    B Pertussis by PCR Not Detected Not Detected    Chlamydia pneumoniae By PCR Not Detected Not Detected    Mycoplasma pneumo by PCR Not Detected Not Detected       Imaging/Diagnostics:  XR CHEST PORTABLE    Result Date: 1/8/2024  Hazy opacity right hemithorax unchanged or slightly increased but likely in part related to chest deformity as seen on prior studies and rotation. Correlate clinically, as above.     CT HEAD WO CONTRAST    Result Date: 1/8/2024  1. No acute intracranial abnormality. 2. Unremarkable CTA of the head and neck.     CTA HEAD NECK W CONTRAST    Result Date: 1/8/2024  1. No acute intracranial abnormality. 2. Unremarkable CTA of the head and neck.     XR CHEST (2 VW)    Result Date: 1/8/2024  No acute abnormality.       Assessment :      Hospital Problems             Last Modified POA    * (Principal) Acute on chronic respiratory failure with hypoxia and hypercapnia (HCC) 1/8/2024 Yes    Marfan syndrome 1/8/2024 Yes    Opioid abuse (HCC) 1/8/2024 Yes    Scoliosis 1/8/2024 Yes    Benign essential HTN 1/8/2024 Yes    Severe major depression without psychotic features (HCC) 1/8/2024 Yes    Myopathy 1/8/2024 Yes    Anxiety disorder 1/8/2024 Yes    Hypercapnic respiratory failure (HCC) 1/8/2024 Yes       Plan:     Patient status inpatient in the Progressive Unit/Step down       Acute on chronic respiratory failure with hypoxia and hypercapnia : -Treated wz BIPAP, continue along with supplemental okygen as needed.  -Pulm consultation  - per notes  from last admission, the cause of the respiratory failure was found to be extrapulmonary.  Fluoroscopy for diaphragmatic motion showed relative decrease left hemidiaphragm motion but Similar degree of  expression on forced inspiration.      Marfan syndrome / Scoliosis:      History of opioid abuse on Suboxone: Continue on in house     Benign essential HTN: Restart home meds     Severe major depression /   Anxiety disorder: Resume Cymbalta       Consultations:   IP CONSULT TO HOSPITALIST  IP CONSULT TO PULMONOLOGY     Patient is admitted as inpatient status because of co-morbidities listed above, severity of signs and symptoms as outlined, requirement for current medical therapies and most importantly because of direct risk to patient if care not provided in a hospital setting.  Expected length of stay > 48 hours.    Esmer Valente MD  1/8/2024  12:24 PM    Copy sent to Jazzy Carrasco, APRN - CNP

## 2024-01-08 NOTE — PROGRESS NOTES
BRONCHOSPASM/BRONCHOCONSTRICTION     [x]         IMPROVE AERATION/BREATH SOUNDS  [x]   ADMINISTER BRONCHODILATOR THERAPY AS APPROPRIATE  [x]   ASSESS BREATH SOUNDS  [x]   IMPLEMENT AEROSOL/MDI PROTOCOL  [x]   PATIENT EDUCATION AS NEEDED

## 2024-01-08 NOTE — ED PROVIDER NOTES
[facility] ED  Emergency Department  Emergency Medicine Resident Sign-out     Care of Opal Valladares was assumed from Dr. Parkinson and is being seen for Shortness of Breath  .  The patient's initial evaluation and plan have been discussed with the prior provider who initially evaluated the patient.     EMERGENCY DEPARTMENT COURSE / MEDICAL DECISION MAKING:       MEDICATIONS GIVEN:  Orders Placed This Encounter   Medications    DISCONTD: methylPREDNISolone sodium (PF) (SOLU-MEDROL PF) injection 125 mg    methylPREDNISolone sodium succ (SOLU-MEDROL) injection 125 mg    DISCONTD: nicotine (NICODERM CQ) 14 MG/24HR 1 patch    ipratropium 0.5 mg-albuterol 2.5 mg (DUONEB) nebulizer solution 1 Dose     Order Specific Question:   Initiate RT Bronchodilator Protocol     Answer:   Yes - Inpatient Protocol    albuterol (PROVENTIL) nebulizer solution 5 mg     Order Specific Question:   Initiate RT Bronchodilator Protocol     Answer:   Yes - Inpatient Protocol    nicotine (NICODERM CQ) 14 MG/24HR 1 patch    iopamidol (ISOVUE-370) 76 % injection 75 mL       LABS / RADIOLOGY:     Labs Reviewed   BLOOD GAS, VENOUS - Abnormal; Notable for the following components:       Result Value    pH, Chetan 7.278 (*)     pCO2, Chetan 92.3 (*)     HCO3, Venous 41.8 (*)     Positive Base Excess, Chetan 11.2 (*)     All other components within normal limits   CBC WITH AUTO DIFFERENTIAL - Abnormal; Notable for the following components:    Neutrophils % 85 (*)     Lymphocytes % 11 (*)     Eosinophils % 0 (*)     Immature Granulocytes 1 (*)     Lymphocytes Absolute 0.76 (*)     All other components within normal limits   BASIC METABOLIC PANEL - Abnormal; Notable for the following components:    Chloride 93 (*)     CO2 37 (*)     Glucose 162 (*)     Creatinine 0.3 (*)     All other components within normal limits   VENOUS BLOOD GAS, POINT OF CARE - Abnormal; Notable for the following components:    pCO2, Chetan 65.6 (*)     pO2, Chetan 24.1 (*)

## 2024-01-08 NOTE — ED NOTES
EMPIRICALLY WITH 56 FR       PAST MEDICAL HISTORY       Past Medical History:   Diagnosis Date    Acute on chronic respiratory failure with hypoxia and hypercapnia ([facility]) 5/18/2023    Anxiety     Arthritis     O.A.    Bipolar depression ([facility])     Dermatitis     Dysuria     Exposure to STD     Folliculitis     Hx of acute bronchitis     Hypertension     Marfan syndrome     MVP (mitral valve prolapse)     Nausea & vomiting     Polysubstance abuse ([facility])     drug abuse includes cocaine, fentnyl, cannabis    PONV (postoperative nausea and vomiting)     Scoliosis     Seasonal allergies     Staph infection     HX OF ALSO BACTEREMIA    UTI (lower urinary tract infection)        Labs:  Labs Reviewed   BLOOD GAS, VENOUS - Abnormal; Notable for the following components:       Result Value    pH, Chetan 7.278 (*)     pCO2, Chetan 92.3 (*)     HCO3, Venous 41.8 (*)     Positive Base Excess, Chetan 11.2 (*)     All other components within normal limits   CBC WITH AUTO DIFFERENTIAL - Abnormal; Notable for the following components:    Neutrophils % 85 (*)     Lymphocytes % 11 (*)     Eosinophils % 0 (*)     Immature Granulocytes 1 (*)     Lymphocytes Absolute 0.76 (*)     All other components within normal limits   BASIC METABOLIC PANEL - Abnormal; Notable for the following components:    Chloride 93 (*)     CO2 37 (*)     Glucose 162 (*)     Creatinine 0.3 (*)     All other components within normal limits   BASIC METABOLIC PANEL W/ REFLEX TO MG FOR LOW K - Abnormal; Notable for the following components:    Chloride 95 (*)     CO2 37 (*)     Glucose 136 (*)     Creatinine 0.3 (*)     All other components within normal limits   BLOOD GAS, VENOUS - Abnormal; Notable for the following components:    pH, Chetan 7.280 (*)     pCO2, Chetan 86.7 (*)     pO2, Chetan 108.0 (*)     HCO3, Venous 39.5 (*)     Positive Base Excess, Chetan 10.0 (*)     O2 Sat, Chetan 98.2 (*)     All other components within normal limits   VENOUS BLOOD GAS, POINT OF CARE - Abnormal;

## 2024-01-08 NOTE — ED NOTES
Pt taken off of bipap per order, pt placed onto 3L oxygen via nasal cannula, pt states this is her baseline. Pt provided with breakfast tray and ice water.

## 2024-01-08 NOTE — ED NOTES
ED to inpatient nurses report      Chief Complaint:  Chief Complaint   Patient presents with    Shortness of Breath     Present to ED from: home     MOA:     LOC: A&Ox2.   Mobility: Independent  Oxygen Baseline: 02 NC.                   Current needs required: bi=pap   Pending ED orders: none  Present condition: stable      Why did the patient come to the ED? Pt to ED for SOB by EMS. EMS states they were called out by pt's mother because patient seem altered. Pt is suppose to be wearing oxygen and was not wearing it on EMS arrival. Pt also suppose to be wearing CPAP but has not been using it per mother. On arrival, pt getting albuterol treatment and on 3L NC. Patient placed on bi-pap.   What is the plan? IV steroids and antibiotics.   Any procedures or intervention occur? Labs, CT.   Any safety concerns??    Mental Status:  Level of Consciousness: Responds to voice (1)    Psych Assessment:   Psychosocial  Psychosocial (WDL): Within Defined Limits  Vital signs   Vitals:    01/08/24 1503 01/08/24 1625 01/08/24 1632 01/08/24 1700   BP:   (!) 151/105 (!) 152/106   Pulse: (!) 108 (!) 101 (!) 109 (!) 105   Resp: 13 14 15 13   Temp:       TempSrc:       SpO2: 100% 100% 100%         Vitals:  Patient Vitals for the past 24 hrs:   BP Temp Temp src Pulse Resp SpO2   01/08/24 1700 (!) 152/106 -- -- (!) 105 13 --   01/08/24 1632 (!) 151/105 -- -- (!) 109 15 100 %   01/08/24 1625 -- -- -- (!) 101 14 100 %   01/08/24 1503 -- -- -- (!) 108 13 100 %   01/08/24 1117 -- -- -- 100 10 100 %   01/08/24 0830 (!) 148/100 -- -- (!) 105 12 99 %   01/08/24 0800 (!) 146/98 -- -- 96 14 100 %   01/08/24 0743 -- -- -- 96 20 100 %   01/08/24 0630 131/85 -- -- 99 14 100 %   01/08/24 0600 135/84 -- -- (!) 101 -- 100 %   01/08/24 0400 134/85 -- -- -- -- 99 %   01/08/24 0239 -- -- -- (!) 105 18 100 %   01/08/24 0109 -- -- -- (!) 104 14 --   01/08/24 0104 -- -- -- -- -- 91 %   01/08/24 0102 -- -- -- (!) 112 14 93 %   01/08/24 0012 (!) 155/99 -- -- (!)  Bilateral     harware in place bilat    TUBAL LIGATION      clips placed on fallopian tubes    UPPER GASTROINTESTINAL ENDOSCOPY  08/29/2017     EXTENSIVE SPASMATIC CONTRACTIONS; NO LUMINAL OBSTRUCTIONS; CLOSER TO THE DISTAL ESOPHAGUS AND DEVELOPING DIVERTICULUM IS NOTED AND BELIEVED TO BE SEC TO DSYSMOTILITY;  DALEY DIATIONS WERE DONE EMPIRICALLY WITH 56 FR       PAST MEDICAL HISTORY       Past Medical History:   Diagnosis Date    Acute on chronic respiratory failure with hypoxia and hypercapnia ([facility]) 5/18/2023    Anxiety     Arthritis     O.A.    Bipolar depression ([facility])     Dermatitis     Dysuria     Exposure to STD     Folliculitis     Hx of acute bronchitis     Hypertension     Marfan syndrome     MVP (mitral valve prolapse)     Nausea & vomiting     Polysubstance abuse ([facility])     drug abuse includes cocaine, fentnyl, cannabis    PONV (postoperative nausea and vomiting)     Scoliosis     Seasonal allergies     Staph infection     HX OF ALSO BACTEREMIA    UTI (lower urinary tract infection)        Labs:  Labs Reviewed   BLOOD GAS, VENOUS - Abnormal; Notable for the following components:       Result Value    pH, Chetan 7.278 (*)     pCO2, Chetan 92.3 (*)     HCO3, Venous 41.8 (*)     Positive Base Excess, Chetan 11.2 (*)     All other components within normal limits   CBC WITH AUTO DIFFERENTIAL - Abnormal; Notable for the following components:    Neutrophils % 85 (*)     Lymphocytes % 11 (*)     Eosinophils % 0 (*)     Immature Granulocytes 1 (*)     Lymphocytes Absolute 0.76 (*)     All other components within normal limits   BASIC METABOLIC PANEL - Abnormal; Notable for the following components:    Chloride 93 (*)     CO2 37 (*)     Glucose 162 (*)     Creatinine 0.3 (*)     All other components within normal limits   BASIC METABOLIC PANEL W/ REFLEX TO MG FOR LOW K - Abnormal; Notable for the following components:    Chloride 95 (*)     CO2 37 (*)     Glucose 136 (*)     Creatinine 0.3 (*)     All other components

## 2024-01-08 NOTE — ED NOTES
Placed on bedpan, was able to have small formed BM. Using purewick for urination. Able to turn self to assist. On 3 L nc , with no distress. Tolerated well.

## 2024-01-08 NOTE — ED NOTES
The following labs were labeled with appropriate pt sticker and tubed to lab:     [] Blue     [] Lavender   [] on ice  [x] Green/yellow  [] Green/black [] on ice  [] Grey  [] on ice  [] Yellow  [] Red  [] Pink  [] Type/ Screen  [] ABG  [] VBG    [] COVID-19 swab    [] Rapid  [] PCR  [] Flu swab  [] Peds Viral Panel     [] Urine Sample  [] Fecal Sample  [] Pelvic Cultures  [] Blood Cultures  [] X 2  [] STREP Cultures  [] Wound Cultures

## 2024-01-08 NOTE — ED PROVIDER NOTES
the salivary and thyroid glands. BONES: No acute osseous abnormality.  There is reversal of the cervical lordosis, which may be positional. CTA HEAD: ANTERIOR CIRCULATION: No significant stenosis of the intracranial internal carotid, anterior cerebral, or middle cerebral arteries. No aneurysm. POSTERIOR CIRCULATION: No significant stenosis of the vertebral, basilar, or posterior cerebral arteries.  The right posterior cerebral artery has fetal origin.  No aneurysm. OTHER: No dural venous sinus thrombosis on this non-dedicated study.     1. No acute intracranial abnormality. 2. Unremarkable CTA of the head and neck.     CTA HEAD NECK W CONTRAST    Result Date: 1/8/2024  EXAMINATION: CTA OF THE HEAD AND NECK WITH CONTRAST; CT OF THE HEAD WITHOUT CONTRAST 1/8/2024 1:36 am: TECHNIQUE: CTA of the head and neck was performed with the administration of intravenous contrast. Multiplanar reformatted images are provided for review.  MIP images are provided for review. Stenosis of the internal carotid arteries measured using NASCET criteria. Automated exposure control, iterative reconstruction, and/or weight based adjustment of the mA/kV was utilized to reduce the radiation dose to as low as reasonably achievable.; CT of the head was performed without the administration of intravenous contrast. Automated exposure control, iterative reconstruction, and/or weight based adjustment of the mA/kV was utilized to reduce the radiation dose to as low as reasonably achievable. Noncontrast CT of the head with reconstructed 2-D images are also provided for review. COMPARISON: None. HISTORY: ORDERING SYSTEM PROVIDED HISTORY: confusion, hx of marfan syndrome TECHNOLOGIST PROVIDED HISTORY: confusion, hx of marfan syndrome Decision Support Exception - unselect if not a suspected or confirmed emergency medical condition->Emergency Medical Condition (MA) Reason for Exam: confusion, hx of marfan syndrome; FINDINGS: CT HEAD: BRAIN/VENTRICLES:  No

## 2024-01-08 NOTE — ED PROVIDER NOTES
Sensitivity: 13  Troponin is within normal limits.  This makes ACS/STEMI/NSTEMI significantly less likely, additionally patient did not have any obvious signs of ischemia seen on EKG. [MW]   0227 Patient signed out to oncoming resident, will need admission due to respiratory distress and hypercapnia. [MW]      ED Course User Index  [MW] Sterling Parkinson MD       PROCEDURES:      CONSULTS:  None    CRITICAL CARE:  There was significant risk of life threatening deterioration of patient's condition requiring my direct management. Critical care time  minutes, excluding any documented procedures.    FINAL IMPRESSION      1. COPD exacerbation (HCC)    2. Hypercapnia          DISPOSITION / PLAN     DISPOSITION        PATIENT REFERRED TO:  No follow-up provider specified.    DISCHARGE MEDICATIONS:  New Prescriptions    No medications on file       Sterling Parkinson MD  Emergency Medicine Resident    (Please note that portions of this note were completed with a voice recognition program.  Efforts were made to edit the dictations but occasionally words are mis-transcribed.)

## 2024-01-08 NOTE — ED TRIAGE NOTES
Pt to ED for SOB by EMS. EMS states they were called out by pt's mother because patient seem altered. Pt is suppose to be wearing oxygen and was not wearing it on EMS arrival. Pt also suppose to be wearing CPAP but has not been using it per mother. On arrival, pt getting albuterol treatment and on 3L NC. Pt A& O x4.

## 2024-01-08 NOTE — ED NOTES
ED to inpatient nurses report      Chief Complaint:  Chief Complaint   Patient presents with    Shortness of Breath     Present to ED from: home    MOA:     LOC: A&Ox2.   Mobility: Independent  Oxygen Baseline: 02 NC.     Current needs required: bi=pap   Pending ED orders: none  Present condition: stable     Why did the patient come to the ED? Pt to ED for SOB by EMS. EMS states they were called out by pt's mother because patient seem altered. Pt is suppose to be wearing oxygen and was not wearing it on EMS arrival. Pt also suppose to be wearing CPAP but has not been using it per mother. On arrival, pt getting albuterol treatment and on 3L NC. Patient placed on bi-pap.   What is the plan? IV steroids and antibiotics.   Any procedures or intervention occur? Labs, CT.   Any safety concerns??    Mental Status:       Psych Assessment:   Psychosocial  Psychosocial (WDL): Within Defined Limits  Vital signs   Vitals:    01/08/24 0102 01/08/24 0104 01/08/24 0109 01/08/24 0239   BP:       Pulse: (!) 112  (!) 104 (!) 105   Resp: 14  14 18   Temp:       TempSrc:       SpO2: 93% 91%  100%        Vitals:  Patient Vitals for the past 24 hrs:   BP Temp Temp src Pulse Resp SpO2   01/08/24 0239 -- -- -- (!) 105 18 100 %   01/08/24 0109 -- -- -- (!) 104 14 --   01/08/24 0104 -- -- -- -- -- 91 %   01/08/24 0102 -- -- -- (!) 112 14 93 %   01/08/24 0012 (!) 155/99 -- -- (!) 107 -- --   01/07/24 2336 -- -- -- (!) 105 20 96 %   01/07/24 2329 -- -- -- (!) 111 25 96 %   01/07/24 2233 -- 98.8 °F (37.1 °C) Axillary -- -- --   01/07/24 2232 -- -- -- -- -- 93 %   01/07/24 2227 -- -- -- (!) 117 17 --   01/07/24 2224 (!) 152/116 -- -- -- -- --      Visit Vitals  BP (!) 155/99   Pulse (!) 105   Temp 98.8 °F (37.1 °C) (Axillary)   Resp 18   SpO2 100%        LDAs:   Peripheral IV 01/07/24 Right Antecubital (Active)       Ambulatory Status:  Presents to emergency department  because of falls (Syncope, seizure, or loss of consciousness): No, Age > 70:  attempt to quit: 3/26/2015     Years since quittin.7    Smokeless tobacco: Never   Vaping Use    Vaping Use: Never used   Substance and Sexual Activity    Alcohol use: No    Drug use: Not Currently     Types: Opiates , Marijuana (Weed), Cocaine     Comment: drug abuse includes cocaine, fentnyl, cannabis    Sexual activity: Yes     Partners: Male       FAMILY HISTORY       Family History   Problem Relation Age of Onset    Cancer Brother         testicular    Breast Cancer Maternal Grandmother     Hypertension Mother     Thyroid Disease Mother        ALLERGIES     Ketamine and Morphine    CURRENT MEDICATIONS       Previous Medications    ALBUTEROL SULFATE HFA (VENTOLIN HFA) 108 (90 BASE) MCG/ACT INHALER    Inhale 2 puffs into the lungs 4 times daily as needed for Wheezing    ALBUTEROL SULFATE HFA (VENTOLIN HFA) 108 (90 BASE) MCG/ACT INHALER    Inhale 2 puffs into the lungs 4 times daily as needed for Wheezing    BUPRENORPHINE-NALOXONE (SUBOXONE) 8-2 MG FILM SL FILM    Place 1 Film under the tongue daily.    CYCLOBENZAPRINE (FLEXERIL) 10 MG TABLET    Take 1 tablet by mouth 3 times daily as needed for Muscle spasms    DULOXETINE (CYMBALTA) 20 MG EXTENDED RELEASE CAPSULE    Take 1 capsule by mouth daily    ERGOCALCIFEROL (VITAMIN D) 26110 UNITS CAPS    Take 50,000 Units by mouth once a week for 7 doses    GABAPENTIN (NEURONTIN) 100 MG CAPSULE    Take 1 capsule by mouth 3 times daily for 30 days.    LIDOCAINE 4 % EXTERNAL PATCH    Place 1 patch onto the skin daily    MELOXICAM (MOBIC) 15 MG TABLET    Take 1 tablet by mouth daily as needed for Pain    NICOTINE (NICODERM CQ) 21 MG/24HR    Place 1 patch onto the skin daily    VITAMIN D3 (CHOLECALCIFEROL) 25 MCG (1000 UT) TABS TABLET    Take 1 tablet by mouth daily     Orders Placed This Encounter   Medications    DISCONTD: methylPREDNISolone sodium (PF) (SOLU-MEDROL PF) injection 125 mg    methylPREDNISolone sodium succ (SOLU-MEDROL) injection 125 mg    DISCONTD:

## 2024-01-08 NOTE — ED PROVIDER NOTES
Note Started: 11:34 PM ARIAS         [facility]     Emergency Department     Faculty Attestation    I performed a history and physical examination of the patient and discussed management with the resident. I reviewed the resident’s note and agree with the documented findings and plan of care. Any areas of disagreement are noted on the chart. I was personally present for the key portions of any procedures. I have documented in the chart those procedures where I was not present during the key portions. I have reviewed the emergency nurses triage note. I agree with the chief complaint, past medical history, past surgical history, allergies, medications, social and family history as documented unless otherwise noted below.        For Physician Assistant/ Nurse Practitioner cases/documentation I have personally evaluated this patient and have completed at least one if not all key elements of the E/M (history, physical exam, and MDM). Additional findings are as noted.  I have personally seen and evaluated the patient.  I find the patient's history and physical exam are consistent with the NP/PA documentation.  I agree with the care provided, treatment rendered, disposition and follow-up plan.    43-year-old female with a history of Marfan's, substance abuse, bipolar disorder, hypercapnia requiring CPAP at night presenting with difficulty breathing.  Parent called EMS, stating that she was acting confused.  Patient is awake, but does not respond to questions quickly nor appropriately.  She will follow simple commands.  She has a history of COPD and had some wheezing upon arrival.    Exam:  General : Sitting on the bed, awake, alert, and intermittently confused  CV : Tachycardic and regular rhythm  Lungs : Wheezing across bilateral lung fields  Abdomen : soft, non-tender, non-distended  Neuro: Moving all extremities, no focal deficits.  Patient appears confused, unable to give history    DDx: Stroke,  carotid dissection given history of Marfan's syndrome, electrolyte abnormality, hypercapnia, substance use    Plan:  Lab workup including CBC, electrolytes, troponin  CT head and CTA head/neck  Steroids, albuterol, Atrovent    Medical Decision Making  Amount and/or Complexity of Data Reviewed  Labs: ordered.  Radiology: ordered.  ECG/medicine tests: ordered.    Risk  OTC drugs.  Prescription drug management.      EKG obtained, unable to read due to significant background artifact.  Will reattempt EKG once workup is initiated    Marlena Rowell MD   Attending Emergency Physician    (Please note that portions of this note were completed with a voice recognition program. Efforts were made to edit the dictations but occasionally words are mis-transcribed.)            Marlena Rowell MD  01/07/24 [id number]

## 2024-01-08 NOTE — CONSULTS
clinic)  ??  COPD exacerbation    PLAN:    I personally interviewed/examined the patient; reviewed interval history, interpreted all available radiographic and laboratory data at the time of service.    Patient is hemodynamically stable  Patient is currently on BiPAP 16/8, FiO2 40%  Continue supplemental oxygen to keep oxygen saturation >90%  Okay to give intermittent breaks from BiPAP  Monitor VBG  Encourage incentive spirometry/Acapella, when off BiPAP  Maintain bronchopulmonary hygiene  Aspiration precautions  Continue bronchodilators  Antimicrobials reviewed; continue Rocephin/azithromycin  Follow-up culture results  Discontinue Solu-Medrol  Monitor I/O, electrolytes with a goal of even/negative fluid balance  DVT and stress ulcer prophylaxis  Physical/occupational therapy  Overall prognosis guarded    It was my pleasure to evaluate Opal Valladares today. I would like to thank you for allowing me to participate in the care of this patient.  Please feel free to call with any further questions or concerns. We will continue to follow.     Terry Manzanares MD  Pulmonary and Critical Care Medicine           1/8/2024, 4:46 PM           This note is created with the assistance of a speech recognition program.  While intending to generate a document that actually reflects the content of the visit, the document can still have some errors including those of syntax and sound-alike substitutions which may escape proof reading.  It such instances, actual meaning can be extrapolated by contextual diversion.

## 2024-01-09 PROBLEM — R13.10 DYSPHAGIA: Status: RESOLVED | Noted: 2017-08-14 | Resolved: 2024-01-09

## 2024-01-09 PROBLEM — H10.33 ACUTE BACTERIAL CONJUNCTIVITIS OF BOTH EYES: Status: RESOLVED | Noted: 2023-03-29 | Resolved: 2024-01-09

## 2024-01-09 PROBLEM — R42 DIZZINESS: Status: RESOLVED | Noted: 2023-03-26 | Resolved: 2024-01-09

## 2024-01-09 PROBLEM — R06.02 SHORTNESS OF BREATH: Status: RESOLVED | Noted: 2023-03-26 | Resolved: 2024-01-09

## 2024-01-09 LAB
ANION GAP SERPL CALCULATED.3IONS-SCNC: 8 MMOL/L (ref 9–17)
BACTERIA URNS QL MICRO: NORMAL
BASOPHILS # BLD: <0.03 K/UL (ref 0–0.2)
BASOPHILS NFR BLD: 0 % (ref 0–2)
BILIRUB UR QL STRIP: ABNORMAL
BUN SERPL-MCNC: 22 MG/DL (ref 6–20)
CALCIUM SERPL-MCNC: 9.3 MG/DL (ref 8.6–10.4)
CASTS #/AREA URNS LPF: NORMAL /LPF (ref 0–8)
CHLORIDE SERPL-SCNC: 99 MMOL/L (ref 98–107)
CLARITY UR: ABNORMAL
CO2 SERPL-SCNC: 36 MMOL/L (ref 20–31)
COLOR UR: ABNORMAL
CREAT SERPL-MCNC: 0.3 MG/DL (ref 0.5–0.9)
EOSINOPHIL # BLD: <0.03 K/UL (ref 0–0.44)
EOSINOPHILS RELATIVE PERCENT: 0 % (ref 1–4)
EPI CELLS #/AREA URNS HPF: NORMAL /HPF (ref 0–5)
ERYTHROCYTE [DISTWIDTH] IN BLOOD BY AUTOMATED COUNT: 12 % (ref 11.8–14.4)
GFR SERPL CREATININE-BSD FRML MDRD: >60 ML/MIN/1.73M2
GLUCOSE SERPL-MCNC: 100 MG/DL (ref 70–99)
GLUCOSE UR STRIP-MCNC: NEGATIVE MG/DL
HCT VFR BLD AUTO: 36.2 % (ref 36.3–47.1)
HGB BLD-MCNC: 11.6 G/DL (ref 11.9–15.1)
HGB UR QL STRIP.AUTO: NEGATIVE
IMM GRANULOCYTES # BLD AUTO: 0.1 K/UL (ref 0–0.3)
IMM GRANULOCYTES NFR BLD: 1 %
KETONES UR STRIP-MCNC: ABNORMAL MG/DL
LEUKOCYTE ESTERASE UR QL STRIP: ABNORMAL
LYMPHOCYTES NFR BLD: 0.76 K/UL (ref 1.1–3.7)
LYMPHOCYTES RELATIVE PERCENT: 7 % (ref 24–43)
MCH RBC QN AUTO: 29.7 PG (ref 25.2–33.5)
MCHC RBC AUTO-ENTMCNC: 32 G/DL (ref 28.4–34.8)
MCV RBC AUTO: 92.6 FL (ref 82.6–102.9)
MONOCYTES NFR BLD: 0.23 K/UL (ref 0.1–1.2)
MONOCYTES NFR BLD: 2 % (ref 3–12)
NEUTROPHILS NFR BLD: 91 % (ref 36–65)
NEUTS SEG NFR BLD: 10.64 K/UL (ref 1.5–8.1)
NITRITE UR QL STRIP: NEGATIVE
NRBC BLD-RTO: 0 PER 100 WBC
PH UR STRIP: 6.5 [PH] (ref 5–8)
PLATELET # BLD AUTO: 236 K/UL (ref 138–453)
PMV BLD AUTO: 9.9 FL (ref 8.1–13.5)
POTASSIUM SERPL-SCNC: 4.2 MMOL/L (ref 3.7–5.3)
PROT UR STRIP-MCNC: ABNORMAL MG/DL
RBC # BLD AUTO: 3.91 M/UL (ref 3.95–5.11)
RBC #/AREA URNS HPF: NORMAL /HPF (ref 0–4)
SODIUM SERPL-SCNC: 143 MMOL/L (ref 135–144)
SP GR UR STRIP: 1.03 (ref 1–1.03)
UROBILINOGEN UR STRIP-ACNC: NORMAL EU/DL (ref 0–1)
WBC #/AREA URNS HPF: NORMAL /HPF (ref 0–5)
WBC OTHER # BLD: 11.7 K/UL (ref 3.5–11.3)

## 2024-01-09 PROCEDURE — 2060000000 HC ICU INTERMEDIATE R&B

## 2024-01-09 PROCEDURE — 97162 PT EVAL MOD COMPLEX 30 MIN: CPT

## 2024-01-09 PROCEDURE — 6360000002 HC RX W HCPCS: Performed by: FAMILY MEDICINE

## 2024-01-09 PROCEDURE — 94640 AIRWAY INHALATION TREATMENT: CPT

## 2024-01-09 PROCEDURE — 6360000002 HC RX W HCPCS: Performed by: NURSE PRACTITIONER

## 2024-01-09 PROCEDURE — 97167 OT EVAL HIGH COMPLEX 60 MIN: CPT

## 2024-01-09 PROCEDURE — 6370000000 HC RX 637 (ALT 250 FOR IP): Performed by: STUDENT IN AN ORGANIZED HEALTH CARE EDUCATION/TRAINING PROGRAM

## 2024-01-09 PROCEDURE — 6370000000 HC RX 637 (ALT 250 FOR IP): Performed by: FAMILY MEDICINE

## 2024-01-09 PROCEDURE — 81001 URINALYSIS AUTO W/SCOPE: CPT

## 2024-01-09 PROCEDURE — 6370000000 HC RX 637 (ALT 250 FOR IP)

## 2024-01-09 PROCEDURE — 97530 THERAPEUTIC ACTIVITIES: CPT

## 2024-01-09 PROCEDURE — 6370000000 HC RX 637 (ALT 250 FOR IP): Performed by: NURSE PRACTITIONER

## 2024-01-09 PROCEDURE — 85025 COMPLETE CBC W/AUTO DIFF WBC: CPT

## 2024-01-09 PROCEDURE — 2580000003 HC RX 258: Performed by: NURSE PRACTITIONER

## 2024-01-09 PROCEDURE — 97535 SELF CARE MNGMENT TRAINING: CPT

## 2024-01-09 PROCEDURE — 99232 SBSQ HOSP IP/OBS MODERATE 35: CPT | Performed by: STUDENT IN AN ORGANIZED HEALTH CARE EDUCATION/TRAINING PROGRAM

## 2024-01-09 PROCEDURE — 80048 BASIC METABOLIC PNL TOTAL CA: CPT

## 2024-01-09 RX ORDER — GABAPENTIN 600 MG/1
600 TABLET ORAL 3 TIMES DAILY
Status: DISCONTINUED | OUTPATIENT
Start: 2024-01-09 | End: 2024-01-11 | Stop reason: HOSPADM

## 2024-01-09 RX ORDER — CYCLOBENZAPRINE HCL 10 MG
10 TABLET ORAL 3 TIMES DAILY PRN
Status: DISCONTINUED | OUTPATIENT
Start: 2024-01-09 | End: 2024-01-11 | Stop reason: HOSPADM

## 2024-01-09 RX ORDER — CARVEDILOL 12.5 MG/1
12.5 TABLET ORAL 2 TIMES DAILY WITH MEALS
Status: DISCONTINUED | OUTPATIENT
Start: 2024-01-09 | End: 2024-01-11 | Stop reason: HOSPADM

## 2024-01-09 RX ADMIN — DULOXETINE HYDROCHLORIDE 20 MG: 20 CAPSULE, DELAYED RELEASE ORAL at 08:48

## 2024-01-09 RX ADMIN — CARVEDILOL 12.5 MG: 12.5 TABLET, FILM COATED ORAL at 18:59

## 2024-01-09 RX ADMIN — Medication 1000 MG: at 04:31

## 2024-01-09 RX ADMIN — SODIUM CHLORIDE: 9 INJECTION, SOLUTION INTRAVENOUS at 04:59

## 2024-01-09 RX ADMIN — CYCLOBENZAPRINE 10 MG: 10 TABLET, FILM COATED ORAL at 18:58

## 2024-01-09 RX ADMIN — GABAPENTIN 600 MG: 600 TABLET ORAL at 18:59

## 2024-01-09 RX ADMIN — AZITHROMYCIN MONOHYDRATE 500 MG: 500 INJECTION, POWDER, LYOPHILIZED, FOR SOLUTION INTRAVENOUS at 07:16

## 2024-01-09 RX ADMIN — CHOLECALCIFEROL TAB 25 MCG (1000 UNIT) 1000 UNITS: 25 TAB at 08:48

## 2024-01-09 RX ADMIN — METHYLPREDNISOLONE SODIUM SUCCINATE 40 MG: 40 INJECTION, POWDER, LYOPHILIZED, FOR SOLUTION INTRAMUSCULAR; INTRAVENOUS at 04:56

## 2024-01-09 RX ADMIN — SODIUM CHLORIDE, PRESERVATIVE FREE 10 ML: 5 INJECTION INTRAVENOUS at 08:49

## 2024-01-09 RX ADMIN — IPRATROPIUM BROMIDE AND ALBUTEROL SULFATE 1 DOSE: 2.5; .5 SOLUTION RESPIRATORY (INHALATION) at 21:23

## 2024-01-09 RX ADMIN — CYCLOBENZAPRINE 10 MG: 10 TABLET, FILM COATED ORAL at 11:44

## 2024-01-09 RX ADMIN — CARVEDILOL 12.5 MG: 12.5 TABLET, FILM COATED ORAL at 11:44

## 2024-01-09 RX ADMIN — IPRATROPIUM BROMIDE AND ALBUTEROL SULFATE 1 DOSE: 2.5; .5 SOLUTION RESPIRATORY (INHALATION) at 07:38

## 2024-01-09 RX ADMIN — ACETAMINOPHEN 650 MG: 325 TABLET ORAL at 21:48

## 2024-01-09 RX ADMIN — GABAPENTIN 600 MG: 600 TABLET ORAL at 11:44

## 2024-01-09 RX ADMIN — IPRATROPIUM BROMIDE AND ALBUTEROL SULFATE 1 DOSE: 2.5; .5 SOLUTION RESPIRATORY (INHALATION) at 12:48

## 2024-01-09 RX ADMIN — GABAPENTIN 600 MG: 600 TABLET ORAL at 21:48

## 2024-01-09 RX ADMIN — IPRATROPIUM BROMIDE AND ALBUTEROL SULFATE 1 DOSE: 2.5; .5 SOLUTION RESPIRATORY (INHALATION) at 15:41

## 2024-01-09 RX ADMIN — BUPRENORPHINE AND NALOXONE 1 TABLET: 8; 2 TABLET SUBLINGUAL at 08:48

## 2024-01-09 ASSESSMENT — ENCOUNTER SYMPTOMS
VOMITING: 0
NAUSEA: 0
COUGH: 0
ABDOMINAL PAIN: 0
CONSTIPATION: 0
SHORTNESS OF BREATH: 1
WHEEZING: 0
DIARRHEA: 0

## 2024-01-09 ASSESSMENT — PAIN DESCRIPTION - LOCATION: LOCATION: GENERALIZED

## 2024-01-09 ASSESSMENT — PAIN DESCRIPTION - PAIN TYPE: TYPE: CHRONIC PAIN

## 2024-01-09 ASSESSMENT — PAIN DESCRIPTION - DESCRIPTORS: DESCRIPTORS: PATIENT UNABLE TO DESCRIBE

## 2024-01-09 ASSESSMENT — PAIN SCALES - GENERAL: PAINLEVEL_OUTOF10: 3

## 2024-01-09 NOTE — PROGRESS NOTES
Occupational Therapy  Facility/Department: [facility] ED  Occupational Therapy Initial Assessment    Name: Opal Valladares  : 1980  MRN: [id number]  Date of Service: 2024    Discharge Recommendations:  Patient would benefit from continued therapy after discharge      Copied from Emergency Medicine: Opal Valladares is a 43 y.o. female PMH chronic hypoxic respiratory failure, opiate abuse on Suboxone, Marfan syndrome who presents with confusion, shortness of breath.  On arrival patient seemed to be in significant distress, was on nonrebreather with oxygen saturations in the high 80s, low 90s.  Patient was alert and oriented x 3, however was sluggish and responding to questions.  Patient denied any chest pain, blurry vision, nausea, vomiting, diaphoresis, abdominal pain.  Patient states she did have a headache.  Patient is supposed to be wearing a CPAP at night, however reports were that patient has not been compliant with this and leads to hypercapnia.  Patient has required multiple admissions for hypercapnia.    Patient Diagnosis(es): The primary encounter diagnosis was COPD exacerbation (HCC). A diagnosis of Hypercapnia was also pertinent to this visit.    Past Medical History:  has a past medical history of Acute on chronic respiratory failure with hypoxia and hypercapnia (HCC), Anxiety, Arthritis, Bipolar depression (HCC), Dermatitis, Dysuria, Exposure to STD, Folliculitis, Hx of acute bronchitis, Hypertension, Marfan syndrome, MVP (mitral valve prolapse), Nausea & vomiting, Polysubstance abuse (HCC), PONV (postoperative nausea and vomiting), Scoliosis, Seasonal allergies, Staph infection, and UTI (lower urinary tract infection).    Past Surgical History:  has a past surgical history that includes Foot surgery (Bilateral); Hip arthroscopy (Left); Knee arthroscopy (Right); Tubal ligation; Breast reduction surgery (Bilateral); Foot Capsulotomy (Left, 14); Shoulder Arthroplasty

## 2024-01-09 NOTE — PROGRESS NOTES
level with bedroom/bathroom  Home Access: Level entry  Bathroom Shower/Tub: Curtain, Tub/Shower unit  Bathroom Toilet: Handicap height  Bathroom Equipment: Grab bars in shower, Tub transfer bench, Grab bars around toilet (Toilet grab bars on left side)  Bathroom Accessibility: Accessible  Home Equipment: Rollator, Wheelchair-electric, Oxygen  Receives Help From: Home health, Family (Mother also comes and helps)  ADL Assistance: Needs assistance  Dressing:  (Pt sits on EOB to dress)  Toileting: Needs assistance  Homemaking Assistance: Needs assistance  Meal Prep: Total (Uses delivery service)  Laundry: Total (Aid)  Cleaning: Total (Aid)  Driving: Total (Pt uses cab services)  Homemaking Responsibilities: Yes  Ambulation Assistance: Needs assistance  Active : No  Patient's  Info: Calls Medicare for Cab  Mode of Transportation: Cab  Leisure & Hobbies: Acrylic Painting  Vision/Hearing  Vision  Vision: Impaired  Vision Exceptions: Wears glasses for distance (Pt reported should be wearing glasses but doesn't wear)  Hearing  Hearing: Within functional limits    Cognition   Orientation  Overall Orientation Status: Within Functional Limits  Cognition  Overall Cognitive Status: WFL     Objective   AROM RLE (degrees)  RLE AROM: WFL  AROM LLE (degrees)  LLE AROM : WFL  LLE General AROM: Hip flexion AROM ~50% available ROM, PROM WFL.  AROM RUE (degrees)  RUE General AROM: See OT  AROM LUE (degrees)  LUE General AROM: See OT  Strength RLE  Strength RLE: Exception  Comment: Grossly 4-/5, hip flexion 2+/5  Strength LLE  Strength LLE: Exception  Comment: Grossly 4-/5, hip flexion 2+/5  Strength RUE  Comment: See OT  Strength LUE  Comment: See OT        Bed Mobility Training  Bed Mobility Training: Yes  Bed mobility  Supine to Sit: Moderate assistance  Sit to Supine: Contact guard assistance  Bed Mobility Comments: Pt required assist at trunk in supine to sit. In sit to supine pt able to perform movement w/ increased

## 2024-01-09 NOTE — PROGRESS NOTES
[facility]  PROGRESS NOTE    Shift date: 1/9/2024   Shift day: Tuesday   Shift # 1    Room # 24/24   Name: Opal Valladares                Latter day: Sabianist   Place of Alevism: None    Referral:  Request from patient for support    Admit Date & Time: 1/7/2024 10:22 PM    Assessment:  Opal Valladares is a 43 y.o. female who is currently boarded in the ED. Upon entering the room writer observes pt awake and alert.     Intervention:  Writer introduced self and title as  Writer offered space for patient  to express feelings, needs, and concerns and provided a ministry presence. She explained that she is here because she has not been using her bipap at night. She also explained that she has a life long condition that affects her joints. She said her mom and her sister are great support to her. Pt does not currently have a Pentecostalism. She said she was raised [place] but went to a [employer]ism that she liked.  prayed with patient at her request.    Outcome:  Pt was tearful after prayer. She was receptive to 's support and expressed gratitude for it.    Plan:  Chaplains will remain available to offer spiritual and emotional support as needed.      Electronically signed by Chaplain Marcela, on 1/9/2024 at 2:11 PM.  [facility]  [phone]

## 2024-01-09 NOTE — ED NOTES
intracranial abnormality.  2. Unremarkable CTA of the head and neck. [GC]   0303 [facility] consulted for admission [GC]      ED Course User Index  [GC] Nimesh Godoy DO  [MW] Sterling Parkinson MD          Skin Assessment:        Pain Score:  Pain Assessment  Pain Assessment: None - Denies Pain      SOCIAL HISTORY       Social History     Socioeconomic History    Marital status:      Spouse name: None    Number of children: None    Years of education: None    Highest education level: None   Tobacco Use    Smoking status: Every Day     Current packs/day: 0.00     Types: E-Cigarettes, Cigarettes     Start date: 2013     Last attempt to quit: 3/26/2015     Years since quittin.7    Smokeless tobacco: Never   Vaping Use    Vaping Use: Never used   Substance and Sexual Activity    Alcohol use: No    Drug use: Not Currently     Types: Opiates , Marijuana (Weed), Cocaine     Comment: drug abuse includes cocaine, fentnyl, cannabis    Sexual activity: Yes     Partners: Male       FAMILY HISTORY       Family History   Problem Relation Age of Onset    Cancer Brother         testicular    Breast Cancer Maternal Grandmother     Hypertension Mother     Thyroid Disease Mother        ALLERGIES     Ketamine and Morphine    CURRENT MEDICATIONS       Previous Medications    ALBUTEROL SULFATE HFA (VENTOLIN HFA) 108 (90 BASE) MCG/ACT INHALER    Inhale 2 puffs into the lungs 4 times daily as needed for Wheezing    ALBUTEROL SULFATE HFA (VENTOLIN HFA) 108 (90 BASE) MCG/ACT INHALER    Inhale 2 puffs into the lungs 4 times daily as needed for Wheezing    BUPRENORPHINE-NALOXONE (SUBOXONE) 8-2 MG FILM SL FILM    Place 1 Film under the tongue daily.    CYCLOBENZAPRINE (FLEXERIL) 10 MG TABLET    Take 1 tablet by mouth 3 times daily as needed for Muscle spasms    DULOXETINE (CYMBALTA) 20 MG EXTENDED RELEASE CAPSULE    Take 1 capsule by mouth daily    DULOXETINE (CYMBALTA) 30 MG EXTENDED RELEASE CAPSULE    Take 1 capsule by mouth  following components:    Color, UA Dark Yellow (*)     Turbidity UA Cloudy (*)     Bilirubin Urine MOD (*)     Ketones, Urine LARGE (*)     Protein, UA 1+ (*)     Leukocyte Esterase, Urine SMALL (*)     All other components within normal limits   BASIC METABOLIC PANEL W/ REFLEX TO MG FOR LOW K - Abnormal; Notable for the following components:    Chloride 95 (*)     CO2 37 (*)     Glucose 136 (*)     Creatinine 0.3 (*)     All other components within normal limits   BLOOD GAS, VENOUS - Abnormal; Notable for the following components:    pH, Chetan 7.280 (*)     pCO2, Chetan 86.7 (*)     pO2, Chetan 108.0 (*)     HCO3, Venous 39.5 (*)     Positive Base Excess, Chetan 10.0 (*)     O2 Sat, Chetan 98.2 (*)     All other components within normal limits   BASIC METABOLIC PANEL W/ REFLEX TO MG FOR LOW K - Abnormal; Notable for the following components:    CO2 36 (*)     Anion Gap 8 (*)     Glucose 100 (*)     BUN 22 (*)     Creatinine 0.3 (*)     All other components within normal limits   CBC WITH AUTO DIFFERENTIAL - Abnormal; Notable for the following components:    WBC 11.7 (*)     RBC 3.91 (*)     Hemoglobin 11.6 (*)     Hematocrit 36.2 (*)     Neutrophils % 91 (*)     Lymphocytes % 7 (*)     Monocytes % 2 (*)     Eosinophils % 0 (*)     Immature Granulocytes 1 (*)     Neutrophils Absolute 10.64 (*)     Lymphocytes Absolute 0.76 (*)     All other components within normal limits   VENOUS BLOOD GAS, POINT OF CARE - Abnormal; Notable for the following components:    pCO2, Chetan 65.6 (*)     pO2, Chetan 24.1 (*)     HCO3, Venous 39.3 (*)     Positive Base Excess, Chetan 11.6 (*)     O2 Sat, Chetan 39.4 (*)     All other components within normal limits   RESPIRATORY PANEL, MOLECULAR, WITH COVID-19   GRAM STAIN   CULTURE, RESPIRATORY   TROPONIN   TROPONIN   HCG, SERUM, QUALITATIVE   D-DIMER, QUANTITATIVE   MICROSCOPIC URINALYSIS       Electronically signed by Leah Elkins RN on 1/9/2024 at 5:34 PM

## 2024-01-09 NOTE — ED NOTES
Pt assisted to restroom using wheel chair. Pt cleaned up and bedding changed. Pt given soda as request of pt. Pt declined breakfast tray. Pt assisted back into bed with 1 assist. Pt states she uses a walker at home. RN unable to retrieve walker to assist pt, which is why RN used wheelchair to assist pt to restroom. Pt encouraged to get up to restroom to help mobility and prevent of sores.

## 2024-01-09 NOTE — ED NOTES
Pt is often tearful stating she wants her meds, but writer has explained multiple times that those particular meds, Gabapentin and Flexeril are being held by the provider due to their aiding in her condition. Writer explains that he perfectserved provider to be sure and was confirmed and would reevaluate in the am.

## 2024-01-09 NOTE — ED NOTES
SW supervisor, Virgil Savage, received a voicemail from patient's mother, Bianca @ [phone], that she would like to speak with the ER SW about her daughter.  Virgil requested this SW call the mother.  SW was able to reach Bianca and mom is not patient's POA, the sister is, so SW mostly listened to mom's concerns as mom wanted to vent.  Bianca states she is unhappy with how long patient has been in the ER.  The ER boarding situation was explained to mom, who states she is still not happy with the care her daughter is receiving and suggested maybe patient needs to go to a skilled nursing facility instead of remaining in the ER.  SIGIFREDO explained that this SW will consult the ED CM about getting discharge planning started, but patient would not be accepted to a SNF until she is medically stable.  Bianca then stated she did not want her daughter going to a SNF at discharge, but that the patient will be going home to live with mom and the mom will make sure patient has the care she needs.  SIGIFREDO did speak with Linda, the ED CM, to start discharge planning, however.  SIGIFREDO asked Bianca if there are family members who can rotate in, while patient is admitted, to make sure patient is getting the care that they want and Bianca states everyone works and has other commitments so this is not possible.  Bianca goes on to state that she has already also spoken with the ED Coordinator yesterday and  last night, Laurie, about her concerns.  SIGIFREDO updated the ED Coordinator, Ryan, and patient's RN, Melissa, about the conversation and SW recommendations.  Mom states she may try to come in on her lunch hour as well.  Patient with decision-making capacity and mom had stated she is a SW at [facility] [employer], so mom clearly understands the boundaries of confidentiality in patient's care.  IVAN Bernstein

## 2024-01-09 NOTE — CARE COORDINATION
Case Management Assessment  Initial Evaluation    Date/Time of Evaluation: 1/9/2024 12:24 PM  Assessment Completed by: CYNTHIA PARIS RN    If patient is discharged prior to next notation, then this note serves as note for discharge by case management.    Patient Name: Opal Valladares                   YOB: 1980  Diagnosis: Hypercapnic respiratory failure (HCC) [J96.92]                   Date / Time: 1/7/2024 10:22 PM    Patient Admission Status: Inpatient   Readmission Risk (Low < 19, Mod (19-27), High > 27): Readmission Risk Score: 13.9    Current PCP: Jazzy Ma APRN - CNP  PCP verified by CM? Yes    Chart Reviewed: Yes      History Provided by: Patient  Patient Orientation: Alert and Oriented    Patient Cognition: Alert    Hospitalization in the last 30 days (Readmission):  No    If yes, Readmission Assessment in CM Navigator will be completed.    Advance Directives:      Code Status: Full Code   Patient's Primary Decision Maker is:      Primary Decision Maker: Cynthia Burgos - Brother/Sister - [phone]    Secondary Decision Maker: Bianca Dos Santos - Parent - [phone]    Supplemental (Other) Decision Maker: Bharat Cordova - Child - [phone]    Discharge Planning:    Patient lives with: Alone Type of Home: Apartment  Primary Care Giver: Self  Patient Support Systems include: Parent, Home Care Staff ([facility])   Current Financial resources:    Current community resources:    Current services prior to admission: Durable Medical Equipment, Home Bipap, Home Care, Oxygen Therapy ([facility])            Current DME: Bipap, Wheelchair, Oxygen Therapy (Comment)            Type of Home Care services:  Aide Services    ADLS  Prior functional level:    Current functional level:      PT AM-PAC:   /24  OT AM-PAC:   /24    Family can provide assistance at DC:    Would you like Case Management to discuss the discharge plan with any other family members/significant others, and if so, who?

## 2024-01-09 NOTE — PROGRESS NOTES
[facility]  Office: [phone]  Marko Reynoso DO, Milad Roblero DO, Jared Valdez DO, Johan Doe, DO, Cristofer Alarcon MD, Danya Apple MD, Dixon Palma MD, Esmer Valente MD,  Lm Norris MD, Jason Austin MD, Jim Juarez MD,  Sourav Fernandez DO, Vladimir Beasley MD, Ha Rae MD, Silver Reynoso DO, Deborah Wood MD,  Raul Cohn DO, Shobha Sandoval MD, Sonya Bernard MD, Melony Gaines MD, Diane Sawyer MD,  Bradley Padron MD, Konrad De La Torre MD, Keyon Costello MD, Marie Locke MD, Colt Webb MD, Lise Mann MD, Dillon Fletcher DO, Toby Ford DO, Katherin Hong MD,  Ran Ramírez MD, Shirley Waterhouse, CNP,  Wendi Vigil CNP, Nikunj Ross, CNP,  Sol Bear, DEION, Dorota Gutiérrez, CNP, Shelly Anaya, CNP, Yanira Bernstein CNP, Mily Roman CNP, Bonnie Ro CNP, Sofya Gee, PA-C, Sobeida Bradshaw PA-C, Harper Guido, CNP, Alida Melton, CNS, Rosa Reyes, CNP, Oneyda Yates CNP, Tracy Schwab, CNP         [facility]   IN-PATIENT SERVICE   [facility]    Progress Note    1/9/2024    8:30 AM    Name:   Opal Valladares  MRN:     [id number]     Acct:      [id number]   Room:   24/24   Day:  1  Admit Date:  1/7/2024 10:22 PM    PCP:   Jazzy Ma APRN - CNP  Code Status:  Full Code    Subjective:     C/C:   Chief Complaint   Patient presents with    Shortness of Breath     Interval History Status: improved  Patient seen and examined bedside.  Is alert and awake and answering all questions appropriately.  Saturating well on 3 L oxygen via NC SpO2 96%.  Weaning off.  Blood pressure little high this morning.  Denies any other current complaints.  Insisting on restarting Neurontin and Flexeril.  No new conditions.  Labs reviewed.    Brief History:   Per my collegue  Opal Valladares is a 43 y.o. Non- / non  female who presents with Shortness of Breath   and is admitted to the hospital for the  major depression /   Anxiety disorder: Continue Cymbalta   PT OT eval.    Diane Sawyer MD  1/9/2024  8:30 AM

## 2024-01-09 NOTE — ED NOTES
Pt eating lunch tray. Pt given flexeril and gabapentin per pts request. Pt states \"I'm withdrawing from not getting my flexeril and gabapentin.\" RN updated on PT to see pt.

## 2024-01-10 PROBLEM — J44.1 COPD EXACERBATION (HCC): Status: ACTIVE | Noted: 2024-01-10

## 2024-01-10 LAB
ANION GAP SERPL CALCULATED.3IONS-SCNC: 9 MMOL/L (ref 9–17)
BASOPHILS # BLD: <0.03 K/UL (ref 0–0.2)
BASOPHILS NFR BLD: 0 % (ref 0–2)
BUN SERPL-MCNC: 17 MG/DL (ref 6–20)
CALCIUM SERPL-MCNC: 8.8 MG/DL (ref 8.6–10.4)
CHLORIDE SERPL-SCNC: 96 MMOL/L (ref 98–107)
CO2 SERPL-SCNC: 32 MMOL/L (ref 20–31)
CREAT SERPL-MCNC: 0.3 MG/DL (ref 0.5–0.9)
EOSINOPHIL # BLD: <0.03 K/UL (ref 0–0.44)
EOSINOPHILS RELATIVE PERCENT: 0 % (ref 1–4)
ERYTHROCYTE [DISTWIDTH] IN BLOOD BY AUTOMATED COUNT: 12.1 % (ref 11.8–14.4)
GFR SERPL CREATININE-BSD FRML MDRD: >60 ML/MIN/1.73M2
GLUCOSE SERPL-MCNC: 154 MG/DL (ref 70–99)
HCT VFR BLD AUTO: 34.9 % (ref 36.3–47.1)
HGB BLD-MCNC: 11 G/DL (ref 11.9–15.1)
IMM GRANULOCYTES # BLD AUTO: <0.03 K/UL (ref 0–0.3)
IMM GRANULOCYTES NFR BLD: 0 %
LYMPHOCYTES NFR BLD: 1.62 K/UL (ref 1.1–3.7)
LYMPHOCYTES RELATIVE PERCENT: 29 % (ref 24–43)
MCH RBC QN AUTO: 29.5 PG (ref 25.2–33.5)
MCHC RBC AUTO-ENTMCNC: 31.5 G/DL (ref 28.4–34.8)
MCV RBC AUTO: 93.6 FL (ref 82.6–102.9)
MONOCYTES NFR BLD: 0.37 K/UL (ref 0.1–1.2)
MONOCYTES NFR BLD: 7 % (ref 3–12)
NEUTROPHILS NFR BLD: 64 % (ref 36–65)
NEUTS SEG NFR BLD: 3.64 K/UL (ref 1.5–8.1)
NRBC BLD-RTO: 0 PER 100 WBC
PLATELET # BLD AUTO: 189 K/UL (ref 138–453)
PMV BLD AUTO: 10 FL (ref 8.1–13.5)
POTASSIUM SERPL-SCNC: 3.8 MMOL/L (ref 3.7–5.3)
RBC # BLD AUTO: 3.73 M/UL (ref 3.95–5.11)
SODIUM SERPL-SCNC: 137 MMOL/L (ref 135–144)
WBC OTHER # BLD: 5.7 K/UL (ref 3.5–11.3)

## 2024-01-10 PROCEDURE — 36415 COLL VENOUS BLD VENIPUNCTURE: CPT

## 2024-01-10 PROCEDURE — 80048 BASIC METABOLIC PNL TOTAL CA: CPT

## 2024-01-10 PROCEDURE — 6370000000 HC RX 637 (ALT 250 FOR IP): Performed by: NURSE PRACTITIONER

## 2024-01-10 PROCEDURE — 94760 N-INVAS EAR/PLS OXIMETRY 1: CPT

## 2024-01-10 PROCEDURE — 94640 AIRWAY INHALATION TREATMENT: CPT

## 2024-01-10 PROCEDURE — 99232 SBSQ HOSP IP/OBS MODERATE 35: CPT | Performed by: STUDENT IN AN ORGANIZED HEALTH CARE EDUCATION/TRAINING PROGRAM

## 2024-01-10 PROCEDURE — 6360000002 HC RX W HCPCS: Performed by: FAMILY MEDICINE

## 2024-01-10 PROCEDURE — 6360000002 HC RX W HCPCS: Performed by: STUDENT IN AN ORGANIZED HEALTH CARE EDUCATION/TRAINING PROGRAM

## 2024-01-10 PROCEDURE — 2060000000 HC ICU INTERMEDIATE R&B

## 2024-01-10 PROCEDURE — 6370000000 HC RX 637 (ALT 250 FOR IP): Performed by: INTERNAL MEDICINE

## 2024-01-10 PROCEDURE — 6370000000 HC RX 637 (ALT 250 FOR IP): Performed by: FAMILY MEDICINE

## 2024-01-10 PROCEDURE — 6370000000 HC RX 637 (ALT 250 FOR IP): Performed by: STUDENT IN AN ORGANIZED HEALTH CARE EDUCATION/TRAINING PROGRAM

## 2024-01-10 PROCEDURE — 6370000000 HC RX 637 (ALT 250 FOR IP)

## 2024-01-10 PROCEDURE — 6360000002 HC RX W HCPCS: Performed by: NURSE PRACTITIONER

## 2024-01-10 PROCEDURE — 99233 SBSQ HOSP IP/OBS HIGH 50: CPT | Performed by: INTERNAL MEDICINE

## 2024-01-10 PROCEDURE — 85025 COMPLETE CBC W/AUTO DIFF WBC: CPT

## 2024-01-10 PROCEDURE — 94761 N-INVAS EAR/PLS OXIMETRY MLT: CPT

## 2024-01-10 PROCEDURE — 2580000003 HC RX 258: Performed by: NURSE PRACTITIONER

## 2024-01-10 PROCEDURE — 2700000000 HC OXYGEN THERAPY PER DAY

## 2024-01-10 PROCEDURE — 94660 CPAP INITIATION&MGMT: CPT

## 2024-01-10 RX ORDER — BUPRENORPHINE HYDROCHLORIDE AND NALOXONE HYDROCHLORIDE DIHYDRATE 8; 2 MG/1; MG/1
2 TABLET SUBLINGUAL DAILY
Status: DISCONTINUED | OUTPATIENT
Start: 2024-01-11 | End: 2024-01-11 | Stop reason: HOSPADM

## 2024-01-10 RX ORDER — LEVOFLOXACIN 500 MG/1
500 TABLET, FILM COATED ORAL DAILY
Status: DISCONTINUED | OUTPATIENT
Start: 2024-01-10 | End: 2024-01-11 | Stop reason: HOSPADM

## 2024-01-10 RX ORDER — BUPRENORPHINE HYDROCHLORIDE AND NALOXONE HYDROCHLORIDE DIHYDRATE 8; 2 MG/1; MG/1
1 TABLET SUBLINGUAL ONCE
Status: COMPLETED | OUTPATIENT
Start: 2024-01-10 | End: 2024-01-10

## 2024-01-10 RX ORDER — MULTIVITAMIN WITH IRON
1 TABLET ORAL DAILY
Status: DISCONTINUED | OUTPATIENT
Start: 2024-01-10 | End: 2024-01-11 | Stop reason: HOSPADM

## 2024-01-10 RX ORDER — ENOXAPARIN SODIUM 100 MG/ML
30 INJECTION SUBCUTANEOUS 2 TIMES DAILY
Status: DISCONTINUED | OUTPATIENT
Start: 2024-01-10 | End: 2024-01-11 | Stop reason: HOSPADM

## 2024-01-10 RX ADMIN — IPRATROPIUM BROMIDE AND ALBUTEROL SULFATE 1 DOSE: 2.5; .5 SOLUTION RESPIRATORY (INHALATION) at 11:21

## 2024-01-10 RX ADMIN — ENOXAPARIN SODIUM 40 MG: 100 INJECTION SUBCUTANEOUS at 08:33

## 2024-01-10 RX ADMIN — GABAPENTIN 600 MG: 600 TABLET ORAL at 06:14

## 2024-01-10 RX ADMIN — BUPRENORPHINE AND NALOXONE 1 TABLET: 8; 2 TABLET SUBLINGUAL at 21:35

## 2024-01-10 RX ADMIN — SODIUM CHLORIDE, PRESERVATIVE FREE 10 ML: 5 INJECTION INTRAVENOUS at 21:34

## 2024-01-10 RX ADMIN — CHOLECALCIFEROL TAB 25 MCG (1000 UNIT) 1000 UNITS: 25 TAB at 08:32

## 2024-01-10 RX ADMIN — Medication 1000 MG: at 05:44

## 2024-01-10 RX ADMIN — LEVOFLOXACIN 500 MG: 500 TABLET, FILM COATED ORAL at 21:37

## 2024-01-10 RX ADMIN — GABAPENTIN 600 MG: 600 TABLET ORAL at 21:34

## 2024-01-10 RX ADMIN — ENOXAPARIN SODIUM 30 MG: 100 INJECTION SUBCUTANEOUS at 21:34

## 2024-01-10 RX ADMIN — CARVEDILOL 12.5 MG: 12.5 TABLET, FILM COATED ORAL at 08:32

## 2024-01-10 RX ADMIN — SODIUM CHLORIDE, PRESERVATIVE FREE 10 ML: 5 INJECTION INTRAVENOUS at 08:33

## 2024-01-10 RX ADMIN — GABAPENTIN 600 MG: 600 TABLET ORAL at 13:50

## 2024-01-10 RX ADMIN — CYCLOBENZAPRINE 10 MG: 10 TABLET, FILM COATED ORAL at 05:51

## 2024-01-10 RX ADMIN — IPRATROPIUM BROMIDE AND ALBUTEROL SULFATE 1 DOSE: 2.5; .5 SOLUTION RESPIRATORY (INHALATION) at 21:09

## 2024-01-10 RX ADMIN — ACETAMINOPHEN 650 MG: 325 TABLET ORAL at 21:34

## 2024-01-10 RX ADMIN — CYCLOBENZAPRINE 10 MG: 10 TABLET, FILM COATED ORAL at 21:35

## 2024-01-10 RX ADMIN — AZITHROMYCIN MONOHYDRATE 500 MG: 500 INJECTION, POWDER, LYOPHILIZED, FOR SOLUTION INTRAVENOUS at 05:43

## 2024-01-10 RX ADMIN — ALCOHOL 1 TABLET: 70.47 GEL TOPICAL at 13:50

## 2024-01-10 RX ADMIN — DULOXETINE HYDROCHLORIDE 20 MG: 20 CAPSULE, DELAYED RELEASE ORAL at 08:36

## 2024-01-10 RX ADMIN — IPRATROPIUM BROMIDE AND ALBUTEROL SULFATE 1 DOSE: 2.5; .5 SOLUTION RESPIRATORY (INHALATION) at 07:23

## 2024-01-10 RX ADMIN — BUPRENORPHINE AND NALOXONE 1 TABLET: 8; 2 TABLET SUBLINGUAL at 08:32

## 2024-01-10 RX ADMIN — IPRATROPIUM BROMIDE AND ALBUTEROL SULFATE 1 DOSE: 2.5; .5 SOLUTION RESPIRATORY (INHALATION) at 15:24

## 2024-01-10 RX ADMIN — CYCLOBENZAPRINE 10 MG: 10 TABLET, FILM COATED ORAL at 13:50

## 2024-01-10 RX ADMIN — CARVEDILOL 12.5 MG: 12.5 TABLET, FILM COATED ORAL at 17:16

## 2024-01-10 RX ADMIN — ACETAMINOPHEN 650 MG: 325 TABLET ORAL at 05:51

## 2024-01-10 ASSESSMENT — PAIN SCALES - GENERAL
PAINLEVEL_OUTOF10: 8
PAINLEVEL_OUTOF10: 7
PAINLEVEL_OUTOF10: 3
PAINLEVEL_OUTOF10: 5
PAINLEVEL_OUTOF10: 5
PAINLEVEL_OUTOF10: 2

## 2024-01-10 ASSESSMENT — PAIN DESCRIPTION - PAIN TYPE
TYPE: CHRONIC PAIN

## 2024-01-10 ASSESSMENT — PAIN DESCRIPTION - LOCATION
LOCATION: BACK
LOCATION: BACK
LOCATION: GENERALIZED

## 2024-01-10 ASSESSMENT — PAIN DESCRIPTION - FREQUENCY
FREQUENCY: CONTINUOUS
FREQUENCY: CONTINUOUS

## 2024-01-10 ASSESSMENT — PAIN DESCRIPTION - DESCRIPTORS
DESCRIPTORS: ACHING
DESCRIPTORS: PATIENT UNABLE TO DESCRIBE
DESCRIPTORS: ACHING
DESCRIPTORS: PATIENT UNABLE TO DESCRIBE
DESCRIPTORS: ACHING

## 2024-01-10 ASSESSMENT — PAIN DESCRIPTION - ORIENTATION
ORIENTATION: LOWER
ORIENTATION: LOWER

## 2024-01-10 ASSESSMENT — PAIN DESCRIPTION - ONSET
ONSET: ON-GOING
ONSET: ON-GOING

## 2024-01-10 NOTE — ED NOTES
Report received, pt on bed, hooked to full cardiac monitor  On 3 lpm via nasal cannula  No SOB, not tacypneic  Complaining of back pain, pain core of 6/10  Call light within reach  White board updated  Side rails up

## 2024-01-10 NOTE — PROGRESS NOTES
Pharmacy Note     Enoxaparin Dose Adjustment    Opal Valladares is a 43 y.o. female. Pharmacist assessment of enoxaparin dose for VTE prophylaxis.    Recent Labs     01/09/24  0623 01/10/24  0935   BUN 22* 17       Recent Labs     01/09/24  0623 01/10/24  0935   CREATININE 0.3* 0.3*       Estimated Creatinine Clearance: 322 mL/min (A) (based on SCr of 0.3 mg/dL (L)).  Estimated CrCl using Ideal Body Weight: 322 mL/min (based on IBW  kg)    Height:   Ht Readings from Last 1 Encounters:   01/09/24 1.778 m (5' 10\")     Weight:  Wt Readings from Last 1 Encounters:   01/09/24 103.9 kg (229 lb)       The following enoxaparin dose has been adjusted based upon renal function and/or patient weight per P&T Guidelines:             Lovenox dose changed to 30 mg bid for weight > 100 kg

## 2024-01-10 NOTE — ED NOTES
RT notified of patient going to floor via RN on 3L NC.   Pt had bipap earlier, notified to notify floor RT.

## 2024-01-10 NOTE — PROGRESS NOTES
@[facility] [email or web address]@    [facility]   IN-PATIENT SERVICE   [facility]    Progress Note    1/10/2024    11:59 AM    Name:   Opal Valladares  MRN:     [id number]     Acct:      [id number]   Room:   [facility]/0440-01   Day:  2  Admit Date:  1/7/2024 10:22 PM    PCP:   Jazzy Ma APRN - CNP  Code Status:  Full Code    Subjective:     C/C:   Chief Complaint   Patient presents with    Shortness of Breath     Interval History Status: improved.     Seen at bedside, stable at 2 L nasal cannula, baseline 2 to 3 L  States that her breathing is much better, continues to be on Rocephin, azithromycin,  PT/OT consulted, looking for placement    Brief History:     Per my colleague     \"Per my collegue  Opal Valladares is a 43 y.o. Non- / non  female who presents with Shortness of Breath   and is admitted to the hospital for the management of Acute on chronic respiratory failure with hypoxia and hypercapnia (HCC).     Patient with known past medical history of chronic hypoxemic hypercapnic respiratory failure and obstructive sleep apnea that should use a CPAP at nighttime with history of noncompliance, Marfan syndrome, history of bipolar, MVP, arthritis presented to ER with acute onset significant dyspnea, for me she reports it felt like COPD? But she really did not have any increased cough or phlegm, patient reported chills    It is not clear that she was compliant with CPAP .  On arrival to ER she was found to have respiratory acidosis, retaining CO2 and was placed on BIPAP.   Patient was admitted for further management. \"    Medications:     Allergies:    Allergies   Allergen Reactions    Ketamine Other (See Comments)    Morphine Hives       Current Meds:   Scheduled Meds:    gabapentin  600 mg Oral TID    carvedilol  12.5 mg Oral BID WC    sodium chloride flush  5-40 mL IntraVENous 2 times per day    enoxaparin  40 mg SubCUTAneous Daily    ipratropium 0.5 mg-albuterol 2.5 mg  1

## 2024-01-10 NOTE — PROGRESS NOTES
[facility]  PROGRESS NOTE    Shift date: 1/10/2024   Shift day: Wednesday   Shift # 1    Room # 0440/0440-01   Name: Opal Valladares                Methodist: Yarsani   Place of Caodaism: None    Referral: Routine Visit, providing info for pt    Admit Date & Time: 1/7/2024 10:22 PM    Assessment:  Opal Valladares is a 43 y.o. female with a condition that leaves her wheelchair bound. Pt stated yesterday that she would like to find a way to get to [employer].  obtained transportation information for [facility] from [employer] and passed on to patient. Pt discussed the possibility of moving back with her parents and her feelings surrounding that. Pt shared that she has a 21 year old son.    Intervention:  Writer introduced self and title as  Writer offered space for pt  to express feelings, needs, and concerns and provided a ministry presence.  prayed with pt at her request.     Outcome:  Pt expressed gratitude for information and for prayer.     Plan:  Chaplains will remain available to offer spiritual and emotional support as needed.      Electronically signed by Chaplain Marcela, on 1/10/2024 at 12:24 PM.  [facility]  [phone]      01/10/24 1222   Encounter Summary   Encounter Overview/Reason  Follow-up;Spiritual/Emotional Needs   Service Provided For: Patient   Referral/Consult From: Patient   Last Encounter  01/10/24   Complexity of Encounter Moderate   Begin Time 1210   End Time  1223   Total Time Calculated 13 min   Spiritual/Emotional needs   Type Spiritual Support   Assessment/Intervention/Outcome   Assessment Calm;Coping;Decisional conflict   Intervention Active listening;Explored/Affirmed feelings, thoughts, concerns;Prayer (assurance of)/Media   Outcome Engaged in conversation;Expressed feelings, needs, and concerns;Expressed Gratitude;Receptive
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
## 2024-01-10 NOTE — ED NOTES
Pt states she gets suboxone 2 times daily. Pharm Astrid looked at [facility] report which states 1 time daily. Pt argumentative.

## 2024-01-10 NOTE — CONSULTS
PULMONARY & CRITICAL CARE MEDICINE PROGRESS NOTE     Patient:  Opal Valladares  MRN: [id number]  Admit date: 1/7/2024  Primary Care Physician: Jazzy Ma APRN - CNP  CODE Status: Full Code  LOS: 2  Consults   SUBJECTIVE     CHIEF COMPLAINT/REASON FOR CONSULT: Shortness of Breath    HISTORY OF PRESENT ILLNESS:  The patient is a 43 y.o. 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  Her sister has also been central core myopathy with a RYR1 (Ryanodine receptor) mutation at the [facility].  She was evaluated in May 2023 by neurology team here and was deemed to have same disease manifestations.    She presents with worsening shortness of breath for last several days.  Lab evaluation in the ED showed pH of 7.27, pCO2 92.3, bicarb 41.8.  Patient started on BiPAP with some improvement in her respiratory acidosis.  Chest x-ray shows reviewed his opacity in the right hemithorax which is reported to be unchanged or slightly increased.  WBC count is 7.2 patient has been started on Solu-Medrol and Rocephin/azithromycin.  Respiratory viral panel is negative.    Interval history:  1/10/2024      Patient on 2 L oxygen by nasal cannula  Afebrile and hemodynamic stable  Fluid balance is almost even  Denied any chest pain  Shortness of breath is improving  Cough is improving  No orthopnea, PND or increasing pedal edema  No hemoptysis    PAST MEDICAL HISTORY:        Diagnosis Date    Acute on chronic respiratory failure with hypoxia and hypercapnia (HCC) 5/18/2023    Anxiety     Arthritis     O.A.    Bipolar depression (HCC)     Dermatitis     Dysuria     Exposure to STD     Folliculitis     Hx of acute bronchitis     Hypertension     Marfan syndrome     MVP (mitral valve prolapse)     Nausea & vomiting     Polysubstance abuse (HCC)     drug abuse includes cocaine, fentnyl, cannabis    PONV (postoperative nausea and vomiting)     Scoliosis     Seasonal allergies     Staph  carvedilol  12.5 mg Oral BID WC    sodium chloride flush  5-40 mL IntraVENous 2 times per day    enoxaparin  40 mg SubCUTAneous Daily    ipratropium 0.5 mg-albuterol 2.5 mg  1 Dose Inhalation Q4H WA RT    cefTRIAXone (ROCEPHIN) IV  1,000 mg IntraVENous Q24H    azithromycin  500 mg IntraVENous Q24H    buprenorphine-naloxone  1 tablet SubLINGual Daily    DULoxetine  20 mg Oral Daily    Vitamin D  1,000 Units Oral Daily    nicotine  1 patch TransDERmal Daily     Continuous Infusions:   sodium chloride         INPUT/OUTPUT:  In: 10 [I.V.:10]  Out: -        LABORATORY RESULTS:  BLOOD GASES:   No results for input(s): \"POCPH\", \"POCPCO2\", \"POCPO2\", \"POCHCO3\", \"SQPJ3KPM\" in the last 72 hours.  COMPLETE BLOOD COUNTS:   Recent Labs     01/07/24 2238 01/09/24  1146   WBC 7.2 11.7*   HGB 13.2 11.6*   HCT 41.8 36.2*   MCV 92.7 92.6    236   LYMPHOPCT 11* 7*   RBC 4.51 3.91*   MCH 29.3 29.7   MCHC 31.6 32.0   RDW 12.1 12.0       C-REACTIVE PROTEIN:   No results for input(s): \"CRP\" in the last 72 hours.  LACTATE DEHYDROGENASE:   No results for input(s): \"LDH\" in the last 72 hours.  BASIC METABOLIC PROFILE:   Recent Labs     01/07/24 2238 01/08/24  0441 01/09/24  0623    142 143   K 4.7 4.3 4.2   CL 93* 95* 99   CO2 37* 37* 36*   BUN 13 13 22*   CREATININE 0.3* 0.3* 0.3*   GLUCOSE 162* 136* 100*       LIVER FUNCTION TESTS:   No results for input(s): \"PROT\", \"LABALBU\", \"ALT\", \"AST\", \"GGT\", \"ALKPHOS\", \"BILITOT\" in the last 72 hours.  COAGULATION PROFILE:   No results for input(s): \"INR\", \"PROTIME\", \"APTT\" in the last 72 hours.  D-DIMER:  Recent Labs     01/08/24  0946   DDIMER 0.28       LACTIC ACID:  No results for input(s): \"LACTA\" in the last 72 hours.  CARDIAC ENZYMES:  No results for input(s): \"CKTOTAL\", \"CKMB\", \"CKMBINDEX\", \"TROPONINI\" in the last 72 hours.    Invalid input(s): \"TROPONIN\", \"HSTROP\"  BRAIN NATRIURETIC PEPTIDE/PRO-BRAIN NATRURETIC PEPTIDE:   No results for input(s): \"BNP\", \"PROBNP\" in the last 72

## 2024-01-10 NOTE — DISCHARGE INSTR - COC
y+), PF, 0.5mL 10/17/2016, 2018, 2018    Influenza, FLUCELVAX, (age 6 mo+), MDCK, PF, 0.5mL 2023    Influenza, FLUZONE (age 65 y+), High Dose, 0.7mL 2022    Pneumococcal, PCV20, PREVNAR 20, (age 6w+), IM, 0.5mL 2023    Pneumococcal, PPSV23, PNEUMOVAX 23, (age 2y+), SC/IM, 0.5mL 2016       Active Problems:  Patient Active Problem List   Diagnosis Code    Marfan syndrome Q87.40    Opioid abuse (HCC) F11.10    Scoliosis M41.9    H/O long-term treatment with high-risk medication Z79.899    Chronic pain syndrome G89.4    Marfan's syndrome Q87.40    Instability of both shoulder joints M25.311, M25.312    Hip instability M25.359    Benign essential HTN I10    Severe major depression without psychotic features (HCC) F32.2    Depression with suicidal ideation F32.A, R45.851    Bipolar disorder with moderate depression (HCC) F31.32    Opiate withdrawal (HCC) F11.93    Severe recurrent major depression without psychotic features (HCC) F33.2    Severe malnutrition (HCC) E43    Acute psychosis (HCC) F23    Opioid dependence on agonist therapy (HCC) F11.20    Cocaine abuse (HCC) F14.10    Acute hypoxemic respiratory failure due to COVID-19 (HCC) U07.1, J96.01    COVID-19 virus infection U07.1    Sleep-related breathing disorder G47.30    Hypercapnia R06.89    Myopathy G72.9    Acute on chronic respiratory failure with hypoxia and hypercapnia (HCC) J96.21, J96.22    Anxiety disorder F41.9    Hypercapnic respiratory failure ([facility]) J96.92    Central core myopathy ([facility]) G71.29       Isolation/Infection:   Isolation            No Isolation          Patient Infection Status       Infection Onset Added Last Indicated Last Indicated By Review Planned Expiration Resolved Resolved By    None active    Resolved    COVID-19 21 COVID-19, Rapid   10/08/21 Infection                        Nurse Assessment:  Last Vital Signs: BP 98/60   Pulse 79   Temp 98.4 °F (36.9 °C) (Oral)    Resp 16   Ht 1.778 m (5' 10\")   Wt 103.9 kg (229 lb)   LMP  (LMP Unknown)   SpO2 96%   BMI 32.86 kg/m²     Last documented pain score (0-10 scale): Pain Level: 7  Last Weight:   Wt Readings from Last 1 Encounters:   01/09/24 103.9 kg (229 lb)     Mental Status:  oriented and alert    IV Access:  - None    Nursing Mobility/ADLs:  Walking   Assisted  Transfer  Assisted  Bathing  Assisted  Dressing  Assisted  Toileting  Assisted  Feeding  Independent  Med Admin  Independent  Med Delivery   none    Wound Care Documentation and Therapy:        Elimination:  Continence:   Bowel: Yes  Bladder: Yes  Urinary Catheter: None   Colostomy/Ileostomy/Ileal Conduit: No       Date of Last BM: 1/10/2024    Intake/Output Summary (Last 24 hours) at 1/10/2024 1704  Last data filed at 1/10/2024 0835  Gross per 24 hour   Intake 250 ml   Output --   Net 250 ml     I/O last 3 completed shifts:  In: 10 [I.V.:10]  Out: -     Safety Concerns:     At Risk for Falls    Impairments/Disabilities:      None    Nutrition Therapy:  Current Nutrition Therapy:   - Oral Diet:  General    Routes of Feeding: Oral  Liquids: No Restrictions  Daily Fluid Restriction: no  Last Modified Barium Swallow with Video (Video Swallowing Test): not done    Treatments at the Time of Hospital Discharge:   Respiratory Treatments: see medication list.   Oxygen Therapy:  is on oxygen at 2 L/min per nasal cannula.  Ventilator:    - BiPAP   IPAP: 12 cmH20, CPAP/EPAP: 8 cmH2O only when sleeping    Rehab Therapies: Physical Therapy and Occupational Therapy  Weight Bearing Status/Restrictions: No weight bearing restrictions  Other Medical Equipment (for information only, NOT a DME order):  walker and bedside commode  Other Treatments: no.    Patient's personal belongings (please select all that are sent with patient):  Pt clothes     RN SIGNATURE:  Electronically signed by Samuel Ferreira RN on 1/11/24 at 10:14 AM EST    CASE MANAGEMENT/SOCIAL WORK

## 2024-01-10 NOTE — ED NOTES
T2R printed, signed, and sent with RN to floor via WC on 3L NC.   ED Charge notified 4C of patient arrival to floor.

## 2024-01-11 VITALS
HEIGHT: 70 IN | OXYGEN SATURATION: 98 % | WEIGHT: 229 LBS | RESPIRATION RATE: 15 BRPM | DIASTOLIC BLOOD PRESSURE: 49 MMHG | TEMPERATURE: 97.9 F | SYSTOLIC BLOOD PRESSURE: 90 MMHG | BODY MASS INDEX: 32.78 KG/M2 | HEART RATE: 76 BPM

## 2024-01-11 LAB
ANION GAP SERPL CALCULATED.3IONS-SCNC: 7 MMOL/L (ref 9–17)
BASOPHILS # BLD: <0.03 K/UL (ref 0–0.2)
BASOPHILS NFR BLD: 0 % (ref 0–2)
BUN SERPL-MCNC: 15 MG/DL (ref 6–20)
CALCIUM SERPL-MCNC: 8.6 MG/DL (ref 8.6–10.4)
CHLORIDE SERPL-SCNC: 100 MMOL/L (ref 98–107)
CO2 SERPL-SCNC: 31 MMOL/L (ref 20–31)
CREAT SERPL-MCNC: 0.3 MG/DL (ref 0.5–0.9)
EOSINOPHIL # BLD: 0.04 K/UL (ref 0–0.44)
EOSINOPHILS RELATIVE PERCENT: 1 % (ref 1–4)
ERYTHROCYTE [DISTWIDTH] IN BLOOD BY AUTOMATED COUNT: 12.3 % (ref 11.8–14.4)
GFR SERPL CREATININE-BSD FRML MDRD: >60 ML/MIN/1.73M2
GLUCOSE SERPL-MCNC: 86 MG/DL (ref 70–99)
HCT VFR BLD AUTO: 37.4 % (ref 36.3–47.1)
HGB BLD-MCNC: 11.3 G/DL (ref 11.9–15.1)
IMM GRANULOCYTES # BLD AUTO: <0.03 K/UL (ref 0–0.3)
IMM GRANULOCYTES NFR BLD: 0 %
LYMPHOCYTES NFR BLD: 1.67 K/UL (ref 1.1–3.7)
LYMPHOCYTES RELATIVE PERCENT: 37 % (ref 24–43)
MCH RBC QN AUTO: 30 PG (ref 25.2–33.5)
MCHC RBC AUTO-ENTMCNC: 30.2 G/DL (ref 28.4–34.8)
MCV RBC AUTO: 99.2 FL (ref 82.6–102.9)
MONOCYTES NFR BLD: 0.41 K/UL (ref 0.1–1.2)
MONOCYTES NFR BLD: 9 % (ref 3–12)
NEUTROPHILS NFR BLD: 53 % (ref 36–65)
NEUTS SEG NFR BLD: 2.4 K/UL (ref 1.5–8.1)
NRBC BLD-RTO: 0 PER 100 WBC
PLATELET # BLD AUTO: 200 K/UL (ref 138–453)
PMV BLD AUTO: 10 FL (ref 8.1–13.5)
POTASSIUM SERPL-SCNC: 4.4 MMOL/L (ref 3.7–5.3)
RBC # BLD AUTO: 3.77 M/UL (ref 3.95–5.11)
SODIUM SERPL-SCNC: 138 MMOL/L (ref 135–144)
WBC OTHER # BLD: 4.6 K/UL (ref 3.5–11.3)

## 2024-01-11 PROCEDURE — 6370000000 HC RX 637 (ALT 250 FOR IP)

## 2024-01-11 PROCEDURE — 99239 HOSP IP/OBS DSCHRG MGMT >30: CPT | Performed by: STUDENT IN AN ORGANIZED HEALTH CARE EDUCATION/TRAINING PROGRAM

## 2024-01-11 PROCEDURE — 6370000000 HC RX 637 (ALT 250 FOR IP): Performed by: NURSE PRACTITIONER

## 2024-01-11 PROCEDURE — 6370000000 HC RX 637 (ALT 250 FOR IP): Performed by: INTERNAL MEDICINE

## 2024-01-11 PROCEDURE — 6370000000 HC RX 637 (ALT 250 FOR IP): Performed by: STUDENT IN AN ORGANIZED HEALTH CARE EDUCATION/TRAINING PROGRAM

## 2024-01-11 PROCEDURE — 6360000002 HC RX W HCPCS: Performed by: STUDENT IN AN ORGANIZED HEALTH CARE EDUCATION/TRAINING PROGRAM

## 2024-01-11 PROCEDURE — 2700000000 HC OXYGEN THERAPY PER DAY

## 2024-01-11 PROCEDURE — 36415 COLL VENOUS BLD VENIPUNCTURE: CPT

## 2024-01-11 PROCEDURE — 99233 SBSQ HOSP IP/OBS HIGH 50: CPT | Performed by: INTERNAL MEDICINE

## 2024-01-11 PROCEDURE — 80048 BASIC METABOLIC PNL TOTAL CA: CPT

## 2024-01-11 PROCEDURE — 2580000003 HC RX 258: Performed by: NURSE PRACTITIONER

## 2024-01-11 PROCEDURE — 94761 N-INVAS EAR/PLS OXIMETRY MLT: CPT

## 2024-01-11 PROCEDURE — 94640 AIRWAY INHALATION TREATMENT: CPT

## 2024-01-11 PROCEDURE — 6370000000 HC RX 637 (ALT 250 FOR IP): Performed by: FAMILY MEDICINE

## 2024-01-11 PROCEDURE — 85025 COMPLETE CBC W/AUTO DIFF WBC: CPT

## 2024-01-11 RX ORDER — LEVOFLOXACIN 500 MG/1
500 TABLET, FILM COATED ORAL DAILY
Qty: 3 TABLET | Refills: 0 | Status: SHIPPED | OUTPATIENT
Start: 2024-01-12 | End: 2024-01-15

## 2024-01-11 RX ORDER — TIOTROPIUM BROMIDE 18 UG/1
18 CAPSULE ORAL; RESPIRATORY (INHALATION) DAILY
Qty: 90 CAPSULE | Refills: 1 | Status: SHIPPED | OUTPATIENT
Start: 2024-01-11

## 2024-01-11 RX ORDER — CARVEDILOL 12.5 MG/1
12.5 TABLET ORAL 2 TIMES DAILY WITH MEALS
Qty: 60 TABLET | Refills: 3 | Status: SHIPPED | OUTPATIENT
Start: 2024-01-11

## 2024-01-11 RX ADMIN — IPRATROPIUM BROMIDE AND ALBUTEROL SULFATE 1 DOSE: 2.5; .5 SOLUTION RESPIRATORY (INHALATION) at 12:16

## 2024-01-11 RX ADMIN — SODIUM CHLORIDE, PRESERVATIVE FREE 10 ML: 5 INJECTION INTRAVENOUS at 08:22

## 2024-01-11 RX ADMIN — BUPRENORPHINE AND NALOXONE 2 TABLET: 8; 2 TABLET SUBLINGUAL at 08:20

## 2024-01-11 RX ADMIN — DULOXETINE HYDROCHLORIDE 20 MG: 20 CAPSULE, DELAYED RELEASE ORAL at 08:20

## 2024-01-11 RX ADMIN — LEVOFLOXACIN 500 MG: 500 TABLET, FILM COATED ORAL at 08:21

## 2024-01-11 RX ADMIN — CHOLECALCIFEROL TAB 25 MCG (1000 UNIT) 1000 UNITS: 25 TAB at 08:20

## 2024-01-11 RX ADMIN — ALCOHOL 1 TABLET: 70.47 GEL TOPICAL at 08:20

## 2024-01-11 RX ADMIN — CYCLOBENZAPRINE 10 MG: 10 TABLET, FILM COATED ORAL at 04:15

## 2024-01-11 RX ADMIN — ENOXAPARIN SODIUM 30 MG: 100 INJECTION SUBCUTANEOUS at 08:21

## 2024-01-11 RX ADMIN — ACETAMINOPHEN 650 MG: 325 TABLET ORAL at 04:15

## 2024-01-11 RX ADMIN — CARVEDILOL 12.5 MG: 12.5 TABLET, FILM COATED ORAL at 08:20

## 2024-01-11 RX ADMIN — GABAPENTIN 600 MG: 600 TABLET ORAL at 04:15

## 2024-01-11 RX ADMIN — GABAPENTIN 600 MG: 600 TABLET ORAL at 15:19

## 2024-01-11 RX ADMIN — CYCLOBENZAPRINE 10 MG: 10 TABLET, FILM COATED ORAL at 10:03

## 2024-01-11 RX ADMIN — IPRATROPIUM BROMIDE AND ALBUTEROL SULFATE 1 DOSE: 2.5; .5 SOLUTION RESPIRATORY (INHALATION) at 09:25

## 2024-01-11 ASSESSMENT — PAIN DESCRIPTION - DESCRIPTORS: DESCRIPTORS: PATIENT UNABLE TO DESCRIBE

## 2024-01-11 ASSESSMENT — PAIN DESCRIPTION - LOCATION: LOCATION: BACK

## 2024-01-11 ASSESSMENT — PAIN SCALES - GENERAL: PAINLEVEL_OUTOF10: 3

## 2024-01-11 NOTE — PROGRESS NOTES
Physical Therapy        Physical Therapy Cancel Note      DATE: 2024    NAME: Opal Valladares  MRN: [id number]   : 1980      Patient not seen this date for Physical Therapy due to:    Pt to discharge @ 2:00 PM today, per RN.      Electronically signed by GIRISH VALERO PTA on 2024 at 1:05 PM

## 2024-01-11 NOTE — PLAN OF CARE
Problem: Respiratory - Adult  Goal: Achieves optimal ventilation and oxygenation  1/10/2024 0728 by Loreto Metzger, RCSANDOR  Outcome: Progressing  Flowsheets (Taken 1/10/2024 0728)  Achieves optimal ventilation and oxygenation:   Assess for changes in respiratory status   Oxygen supplementation based on oxygen saturation or arterial blood gases   Assess the need for suctioning and aspirate as needed   Assess for changes in mentation and behavior   Initiate smoking cessation protocol as indicated   Assess and instruct to report shortness of breath or any respiratory difficulty   Position to facilitate oxygenation and minimize respiratory effort   Encourage broncho-pulmonary hygiene including cough, deep breathe, incentive spirometry   Respiratory therapy support as indicated     
  Problem: Respiratory - Adult  Goal: Achieves optimal ventilation and oxygenation  1/11/2024 0606 by Mily Johnson RCP  Outcome: Progressing  1/10/2024 2213 by Elan Washington RN  Outcome: Progressing     
  Problem: Respiratory - Adult  Goal: Achieves optimal ventilation and oxygenation  1/11/2024 0928 by Prabha Everett RCP  Outcome: Progressing   BRONCHOSPASM/BRONCHOCONSTRICTION     [x]         IMPROVE AERATION/BREATH SOUNDS  [x]   ADMINISTER BRONCHODILATOR THERAPY AS APPROPRIATE  [x]   ASSESS BREATH SOUNDS  []   IMPLEMENT AEROSOL/MDI PROTOCOL  [x]   PATIENT EDUCATION AS NEEDED   PROVIDE ADEQUATE OXYGENATION WITH ACCEPTABLE SP02/ABG'S    [x]  IDENTIFY APPROPRIATE OXYGEN THERAPY  [x]   MONITOR SP02/ABG'S AS NEEDED   [x]   PATIENT EDUCATION AS NEEDED     
  Problem: Skin/Tissue Integrity  Goal: Absence of new skin breakdown  Description: 1.  Monitor for areas of redness and/or skin breakdown  2.  Assess vascular access sites hourly  3.  Every 4-6 hours minimum:  Change oxygen saturation probe site  4.  Every 4-6 hours:  If on nasal continuous positive airway pressure, respiratory therapy assess nares and determine need for appliance change or resting period.  1/10/2024 1026 by Marielena Lan RN  Outcome: Progressing  1/9/2024 2212 by Elan Washington RN  Outcome: Progressing     Problem: ABCDS Injury Assessment  Goal: Absence of physical injury  1/10/2024 1026 by Marielena Lan RN  Outcome: Progressing  1/9/2024 2212 by Elan Washington RN  Outcome: Progressing     Problem: Pain  Goal: Verbalizes/displays adequate comfort level or baseline comfort level  1/10/2024 1026 by Marielena Lan RN  Outcome: Progressing  Flowsheets (Taken 1/10/2024 0835)  Verbalizes/displays adequate comfort level or baseline comfort level: Encourage patient to monitor pain and request assistance  1/9/2024 2212 by Elan Washington RN  Outcome: Progressing     Problem: Safety - Adult  Goal: Free from fall injury  1/10/2024 1026 by Marielena Lan RN  Outcome: Progressing  Flowsheets (Taken 1/10/2024 1025)  Free From Fall Injury: Instruct family/caregiver on patient safety  1/9/2024 2212 by Elan Washington RN  Outcome: Progressing     Problem: Respiratory - Adult  Goal: Achieves optimal ventilation and oxygenation  1/10/2024 1026 by Marielena Lan RN  Outcome: Progressing  Flowsheets (Taken 1/10/2024 0835)  Achieves optimal ventilation and oxygenation: Assess for changes in respiratory status  1/10/2024 0728 by Loreto Metzger RCP  Outcome: Progressing  Flowsheets (Taken 1/10/2024 0728)  Achieves optimal ventilation and oxygenation:   Assess for changes in respiratory status   Oxygen supplementation based on oxygen saturation or arterial blood gases   Assess the need for suctioning and 
  Problem: Skin/Tissue Integrity  Goal: Absence of new skin breakdown  Description: 1.  Monitor for areas of redness and/or skin breakdown  2.  Assess vascular access sites hourly  3.  Every 4-6 hours minimum:  Change oxygen saturation probe site  4.  Every 4-6 hours:  If on nasal continuous positive airway pressure, respiratory therapy assess nares and determine need for appliance change or resting period.  1/10/2024 2213 by Elan Washington RN  Outcome: Progressing  1/10/2024 1026 by Marielena Lan RN  Outcome: Progressing     Problem: ABCDS Injury Assessment  Goal: Absence of physical injury  1/10/2024 2213 by Elan Washington RN  Outcome: Progressing  1/10/2024 1026 by Marielena Lan RN  Outcome: Progressing     Problem: Pain  Goal: Verbalizes/displays adequate comfort level or baseline comfort level  1/10/2024 2213 by Elan Washington RN  Outcome: Progressing  Flowsheets (Taken 1/10/2024 1210 by Marielena Lan RN)  Verbalizes/displays adequate comfort level or baseline comfort level: Encourage patient to monitor pain and request assistance  1/10/2024 1026 by Marielena Lan RN  Outcome: Progressing  Flowsheets (Taken 1/10/2024 0835)  Verbalizes/displays adequate comfort level or baseline comfort level: Encourage patient to monitor pain and request assistance     Problem: Safety - Adult  Goal: Free from fall injury  1/10/2024 2213 by Elan Washington RN  Outcome: Progressing  1/10/2024 1026 by Marielena Lan RN  Outcome: Progressing  Flowsheets (Taken 1/10/2024 1025)  Free From Fall Injury: Instruct family/caregiver on patient safety     Problem: Respiratory - Adult  Goal: Achieves optimal ventilation and oxygenation  1/10/2024 2213 by Elan Washington RN  Outcome: Progressing  1/10/2024 1026 by Marielena Lan RN  Outcome: Progressing  Flowsheets (Taken 1/10/2024 0835)  Achieves optimal ventilation and oxygenation: Assess for changes in respiratory status     
  Problem: Skin/Tissue Integrity  Goal: Absence of new skin breakdown  Description: 1.  Monitor for areas of redness and/or skin breakdown  2.  Assess vascular access sites hourly  3.  Every 4-6 hours minimum:  Change oxygen saturation probe site  4.  Every 4-6 hours:  If on nasal continuous positive airway pressure, respiratory therapy assess nares and determine need for appliance change or resting period.  1/11/2024 0745 by Samuel Ferreira RN  Outcome: Progressing  1/10/2024 2213 by Elan Washington RN  Outcome: Progressing     Problem: ABCDS Injury Assessment  Goal: Absence of physical injury  1/11/2024 0745 by Samuel Ferreira RN  Outcome: Progressing  1/10/2024 2213 by Elan Washington RN  Outcome: Progressing     Problem: Pain  Goal: Verbalizes/displays adequate comfort level or baseline comfort level  1/11/2024 0745 by Samuel Ferreira RN  Outcome: Progressing  1/10/2024 2213 by Elan Washington RN  Outcome: Progressing  Flowsheets (Taken 1/10/2024 1210 by Marielena Lan RN)  Verbalizes/displays adequate comfort level or baseline comfort level: Encourage patient to monitor pain and request assistance     Problem: Safety - Adult  Goal: Free from fall injury  1/11/2024 0745 by Samuel Ferreira RN  Outcome: Progressing  1/10/2024 2213 by Elan Washington RN  Outcome: Progressing     Problem: Respiratory - Adult  Goal: Achieves optimal ventilation and oxygenation  1/11/2024 0745 by Samuel Ferreira RN  Outcome: Progressing  1/11/2024 0606 by Mily Johnson RCP  Outcome: Progressing  1/10/2024 2213 by Elan Washington RN  Outcome: Progressing     
  Problem: Skin/Tissue Integrity  Goal: Absence of new skin breakdown  Description: 1.  Monitor for areas of redness and/or skin breakdown  2.  Assess vascular access sites hourly  3.  Every 4-6 hours minimum:  Change oxygen saturation probe site  4.  Every 4-6 hours:  If on nasal continuous positive airway pressure, respiratory therapy assess nares and determine need for appliance change or resting period.  Outcome: Progressing     Problem: ABCDS Injury Assessment  Goal: Absence of physical injury  Outcome: Progressing     Problem: Pain  Goal: Verbalizes/displays adequate comfort level or baseline comfort level  Outcome: Progressing     Problem: Safety - Adult  Goal: Free from fall injury  Outcome: Progressing     
RN  Outcome: Progressing  1/11/2024 0606 by Mily Johnson RCP  Outcome: Progressing  1/10/2024 2213 by Elan Washington RN  Outcome: Progressing

## 2024-01-11 NOTE — DISCHARGE SUMMARY
[facility]    [facility]   IN-PATIENT SERVICE   [facility]    Discharge Summary     Patient ID: Opal Valladares  :  1980   MRN: [id number]     ACCOUNT:  [id number]   Patient's PCP: Jazzy Ma APRN - CNP  Admit Date: 2024   Discharge Date: 2024   Length of Stay: 3  Code Status:  Full Code  Admitting Physician: Keyon Crawford Sra, MD  Discharge Physician: Keyon Crawford Sra, MD     Active Discharge Diagnoses:     Hospital Problem Lists:  Principal Problem:    Acute on chronic respiratory failure with hypoxia and hypercapnia (HCC)  Active Problems:    Marfan syndrome    Opioid abuse (HCC)    Scoliosis    Benign essential HTN    Severe major depression without psychotic features (HCC)    Myopathy    Anxiety disorder    Hypercapnic respiratory failure (HCC)    Central core myopathy (HCC)    COPD exacerbation (HCC)  Resolved Problems:    * No resolved hospital problems. *      Admission Condition:  stable     Discharged Condition: stable    Hospital Stay:     Hospital Course: 43-year-old female with history of chronic hypoxemic hypercapnic respiratory failure, TONJA, Marfan syndrome, neuromuscular disorder/primary myopathy, scoliosis presented with shortness of breath, which felt like COPD but did not have any increased cough or phlegm, was found to have respiratory acidosis, placed on BiPAP, also started on steroids and antibiotics, seen by pulmonology service, continued on antibiotics, steroids discontinued, improved significantly, started on Coreg for essential hypertension, being discharged in stable condition to facility.        Significant therapeutic interventions: None    Significant Diagnostic Studies:   Labs / Micro:  CBC:   Lab Results   Component Value Date/Time    WBC 4.6 2024 07:04 AM    RBC 3.77 2024 07:04 AM    HGB 11.3 2024 07:04 AM    HCT 37.4 2024 07:04 AM    MCV 99.2 2024 07:04 AM    MCH 30.0 2024

## 2024-01-11 NOTE — CARE COORDINATION
Transitional planning    Writer received vm from Jessica with [facility] asking about patients bipap. Writer to room , discussed with patient and she has her own at home, parent will bring, also she has a few doses of suboxone that she will bring. Requesting our transport. [facility], Jessica updated, provided number for report to RN, ps sent to MD for d/c order.

## 2024-04-08 NOTE — ED NOTES
Pt is an A&Ox4 female who presents to ED c/o back pain. Pt ambulates to room 6 without difficulty. This RN in room to assess pt, and pt is refusing to tell this writer what is going on. Pt states \"I already told the doctors and I'm not going to go through it again\" explained to pt that this writer is pts nurse and needs to know what's going on and pt still refused to explain. Pt is PWD, eupneic, GCs=15.       [employer], RN  03/15/18 [place] John Goetz, NANCY  03/15/18 2022 [facility]

## 2024-04-24 ENCOUNTER — HOSPITAL ENCOUNTER (EMERGENCY)
Age: 44
Discharge: HOME | End: 2024-04-24
Attending: EMERGENCY MEDICINE
Payer: COMMERCIAL

## 2024-04-24 VITALS
SYSTOLIC BLOOD PRESSURE: 115 MMHG | DIASTOLIC BLOOD PRESSURE: 88 MMHG | BODY MASS INDEX: 32.21 KG/M2 | HEART RATE: 95 BPM | WEIGHT: 225 LBS | HEIGHT: 70 IN | TEMPERATURE: 97.1 F | RESPIRATION RATE: 16 BRPM | OXYGEN SATURATION: 95 %

## 2024-04-24 DIAGNOSIS — T14.8XXA ABRASION: Primary | ICD-10-CM

## 2024-04-24 PROCEDURE — 99284 EMERGENCY DEPT VISIT MOD MDM: CPT

## 2024-04-24 ASSESSMENT — ENCOUNTER SYMPTOMS
COLOR CHANGE: 0
RHINORRHEA: 0
NAUSEA: 0
SORE THROAT: 0
EYE DISCHARGE: 0
COUGH: 0
EYE PAIN: 0
EYE ITCHING: 0
WHEEZING: 0
BACK PAIN: 0
VOMITING: 0

## 2024-04-24 ASSESSMENT — PAIN DESCRIPTION - PAIN TYPE: TYPE: CHRONIC PAIN

## 2024-04-24 ASSESSMENT — LIFESTYLE VARIABLES
HOW MANY STANDARD DRINKS CONTAINING ALCOHOL DO YOU HAVE ON A TYPICAL DAY: PATIENT DOES NOT DRINK
HOW OFTEN DO YOU HAVE A DRINK CONTAINING ALCOHOL: NEVER

## 2024-04-24 ASSESSMENT — PAIN DESCRIPTION - LOCATION: LOCATION: BACK;NECK

## 2024-04-24 ASSESSMENT — PAIN - FUNCTIONAL ASSESSMENT: PAIN_FUNCTIONAL_ASSESSMENT: 0-10

## 2024-04-24 ASSESSMENT — PAIN SCALES - GENERAL: PAINLEVEL_OUTOF10: 6

## 2024-04-24 NOTE — ED PROVIDER NOTES
EMERGENCY DEPARTMENT ENCOUNTER    Pt Name: Opal Valladares  MRN: [id number]  Birthdate 1980  Date of evaluation: 4/24/24  CHIEF COMPLAINT       Chief Complaint   Patient presents with    Rash     HISTORY OF PRESENT ILLNESS   Patient is a 42-year-old female who presents via EMS for a rash on her bilateral legs.  Patient states that it has been there for several weeks.  The patient's mom states that she got a call today from the home health nurse saying that the patient felt tactilely warm.  The patient denies any fever.  The patient's mom was concerned because she has not taken a shower in 2 weeks.  The patient states that that is not unusual for her and that she uses bath wipes because she does not go anywhere.  The patient denies any drainage from the areas but states that she has been wiping the areas down on her legs at least 4-5 times per day with rubbing alcohol.  She does admit to picking at her skin.  She denies a history of MRSA.             REVIEW OF SYSTEMS     Review of Systems   Constitutional:  Negative for chills and fever.   HENT:  Negative for ear pain, rhinorrhea and sore throat.    Eyes:  Negative for pain, discharge and itching.   Respiratory:  Negative for cough and wheezing.    Cardiovascular:  Negative for chest pain and palpitations.   Gastrointestinal:  Negative for nausea and vomiting.   Genitourinary:  Negative for difficulty urinating and dysuria.   Musculoskeletal:  Negative for back pain and myalgias.   Skin:  Positive for wound. Negative for color change.   Neurological:  Negative for dizziness and headaches.   Psychiatric/Behavioral:  Negative for dysphoric mood.      PASTMEDICAL HISTORY     Past Medical History:   Diagnosis Date    Acute on chronic respiratory failure with hypoxia and hypercapnia ([facility]) 5/18/2023    Anxiety     Arthritis     O.A.    Bipolar depression ([facility])     Dermatitis     Dysuria     Exposure to STD     Folliculitis     Hx of acute bronchitis     Hypertension

## 2024-04-24 NOTE — ED TRIAGE NOTES
Mode of arrival (squad #, walk in, police, etc) : ems        Chief complaint(s): skin issue        Arrival Note (brief scenario, treatment PTA, etc).: patient had scratched her thigh so much that she opened skin, now concerned they may get infected         C= \"Have you ever felt that you should Cut down on your drinking?\"  No  A= \"Have people Annoyed you by criticizing your drinking?\"  No  G= \"Have you ever felt bad or Guilty about your drinking?\"  No  E= \"Have you ever had a drink as an Eye-opener first thing in the morning to steady your nerves or to help a hangover?\"  No      Deferred []      Reason for deferring: N/A    *If yes to two or more: probable alcohol abuse.*

## 2024-04-24 NOTE — DISCHARGE INSTRUCTIONS
Please wash the areas 2 times daily with plain soap and water, discontinue the use of alcohol wipes.  Do not use rubbing alcohol    After you wash with soap and water please apply a thin layer of Bactroban    Please call your primary care physician's office to schedule follow-up towards the end of the week    Return to the emergency department for worsening redness swelling drainage warmth or other emergent concerns

## 2024-04-24 NOTE — ED PROVIDER NOTES
eMERGENCY dEPARTMENT eNCOUnter   Independent Attestation     Pt Name: Opal Valladares  MRN: [id number]  Birthdate 1980  Date of evaluation: 4/24/24     Opal Valladares is a 44 y.o. female with CC: Rash      Based on the medical record the care appears appropriate.  I was personally available for consultation in the Emergency Department.    Toñito Sibley DO  Attending Emergency Physician                  Toñito Sibley DO  04/24/24 1514

## 2024-05-04 ENCOUNTER — HOSPITAL ENCOUNTER (EMERGENCY)
Age: 44
Discharge: HOME | End: 2024-05-04
Attending: STUDENT IN AN ORGANIZED HEALTH CARE EDUCATION/TRAINING PROGRAM
Payer: COMMERCIAL

## 2024-05-04 VITALS
TEMPERATURE: 97.3 F | WEIGHT: 195 LBS | HEART RATE: 79 BPM | HEIGHT: 70 IN | SYSTOLIC BLOOD PRESSURE: 139 MMHG | DIASTOLIC BLOOD PRESSURE: 91 MMHG | BODY MASS INDEX: 27.92 KG/M2 | OXYGEN SATURATION: 98 % | RESPIRATION RATE: 18 BRPM

## 2024-05-04 DIAGNOSIS — M54.50 ACUTE EXACERBATION OF CHRONIC LOW BACK PAIN: Primary | ICD-10-CM

## 2024-05-04 DIAGNOSIS — Z87.898 HISTORY OF TREATMENT FOR SUBSTANCE USE DISORDER: ICD-10-CM

## 2024-05-04 DIAGNOSIS — G89.29 ACUTE EXACERBATION OF CHRONIC LOW BACK PAIN: Primary | ICD-10-CM

## 2024-05-04 PROCEDURE — 99284 EMERGENCY DEPT VISIT MOD MDM: CPT | Performed by: STUDENT IN AN ORGANIZED HEALTH CARE EDUCATION/TRAINING PROGRAM

## 2024-05-04 PROCEDURE — 6370000000 HC RX 637 (ALT 250 FOR IP): Performed by: STUDENT IN AN ORGANIZED HEALTH CARE EDUCATION/TRAINING PROGRAM

## 2024-05-04 RX ORDER — LIDOCAINE 4 G/G
1 PATCH TOPICAL DAILY
Status: DISCONTINUED | OUTPATIENT
Start: 2024-05-04 | End: 2024-05-04 | Stop reason: HOSPADM

## 2024-05-04 RX ORDER — LIDOCAINE 4 G/G
PATCH TOPICAL
Status: DISCONTINUED
Start: 2024-05-04 | End: 2024-05-04 | Stop reason: HOSPADM

## 2024-05-04 RX ORDER — CYCLOBENZAPRINE HCL 10 MG
10 TABLET ORAL ONCE
Status: COMPLETED | OUTPATIENT
Start: 2024-05-04 | End: 2024-05-04

## 2024-05-04 RX ADMIN — CYCLOBENZAPRINE 10 MG: 10 TABLET, FILM COATED ORAL at 04:34

## 2024-05-04 ASSESSMENT — PAIN SCALES - GENERAL: PAINLEVEL_OUTOF10: 7

## 2024-05-04 ASSESSMENT — PAIN DESCRIPTION - LOCATION: LOCATION: BACK

## 2024-05-04 ASSESSMENT — PAIN - FUNCTIONAL ASSESSMENT: PAIN_FUNCTIONAL_ASSESSMENT: 0-10

## 2024-05-04 NOTE — ED PROVIDER NOTES
[facility] EMERGENCY DEPARTMENT  Emergency Department Encounter  [facility]       Pt Name:Opal Valladares  MRN: [id number]  Birthdate 1980  Date of evaluation: 5/4/24  PCP:  Jazzy Ma APRN - [facility]      CHIEF COMPLAINT       Chief Complaint   Patient presents with   • Back Pain     Chronic pain, but exacerbated tonight when patient slide out of bed and was \"stuck with back against the board\". Pt also reports she takes suboxone daily, does not have meds through this weekend, asking for assistance        HISTORY OF PRESENT ILLNESS     Opal Valladares is a 44 y.o. female who presents via EMS with concerns for low back pain, acute exacerbation of chronic low back pain.  Patient does have chronic pain.  She reports that she does follow with the Suboxone clinic, [facility].  On arrival she is requesting Suboxone secondary to back pain secondary to being stuck in a position of discomfort.  Patient called EMS for concerns for inability to stand up from the end of her bed.  She denies falls.  She denies injury.  She reports that she has difficulty standing from low positions and she was in a low position.  She reports left lateral low back pain which is nonradiating.  She is no acute bowel or bladder dysfunction.  She denies injury.  She denies new numbness or tingling.  She has no systemic symptoms.  She does report that she took Tylenol, Motrin and Flexeril per her normal regimen prior to arrival while she was sitting on the ground.      No medication provided by EMS prior to arrival, no interventions by EMS prior to arrival.  Patient does utilize supplemental oxygen support chronically, 2 L nasal cannula.    PAST MEDICAL / SURGICAL / SOCIAL / FAMILY HISTORY      has a past medical history of Acute on chronic respiratory failure with hypoxia and hypercapnia (HCC), Anxiety, Arthritis, Bipolar depression (HCC), Dermatitis, Dysuria, Exposure to STD, Folliculitis, Hx of  do not provide Suboxone in the emergency department although well reviewed her records and attempt to contact her Suboxone providers.  Discussed my concerns for not providing opioid pain control at this time due to patient being on Suboxone.  Will provide lidocaine patch and additional dorsal dose of Flexeril.  Will plan for reevaluation.  If no improvement of symptoms or any worsening of symptoms we will discuss need for imaging although at this time do not believe patient requires imaging and patient is comfortable compliant with this plan.      Risk  OTC drugs.  Prescription drug management.            EMERGENCY DEPARTMENT COURSE:          ED Course as of 05/07/24 1007   Sat May 04, 2024   0426 Patient reporting allergies to morphine and ketamine.  Reporting that she took her Tylenol and Motrin prior to arrival as well as her Flexeril.  She reports to me that she takes the max dose of Flexeril daily 3 times daily and she is taking these already.  She is reporting that she missed her Suboxone appointment and therefore is requesting Suboxone and Percocet. [MA]   0427 Significant concerns for medication seeking.  Reviewed up-to-date as well as Lexicom, max dose of Flexeril is between 40 and 60 mg daily can provide additional 10 mg at this time as patient is only on 30 mg daily to equate 40 mg total [MA]      ED Course User Index  [MA] Latasha Berry, DO     Patient reevaluated.  Did have improvement with pain after lidocaine and Flexeril.  Patient comfortable with plan for discharge home.  At this time I do believe patient is safe and stable for discharge home.  I have contacted patient's Suboxone clinic.  They are plan for medication dosing today.  Instructed patient to follow-up with her own Suboxone clinic.  Patient boarding in the emergency department awaiting facilitation of transportation by our team.  Patient discharged in stable condition.  Strict return precaution provided.  Patient expresses

## 2024-05-04 NOTE — DISCHARGE INSTRUCTIONS
SUMMARY OF YOUR VISIT    Today you were seen for low back pain after sitting in a strange position requiring EMS help up.  You did request your Suboxone dose here as we discussed I do not prescribe Suboxone from an emergency department standpoint as you are already established with your Suboxone clinic.  I did discuss with Jatinder your Suboxone clinic in the open at 6:30 AM for dosing, as we discussed I do recommend that you follow-up with [facility] for your Suboxone dosing and management of chronic pain.  If you have any changes, new or worsening symptoms you can return to the emerged primary for reevaluation.  Otherwise recommend follow-up with Jatinder as above as well as your primary care provider to inform them of your emergency department visit.    Please continue to take your home medication as previously prescribed, I have made no changes to your home medications.        You can return to our or another Emergency Department as needed or for worsening symptoms of chest pain, shortness of breath, high fevers not relieved by acetaminophen (Tylenol) and/or ibuprofen (Motrin / Advil), chills, feeling of your heart fluttering or racing, persistent nausea and/or vomiting, vomiting up blood, blood in your stool, loss of consciousness, numbness, weakness or tingling in the arms or legs or change in color of the extremities, changes in mental status, persistent headache, blurry vision, loss of bladder / bowel control, if you are unable to follow up with your physician, or other any other care or concern.    Thank You!    On behalf of the Emergency Department staff and team, I would like to thank you for allowing us the opportunity to participate in your health care and evaluation today.

## 2024-05-22 ENCOUNTER — HOSPITAL ENCOUNTER (EMERGENCY)
Age: 44
Discharge: HOME | End: 2024-05-22
Attending: EMERGENCY MEDICINE
Payer: COMMERCIAL

## 2024-05-22 VITALS
RESPIRATION RATE: 16 BRPM | OXYGEN SATURATION: 100 % | TEMPERATURE: 98.2 F | WEIGHT: 195 LBS | BODY MASS INDEX: 27.92 KG/M2 | DIASTOLIC BLOOD PRESSURE: 73 MMHG | SYSTOLIC BLOOD PRESSURE: 117 MMHG | HEART RATE: 60 BPM | HEIGHT: 70 IN

## 2024-05-22 DIAGNOSIS — R44.0 AUDITORY HALLUCINATION: Primary | ICD-10-CM

## 2024-05-22 DIAGNOSIS — R44.1 VISUAL HALLUCINATIONS: ICD-10-CM

## 2024-05-22 LAB
AMPHET UR QL SCN: NEGATIVE
BARBITURATES UR QL SCN: NEGATIVE
BENZODIAZ UR QL: NEGATIVE
BILIRUB UR QL STRIP: NEGATIVE
CANNABINOIDS UR QL SCN: POSITIVE
CLARITY UR: CLEAR
COCAINE UR QL SCN: NEGATIVE
COLOR UR: YELLOW
COMMENT: NORMAL
FENTANYL UR QL: NEGATIVE
GLUCOSE UR STRIP-MCNC: NEGATIVE MG/DL
HGB UR QL STRIP.AUTO: NEGATIVE
KETONES UR STRIP-MCNC: NEGATIVE MG/DL
LEUKOCYTE ESTERASE UR QL STRIP: NEGATIVE
METHADONE UR QL: NEGATIVE
NITRITE UR QL STRIP: NEGATIVE
OPIATES UR QL SCN: NEGATIVE
OXYCODONE UR QL SCN: NEGATIVE
PCP UR QL SCN: NEGATIVE
PH UR STRIP: 6 [PH] (ref 5–8)
PROT UR STRIP-MCNC: NEGATIVE MG/DL
SP GR UR STRIP: 1.01 (ref 1–1.03)
TEST INFORMATION: ABNORMAL
UROBILINOGEN UR STRIP-ACNC: NORMAL EU/DL (ref 0–1)

## 2024-05-22 PROCEDURE — 99284 EMERGENCY DEPT VISIT MOD MDM: CPT

## 2024-05-22 PROCEDURE — 80307 DRUG TEST PRSMV CHEM ANLYZR: CPT

## 2024-05-22 PROCEDURE — 81003 URINALYSIS AUTO W/O SCOPE: CPT

## 2024-05-22 NOTE — ED NOTES
RN spoke to Linda explaining that patient's wishes were to go home despite professional healthcare team explaining to patient that she could be voluntarily admitted for her hallucinations. Patient declined and requested a ride home. Linda stated her understanding and plans to contact Ce at [facility] for further outpatient needs.

## 2024-05-22 NOTE — ED NOTES
Patient presented to ED by sarah due to wanting a mental health evulation. Patient reported an increase in audtiory hallucinations. Patient reports that the hallucinations have not impacted her day to day.     Patient reported that she is having some paranoia. Patient reports seeing some people in her home and is afraid of them. Patient does follow with [facility] and wants to get back on her medication.     Patient denies SI/HI.     Patient does not feel like she needs to be admitted. Patient reports living with mom and dad.     Writer consulted with ED Physician and PA, patient does not meet criteria for inpatient admission. Patient agreeable to discharge plan. Patient escorted out of Emergency Room with no issues.      provided patient with brief patient prevention education interventions including follow-up outpatient treatment resources & recommendations, and lethal means counseling. Writer and patient discussed safety planning consisting of resources patient can use during or before a mental health crisis. Patient given local community information on homeless shelters, clothing sites, and hot meal sites.      The following service(s) is/are recommended for behavioral health follow-up:  Contact [facility] Crisis Care line [phone] any time for support.  Medications: Take Medications as prescribed. Let your physician know if you have any concerns.  Recovery Help line for assistance with linkage to mental health and substance use services. Call 211.  Return to Emergency Department if you have any further medical concerns.   Patient given National suicide prevention line at 1-360.148.9835

## 2024-05-22 NOTE — ED NOTES
Pt brought to ED via Jini for paranoia and hallucinations. Pt denies suicidal or homicidal thoughts. Pt reports voices telling her that if she doesn't give them her suboxone  they are going to kill her and her family. Reports seeing hallucinations coming through windows and out of cupboards. Pt reports increased anxiety and depression stating that she feels her medication needs to be adjusted. Denies any current alcohol or drug use. Pt states that she can only ambulate short distances and requires chronic O2 at 3 Lpm.

## 2024-05-22 NOTE — ED PROVIDER NOTES
eMERGENCY dEPARTMENT eNCOUnter   Independent Attestation     Pt Name: Opal Valladares  MRN: [id number]  Birthdate 1980  Date of evaluation: 5/22/24     Opal Valladares is a 44 y.o. female with CC: Psychiatric Evaluation      Based on the medical record the care appears appropriate.  I was personally available for consultation in the Emergency Department.    Toñito Sibley DO  Attending Emergency Physician                  Toñito Sibley DO  05/22/24 [id number]    
Staph infection     HX OF ALSO BACTEREMIA    UTI (lower urinary tract infection)      Past Problem List  Patient Active Problem List   Diagnosis Code    Marfan syndrome Q87.40    Opioid abuse (HCC) F11.10    Scoliosis M41.9    H/O long-term treatment with high-risk medication Z79.899    Chronic pain syndrome G89.4    Marfan's syndrome Q87.40    Instability of both shoulder joints M25.311, M25.312    Hip instability M25.359    Benign essential HTN I10    Severe major depression without psychotic features (HCC) F32.2    Depression with suicidal ideation F32.A, R45.851    Bipolar disorder with moderate depression (HCC) F31.32    Opiate withdrawal (HCC) F11.93    Severe recurrent major depression without psychotic features (HCC) F33.2    Severe malnutrition (HCC) E43    Acute psychosis (HCC) F23    Opioid dependence on agonist therapy (HCC) F11.20    Cocaine abuse (HCC) F14.10    Acute hypoxemic respiratory failure due to COVID-19 ([facility]) U07.1, J96.01    COVID-19 virus infection U07.1    Sleep-related breathing disorder G47.30    Hypercapnia R06.89    Myopathy G72.9    Acute on chronic respiratory failure with hypoxia and hypercapnia ([facility]) J96.21, J96.22    Anxiety disorder F41.9    Hypercapnic respiratory failure ([facility]) J96.92    Central core myopathy ([facility]) G71.29    COPD exacerbation ([facility]) J44.1     SURGICAL HISTORY       Past Surgical History:   Procedure Laterality Date    BREAST REDUCTION SURGERY Bilateral     2012    FOOT CAPSULOTOMY Left 9/17/14    1st mppj    FOOT SURGERY Bilateral     RT HAS 2 SCREWS, LT HAD FUSION IN DEC WITH 2 SCREWS    HIP ARTHROSCOPY Left     KNEE ARTHROSCOPY Right     X 2    MA EGD TRANSORAL BIOPSY SINGLE/MULTIPLE N/A 8/29/2017    EGD BIOPSY WITH DILATATION WITH MALONEYS performed by Diane Palma MD at [facility] OR    SHOULDER ARTHROPLASTY Bilateral     harware in place bilat    TUBAL LIGATION      clips placed on fallopian tubes    UPPER GASTROINTESTINAL ENDOSCOPY  08/29/2017     EXTENSIVE

## 2024-05-22 NOTE — DISCHARGE INSTRUCTIONS
Recommend close follow up with your PCP and [facility].  You may also go to the [place] walk in clinic.  Return to the ED if you develop any thoughts of hurting yourself or hurting others, chest pain, shortness of breath, worsening hallucinations or paranoia or any other concerning symptoms.
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
## 2024-05-26 ENCOUNTER — HOSPITAL ENCOUNTER (INPATIENT)
Age: 44
LOS: 9 days | Discharge: HOME | DRG: 885 | End: 2024-06-04
Attending: EMERGENCY MEDICINE | Admitting: PSYCHIATRY & NEUROLOGY
Payer: COMMERCIAL

## 2024-05-26 DIAGNOSIS — F23 ACUTE PSYCHOSIS (HCC): Primary | ICD-10-CM

## 2024-05-26 DIAGNOSIS — F11.20 OPIOID DEPENDENCE ON AGONIST THERAPY (HCC): ICD-10-CM

## 2024-05-26 DIAGNOSIS — F33.3 SEVERE RECURRENT MAJOR DEPRESSION WITH PSYCHOTIC FEATURES (HCC): ICD-10-CM

## 2024-05-26 LAB
ALBUMIN SERPL-MCNC: 4.4 G/DL (ref 3.5–5.2)
ALP SERPL-CCNC: 116 U/L (ref 35–104)
ALT SERPL-CCNC: 11 U/L (ref 5–33)
ANION GAP SERPL CALCULATED.3IONS-SCNC: 10 MMOL/L (ref 9–17)
APAP SERPL-MCNC: <5 UG/ML (ref 10–30)
AST SERPL-CCNC: 13 U/L
BASOPHILS # BLD: 0 K/UL (ref 0–0.2)
BASOPHILS NFR BLD: 0 % (ref 0–2)
BILIRUB SERPL-MCNC: 0.4 MG/DL (ref 0.3–1.2)
BUN SERPL-MCNC: 10 MG/DL (ref 6–20)
CALCIUM SERPL-MCNC: 10.1 MG/DL (ref 8.6–10.4)
CHLORIDE SERPL-SCNC: 99 MMOL/L (ref 98–107)
CO2 SERPL-SCNC: 33 MMOL/L (ref 20–31)
CREAT SERPL-MCNC: 0.6 MG/DL (ref 0.5–0.9)
EOSINOPHIL # BLD: 0.1 K/UL (ref 0–0.4)
EOSINOPHILS RELATIVE PERCENT: 1 % (ref 0–4)
ERYTHROCYTE [DISTWIDTH] IN BLOOD BY AUTOMATED COUNT: 12.5 % (ref 11.5–14.9)
ETHANOL PERCENT: <0.01 %
ETHANOLAMINE SERPL-MCNC: <10 MG/DL (ref 0–0.08)
GFR, ESTIMATED: >90 ML/MIN/1.73M2
GLUCOSE SERPL-MCNC: 101 MG/DL (ref 70–99)
HCT VFR BLD AUTO: 39.4 % (ref 36–46)
HGB BLD-MCNC: 12.6 G/DL (ref 12–16)
LYMPHOCYTES NFR BLD: 1.2 K/UL (ref 1–4.8)
LYMPHOCYTES RELATIVE PERCENT: 20 % (ref 24–44)
MAGNESIUM SERPL-MCNC: 2.3 MG/DL (ref 1.6–2.6)
MCH RBC QN AUTO: 27.5 PG (ref 26–34)
MCHC RBC AUTO-ENTMCNC: 32.1 G/DL (ref 31–37)
MCV RBC AUTO: 85.9 FL (ref 80–100)
MONOCYTES NFR BLD: 0.4 K/UL (ref 0.1–1.3)
MONOCYTES NFR BLD: 6 % (ref 1–7)
NEUTROPHILS NFR BLD: 73 % (ref 36–66)
NEUTS SEG NFR BLD: 4.4 K/UL (ref 1.3–9.1)
PLATELET # BLD AUTO: 359 K/UL (ref 150–450)
PMV BLD AUTO: 6.9 FL (ref 6–12)
POTASSIUM SERPL-SCNC: 4 MMOL/L (ref 3.7–5.3)
PROT SERPL-MCNC: 7.6 G/DL (ref 6.4–8.3)
RBC # BLD AUTO: 4.58 M/UL (ref 4–5.2)
SALICYLATES SERPL-MCNC: <1 MG/DL (ref 3–10)
SODIUM SERPL-SCNC: 142 MMOL/L (ref 135–144)
WBC OTHER # BLD: 6.1 K/UL (ref 3.5–11)

## 2024-05-26 PROCEDURE — 80053 COMPREHEN METABOLIC PANEL: CPT

## 2024-05-26 PROCEDURE — 83735 ASSAY OF MAGNESIUM: CPT

## 2024-05-26 PROCEDURE — 85025 COMPLETE CBC W/AUTO DIFF WBC: CPT

## 2024-05-26 PROCEDURE — 1240000000 HC EMOTIONAL WELLNESS R&B

## 2024-05-26 PROCEDURE — 99285 EMERGENCY DEPT VISIT HI MDM: CPT

## 2024-05-26 PROCEDURE — 84443 ASSAY THYROID STIM HORMONE: CPT

## 2024-05-26 PROCEDURE — 80307 DRUG TEST PRSMV CHEM ANLYZR: CPT

## 2024-05-26 PROCEDURE — 80143 DRUG ASSAY ACETAMINOPHEN: CPT

## 2024-05-26 PROCEDURE — 80179 DRUG ASSAY SALICYLATE: CPT

## 2024-05-26 PROCEDURE — G0480 DRUG TEST DEF 1-7 CLASSES: HCPCS

## 2024-05-26 PROCEDURE — 36415 COLL VENOUS BLD VENIPUNCTURE: CPT

## 2024-05-26 RX ORDER — ACETAMINOPHEN 325 MG/1
650 TABLET ORAL EVERY 6 HOURS PRN
Status: DISCONTINUED | OUTPATIENT
Start: 2024-05-26 | End: 2024-06-04 | Stop reason: HOSPADM

## 2024-05-26 RX ORDER — POLYETHYLENE GLYCOL 3350 17 G/17G
17 POWDER, FOR SOLUTION ORAL DAILY PRN
Status: DISCONTINUED | OUTPATIENT
Start: 2024-05-26 | End: 2024-06-04 | Stop reason: HOSPADM

## 2024-05-26 RX ORDER — IBUPROFEN 400 MG/1
400 TABLET ORAL EVERY 6 HOURS PRN
Status: DISCONTINUED | OUTPATIENT
Start: 2024-05-26 | End: 2024-06-04 | Stop reason: HOSPADM

## 2024-05-26 RX ORDER — DIPHENHYDRAMINE HYDROCHLORIDE 50 MG/ML
50 INJECTION INTRAMUSCULAR; INTRAVENOUS EVERY 6 HOURS PRN
Status: DISCONTINUED | OUTPATIENT
Start: 2024-05-26 | End: 2024-06-04 | Stop reason: HOSPADM

## 2024-05-26 RX ORDER — LORAZEPAM 1 MG/1
2 TABLET ORAL EVERY 6 HOURS PRN
Status: DISCONTINUED | OUTPATIENT
Start: 2024-05-26 | End: 2024-06-01

## 2024-05-26 RX ORDER — TRAZODONE HYDROCHLORIDE 50 MG/1
50 TABLET ORAL NIGHTLY PRN
Status: DISCONTINUED | OUTPATIENT
Start: 2024-05-26 | End: 2024-06-04 | Stop reason: HOSPADM

## 2024-05-26 RX ORDER — HALOPERIDOL 5 MG/1
5 TABLET ORAL EVERY 6 HOURS PRN
Status: DISCONTINUED | OUTPATIENT
Start: 2024-05-26 | End: 2024-06-04 | Stop reason: HOSPADM

## 2024-05-26 RX ORDER — HALOPERIDOL 5 MG/ML
5 INJECTION INTRAMUSCULAR EVERY 6 HOURS PRN
Status: DISCONTINUED | OUTPATIENT
Start: 2024-05-26 | End: 2024-06-04 | Stop reason: HOSPADM

## 2024-05-26 RX ORDER — MAGNESIUM HYDROXIDE/ALUMINUM HYDROXICE/SIMETHICONE 120; 1200; 1200 MG/30ML; MG/30ML; MG/30ML
30 SUSPENSION ORAL EVERY 6 HOURS PRN
Status: DISCONTINUED | OUTPATIENT
Start: 2024-05-26 | End: 2024-06-04 | Stop reason: HOSPADM

## 2024-05-26 RX ORDER — HYDROXYZINE 50 MG/1
50 TABLET, FILM COATED ORAL 3 TIMES DAILY PRN
Status: DISCONTINUED | OUTPATIENT
Start: 2024-05-26 | End: 2024-06-04 | Stop reason: HOSPADM

## 2024-05-26 RX ORDER — LORAZEPAM 2 MG/ML
2 INJECTION INTRAMUSCULAR EVERY 6 HOURS PRN
Status: DISCONTINUED | OUTPATIENT
Start: 2024-05-26 | End: 2024-06-01

## 2024-05-26 ASSESSMENT — LIFESTYLE VARIABLES
HOW OFTEN DO YOU HAVE A DRINK CONTAINING ALCOHOL: NEVER
HOW MANY STANDARD DRINKS CONTAINING ALCOHOL DO YOU HAVE ON A TYPICAL DAY: PATIENT DOES NOT DRINK

## 2024-05-26 NOTE — ED NOTES
Provisional Diagnosis:   Acute psychosis     Psychosocial and Contextual Factors:   Patient having an increase in audaitroy hallucinations, paranoia and delusions. Patient reports someone is putting speakers in her house sending subliminal messages telling to her to kill herself, and they they are going to kill her and her family. Patient states she is not eating or sleeping.     C-SSRS Summary:      Patient: X  Family:   Agency:     Substance Abuse: Patient denies.    Present Suicidal Behavior:  Patient denies. Patient does reports auditory hallucinations telling her to kill herself.     Verbal:     Attempt:    Past Suicidal Behavior: Patient denies.    Verbal:    Attempt:      Self-Injurious/Self-Mutilation:Patient denies      Violence Current or Past Patient is calm and cooperative        Protective Factors:    Patient has housing and is linked with mental health services..      Risk Factors:    Patient has poor coping skills      Clinical Summary:    Opal Valladares is a 44 y.o. female who presents to the ED by squad for worsening auditory and visual hallucinations.  Patient states a person name Janey Mendes is coming to her window at night and whispering bad things to her. Patient states this Janey person set up many different mini speakers around her apartment and her parents house, that send subliminal messages. Patient states the messages are in a frequency that she can't record on her phone when she tried to capture it. Patient states the messages and whispers are telling patient that she should \"kill yourself or this will never end,\" that they are going to kill her and her family, and to \"take pictures of my niece.\" Patient states hat they also say that  \"I need to pay them $500 for it to stop.\" Patient denies wanting to kill herself but states she is having homicidal thoughts of killing this Janey Mendes whom she believes is doing this. Patient states she physically say Janey yesterday and states

## 2024-05-26 NOTE — ED NOTES
Report completed with NANCY Emmanuel. Security notified of need for change-out. Pt ED hold for PPU, medical bed d/t O2 use, Hx COPD.

## 2024-05-26 NOTE — ED PROVIDER NOTES
EMERGENCY DEPARTMENT ENCOUNTER    Pt Name: Opal Valladares  MRN: [id number]  Birthdate 1980  Date of evaluation: 5/26/24  CHIEF COMPLAINT       Chief Complaint   Patient presents with    Mental Health Problem    Hallucinations     HISTORY OF PRESENT ILLNESS   44-year-old female presents for mental health evaluation.  Patient reports that she has been having people come in and talk to her.  She states that there is a person that is manipulating her and trying to get her to do things.  She states that she put speakers around her house and has been sending her \"subliminal messages\" she states that they are telling her to kill herself and also reports that they are telling her that they are going to hurt her family.  She states that she wants to kill these people that are doing this.  She denies any current suicidal ideation, denies any recent drug or alcohol use denies any medical complaints currently    The history is provided by the patient.           REVIEW OF SYSTEMS     Review of Systems   Constitutional:  Negative for fever.   HENT:  Negative for congestion and ear pain.    Eyes:  Negative for pain.   Respiratory:  Negative for shortness of breath.    Cardiovascular:  Negative for chest pain, palpitations and leg swelling.   Gastrointestinal:  Negative for abdominal pain.   Genitourinary:  Negative for dysuria and flank pain.   Musculoskeletal:  Negative for back pain.   Skin:  Negative for color change.   Neurological:  Negative for numbness and headaches.   Psychiatric/Behavioral:  Positive for hallucinations. Negative for confusion.    All other systems reviewed and are negative.    PASTMEDICAL HISTORY     Past Medical History:   Diagnosis Date    Acute on chronic respiratory failure with hypoxia and hypercapnia (HCC) 5/18/2023    Anxiety     Arthritis     O.A.    Bipolar depression (HCC)     Dermatitis     Dysuria     Exposure to STD     Folliculitis     Hx of acute bronchitis     Hypertension     Daniel

## 2024-05-27 LAB
AMPHET UR QL SCN: NEGATIVE
BARBITURATES UR QL SCN: NEGATIVE
BENZODIAZ UR QL: NEGATIVE
CANNABINOIDS UR QL SCN: POSITIVE
COCAINE UR QL SCN: NEGATIVE
FENTANYL UR QL: NEGATIVE
METHADONE UR QL: NEGATIVE
OPIATES UR QL SCN: NEGATIVE
OXYCODONE UR QL SCN: NEGATIVE
PCP UR QL SCN: NEGATIVE
TEST INFORMATION: ABNORMAL

## 2024-05-27 PROCEDURE — APPSS60 APP SPLIT SHARED TIME 46-60 MINUTES

## 2024-05-27 PROCEDURE — 6370000000 HC RX 637 (ALT 250 FOR IP): Performed by: PSYCHIATRY & NEUROLOGY

## 2024-05-27 PROCEDURE — 1240000000 HC EMOTIONAL WELLNESS R&B

## 2024-05-27 PROCEDURE — 99222 1ST HOSP IP/OBS MODERATE 55: CPT | Performed by: PSYCHIATRY & NEUROLOGY

## 2024-05-27 RX ORDER — NICOTINE 21 MG/24HR
1 PATCH, TRANSDERMAL 24 HOURS TRANSDERMAL DAILY
Status: DISCONTINUED | OUTPATIENT
Start: 2024-05-27 | End: 2024-06-04 | Stop reason: HOSPADM

## 2024-05-27 RX ORDER — VITAMIN B COMPLEX
1000 TABLET ORAL DAILY
Status: DISCONTINUED | OUTPATIENT
Start: 2024-05-27 | End: 2024-06-04 | Stop reason: HOSPADM

## 2024-05-27 RX ORDER — ALBUTEROL SULFATE 90 UG/1
2 AEROSOL, METERED RESPIRATORY (INHALATION) 4 TIMES DAILY PRN
Status: DISCONTINUED | OUTPATIENT
Start: 2024-05-27 | End: 2024-06-04 | Stop reason: HOSPADM

## 2024-05-27 RX ORDER — RISPERIDONE 1 MG/1
1 TABLET ORAL 2 TIMES DAILY
Status: DISCONTINUED | OUTPATIENT
Start: 2024-05-27 | End: 2024-05-29

## 2024-05-27 RX ORDER — DULOXETIN HYDROCHLORIDE 30 MG/1
30 CAPSULE, DELAYED RELEASE ORAL DAILY
Status: DISCONTINUED | OUTPATIENT
Start: 2024-05-27 | End: 2024-06-02

## 2024-05-27 RX ORDER — GABAPENTIN 600 MG/1
600 TABLET ORAL 3 TIMES DAILY
Status: DISCONTINUED | OUTPATIENT
Start: 2024-05-27 | End: 2024-06-04 | Stop reason: HOSPADM

## 2024-05-27 RX ADMIN — RISPERIDONE 1 MG: 1 TABLET, FILM COATED ORAL at 20:58

## 2024-05-27 RX ADMIN — LORAZEPAM 2 MG: 1 TABLET ORAL at 12:53

## 2024-05-27 RX ADMIN — TRAZODONE HYDROCHLORIDE 50 MG: 50 TABLET ORAL at 20:58

## 2024-05-27 RX ADMIN — IBUPROFEN 400 MG: 400 TABLET, FILM COATED ORAL at 12:13

## 2024-05-27 RX ADMIN — GABAPENTIN 600 MG: 600 TABLET, FILM COATED ORAL at 20:58

## 2024-05-27 RX ADMIN — ACETAMINOPHEN 650 MG: 325 TABLET ORAL at 12:13

## 2024-05-27 RX ADMIN — HYDROXYZINE HYDROCHLORIDE 50 MG: 50 TABLET, FILM COATED ORAL at 20:58

## 2024-05-27 RX ADMIN — HYDROXYZINE HYDROCHLORIDE 50 MG: 50 TABLET, FILM COATED ORAL at 10:56

## 2024-05-27 RX ADMIN — HALOPERIDOL 5 MG: 5 TABLET ORAL at 12:53

## 2024-05-27 ASSESSMENT — PATIENT HEALTH QUESTIONNAIRE - PHQ9
2. FEELING DOWN, DEPRESSED OR HOPELESS: NOT AT ALL
SUM OF ALL RESPONSES TO PHQ QUESTIONS 1-9: 0
1. LITTLE INTEREST OR PLEASURE IN DOING THINGS: NOT AT ALL
SUM OF ALL RESPONSES TO PHQ QUESTIONS 1-9: 0
SUM OF ALL RESPONSES TO PHQ QUESTIONS 1-9: 0
SUM OF ALL RESPONSES TO PHQ9 QUESTIONS 1 & 2: 0
SUM OF ALL RESPONSES TO PHQ QUESTIONS 1-9: 0

## 2024-05-27 ASSESSMENT — SLEEP AND FATIGUE QUESTIONNAIRES
SLEEP PATTERN: DIFFICULTY FALLING ASLEEP
AVERAGE NUMBER OF SLEEP HOURS: 4
DO YOU HAVE DIFFICULTY SLEEPING: YES
DO YOU USE A SLEEP AID: NO

## 2024-05-27 NOTE — BH NOTE
Emergency Medication Follow-Up Note:    PRN medication of Haldol 5mg PO and Ativan 2mg PO was effective as evidence by Patient resting quietly. Patient denies medication side effects. Will continue to monitor and provide support as needed.
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
## 2024-05-27 NOTE — BH NOTE
Patient arrived on unit via wheelchair with 2 [facility] staff. Patient is alert and oriented x4, calm and cooperative. Patient oriented to room and unit. All belongings placed in patient locker, bin, and security safe.

## 2024-05-27 NOTE — ED NOTES
Pt changed out at this time. All personal belongings placed in patient labeled bags and handed to security.

## 2024-05-27 NOTE — ED NOTES
Vape removed from patient's possession and use in ED as noted by NANCY Hernandez and clayton. Placed in patient belonging bag in PPU, locker D

## 2024-05-27 NOTE — BH NOTE
Patient given tobacco quitline number 82390059476 at this time, refusing to call at this time, states \" I just dont want to quit now\"- patient given information as to the dangers of long term tobacco use. Continue to reinforce the importance of tobacco cessation.

## 2024-05-27 NOTE — BH NOTE
Emergency PRN Medication Administration Note:      Patient is Agitated as evidence by increased agitation, reports feeling unsafe due to the voices/person. Patient stated that \"they are in top of her and clawing at her cervix\" She states that they are at high frequency and none else can hear it. She is unable to be calmed down or redirected by staff. Staff attempted to find and relieve the distress by Talking to patient, Refocusing on new activity, Offering suggestions, and Administer PRN medications Patient is currently accepted PRN medications. Medication Administered as prescribed: Haldol 5mg PO and Ativan 2mg PO Patient Tolerated medication administration.   Will continue to monitor, offer support, and reassess.

## 2024-05-27 NOTE — CARE COORDINATION
BHI Biopsychosocial Assessment    **Patient did not participate in assessment due to extreme somnolence. Some information taken from chart.    Current Level of Psychosocial Functioning     Independent   Dependent    Minimal Assist XX    Psychosocial High Risk Factors (check all that apply)    Unable to obtain meds   Chronic illness/pain    Substance abuse   Lack of Family Support   Financial stress   Isolation   Inadequate Community Resources  Suicide attempt(s) xx  Not taking medications   Victim of crime   Developmental Delay  Unable to manage personal needs    Age 65 or older   Homeless  No transportation   Readmission within 30 days  Unemployment  Traumatic Event    Psychiatric Advanced Directives: N/A    Family to Involve in Treatment: BASIL - Patient did not participate in assessment due to extreme somnolence    Sexual Orientation:  N/A    Patient Strengths: Per chart-Housing, Insurance, Income    Patient Barriers: History of mental health concerns, Lack of coping skills    Opiate Education Provided:  BASIL - Per chart, patient has past history of opiate use and use of Suboxone. patient was positive for cannabis on 5/26.    CMHC/mental health history: Patient has history of inpatient treatment; last admitted to the [facility] on 7/27/21. Patient was last linked to [facility].    Plan of Care   medication management, group/individual therapies, family meetings, psycho -education, treatment team meetings to assist with stabilization    Initial Discharge Plan:  BASIL - Patient did not participate in assessment due to extreme somnolence. Per chart, patient has housing and was last linked to [facility].    Clinical Summary:  Patient is a 43yo female admitted to the [facility] for severe recurrent major depression with psychotic features. Patient did not participate in assessment due to extreme somnolence. Some information taken from chart.   Patient has history of inpatient treatment; last admitted to the [facility] on 7/27/21. Patient was last

## 2024-05-27 NOTE — ED NOTES
Pt denies SI/HI, but does admit to auditory and visual hallucinations. States \"they have never been this bad.\" Pt paranoid, states people are trying to steal her money. States these same people in the basement are \"zapping her in her special place\" from the floor below and have little speakers all around to hear what she says. States the voices tell her to harm herself, but patient states she would never do that.

## 2024-05-28 LAB — TSH SERPL DL<=0.05 MIU/L-ACNC: 0.59 UIU/ML (ref 0.3–5)

## 2024-05-28 PROCEDURE — 99232 SBSQ HOSP IP/OBS MODERATE 35: CPT | Performed by: PSYCHIATRY & NEUROLOGY

## 2024-05-28 PROCEDURE — 99222 1ST HOSP IP/OBS MODERATE 55: CPT | Performed by: INTERNAL MEDICINE

## 2024-05-28 PROCEDURE — 1240000000 HC EMOTIONAL WELLNESS R&B

## 2024-05-28 PROCEDURE — 6370000000 HC RX 637 (ALT 250 FOR IP): Performed by: PSYCHIATRY & NEUROLOGY

## 2024-05-28 PROCEDURE — APPSS30 APP SPLIT SHARED TIME 16-30 MINUTES

## 2024-05-28 RX ORDER — AMLODIPINE BESYLATE 5 MG/1
5 TABLET ORAL DAILY
Status: DISCONTINUED | OUTPATIENT
Start: 2024-05-28 | End: 2024-06-04 | Stop reason: HOSPADM

## 2024-05-28 RX ADMIN — GABAPENTIN 600 MG: 600 TABLET, FILM COATED ORAL at 14:15

## 2024-05-28 RX ADMIN — DULOXETINE HYDROCHLORIDE 30 MG: 30 CAPSULE, DELAYED RELEASE ORAL at 08:53

## 2024-05-28 RX ADMIN — TIOTROPIUM BROMIDE INHALATION SPRAY 2 PUFF: 3.12 SPRAY, METERED RESPIRATORY (INHALATION) at 08:53

## 2024-05-28 RX ADMIN — RISPERIDONE 1 MG: 1 TABLET, FILM COATED ORAL at 20:55

## 2024-05-28 RX ADMIN — ACETAMINOPHEN 650 MG: 325 TABLET ORAL at 04:56

## 2024-05-28 RX ADMIN — LORAZEPAM 2 MG: 1 TABLET ORAL at 20:55

## 2024-05-28 RX ADMIN — ACETAMINOPHEN 650 MG: 325 TABLET ORAL at 14:15

## 2024-05-28 RX ADMIN — HALOPERIDOL 5 MG: 5 TABLET ORAL at 20:55

## 2024-05-28 RX ADMIN — IBUPROFEN 400 MG: 400 TABLET, FILM COATED ORAL at 08:52

## 2024-05-28 RX ADMIN — HYDROXYZINE HYDROCHLORIDE 50 MG: 50 TABLET, FILM COATED ORAL at 08:52

## 2024-05-28 RX ADMIN — RISPERIDONE 1 MG: 1 TABLET, FILM COATED ORAL at 08:52

## 2024-05-28 RX ADMIN — GABAPENTIN 600 MG: 600 TABLET, FILM COATED ORAL at 08:53

## 2024-05-28 RX ADMIN — Medication 1000 UNITS: at 08:54

## 2024-05-28 RX ADMIN — HYDROXYZINE HYDROCHLORIDE 50 MG: 50 TABLET, FILM COATED ORAL at 16:00

## 2024-05-28 RX ADMIN — GABAPENTIN 600 MG: 600 TABLET, FILM COATED ORAL at 20:55

## 2024-05-28 ASSESSMENT — PAIN DESCRIPTION - LOCATION: LOCATION: SHOULDER

## 2024-05-28 ASSESSMENT — PAIN DESCRIPTION - DESCRIPTORS
DESCRIPTORS: THROBBING
DESCRIPTORS: ACHING

## 2024-05-28 ASSESSMENT — PAIN SCALES - GENERAL
PAINLEVEL_OUTOF10: 8
PAINLEVEL_OUTOF10: 5
PAINLEVEL_OUTOF10: 5

## 2024-05-28 ASSESSMENT — PAIN DESCRIPTION - ORIENTATION: ORIENTATION: RIGHT;LEFT

## 2024-05-28 NOTE — GROUP NOTE
Group Therapy Note    Date: 5/28/2024    Group Start Time: 1000  Group End Time: 1047  Group Topic: Psychotherapy    ANYI BHEdita Hoyos MSW, IVAN        Group Therapy Note    Attendees: 5/13     Patient refused to attend psychotherapy group at 10:00 am after encouragement from staff. 1:1 talk was offered as alternative to group; patient declined.  Discipline Responsible: /Counselor      Signature:  NANDA Quiroz, IVAN

## 2024-05-28 NOTE — H&P
Department of Psychiatry  Attending Physician Psychiatric Assessment     Reason for Admission to Psychiatric Unit:  Threat to self requiring 24 hour professional observation  Command hallucinations directing harm to self or others; risk of the patient taking action  Concerns about patient's safety in the community  Past Mental Health Diagnosis: a history of  Major Depression, Bipolar Disorder, Anxiety Disorder, and Alcohol and/or Drug Use Disorder  Triggering event or precipitating factor: ...acute psychosis due to psychiatric diagnosis  Length of time/duration of triggering event: Weeks  Legal Status: Voluntary    CHIEF COMPLAINT:  Severe recurrent major depressive disorder w/psychotic features    History obtained from: Patient, electronic medical record          HISTORY OF PRESENT ILLNESS:    Opal Valladares is a 44 y.o. female who has a past medical history of opioid abuse, depression w/SI, bipolar disorder w/moderate depression, acute psychosis, severe recurrent major depression, and anxiety. Patient presented to the ED for mental health evaluation on 05/26/2024. Per ED documentation, \"44-year-old female presents for mental health evaluation. Patient reports that she has been having people come in and talk to her. She states that there is a person that is manipulating her and trying to get her to do things. She states that she put speakers around her house and has been sending her \"subliminal messages\" she states that they are telling her to kill herself and also reports that they are telling her that they are going to hurt her family. She states that she wants to kill these people that are doing this. She denies any current suicidal ideation, denies any recent drug or alcohol use denies any medical complaints currently\".  \"Pt denies SI/HI, but does admit to auditory and visual hallucinations. States \"they have never been this bad.\" Pt paranoid, states people are trying to steal her money. States these same 
IN-PATIENT SERVICE  [facility]    HISTORY AND PHYSICAL EXAMINATION/ CONSULT NOTE            Date:   5/28/2024  Patient name:  Opal Valladares  Date of admission:  5/26/2024  5:22 PM  MRN:   [id number]  Account:  [id number]  YOB: 1980  PCP:    Jazzy Ma APRN - CNP  Room:   [address] [place] [address]  Code Status:    Full Code    Physician Requesting Consult: Samy Tillman MD    Reason for Consult: History and physical, medical management    Chief Complaint:     Chief Complaint   Patient presents with    Mental Health Problem    Hallucinations     Medical management    History Obtained From:     Patient, EMR, nursing staff    History of Present Illness:     44-year-old female admitted for major depression with psychotic features    Medical history relevant for hypertension Marfan syndrome.       COPD, moderate diagnosed many years ago generally well controlled  Patient uses Symbicort inhaler regularly and albuterol inhaler or nebulizer for exacerbations  Symptoms of cough and shortness of breath with exacerbations improved with above inhalers, worse with respiratory infections or smoke inhalation        Past Medical History:     Past Medical History:   Diagnosis Date    Acute on chronic respiratory failure with hypoxia and hypercapnia (HCC) 5/18/2023    Anxiety     Arthritis     O.A.    Bipolar depression (HCC)     Dermatitis     Dysuria     Exposure to STD     Folliculitis     Hx of acute bronchitis     Hypertension     Marfan syndrome     MVP (mitral valve prolapse)     Nausea & vomiting     Polysubstance abuse (HCC)     drug abuse includes cocaine, fentnyl, cannabis    PONV (postoperative nausea and vomiting)     Scoliosis     Seasonal allergies     Staph infection     HX OF ALSO BACTEREMIA    UTI (lower urinary tract infection)         Past Surgical History:     Past Surgical History:   Procedure Laterality Date    BREAST REDUCTION SURGERY Bilateral     2012    
below and have little speakers all around to hear what she says. States the voices tell her to harm herself, but patient states she would never do that\".    Patient labs reviewed showing increased alkaline phosphatase, otherwise WNL. Vital signs reviewed, showing high blood pressure of 139/99, will continue to monitor. Patient was seen in the ED on 5/22/2024 for auditory hallucinations and visual hallucinations but denied voluntary admittance to [facility]. Patient is known to the [facility], her last admittance being on 7/27/2021 for acute psychosis.  When at [facility] patient was stabilized risperidone Perseris 90 mg injection, Cymbalta, trazodone and had continued Neurontin, Suboxone and Flexeril.  Patient reportedly noncompliant with psychiatric medication or follow-up appointment.  Patient family reports patient history Invega however noncompliant for the past year also noted the family to have reported the patient on long-term Suboxone and Neurontin and does not do well when discontinued while inpatient.  Patient reported following up with [facility] for Suboxone and Neurontin.  PDMP reviewed bupropion/naloxone 8 2 mg a quantity, 3-day supply filled on 5/4/2024 and Neurontin 600 mg 90 quantity, 30-day supply filled on 4/25/2024.     Patient was seen for assessment in the privacy of her room while resting in her bed.  One-to-one at bedside due to patient on continuous nasal cannula.  Patient appears extremely paranoid, nervous, and on edge. Patient concurrently having visual hallucinations, auditory and tactile hallucinations throughout assessment. Patient very distracted during visit by hallucinations. Patient on and off will look around the room scared and will say \"you need to leave me alone, please stop doing this\" speaking to no one. Patient states that the last week or two that a male and two females have started to appear again without reason.  Also made a statement how her symptoms \"on top are red crawling out of her

## 2024-05-28 NOTE — BH NOTE
Patient was being assisted to bathroom. Sitter observed unknown pill in patient's bed. Patient questioned, states she did take a muscle-relaxer when she was in the ER and must have been stuck to her somewhere. Denies having any pills. Patient room searched, no other pills found.

## 2024-05-28 NOTE — GROUP NOTE
Group Therapy Note    Date: 5/28/2024    Group Start Time: 1330  Group End Time: 1415  Group Topic: Recreational    [facility] Hetal Herndon CTRS        Group Therapy Note    Attendees: 5/12       Patient's Goal:  To improve interpersonal skills and leisure awareness through collaborating with peers and concentrating on a presented task.     Notes:  Patient attended and observed the last 10 minutes of group. Patient was able to collaborate with peers and concentrate on a presented task while in group. Patient was pleasant.     Status After Intervention:  Improved    Participation Level: Active Listener and Interactive    Participation Quality: Appropriate, Attentive, and Supportive      Speech:  normal      Thought Process/Content: Logical      Affective Functioning: Constricted       Mood: anxious, brightened       Level of consciousness:  Alert and Attentive      Response to Learning: Able to verbalize current knowledge/experience, Able to retain information, and Progressing to goal      Endings: None Reported    Modes of Intervention: Socialization, Exploration, Problem-solving, and Activity      Discipline Responsible: Psychoeducational Specialist      Signature:  KARINA Byrne

## 2024-05-28 NOTE — GROUP NOTE
Group Therapy Note    Date: 5/28/2024    Group Start Time: 1100  Group End Time: 1130  Group Topic: Cognitive Skills    Hetal Gonzalez CTRS        Group Therapy Note    Attendees: 7/13    Cognitive Skills Group Note        Date: May 28, 2024       Start Time: 11am  End Time: 11:30am      Number of Participants in Group & Unit Census:  7/13    Topic:  interpersonal skills, decision-making, concentration     Goal of Group: To improve interpersonal skills and decision-making through collaborating with peers and concentrating on a presented task.       Comments:     Patient did not participate in Cognitive Skills group, despite staff encouragement and explanation of benefits.  Patient remain seclusive to self.  Q15 minute safety checks maintained for patient safety and will continue to encourage patient to attend unit programming.        Signature:  KARINA Byrne

## 2024-05-29 ENCOUNTER — APPOINTMENT (OUTPATIENT)
Dept: NEUROLOGY | Age: 44
DRG: 885 | End: 2024-05-29
Payer: COMMERCIAL

## 2024-05-29 PROCEDURE — 6370000000 HC RX 637 (ALT 250 FOR IP): Performed by: PSYCHIATRY & NEUROLOGY

## 2024-05-29 PROCEDURE — 95816 EEG AWAKE AND DROWSY: CPT | Performed by: PSYCHIATRY & NEUROLOGY

## 2024-05-29 PROCEDURE — 99232 SBSQ HOSP IP/OBS MODERATE 35: CPT | Performed by: INTERNAL MEDICINE

## 2024-05-29 PROCEDURE — 6370000000 HC RX 637 (ALT 250 FOR IP): Performed by: INTERNAL MEDICINE

## 2024-05-29 PROCEDURE — 1240000000 HC EMOTIONAL WELLNESS R&B

## 2024-05-29 PROCEDURE — APPSS30 APP SPLIT SHARED TIME 16-30 MINUTES

## 2024-05-29 PROCEDURE — 99232 SBSQ HOSP IP/OBS MODERATE 35: CPT | Performed by: PSYCHIATRY & NEUROLOGY

## 2024-05-29 RX ORDER — HYDROXYZINE 50 MG/1
50 TABLET, FILM COATED ORAL EVERY 6 HOURS PRN
COMMUNITY

## 2024-05-29 RX ORDER — PALIPERIDONE 3 MG/1
3 TABLET, EXTENDED RELEASE ORAL DAILY
Status: DISCONTINUED | OUTPATIENT
Start: 2024-05-29 | End: 2024-05-31

## 2024-05-29 RX ORDER — LIDOCAINE 50 MG/G
1 PATCH TOPICAL DAILY PRN
Status: ON HOLD | COMMUNITY
End: 2024-06-04 | Stop reason: HOSPADM

## 2024-05-29 RX ORDER — TIZANIDINE 4 MG/1
4 TABLET ORAL 3 TIMES DAILY PRN
Status: ON HOLD | COMMUNITY
End: 2024-06-04 | Stop reason: HOSPADM

## 2024-05-29 RX ORDER — FLUTICASONE FUROATE AND VILANTEROL 100; 25 UG/1; UG/1
1 POWDER RESPIRATORY (INHALATION) DAILY
COMMUNITY

## 2024-05-29 RX ORDER — AMLODIPINE BESYLATE 10 MG/1
10 TABLET ORAL DAILY
Status: ON HOLD | COMMUNITY
End: 2024-06-04 | Stop reason: HOSPADM

## 2024-05-29 RX ORDER — GUAIFENESIN 600 MG/1
600 TABLET, EXTENDED RELEASE ORAL 2 TIMES DAILY
Status: DISCONTINUED | OUTPATIENT
Start: 2024-05-29 | End: 2024-06-04 | Stop reason: HOSPADM

## 2024-05-29 RX ADMIN — DULOXETINE HYDROCHLORIDE 30 MG: 30 CAPSULE, DELAYED RELEASE ORAL at 09:25

## 2024-05-29 RX ADMIN — PALIPERIDONE 3 MG: 3 TABLET, EXTENDED RELEASE ORAL at 16:15

## 2024-05-29 RX ADMIN — GABAPENTIN 600 MG: 600 TABLET, FILM COATED ORAL at 09:25

## 2024-05-29 RX ADMIN — TRAZODONE HYDROCHLORIDE 50 MG: 50 TABLET ORAL at 20:44

## 2024-05-29 RX ADMIN — GUAIFENESIN 600 MG: 600 TABLET, EXTENDED RELEASE ORAL at 20:44

## 2024-05-29 RX ADMIN — ACETAMINOPHEN 650 MG: 325 TABLET ORAL at 09:25

## 2024-05-29 RX ADMIN — HYDROXYZINE HYDROCHLORIDE 50 MG: 50 TABLET, FILM COATED ORAL at 20:44

## 2024-05-29 RX ADMIN — HYDROXYZINE HYDROCHLORIDE 50 MG: 50 TABLET, FILM COATED ORAL at 09:25

## 2024-05-29 RX ADMIN — GABAPENTIN 600 MG: 600 TABLET, FILM COATED ORAL at 20:44

## 2024-05-29 RX ADMIN — GABAPENTIN 600 MG: 600 TABLET, FILM COATED ORAL at 15:37

## 2024-05-29 RX ADMIN — Medication 1000 UNITS: at 09:25

## 2024-05-29 RX ADMIN — RISPERIDONE 1 MG: 1 TABLET, FILM COATED ORAL at 09:25

## 2024-05-29 RX ADMIN — AMLODIPINE BESYLATE 5 MG: 5 TABLET ORAL at 09:25

## 2024-05-29 RX ADMIN — TIOTROPIUM BROMIDE INHALATION SPRAY 2 PUFF: 3.12 SPRAY, METERED RESPIRATORY (INHALATION) at 09:25

## 2024-05-29 ASSESSMENT — PAIN SCALES - GENERAL: PAINLEVEL_OUTOF10: 4

## 2024-05-29 ASSESSMENT — PAIN - FUNCTIONAL ASSESSMENT: PAIN_FUNCTIONAL_ASSESSMENT: ACTIVITIES ARE NOT PREVENTED

## 2024-05-29 NOTE — GROUP NOTE
Group Therapy Note    Date: 5/29/2024    Group Start Time: 1500  Group End Time: 1600  Group Topic: Cognitive Skills    Brittney Bianchi CTRS        Group Therapy Note    Attendees: 2/11     Topic: To increase socialization and engage in reminiscing r/t specific topics, sharing   and listening, and relating to peers.        Patient did not participate in Cognitive Skills Group at 15:00, despite staff encouragement   and explanation of benefits. Pt was seclusive to self and room during group.        Q15 minute safety checks maintained for patient safety and will continue to encourage   patient to attend unit programming.       Discipline Responsible: Psychoeducational Specialist  Signature:  KARINA GARCIA

## 2024-05-29 NOTE — BH NOTE
Emergency PRN Medication Administration Note:      Patient is agitated as evidence by increased agitation and anxiousness. The patient reports endorsing auditory hallucinations. She stated \"my nerves are shot, I need Haldol and Ativan.\" She denies current thoughts of self harm. Staff attempted to find and relieve the distress by talking to the patient, suggesting self calming techniques and diversionary activities. Patient is accepting of the PRN medications. Medication Administered as prescribed: Haldol 5 mg and Ativan 2 mg, both administer orally. Patient tolerated the medication administration well. Writer will continue to monitor, offer support, and reassess.

## 2024-05-29 NOTE — BH NOTE
Emergency Medication Follow-Up Note:    PRN medication of Haldol 5 mg an Ativan 2 mg was effective as evidence by the patient regaining  behavioral control and resting comfortably in bed. No reports of adverse reactions reported or noted. Writer will continue to monitor and provide support as needed.

## 2024-05-30 PROCEDURE — 6370000000 HC RX 637 (ALT 250 FOR IP): Performed by: PSYCHIATRY & NEUROLOGY

## 2024-05-30 PROCEDURE — 99231 SBSQ HOSP IP/OBS SF/LOW 25: CPT | Performed by: INTERNAL MEDICINE

## 2024-05-30 PROCEDURE — 6370000000 HC RX 637 (ALT 250 FOR IP): Performed by: INTERNAL MEDICINE

## 2024-05-30 PROCEDURE — APPSS30 APP SPLIT SHARED TIME 16-30 MINUTES

## 2024-05-30 PROCEDURE — 97116 GAIT TRAINING THERAPY: CPT

## 2024-05-30 PROCEDURE — 1240000000 HC EMOTIONAL WELLNESS R&B

## 2024-05-30 PROCEDURE — 97166 OT EVAL MOD COMPLEX 45 MIN: CPT

## 2024-05-30 PROCEDURE — 99232 SBSQ HOSP IP/OBS MODERATE 35: CPT | Performed by: PSYCHIATRY & NEUROLOGY

## 2024-05-30 PROCEDURE — 97162 PT EVAL MOD COMPLEX 30 MIN: CPT

## 2024-05-30 PROCEDURE — 97535 SELF CARE MNGMENT TRAINING: CPT

## 2024-05-30 RX ORDER — BUPRENORPHINE AND NALOXONE 8; 2 MG/1; MG/1
1 FILM, SOLUBLE BUCCAL; SUBLINGUAL 2 TIMES DAILY
Status: DISCONTINUED | OUTPATIENT
Start: 2024-05-30 | End: 2024-06-04 | Stop reason: HOSPADM

## 2024-05-30 RX ADMIN — GUAIFENESIN 600 MG: 600 TABLET, EXTENDED RELEASE ORAL at 20:45

## 2024-05-30 RX ADMIN — GABAPENTIN 600 MG: 600 TABLET, FILM COATED ORAL at 08:34

## 2024-05-30 RX ADMIN — BUPRENORPHINE AND NALOXONE 1 FILM: 8; 2 FILM, SOLUBLE BUCCAL; SUBLINGUAL at 20:46

## 2024-05-30 RX ADMIN — TIOTROPIUM BROMIDE INHALATION SPRAY 2 PUFF: 3.12 SPRAY, METERED RESPIRATORY (INHALATION) at 08:35

## 2024-05-30 RX ADMIN — PALIPERIDONE 3 MG: 3 TABLET, EXTENDED RELEASE ORAL at 08:34

## 2024-05-30 RX ADMIN — GABAPENTIN 600 MG: 600 TABLET, FILM COATED ORAL at 20:45

## 2024-05-30 RX ADMIN — BUPRENORPHINE AND NALOXONE 1 FILM: 8; 2 FILM, SOLUBLE BUCCAL; SUBLINGUAL at 14:22

## 2024-05-30 RX ADMIN — DULOXETINE HYDROCHLORIDE 30 MG: 30 CAPSULE, DELAYED RELEASE ORAL at 08:34

## 2024-05-30 RX ADMIN — AMLODIPINE BESYLATE 5 MG: 5 TABLET ORAL at 08:35

## 2024-05-30 RX ADMIN — GABAPENTIN 600 MG: 600 TABLET, FILM COATED ORAL at 14:22

## 2024-05-30 RX ADMIN — HYDROXYZINE HYDROCHLORIDE 50 MG: 50 TABLET, FILM COATED ORAL at 15:10

## 2024-05-30 RX ADMIN — Medication 1000 UNITS: at 08:34

## 2024-05-30 RX ADMIN — GUAIFENESIN 600 MG: 600 TABLET, EXTENDED RELEASE ORAL at 08:34

## 2024-05-30 NOTE — BH NOTE
[facility]  Day 3 Interdisciplinary Treatment Plan NOTE    Review Date & Time: 5/30/2024   1249    Admission Type:   Admission Type: Voluntary    Reason for admission:  Reason for Admission: Patient hearing and seeing a person who is telling herself to kill herself, her family, to take photos of her neice, demanding her to pay money states that they come through the floor. She is fearful of the person and family is feeling unsafe with her in the house at this time. She lives with parents and is chronically on 3L O2 and uses motorized wheelchair to get around.  Estimated Length of Stay:  5-7 days  Estimated Discharge Date Update:   to be determined by physician    PATIENT STRENGTHS:  Patient Strengths:   Patient Strengths and Limitations:Limitations: External locus of control, Difficulty problem solving/relies on others to help solve problems, Inappropriate/potentially harmful leisure interests, Apathetic / unmotivated, General negative or hopeless attitude about future/recovery, Difficult relationships / poor social skills  Addictive Behavior:Addictive Behavior  In the Past 3 Months, Have You Felt or Has Someone Told You That You Have a Problem With  :  (BASIL - Patient did not participate in assessment due to extreme somnolence)  Medical Problems:  Past Medical History:   Diagnosis Date    Acute on chronic respiratory failure with hypoxia and hypercapnia (HCC) 5/18/2023    Anxiety     Arthritis     O.A.    Bipolar depression (HCC)     Dermatitis     Dysuria     Exposure to STD     Folliculitis     Hx of acute bronchitis     Hypertension     Marfan syndrome     MVP (mitral valve prolapse)     Nausea & vomiting     Polysubstance abuse (HCC)     drug abuse includes cocaine, fentnyl, cannabis    PONV (postoperative nausea and vomiting)     Scoliosis     Seasonal allergies     Staph infection     HX OF ALSO BACTEREMIA    UTI (lower urinary tract infection)        Risk:  Fall Risk   Griffin Scale Griffin

## 2024-05-30 NOTE — GROUP NOTE
Group Therapy Note    Date: 5/30/2024    Group Start Time: 1430  Group End Time: 1530  Group Topic: Cognitive Skills    Brittney Bianchi CTRS        Group Therapy Note    Attendees: 6/12     Topic: To increase socialization and practice self expression, explore values, and   preferences and relate to peers.         Patient did not participate in Cognitive Skills Group at 14:30, despite staff encouragement   and explanation of benefits. Pt was sleeping in bed, in room during group and did not wake upon approach..        Q15 minute safety checks maintained for patient safety and will continue to encourage   patient to attend unit programming.       Discipline Responsible: Psychoeducational Specialist  Signature:  KARINA GARCIA

## 2024-05-30 NOTE — PROCEDURES
PROCEDURE NOTE  Date: 5/29/2024   Name: Opal Valladares  YOB: 1980    Procedures        EEG REPORT       Patient: Opal Valladares Age: 44 y.o.  MRN: [id number]      Referring Provider: No ref. provider found    History: This routine 30 minute scalp EEG was recorded with video- monitoring for a 44 y.o.. female who presented with encephalopathy. This EEG was performed to evaluate for focal and epileptiform abnormalities.     Opal Valladares   Current Facility-Administered Medications   Medication Dose Route Frequency Provider Last Rate Last Admin    paliperidone (INVEGA) extended release tablet 3 mg  3 mg Oral Daily Partha Acuna MD   3 mg at 05/29/24 1615    guaiFENesin (MUCINEX) extended release tablet 600 mg  600 mg Oral BID Ernst Hay MD   600 mg at 05/29/24 2044    amLODIPine (NORVASC) tablet 5 mg  5 mg Oral Daily Bia Salmeron MD   5 mg at 05/29/24 0925    nicotine (NICODERM CQ) 21 MG/24HR 1 patch  1 patch TransDERmal Daily Samy Tillman MD   1 patch at 05/29/24 0937    albuterol sulfate HFA (PROVENTIL;VENTOLIN;PROAIR) 108 (90 Base) MCG/ACT inhaler 2 puff  2 puff Inhalation 4x Daily PRN Samy Tillman MD        DULoxetine (CYMBALTA) extended release capsule 30 mg  30 mg Oral Daily Samy Tillman MD   30 mg at 05/29/24 0925    gabapentin (NEURONTIN) tablet 600 mg  600 mg Oral TID Samy Tillman MD   600 mg at 05/29/24 2044    tiotropium (SPIRIVA RESPIMAT) 2.5 MCG/ACT inhaler 2 puff  2 puff Inhalation Daily RT Samy Tillman MD   2 puff at 05/29/24 0925    Vitamin D (CHOLECALCIFEROL) tablet 1,000 Units  1,000 Units Oral Daily Samy Tillman MD   1,000 Units at 05/29/24 0925    haloperidol (HALDOL) tablet 5 mg  5 mg Oral Q6H PRN Partha Acuna MD   5 mg at 05/28/24 2055    And    LORazepam (ATIVAN) tablet 2 mg  2 mg Oral Q6H PRN Partha Acuna MD   2 mg at 05/28/24 2055    haloperidol lactate (HALDOL) injection 5 mg  5 mg IntraMUSCular Q6H PRN

## 2024-05-31 PROCEDURE — 6370000000 HC RX 637 (ALT 250 FOR IP): Performed by: PSYCHIATRY & NEUROLOGY

## 2024-05-31 PROCEDURE — 99232 SBSQ HOSP IP/OBS MODERATE 35: CPT | Performed by: INTERNAL MEDICINE

## 2024-05-31 PROCEDURE — 99232 SBSQ HOSP IP/OBS MODERATE 35: CPT | Performed by: PSYCHIATRY & NEUROLOGY

## 2024-05-31 PROCEDURE — APPSS30 APP SPLIT SHARED TIME 16-30 MINUTES

## 2024-05-31 PROCEDURE — 6370000000 HC RX 637 (ALT 250 FOR IP): Performed by: INTERNAL MEDICINE

## 2024-05-31 PROCEDURE — 1240000000 HC EMOTIONAL WELLNESS R&B

## 2024-05-31 PROCEDURE — 81001 URINALYSIS AUTO W/SCOPE: CPT

## 2024-05-31 RX ORDER — PALIPERIDONE 6 MG/1
6 TABLET, EXTENDED RELEASE ORAL DAILY
Status: DISCONTINUED | OUTPATIENT
Start: 2024-06-01 | End: 2024-06-02

## 2024-05-31 RX ORDER — CEPHALEXIN 500 MG/1
500 CAPSULE ORAL EVERY 8 HOURS SCHEDULED
Status: DISCONTINUED | OUTPATIENT
Start: 2024-05-31 | End: 2024-06-03

## 2024-05-31 RX ADMIN — GABAPENTIN 600 MG: 600 TABLET, FILM COATED ORAL at 08:44

## 2024-05-31 RX ADMIN — GABAPENTIN 600 MG: 600 TABLET, FILM COATED ORAL at 13:28

## 2024-05-31 RX ADMIN — BUPRENORPHINE AND NALOXONE 1 FILM: 8; 2 FILM, SOLUBLE BUCCAL; SUBLINGUAL at 08:44

## 2024-05-31 RX ADMIN — HALOPERIDOL 5 MG: 5 TABLET ORAL at 16:30

## 2024-05-31 RX ADMIN — DULOXETINE HYDROCHLORIDE 30 MG: 30 CAPSULE, DELAYED RELEASE ORAL at 08:43

## 2024-05-31 RX ADMIN — HYDROXYZINE HYDROCHLORIDE 50 MG: 50 TABLET, FILM COATED ORAL at 08:44

## 2024-05-31 RX ADMIN — CEPHALEXIN 500 MG: 500 CAPSULE ORAL at 17:28

## 2024-05-31 RX ADMIN — AMLODIPINE BESYLATE 5 MG: 5 TABLET ORAL at 08:44

## 2024-05-31 RX ADMIN — LORAZEPAM 2 MG: 1 TABLET ORAL at 16:30

## 2024-05-31 RX ADMIN — TIOTROPIUM BROMIDE INHALATION SPRAY 2 PUFF: 3.12 SPRAY, METERED RESPIRATORY (INHALATION) at 09:03

## 2024-05-31 RX ADMIN — IBUPROFEN 400 MG: 400 TABLET, FILM COATED ORAL at 12:13

## 2024-05-31 RX ADMIN — PALIPERIDONE 3 MG: 3 TABLET, EXTENDED RELEASE ORAL at 08:43

## 2024-05-31 RX ADMIN — GUAIFENESIN 600 MG: 600 TABLET, EXTENDED RELEASE ORAL at 08:44

## 2024-05-31 RX ADMIN — Medication 1000 UNITS: at 08:44

## 2024-05-31 RX ADMIN — ACETAMINOPHEN 650 MG: 325 TABLET ORAL at 12:13

## 2024-05-31 NOTE — BH NOTE
PRN Note:     Patient asked for Tylenol 650mg and Motrin 400mg for pain.Patient requests for gabapentin, informed scheduled for 1400. Tylenol and Motrin provided to patient. Patient currently sitting in day area watching TV and social with peers.

## 2024-05-31 NOTE — BH NOTE
Dr. Fraga informed via perfectserve call of patients cloudy urine. Patient denied pain during urination. Per / Flakito \"I will start her on an antibiotic and we will need a UA\". Patient informed and verbalized understanding.

## 2024-05-31 NOTE — GROUP NOTE
Group Therapy Note    Date: 5/31/2024    Group Start Time: 1430  Group End Time: 1520  Group Topic: Music Therapy    Jony Vieyra        Group Therapy Note    Attendees: 4/11       Patient's Goal:  Patients shared songs to dedicate to important people in their lives, answering questions about these people and relationships as asked by this writer based on their songs and sharing. Patient goals to reflect on relationships; Increase sense of community; Increase socialization; Increase self-expression    Notes:  Patient attended and participated in group having positive interactions with peers and staff throughout. Patient was pleasant and engaging throughout. Shared music and dedicated song to her son. States she used to sing the song to her son when he was younger, every morning after he woke up. Talked about things she likes to do with her son and lessons she learned being a mother    Status After Intervention:  Improved    Participation Level: Active Listener and Interactive    Participation Quality: Appropriate, Attentive, and Sharing      Speech:  normal      Thought Process/Content: Logical  Linear      Affective Functioning: Congruent      Mood: euthymic      Level of consciousness:  Alert and Attentive      Response to Learning: Able to verbalize current knowledge/experience and Progressing to goal      Endings: None Reported    Modes of Intervention: Support, Socialization, Exploration, Activity, Media, and Reality-testing      Discipline Responsible: Psychoeducational Specialist      Signature:  Jony Burgos

## 2024-05-31 NOTE — GROUP NOTE
Group Therapy Note    Date: 5/31/2024    Group Start Time: 1000  Group End Time: 1045  Group Topic: Psychotherapy    [facility] Janelle Isidro MSW        Group Therapy Note    Attendees: 2/12       Patient's Goal:  Identify personal strengths and discuss how to use them in the future.    Notes:     Status After Intervention:  Improved    Participation Level: Active Listener and Interactive    Participation Quality: Appropriate, Attentive, and Sharing      Speech:  normal      Thought Process/Content: Logical  Linear      Affective Functioning: Congruent      Mood: euthymic      Level of consciousness:  Alert and Oriented x4      Response to Learning: Able to verbalize current knowledge/experience and Able to verbalize/acknowledge new learning      Endings: None Reported    Modes of Intervention: Education and Support      Discipline Responsible: /Counselor      Signature:  NANDA Lyles

## 2024-05-31 NOTE — BH NOTE
Emergency Medication Follow-Up Note:    PRN medication of Haldol 5mg and Ativan 2mg PO was effective as evidence by patient ceasing to cry, and reports she does not feel like she is being electrified anymore\".  Patient denies medication side effects. Will continue to monitor and provide support as needed.

## 2024-05-31 NOTE — BH NOTE
Emergency PRN Note:    Patient heard yelling out in day area \"Excuse me. We are being electrocuted here. Someone is electrifying us\". Patient was able to be redirected and talk with 1:1 staff. Patient then approached writer and states \"I know I'm being electrocuted, look at me (pt raises arm)\". Patient very tearful, patient asked for medication to help anxiety. Haldol 5mg and Ativan 2mg given, with patient accepting. Patient and writer sit in day area, coloring and listening to soft Rock Music.

## 2024-06-01 LAB
BACTERIA URNS QL MICRO: ABNORMAL
BILIRUB UR QL STRIP: NEGATIVE
CASTS #/AREA URNS LPF: ABNORMAL /LPF
CLARITY UR: ABNORMAL
COLOR UR: YELLOW
EPI CELLS #/AREA URNS HPF: ABNORMAL /HPF
GLUCOSE UR STRIP-MCNC: NEGATIVE MG/DL
HGB UR QL STRIP.AUTO: NEGATIVE
KETONES UR STRIP-MCNC: NEGATIVE MG/DL
LEUKOCYTE ESTERASE UR QL STRIP: ABNORMAL
NITRITE UR QL STRIP: NEGATIVE
PH UR STRIP: 7.5 [PH] (ref 5–8)
PROT UR STRIP-MCNC: NEGATIVE MG/DL
RBC #/AREA URNS HPF: ABNORMAL /HPF
SP GR UR STRIP: 1.02 (ref 1–1.03)
UROBILINOGEN UR STRIP-ACNC: NORMAL EU/DL (ref 0–1)
WBC #/AREA URNS HPF: ABNORMAL /HPF

## 2024-06-01 PROCEDURE — 6370000000 HC RX 637 (ALT 250 FOR IP): Performed by: PSYCHIATRY & NEUROLOGY

## 2024-06-01 PROCEDURE — 6370000000 HC RX 637 (ALT 250 FOR IP): Performed by: INTERNAL MEDICINE

## 2024-06-01 PROCEDURE — 87086 URINE CULTURE/COLONY COUNT: CPT

## 2024-06-01 PROCEDURE — 99232 SBSQ HOSP IP/OBS MODERATE 35: CPT | Performed by: PSYCHIATRY & NEUROLOGY

## 2024-06-01 PROCEDURE — 1240000000 HC EMOTIONAL WELLNESS R&B

## 2024-06-01 PROCEDURE — APPSS30 APP SPLIT SHARED TIME 16-30 MINUTES

## 2024-06-01 RX ADMIN — CEPHALEXIN 500 MG: 500 CAPSULE ORAL at 06:32

## 2024-06-01 RX ADMIN — GABAPENTIN 600 MG: 600 TABLET, FILM COATED ORAL at 11:00

## 2024-06-01 RX ADMIN — TRAZODONE HYDROCHLORIDE 50 MG: 50 TABLET ORAL at 20:38

## 2024-06-01 RX ADMIN — GABAPENTIN 600 MG: 600 TABLET, FILM COATED ORAL at 10:02

## 2024-06-01 RX ADMIN — BUPRENORPHINE AND NALOXONE 1 FILM: 8; 2 FILM, SOLUBLE BUCCAL; SUBLINGUAL at 10:00

## 2024-06-01 RX ADMIN — GUAIFENESIN 600 MG: 600 TABLET, EXTENDED RELEASE ORAL at 20:38

## 2024-06-01 RX ADMIN — AMLODIPINE BESYLATE 5 MG: 5 TABLET ORAL at 10:02

## 2024-06-01 RX ADMIN — CEPHALEXIN 500 MG: 500 CAPSULE ORAL at 16:10

## 2024-06-01 RX ADMIN — GUAIFENESIN 600 MG: 600 TABLET, EXTENDED RELEASE ORAL at 10:02

## 2024-06-01 RX ADMIN — HALOPERIDOL 5 MG: 5 TABLET ORAL at 21:11

## 2024-06-01 RX ADMIN — TIOTROPIUM BROMIDE INHALATION SPRAY 2 PUFF: 3.12 SPRAY, METERED RESPIRATORY (INHALATION) at 10:06

## 2024-06-01 RX ADMIN — GABAPENTIN 600 MG: 600 TABLET, FILM COATED ORAL at 20:38

## 2024-06-01 RX ADMIN — PALIPERIDONE 6 MG: 6 TABLET, EXTENDED RELEASE ORAL at 10:02

## 2024-06-01 RX ADMIN — BUPRENORPHINE AND NALOXONE 1 FILM: 8; 2 FILM, SOLUBLE BUCCAL; SUBLINGUAL at 20:40

## 2024-06-01 RX ADMIN — Medication 1000 UNITS: at 10:02

## 2024-06-01 RX ADMIN — CEPHALEXIN 500 MG: 500 CAPSULE ORAL at 20:38

## 2024-06-01 RX ADMIN — HYDROXYZINE HYDROCHLORIDE 50 MG: 50 TABLET, FILM COATED ORAL at 20:38

## 2024-06-01 RX ADMIN — DULOXETINE HYDROCHLORIDE 30 MG: 30 CAPSULE, DELAYED RELEASE ORAL at 10:05

## 2024-06-01 ASSESSMENT — PAIN SCALES - GENERAL: PAINLEVEL_OUTOF10: 7

## 2024-06-01 ASSESSMENT — PAIN DESCRIPTION - LOCATION: LOCATION: BACK

## 2024-06-01 ASSESSMENT — PAIN DESCRIPTION - ORIENTATION: ORIENTATION: MID;LOWER

## 2024-06-01 NOTE — GROUP NOTE
Group Therapy Note    Date: 6/1/2024    Group Start Time: 1100  Group End Time: 1140  Group Topic: Cognitive Skills    Hetal Gonzalez CTRS        Group Therapy Note    Attendees: 4/12    Cognitive Skills Group Note        Date: June 1, 2024       Start Time: 11am  End Time: 11:40am      Number of Participants in Group & Unit Census:  4/12    Topic:  interpersonal skills, memory recall, self-expression    Goal of Group: To improve interpersonal skills and memory recall through collaborating with peers and demonstrating self-expression.      Comments:     Patient did not participate in Cognitive Skills group, despite staff encouragement and explanation of benefits.  Patient remain seclusive to self.  Q15 minute safety checks maintained for patient safety and will continue to encourage patient to attend unit programming.        Signature:  KARINA Byrne

## 2024-06-01 NOTE — BH NOTE
Patient night medications held due to patient sleeping. Able to arouse but falls back to sleep shortly after. Pt continues on antibiotic, afebrile. No adverse reaction noted.

## 2024-06-02 LAB
MICROORGANISM SPEC CULT: NORMAL
SPECIMEN DESCRIPTION: NORMAL

## 2024-06-02 PROCEDURE — 99232 SBSQ HOSP IP/OBS MODERATE 35: CPT | Performed by: PSYCHIATRY & NEUROLOGY

## 2024-06-02 PROCEDURE — 6370000000 HC RX 637 (ALT 250 FOR IP): Performed by: PSYCHIATRY & NEUROLOGY

## 2024-06-02 PROCEDURE — 6370000000 HC RX 637 (ALT 250 FOR IP): Performed by: INTERNAL MEDICINE

## 2024-06-02 PROCEDURE — APPSS30 APP SPLIT SHARED TIME 16-30 MINUTES: Performed by: NURSE PRACTITIONER

## 2024-06-02 PROCEDURE — 1240000000 HC EMOTIONAL WELLNESS R&B

## 2024-06-02 RX ORDER — DULOXETIN HYDROCHLORIDE 20 MG/1
40 CAPSULE, DELAYED RELEASE ORAL DAILY
Status: DISCONTINUED | OUTPATIENT
Start: 2024-06-03 | End: 2024-06-04 | Stop reason: HOSPADM

## 2024-06-02 RX ORDER — PALIPERIDONE 9 MG/1
9 TABLET, EXTENDED RELEASE ORAL DAILY
Status: DISCONTINUED | OUTPATIENT
Start: 2024-06-03 | End: 2024-06-04 | Stop reason: HOSPADM

## 2024-06-02 RX ADMIN — BUPRENORPHINE AND NALOXONE 1 FILM: 8; 2 FILM, SOLUBLE BUCCAL; SUBLINGUAL at 20:49

## 2024-06-02 RX ADMIN — GUAIFENESIN 600 MG: 600 TABLET, EXTENDED RELEASE ORAL at 09:34

## 2024-06-02 RX ADMIN — IBUPROFEN 400 MG: 400 TABLET, FILM COATED ORAL at 13:33

## 2024-06-02 RX ADMIN — PALIPERIDONE 6 MG: 6 TABLET, EXTENDED RELEASE ORAL at 09:34

## 2024-06-02 RX ADMIN — AMLODIPINE BESYLATE 5 MG: 5 TABLET ORAL at 09:34

## 2024-06-02 RX ADMIN — BUPRENORPHINE AND NALOXONE 1 FILM: 8; 2 FILM, SOLUBLE BUCCAL; SUBLINGUAL at 09:34

## 2024-06-02 RX ADMIN — CEPHALEXIN 500 MG: 500 CAPSULE ORAL at 06:16

## 2024-06-02 RX ADMIN — GABAPENTIN 600 MG: 600 TABLET, FILM COATED ORAL at 13:33

## 2024-06-02 RX ADMIN — GABAPENTIN 600 MG: 600 TABLET, FILM COATED ORAL at 20:49

## 2024-06-02 RX ADMIN — ACETAMINOPHEN 650 MG: 325 TABLET ORAL at 13:33

## 2024-06-02 RX ADMIN — TIOTROPIUM BROMIDE INHALATION SPRAY 2 PUFF: 3.12 SPRAY, METERED RESPIRATORY (INHALATION) at 09:37

## 2024-06-02 RX ADMIN — GUAIFENESIN 600 MG: 600 TABLET, EXTENDED RELEASE ORAL at 20:49

## 2024-06-02 RX ADMIN — GABAPENTIN 600 MG: 600 TABLET, FILM COATED ORAL at 09:34

## 2024-06-02 RX ADMIN — Medication 1000 UNITS: at 09:34

## 2024-06-02 RX ADMIN — CEPHALEXIN 500 MG: 500 CAPSULE ORAL at 13:33

## 2024-06-02 RX ADMIN — DULOXETINE HYDROCHLORIDE 30 MG: 30 CAPSULE, DELAYED RELEASE ORAL at 09:34

## 2024-06-02 ASSESSMENT — PAIN DESCRIPTION - LOCATION: LOCATION: PELVIS

## 2024-06-02 ASSESSMENT — PAIN DESCRIPTION - DESCRIPTORS: DESCRIPTORS: ACHING

## 2024-06-02 ASSESSMENT — PAIN SCALES - GENERAL: PAINLEVEL_OUTOF10: 8

## 2024-06-02 ASSESSMENT — PAIN - FUNCTIONAL ASSESSMENT: PAIN_FUNCTIONAL_ASSESSMENT: PREVENTS OR INTERFERES SOME ACTIVE ACTIVITIES AND ADLS

## 2024-06-02 NOTE — BH NOTE
Emergency Medication Follow-Up Note:    PRN medication of haldol 5 mg  was effective as evidence by patient was able to regain behavior control and is resting quietly in bed. Patient denies medication side effects. Will continue to monitor and provide support as needed.

## 2024-06-02 NOTE — BH NOTE
Emergency PRN Medication Administration Note:      Patient is Agitated as evidence by tearful, reporting increased feelings of someone sending electric shocks through the floor into her and reports of increased anxiety. Staff attempted to find and relieve the distress by Talking to patient, Refocusing on new activity, and Offering suggestions Patient is currently  continuing to escalate and accepting of PRN medication. Medication Administered as prescribed: haldol 5 mg given. Patient requested ativan, also, but patient educated that ativan was discontinued by doctor. Patient Tolerated medication administration.   Will continue to monitor, offer support, and reassess.

## 2024-06-02 NOTE — GROUP NOTE
Group Therapy Note    Date: 6/2/2024    Group Start Time: 1800  Group End Time: 1830  Group Topic: Nursing    Divya Ramey, RN        Group Therapy Note    Attendees: 11/11         Patient participated in Sunday Safety Checks. No contraband found.

## 2024-06-02 NOTE — GROUP NOTE
Group Therapy Note    Date: 6/2/2024    Group Start Time: 1100  Group End Time: 1140  Group Topic: Cognitive Skills    Hetal Gonzalez CTRS        Group Therapy Note    Attendees: 3/12       Patient's Goal:  To improve interpersonal skills and decision-making through collaborating with peers and concentrating on a presented task.    Notes:  Patient attended and observed the last 10 minutes of group. Patient was able to collaborate with peers and concentrate on a presented task while in group. Patient was pleasant.     Status After Intervention:  Improved    Participation Level: Active Listener and Interactive    Participation Quality: Appropriate and Attentive      Speech:  normal      Thought Process/Content: Logical      Affective Functioning: Blunted      Mood: anxious      Level of consciousness:  Alert and Attentive      Response to Learning: Able to verbalize current knowledge/experience, Able to retain information, and Progressing to goal      Endings: None Reported    Modes of Intervention: Socialization, Exploration, Problem-solving, and Activity      Discipline Responsible: Psychoeducational Specialist      Signature:  KARINA Byrne

## 2024-06-03 PROCEDURE — 6370000000 HC RX 637 (ALT 250 FOR IP): Performed by: INTERNAL MEDICINE

## 2024-06-03 PROCEDURE — 6370000000 HC RX 637 (ALT 250 FOR IP): Performed by: PSYCHIATRY & NEUROLOGY

## 2024-06-03 PROCEDURE — APPSS30 APP SPLIT SHARED TIME 16-30 MINUTES

## 2024-06-03 PROCEDURE — 99232 SBSQ HOSP IP/OBS MODERATE 35: CPT | Performed by: PSYCHIATRY & NEUROLOGY

## 2024-06-03 PROCEDURE — 97110 THERAPEUTIC EXERCISES: CPT

## 2024-06-03 PROCEDURE — 1240000000 HC EMOTIONAL WELLNESS R&B

## 2024-06-03 PROCEDURE — 90833 PSYTX W PT W E/M 30 MIN: CPT | Performed by: PSYCHIATRY & NEUROLOGY

## 2024-06-03 PROCEDURE — 97530 THERAPEUTIC ACTIVITIES: CPT

## 2024-06-03 PROCEDURE — 97116 GAIT TRAINING THERAPY: CPT

## 2024-06-03 RX ADMIN — Medication 1000 UNITS: at 08:52

## 2024-06-03 RX ADMIN — GUAIFENESIN 600 MG: 600 TABLET, EXTENDED RELEASE ORAL at 21:05

## 2024-06-03 RX ADMIN — BUPRENORPHINE AND NALOXONE 1 FILM: 8; 2 FILM, SOLUBLE BUCCAL; SUBLINGUAL at 08:52

## 2024-06-03 RX ADMIN — IBUPROFEN 400 MG: 400 TABLET, FILM COATED ORAL at 06:48

## 2024-06-03 RX ADMIN — BUPRENORPHINE AND NALOXONE 1 FILM: 8; 2 FILM, SOLUBLE BUCCAL; SUBLINGUAL at 21:04

## 2024-06-03 RX ADMIN — GABAPENTIN 600 MG: 600 TABLET, FILM COATED ORAL at 08:52

## 2024-06-03 RX ADMIN — AMLODIPINE BESYLATE 5 MG: 5 TABLET ORAL at 08:52

## 2024-06-03 RX ADMIN — GABAPENTIN 600 MG: 600 TABLET, FILM COATED ORAL at 14:07

## 2024-06-03 RX ADMIN — TRAZODONE HYDROCHLORIDE 50 MG: 50 TABLET ORAL at 21:05

## 2024-06-03 RX ADMIN — GABAPENTIN 600 MG: 600 TABLET, FILM COATED ORAL at 21:05

## 2024-06-03 RX ADMIN — HYDROXYZINE HYDROCHLORIDE 50 MG: 50 TABLET, FILM COATED ORAL at 21:05

## 2024-06-03 RX ADMIN — GUAIFENESIN 600 MG: 600 TABLET, EXTENDED RELEASE ORAL at 08:52

## 2024-06-03 RX ADMIN — PALIPERIDONE 9 MG: 9 TABLET, EXTENDED RELEASE ORAL at 08:52

## 2024-06-03 RX ADMIN — TIOTROPIUM BROMIDE INHALATION SPRAY 2 PUFF: 3.12 SPRAY, METERED RESPIRATORY (INHALATION) at 08:53

## 2024-06-03 RX ADMIN — IBUPROFEN 400 MG: 400 TABLET, FILM COATED ORAL at 21:05

## 2024-06-03 RX ADMIN — CEPHALEXIN 500 MG: 500 CAPSULE ORAL at 06:29

## 2024-06-03 RX ADMIN — DULOXETINE HYDROCHLORIDE 40 MG: 20 CAPSULE, DELAYED RELEASE ORAL at 08:52

## 2024-06-03 ASSESSMENT — PAIN DESCRIPTION - LOCATION
LOCATION: GENERALIZED
LOCATION: BACK

## 2024-06-03 ASSESSMENT — PAIN DESCRIPTION - DESCRIPTORS: DESCRIPTORS: ACHING

## 2024-06-03 ASSESSMENT — PAIN SCALES - GENERAL
PAINLEVEL_OUTOF10: 7
PAINLEVEL_OUTOF10: 5

## 2024-06-03 NOTE — BH NOTE
Patient refused her ordered keflex at this time education provided patient continued to refuse and stated \"I'll take it in the morning but not tonight.\"

## 2024-06-03 NOTE — BH NOTE
1323: Dr. Fraga reached out regarding patient negative growth culture and pharmacist recommendation to discontinue Keflex.     1324: RN provided orders to discontinue Keflex. Readback completed.

## 2024-06-03 NOTE — GROUP NOTE
Group Therapy Note    Date: 6/3/2024    Group Start Time: 1430  Group End Time: 1515  Group Topic: Psychoeducation    Hetal Gonzalez CTRS        Group Therapy Note    Attendees: 2/11    Psych-Ed/Relapse Prevention Group Note        Date: Rhonda 3, 2024    Start Time: 2:30pm  End Time: 3:15pm      Number of Participants in Group & Unit Census:  2/11    Topic:  interpersonal skills, coping skills, self-expression     Goal of Group: To improve interpersonal skills and coping skills awareness through collaborating with peers and demonstrating self-expression.       Comments:     Patient did not participate in Psych-Ed/Relapse Prevention group, despite staff encouragement and explanation of benefits.  Patient remain seclusive to self.  Q15 minute safety checks maintained for patient safety and will continue to encourage patient to attend unit programming.        Signature:  KARINA Byrne

## 2024-06-03 NOTE — GROUP NOTE
Group Therapy Note    Date: 6/3/2024    Group Start Time: 1000  Group End Time: 1040  Group Topic: Psychotherapy     Janelle Isidro MSW        Group Therapy Note    Attendees: 2/12     Patient was offered group therapy today but declined to participate despite encouragement from staff.  1:1 was offered.    Discipline Responsible: /Counselor      Signature:  NANDA Lyles

## 2024-06-03 NOTE — GROUP NOTE
Group Therapy Note    Date: 6/3/2024    Group Start Time: 1100  Group End Time: 1155  Group Topic: Cognitive Skills    [facility] Hetal Herndon CTRS        Group Therapy Note    Attendees: 7/12       Patient's Goal:  To improve interpersonal skills and decision-making through collaborating with peers and concentrating on a presented task.     Notes:  Patient attended and participated in group. Patient was able to collaborate with peers and concentrate on a presented task. Patient was pleasant and cooperative.     Status After Intervention:  Improved    Participation Level: Active Listener and Interactive    Participation Quality: Appropriate, Attentive, and Supportive      Speech:  normal      Thought Process/Content: Logical and Linear      Affective Functioning: Constricted      Mood: anxious, brightened       Level of consciousness:  Alert and Attentive      Response to Learning: Able to verbalize current knowledge/experience, Able to verbalize/acknowledge new learning, Able to retain information, and Progressing to goal      Endings: None Reported    Modes of Intervention: Socialization, Exploration, Problem-solving, and Activity      Discipline Responsible: Psychoeducational Specialist      Signature:  KARINA Byrne

## 2024-06-04 VITALS
RESPIRATION RATE: 16 BRPM | OXYGEN SATURATION: 98 % | HEIGHT: 70 IN | SYSTOLIC BLOOD PRESSURE: 132 MMHG | WEIGHT: 195 LBS | BODY MASS INDEX: 27.92 KG/M2 | DIASTOLIC BLOOD PRESSURE: 106 MMHG | HEART RATE: 91 BPM | TEMPERATURE: 97.5 F

## 2024-06-04 PROCEDURE — 6370000000 HC RX 637 (ALT 250 FOR IP): Performed by: INTERNAL MEDICINE

## 2024-06-04 PROCEDURE — 6370000000 HC RX 637 (ALT 250 FOR IP): Performed by: PSYCHIATRY & NEUROLOGY

## 2024-06-04 PROCEDURE — 99239 HOSP IP/OBS DSCHRG MGMT >30: CPT | Performed by: PSYCHIATRY & NEUROLOGY

## 2024-06-04 RX ORDER — AMLODIPINE BESYLATE 5 MG/1
5 TABLET ORAL DAILY
Qty: 30 TABLET | Refills: 3 | Status: SHIPPED | OUTPATIENT
Start: 2024-06-05

## 2024-06-04 RX ORDER — DULOXETINE 40 MG/1
40 CAPSULE, DELAYED RELEASE ORAL DAILY
Qty: 30 CAPSULE | Refills: 0 | Status: SHIPPED | OUTPATIENT
Start: 2024-06-05

## 2024-06-04 RX ORDER — NICOTINE 21 MG/24HR
1 PATCH, TRANSDERMAL 24 HOURS TRANSDERMAL DAILY
Qty: 30 PATCH | Refills: 3 | Status: SHIPPED | OUTPATIENT
Start: 2024-06-05

## 2024-06-04 RX ORDER — POLYETHYLENE GLYCOL 3350 17 G/17G
17 POWDER, FOR SOLUTION ORAL DAILY PRN
Qty: 527 G | Refills: 0 | Status: SHIPPED | OUTPATIENT
Start: 2024-06-04 | End: 2024-07-04

## 2024-06-04 RX ORDER — TRAZODONE HYDROCHLORIDE 50 MG/1
50 TABLET ORAL NIGHTLY PRN
Qty: 30 TABLET | Refills: 0 | Status: SHIPPED | OUTPATIENT
Start: 2024-06-04

## 2024-06-04 RX ORDER — MELATONIN
1000 DAILY
Qty: 30 TABLET | Refills: 0 | Status: SHIPPED | OUTPATIENT
Start: 2024-06-04 | End: 2024-07-04

## 2024-06-04 RX ORDER — PALIPERIDONE 9 MG/1
9 TABLET, EXTENDED RELEASE ORAL DAILY
Qty: 7 TABLET | Refills: 0 | Status: SHIPPED | OUTPATIENT
Start: 2024-06-05

## 2024-06-04 RX ORDER — BUPRENORPHINE AND NALOXONE 8; 2 MG/1; MG/1
1 FILM, SOLUBLE BUCCAL; SUBLINGUAL 2 TIMES DAILY
Qty: 1 FILM | Refills: 0
Start: 2024-06-04 | End: 2024-07-04

## 2024-06-04 RX ORDER — GUAIFENESIN 600 MG/1
600 TABLET, EXTENDED RELEASE ORAL 2 TIMES DAILY
Qty: 20 TABLET | Refills: 0 | Status: SHIPPED | OUTPATIENT
Start: 2024-06-04

## 2024-06-04 RX ADMIN — DULOXETINE HYDROCHLORIDE 40 MG: 20 CAPSULE, DELAYED RELEASE ORAL at 08:10

## 2024-06-04 RX ADMIN — GABAPENTIN 600 MG: 600 TABLET, FILM COATED ORAL at 08:10

## 2024-06-04 RX ADMIN — GABAPENTIN 600 MG: 600 TABLET, FILM COATED ORAL at 14:07

## 2024-06-04 RX ADMIN — BUPRENORPHINE AND NALOXONE 1 FILM: 8; 2 FILM, SOLUBLE BUCCAL; SUBLINGUAL at 08:10

## 2024-06-04 RX ADMIN — PALIPERIDONE 9 MG: 9 TABLET, EXTENDED RELEASE ORAL at 08:11

## 2024-06-04 RX ADMIN — Medication 1000 UNITS: at 08:10

## 2024-06-04 RX ADMIN — AMLODIPINE BESYLATE 5 MG: 5 TABLET ORAL at 08:10

## 2024-06-04 RX ADMIN — TIOTROPIUM BROMIDE INHALATION SPRAY 2 PUFF: 3.12 SPRAY, METERED RESPIRATORY (INHALATION) at 08:10

## 2024-06-04 RX ADMIN — GUAIFENESIN 600 MG: 600 TABLET, EXTENDED RELEASE ORAL at 08:10

## 2024-06-04 NOTE — DISCHARGE INSTRUCTIONS
Information:  Medications:   Medication summary provided   I understand that I should take only the medications on my list.     -why and when I need to take each medicine.     -which side effects to watch for.     -that I should carry my medication information at all times in case of     Emergency situations.    I will take all of my medicines to follow up appointments.     -check with my physician or pharmacist before taking any new    Medication, over the counter product or drink alcohol.    -Ask about food, drug or dietary supplement interactions.    -discard old lists and update records with medication providers.    Notify Physician:  Notify physician if you notice:   Always call 911 if you feel your life is in danger  In case of an emergency call 911 immediately!  If 911 is not available call your local emergency medical system for help    Behavioral Health Follow Up:  Original Referral Source:[facility]/Progress toward goals: medication stabilization and group/ supportive therapy  Recommendations for Level of Care: compliance with medications and follow up appointments  Patient status at discharge: stable  My hospital  was: [facility]  Aftercare plan faxed:    -faxed by: RN   -date: 6/4/24   -time: 1600  Prescriptions: sent to patient pharmacy    Smoking: Quit Smoking.   Call the [employer]'s smoking quitline at 6-658-89S-QUIT  Know the signs of a heart attack   If you have any of the following symptoms call 911 immediately, do not wait more    Than five minutes.    1. Pressure, fullness and/ or squeezing in the center of the chest spreading to    The jaw, neck or shoulder.    2. Chest discomfort with light headedness, fainting, sweating, nausea or    Shortness of breath.   3. Upper abdominal pressure or discomfort.   4. Lower chest pain, back pain, unusual fatigue, shortness of breath, nausea   Or dizziness.     General Information:   Questions regarding your bill: Call HELP program ([phone])

## 2024-06-04 NOTE — CARE COORDINATION
Social work arranged transport with [employer]. [facility] confirmed [facility] will be here at 3:30 pm to take patient home.

## 2024-06-04 NOTE — TRANSITION OF CARE
Behavioral Health Transition Record    Patient Name: Opal Valladares  YOB: 1980   Medical Record Number: [id number]  Date of Admission: 5/26/2024  5:22 PM   Date of Discharge: 6/4/24    Attending Provider: Samy Tillman MD   Discharging Provider: Primo  To contact this individual call [phone] and ask the  to page.  If unavailable, ask to be transferred to Behavioral Health Provider on call.  A Behavioral Health Provider will be available on call 24/7 and during holidays.    Primary Care Provider: Jazzy Ma APRN - CNP    Allergies   Allergen Reactions    Ketamine Other (See Comments)    Morphine Hives       Reason for Admission: Patient hearing and seeing a person who is telling herself to kill herself, her family, to take photos of her neice, demanding her to pay money states that they come through the floor. She is fearful of the person and family is feeling unsafe with her in the house at this time. She lives with parents and is chronically on 3L O2 and uses motorized wheelchair to get around.     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]  Acute psychosis (HCC) [F23]    * No surgery found *    Results for orders placed or performed during the hospital encounter of 05/26/24   Culture, Urine    Specimen: Urine, clean catch   Result Value Ref Range    Specimen Description .CLEAN CATCH URINE     Culture NO SIGNIFICANT GROWTH    CBC with Auto Differential   Result Value Ref Range    WBC 6.1 3.5 - 11.0 k/uL    RBC 4.58 4.0 - 5.2 m/uL    Hemoglobin 12.6 12.0 - 16.0 g/dL    Hematocrit 39.4 36 - 46 %    MCV 85.9 80 - 100 fL    MCH 27.5 26 - 34 pg    MCHC 32.1 31 - 37 g/dL    RDW 12.5 11.5 - 14.9 %    Platelets 359 150 - 450 k/uL    MPV 6.9 6.0 - 12.0 fL    Neutrophils % 73 (H) 36 - 66 %    Lymphocytes % 20 (L) 24 - 44 %    Monocytes % 6 1 - 7 %    Eosinophils % 1 0 - 4 %    Basophils % 0 0 - 2 %    Neutrophils Absolute 4.40 1.3 -

## 2024-06-04 NOTE — DISCHARGE INSTR - DIET
Good nutrition is important when healing from an illness, injury, or surgery.  Follow any nutrition recommendations given to you during your hospital stay.   If you were given an oral nutrition supplement while in the hospital, continue to take this supplement at home.  You can take it with meals, in-between meals, and/or before bedtime. These supplements can be purchased at most local grocery stores, pharmacies, and chain [employer]-stores.   If you have any questions about your diet or nutrition, call the hospital and ask for the dietitian.  General diet  Increased fluids

## 2024-06-04 NOTE — GROUP NOTE
Group Therapy Note    Date: 6/4/2024    Group Start Time: 1330  Group End Time: 1400  Group Topic: Psychoeducation    [facility]    Ivania Sprague CTRS        Group Therapy Note    Attendees: 6/11       Patient's Goal:  pt will demonstrate improved coping skills and improved communication skills     Notes:   pt was pleasant and participated well    Status After Intervention:  Improved    Participation Level: Active Listener and Interactive    Participation Quality: Appropriate, Sharing, and Supportive      Speech:  normal      Thought Process/Content: Logical      Affective Functioning: Congruent      Mood: euthymic      Level of consciousness:  Alert      Response to Learning: Able to verbalize current knowledge/experience, Capable of insight, and Progressing to goal      Endings: None Reported    Modes of Intervention: Education, Support, and Activity      Discipline Responsible: Psychoeducational Specialist      Signature:  KARINA DANIEL

## 2024-06-04 NOTE — BH NOTE
[facility]al [facility]  Discharge Note    Pt discharged with followings belongings:   Dental Appliances: None  Vision - Corrective Lenses: None  Hearing Aid: None  Jewelry: Ring, Bracelet, Necklace  Body Piercings Removed: N/A  Clothing: Shirt, Pants, Undergarments  Other Valuables: Money, Purse, Wallet, Credit/Debit Card, Other (Comment) (breeze-vape, condom)   Valuables sent home with patient. Valuables retrieved from safe, Security envelope number:  [id number]; [id number] and returned to patient. Patient education on aftercare instructions: yes  Information faxed to [facility] by RN Patient verbalize understanding of AVS:  yes.    Status EXAM upon discharge:  Mental Status and Behavioral Exam  Normal: No  Level of Assistance: Independent/Self  Facial Expression: Flat  Affect: Appropriate  Level of Consciousness: Alert  Frequency of Checks: 1 staff: 1 patient  - continuous  Mood:Normal: No  Mood: Depressed, Anxious  Motor Activity:Normal: No  Motor Activity: Unusual posture/gait, Decreased  Eye Contact: Good  Observed Behavior: Cooperative, Preoccupied  Sexual Misconduct History: Current - no  Preception: [place] to person, [place] to time, [place] to place, [place] to situation  Attention:Normal: No  Attention: Distractible  Thought Processes: Unremarkable  Thought Content:Normal: No  Thought Content: Preoccupations  Depression Symptoms: Isolative, Loss of interest, Change in energy level  Anxiety Symptoms: Generalized  Debi Symptoms: No problems reported or observed.  Hallucinations: Auditory (comment) (patient reports auditory hallucinations of \"postive voices\" encouraging her to walk)  Delusions: No  Delusions: Paranoid  Memory:Normal: Yes  Memory: Poor recent, Poor remote  Insight and Judgment: No  Insight and Judgment: Poor judgment, Poor insight      Metabolic Screening:    Lab Results   Component Value Date    LABA1C 5.0 08/22/2023       Lab Results   Component Value Date    CHOL 210 (H) 08/22/2023    CHOL

## 2024-06-04 NOTE — PROGRESS NOTES
Behavioral Services                                              Medicare Re-Certification    I certify that the inpatient psychiatric hospital services furnished since the previous certification/re-certification were, and continue to be, medically necessary for;    [x] (1) Treatment which could reasonably be expected to improve the patient's condition,    [x] (2) Or for diagnostic study.    Estimated length of stay/service 4-7 days    Plan for post-hospital care home with outpatient [facility] f/u    This patient continues to need, on a daily basis, active treatment furnished directly by or requiring the supervision of inpatient psychiatric personnel.    Electronically signed by SHANIKA DENSON MD on 6/2/2024 at 6:35 PM   
      Behavioral Services  Medicare Certification Upon Admission    I certify that this patient's inpatient psychiatric hospital admission is medically necessary for:    [x] (1) Treatment which could reasonably be expected to improve this patient's condition,       [x] (2) Or for diagnostic study;     AND     [x](2) The inpatient psychiatric services are provided while the individual is under the care of a physician and are included in the individualized plan of care.    Estimated length of stay/service 4 to 7 days    Plan for post-hospital care home with outpatient community mental health follow-up    Electronically signed by SHANIKA DENSON MD on 5/27/2024 at 4:49 PM      
  Daily Progress Note  5/29/2024    Patient Name: Opal Valladares    CHIEF COMPLAINT:  Severe recurrent major depressive disorder with psychotic features          SUBJECTIVE:      Patient is seen today for a follow up assessment. Vitals and labs reviewed, free of any abnormalities. Patient has been compliant with her scheduled medications and has been behaviorally in control. Patient did receive Ativan and Haldol yesterday, specific time not given. Per 1:1 sitter, she has been very fatigued today and has not eaten all day, only having a lemonade to drink. The sitter also mentions that the patient had just come back from an EEG and that the patient slept through the entire procedure. Patient found today in her room with a sitter present, she agreed to private conversation in her room. She appears solemn, fatigued, and labile. Patient states she needs her \"suboxone ASAP\" and has been withdrawing from it since her admit to the [facility]. Patient extremely focused and fixated on requiring suboxone. She states she has been nausea without emesis and has been noted to have trembling arms/hands on visit. She denies visual hallucinations, however endorses continued command hallucinations to harm herself, though patient stats she will not listen to them or act on them. She endorses continuation of tactile hallucinations. Patient denies active suicidal ideation or homicidal ideation. Patient also complains of back and shoulder pain, requesting Zanaflex. At this time patient continues to present with psychotic symptoms thus requires inpatient hospitalization for safety and stabilization.       Appetite:  [] Adequate/Unchanged  [] Increased  [x] Decreased      Sleep:       [x] Adequate/Unchanged  [] Fair  [] Poor      Group Attendance on Unit:   [] Yes   [] Selectively    [x] No    Compliant with scheduled medications: [x] Yes  [] No    Received emergency medications in past 24 hrs: [] Yes   [] No    Medication Side Effects: 
  Daily Progress Note  6/1/2024    Patient Name: Opal Valladares    CHIEF COMPLAINT:  Acute psychosis         SUBJECTIVE:      Patient is seen today for a follow up assessment. Reviewed labs and vitals, all WNL. Patient has been compliant with her scheduled medications and has been behaviorally in control. Patient did receive PRN medication in last 24 hours, Haldol and Ativan on 5/31/24 at 1630.  Per nursing staff patient was yelling in the day area believing that she was being electrocuted and unable to been redirected. Patient found today in her room with one-to-one sitter due to being on supplemental O2 which was noted to have been on 1 L continuous nasal cannula. Patient agrees to conversation within her room. Patient appears fatigued, but pleasant and cooperative. Patient states she slept well last night and her appetite has been getting better. She states her anxiety has been unchanged, however states that her depression has been getting better.  Noted the patient Invega recently titrated to 6 mg p.o. daily however denies noticing significant difference in hallucinations and paranoia.  She continues to endorse auditory command hallucinations, which she states she will not listen to and reports to be \"less loud\". She also endorses continued tactile hallucinations and noticed improvement in visual hallucination. Patient notes improvement in suicidal ideation and homicidal ideation.  It was noted the patient night medication held due to patient increase in sedation however otherwise has remained compliant with medication. Patient continues to require inpatient hospitalization.     Nursing staff reports that patient complained of burning with urination and is currently on Keflex, will monitor for now.  Also per staff patient would like Suboxone to be administered daily instead of twice daily.  Medication management and disposition plan per attending psychiatrist    Appetite:  [] Adequate/Unchanged  [x] Increased  
  Daily Progress Note  6/1/2024    Patient Name: Opal Valladares    CHIEF COMPLAINT:  Acute psychosis         SUBJECTIVE:      Patient is seen today for a follow up assessment. Reviewed labs and vitals, all WNL. Patient has been compliant with her scheduled medications and has been behaviorally in control. Patient did receive PRN medication in last 24 hours, Haldol on 5/31/24 at 1630, Atarax on 5/31/24 at 0844, and Ativan on 5/31/24 at 1630.     Patient found today in her room with one-to-one sitter due to being on supplemental O2. Patient agrees to conversation within her room. Patient appears fatigued, but pleasant and cooperative. Patient states she slept well last night and her appetite has been getting better. She states her anxiety has been unchanged, however states that her depression has been getting better. She continues to endorse auditory command hallucinations, which she states she will not listen to. She also endorses continued tactile hallucinations. Patient notes improvement in suicidal ideation and homicidal ideation. Patient denies visual hallucinations. Patient continues to require inpatient hospitalization.       Appetite:  [] Adequate/Unchanged  [x] Increased  [] Decreased      Sleep:       [x] Adequate/Unchanged  [] Fair  [] Poor      Group Attendance on Unit:   [] Yes   [] Selectively    [x] No    Compliant with scheduled medications: [x] Yes  [] No    Received emergency medications in past 24 hrs: [x] Yes   [] No    Medication Side Effects: Denies         Mental Status Exam  Level of consciousness: Alert and awake   Appearance: Appropriate attire for setting, resting in bed, with fair  grooming and hygiene   Behavior/Motor: Approachable, engages with interviewer, no psychomotor abnormalities   Attitude toward examiner: Cooperative, attentive, fair eye contact  Speech: spontaneous, normal rate, and normal volume   Mood:  \"better\"  Affect: calm, labile  Thought processes: linear and coherent 
  Daily Progress Note  6/3/2024    Patient Name: Opal Valladares    CHIEF COMPLAINT: Acute psychosis          SUBJECTIVE:      Patient is seen today for a follow up assessment while in milieu, declines need for privacy.  Patient has been compliant with scheduled medication.  She has not required any emergency medication last 24 hours.  One-to-one continues due to need for continuous nasal cannula 3 L.  On approach patient is pleasant and states to notice improvement in her mood.  Patient denies any suicidal or homicidal ideation.  She states auditory hallucinations to no longer be command and states to be \"significantly better\".  Patient denies any issues with visual hallucination and states tactile hallucinations to be improving, states less intense and frequent.  She reports to be tolerating titration of Cymbalta and Invega well with no side effects and states noticed improvement in mood.  Patient states clarity of thoughts and reports ready for discharge.  She states plan to return home with parents and [facility]  Patient endorses with improvement in symptoms however requires further hospitalization to ensure safety and stabilization.  Medication regimen and disposition plan per attending psychiatrist.    Appetite:  [] Adequate/Unchanged  [] Increased  [x] Decreased      Sleep:       [x] Adequate/Unchanged  [] Fair  [] Poor      Group Attendance on Unit:   [] Yes   [x] Selectively    [] No    Compliant with scheduled medications: [x] Yes  [] No    Received emergency medications in past 24 hrs: [x] Yes   [] No    Medication Side Effects: Denies         Mental Status Exam  Level of consciousness: Alert and awake   Appearance: Appropriate attire for setting, seated in chair, with fair  grooming and hygiene   Behavior/Motor: Approachable, engages with interviewer, no psychomotor abnormalities   Attitude toward examiner: Cooperative, attentive, good eye contact  Speech: Normal rate, volume, 
CLINICAL PHARMACY NOTE: MEDS TO BEDS    Total # of Prescriptions Filled: 8   The following medications were delivered to the patient:  Duloxetine 40mg  Paliperidone ER 9mg  Trazodone 50mg  Vitamin D3 25mcg(1000unit)  Polyethylene glycol (Generic Miralax)  Nicotine 21mg patch  Mucus relief 600mg(Generic Mucinex)  Amlodipine 5mg      Additional Documentation: 6/4/24 kbg delivered to RICHA Barroso 1:03pm peter  
EEG complete. Results to follow.    Electronically signed by Nadja Mcdowell on 5/29/2024 at 3:25 PM    
IN-PATIENT SERVICE  [facility] Internal Medicine    HISTORY AND PHYSICAL EXAMINATION/ CONSULT NOTE            Date:   5/31/2024  Patient name:  Opal Valladares  Date of admission:  5/26/2024  5:22 PM  MRN:   [id number]  Account:  [id number]  YOB: 1980  PCP:    Jazzy Ma APRN - CNP  Room:   [address] [place] [address]  Code Status:    Full Code    Physician Requesting Consult: Samy Tillman MD    Reason for Consult: History and physical, medical management    Chief Complaint:     Chief Complaint   Patient presents with    Mental Health Problem    Hallucinations     Medical management    History Obtained From:     Patient, EMR, nursing staff    History of Present Illness:     44-year-old female admitted for major depression with psychotic features    Medical history relevant for hypertension Marfan syndrome.       COPD, moderate diagnosed many years ago generally well controlled  Patient uses Symbicort inhaler regularly and albuterol inhaler or nebulizer for exacerbations  Symptoms of cough and shortness of breath with exacerbations improved with above inhalers, worse with respiratory infections or smoke inhalation    5/30  Denies any acute concerns.  On 3 L nasal cannula which is her home requirement.  EEG resulted yesterday, disorganized and slow background suggesting mild to moderate encephalopathy of nonspecific etiology.    Past Medical History:     Past Medical History:   Diagnosis Date    Acute on chronic respiratory failure with hypoxia and hypercapnia (HCC) 5/18/2023    Anxiety     Arthritis     O.A.    Bipolar depression ([facility])     Dermatitis     Dysuria     Exposure to STD     Folliculitis     Hx of acute bronchitis     Hypertension     Marfan syndrome     MVP (mitral valve prolapse)     Nausea & vomiting     Polysubstance abuse (HCC)     drug abuse includes cocaine, fentnyl, cannabis    PONV (postoperative nausea and vomiting)     Scoliosis     Seasonal allergies     Staph 
IN-PATIENT SERVICE  [facility] Internal Medicine    HISTORY AND PHYSICAL EXAMINATION/ CONSULT NOTE            Date:   5/29/2024  Patient name:  Opal Valladares  Date of admission:  5/26/2024  5:22 PM  MRN:   [id number]  Account:  [id number]  YOB: 1980  PCP:    Jazzy Ma APRN - CNP  Room:   [address] [place] [address]  Code Status:    Full Code    Physician Requesting Consult: Samy Tillman MD    Reason for Consult: History and physical, medical management    Chief Complaint:     Chief Complaint   Patient presents with    Mental Health Problem    Hallucinations     Medical management    History Obtained From:     Patient, EMR, nursing staff    History of Present Illness:     44-year-old female admitted for major depression with psychotic features    Medical history relevant for hypertension Marfan syndrome.       COPD, moderate diagnosed many years ago generally well controlled  Patient uses Symbicort inhaler regularly and albuterol inhaler or nebulizer for exacerbations  Symptoms of cough and shortness of breath with exacerbations improved with above inhalers, worse with respiratory infections or smoke inhalation    5/29/2024  The patient continues to report having a nonproductive cough.  Overall feels this is improving.  On 3 L nasal cannula which is her home requirement.    Past Medical History:     Past Medical History:   Diagnosis Date    Acute on chronic respiratory failure with hypoxia and hypercapnia (HCC) 5/18/2023    Anxiety     Arthritis     O.A.    Bipolar depression ([facility])     Dermatitis     Dysuria     Exposure to STD     Folliculitis     Hx of acute bronchitis     Hypertension     Marfan syndrome     MVP (mitral valve prolapse)     Nausea & vomiting     Polysubstance abuse (HCC)     drug abuse includes cocaine, fentnyl, cannabis    PONV (postoperative nausea and vomiting)     Scoliosis     Seasonal allergies     Staph infection     HX OF ALSO BACTEREMIA    UTI (lower 
[facility]   Occupational Therapy Evaluation  Date: 24  Patient Name: Opal Valladares       Room: 0205/0205-01  MRN: [id number]  Account: [id number]   : 1980  (44 y.o.) Gender: female     Discharge Recommendations:  Further Occupational Therapy is recommended upon facility discharge.    OT Equipment Recommendations  Other: TBD    Referring Practitioner: Katy Carlos APRN - CNP  Diagnosis: Acute psychosis   Additional Pertinent Hx: 44-year-old female admitted for major depression with psychotic features     Medical history relevant for hypertension Marfan syndrome.         COPD, moderate diagnosed many years ago generally well controlled  Patient uses Symbicort inhaler regularly and albuterol inhaler or nebulizer for exacerbations  Symptoms of cough and shortness of breath with exacerbations improved with above inhalers, worse with respiratory infections or smoke inhalation    Treatment Diagnosis: Impaired self-care status    Past Medical History:  has a past medical history of Acute on chronic respiratory failure with hypoxia and hypercapnia (HCC), Anxiety, Arthritis, Bipolar depression (HCC), Dermatitis, Dysuria, Exposure to STD, Folliculitis, Hx of acute bronchitis, Hypertension, Marfan syndrome, MVP (mitral valve prolapse), Nausea & vomiting, Polysubstance abuse (HCC), PONV (postoperative nausea and vomiting), Scoliosis, Seasonal allergies, Staph infection, and UTI (lower urinary tract infection).    Past Surgical History:   has a past surgical history that includes Foot surgery (Bilateral); Hip arthroscopy (Left); Knee arthroscopy (Right); Tubal ligation; Breast reduction surgery (Bilateral); Foot Capsulotomy (Left, 14); Shoulder Arthroplasty (Bilateral); pr egd transoral biopsy single/multiple (N/A, 2017); and Upper gastrointestinal endoscopy (2017).    Restrictions  Restrictions/Precautions  Restrictions/Precautions: Other (comment) (Safety 
Occupational Therapy  [facility]   INPATIENT OCCUPATIONAL THERAPY  PROGRESS NOTE  Date: 6/3/2024  Patient Name: Opal Valladares       Room: 0205/0205-01  MRN: [id number]    : 1980  (44 y.o.)  Gender: female   Referring Practitioner: Katy Carlos APRN - CNP  Diagnosis: Acute psychosis      Discharge Recommendations:  Further Occupational Therapy is recommended upon facility discharge.    OT Equipment Recommendations  Other: TBD    Restrictions/Precautions  Restrictions/Precautions  Restrictions/Precautions: Other (comment) (Safety precautions & Activity as tolerated)  Required Braces or Orthoses?: Yes (Pt reports she was told by her doctor to wear bilateral ankle braces d/t weakness. Pt reports she never wears them.)  Implants present? : Metal implants (Pt reports 4 screws in bilateral shoulders and feet)    Pulse:  (Pt's pulse increased to 132 bpm with mobility and decreased to 116 bpm once laying in bed.)  SpO2:  (Pt's SpO2 dropped to 85% post mobility without nasal cannula. Increased to 89% once laying down in bed with O2.)  O2 Device: Nasal cannula  Comment: 3L- pt wears 3L at baseline, SpO2 after mobility reading 95%,  and pt requires seated rest break    Subjective  Subjective  Subjective: \"I can initially stand, but then I need help getting up all the way\" pt states in regards to functional transfers. Pt requires Mod A x2 this date for sit>stand from wheelchair  Subjective  Pain: pt denies pain at rest  Comments: Nursing ok'd pt for OT/PTA co-tx. Pt agreeable to participate and pleasant/cooperative throughout.    Objective  Orientation  Overall Orientation Status: Within Functional Limits  Cognition  Overall Cognitive Status: Exceptions  Arousal/Alertness: Appears intact  Following Commands: Follows one step commands with increased time;Follows one step commands with repetition  Attention Span: Attends with cues to redirect  Memory: Appears intact  Safety 
Pharmacy Med Education Group Note    Date: 06/03/2024  Start Time: 1:30 PM  End Time: 2:30PM    Number Participants in Group:  4    Goal:  Patient will demonstrate an understanding of the medication’s intended purpose and possible adverse effects  Topic: [place] for Pharmacy Med Ed Group    Discipline Responsible:     OT  AT  [facility].  RT     X Other       Participation Level:     None  Minimal      X Active Listener    X Interactive    Monopolizing         Participation Quality:    X Appropriate  Inappropriate     X       Attentive        Intrusive          Sharing        Resistant          Supportive        Lethargic       Affective:     X Congruent  Incongruent  Blunted  Flat    Constricted  Anxious  Elated  Angry    Labile  Depressed  Other         Cognitive:    X Alert  Oriented PPTP     Concentration   X G  F  P   Attention Span   X G  F  P   Short-Term Memory   X G  F  P   Long-Term Memory  G  F  P   ProblemSolving/  Decision Making  G  F  P   Ability to Process  Information   X G  F  P      Contributing Factors             Delusional             Hallucinating             Flight of Ideas             Other:       Modes of Intervention:    X Education   X Support  Exploration    Clarifying  Problem Solving  Confrontation    Socialization  Limit Setting  Reality Testing    Activity  Movement  Media    Other:            Response to Learning:    X Able to verbalize current knowledge/experience    Able to verbalize/acknowledge new learning    Able to retain information    Capable of insight    Able to change behavior    Progressing to goal    Other:        Comments:    
Physical Therapy        Physical Therapy Cancel Note      DATE: 2024    NAME: Opal Valladares  MRN: [id number]   : 1980      Patient not seen this date for Physical Therapy due to:    Other: Cx; patient discharging per RN, awaiting transport. Will continue to follow for therapy updates.      Electronically signed by Alanna Mak PTA on 2024 at 1:16 PM      
Physical Therapy  Facility/Department: [facility]  Physical Therapy Initial Assessment    Name: Opal Valladares  : 1980  MRN: [id number]  Date of Service: 2024    Discharge Recommendations:  Patient would benefit from continued therapy after discharge, Therapy recommended at discharge   PT Equipment Recommendations  Other: Pt has rollator and power chair      Patient Diagnosis(es): The encounter diagnosis was Acute psychosis (HCC).  Past Medical History:  has a past medical history of Acute on chronic respiratory failure with hypoxia and hypercapnia (HCC), Anxiety, Arthritis, Bipolar depression (HCC), Dermatitis, Dysuria, Exposure to STD, Folliculitis, Hx of acute bronchitis, Hypertension, Marfan syndrome, MVP (mitral valve prolapse), Nausea & vomiting, Polysubstance abuse (HCC), PONV (postoperative nausea and vomiting), Scoliosis, Seasonal allergies, Staph infection, and UTI (lower urinary tract infection).  Past Surgical History:  has a past surgical history that includes Foot surgery (Bilateral); Hip arthroscopy (Left); Knee arthroscopy (Right); Tubal ligation; Breast reduction surgery (Bilateral); Foot Capsulotomy (Left, 14); Shoulder Arthroplasty (Bilateral); pr egd transoral biopsy single/multiple (N/A, 2017); and Upper gastrointestinal endoscopy (2017).    Assessment   Assessment: Pt with impaired mobility due to weakness and decreased endurance. At baseline pt ambulates short distance with rollator, uses power chair for distances. Pt uses 3 lt o2 at home. Will benefit from physical therapy to address her deficits.  Treatment Diagnosis: Impaired function  Specific Instructions for Next Treatment: Encourage OOB activity, ambulation, monitor spo2, use o2 during activity  Therapy Prognosis: Fair  Decision Making: Medium Complexity  Requires PT Follow-Up: Yes  Activity Tolerance  Activity Tolerance: Patient limited by fatigue;Patient limited by endurance;Patient limited by pain     Plan 
RT ASSESSMENT TREATMENT GOALS    [x]Pt Goal:  Pt will identify 1-2 positive coping skills by time of discharge.    []Pt Goal:  Pt will identify 1-2 positive aspects of self by time of discharge.    [x]Pt Goal:  Pt will remain on task/topic for 15-30 minutes during group by time of discharge.    [x]Pt Goal:  Pt will identify 1-2 aspects of relapse prevention plan by time of discharge.    []Pt Goal:  Pt will join in conversation with peers 1-2 times per group by time of discharge.    []Pt Goal:  Pt will identify 1-2 new leisure interests by time of discharge.    []Pt Goal:  Pt will not voice any delusional content by time of discharge.   
Spoke with NANCY Celestin at [facility], ([phone]) and confirmed patient is dosing with [facility]. She was given two 8/2 mg films and two 2/0.5 mg films for total dose of 20 mg of Buprenorphine. Patient last seen on 5/21/24 and was given enough Suboxone to last until 6/4/24.  
Writer spoke with Linda CRUZ to schedule EEG. Testing scheduled for 1400. Per RN, PT able to remain still for hour period and is likely agreeable to have testing performed. Any questions/updates, please call [phone].    Electronically signed by Nadja Mcdowell on 5/29/2024 at 9:11 AM    
Judgement: Decreased awareness of need for assistance;Decreased awareness of need for safety  Problem Solving: Assistance required to identify errors made  Insights: Decreased awareness of deficits  Initiation: Requires cues for some  Sequencing: Requires cues for some     Objective   Vitals  O2 Device: Nasal cannula  Comment: 3L O2. SpO2 reading 95% after mobility and  with recovery to low 100s after ~2 minutes  Bed Mobility Training  Bed Mobility Training: No (pt up in wheelchair in common area to begin and end tx)  Balance  Sitting: Intact  Standing: With support (CGA static standing, Grzegorz with  ambulation)  Transfer Training  Transfer Training: Yes  Overall Level of Assistance: Moderate assistance;Assist X2  Interventions: Verbal cues;Safety awareness training  Sit to Stand: Moderate assistance;Assist X2 (Pt freezes at ~ midpoint of stand, requires increased assist at that point to complete stand.)  Stand to Sit: Minimum assistance  Gait Training  Gait Training: Yes  Gait  Gait Training: Yes  Overall Level of Assistance: Minimum assistance;Contact-guard assistance (CGA mostly but intermittently Grzegorz)  Distance (ft):  (25 ft with 2 180 degree turns)  Assistive Device: Gait belt;Walker, rolling  Interventions: Safety awareness training  Base of Support: Widened  Speed/Maddy: Slow  Step Length: Left shortened;Right shortened  Stance: Weight shift  Gait Abnormalities: Decreased step clearance;Trunk sway increased     PT Exercises  A/AROM Exercises: Seated BLE AROM exercises x 10; BUE exercises facilitated by OT.  Functional Mobility Circuit Training: STS x 1     Safety Devices  Type of Devices: Nurse notified;Sitter present;Left in chair  Restraints  Restraints Initially in Place: No       Goals  Short Term Goals  Time Frame for Short Term Goals: 6 to 8 visits  Short Term Goal 1: Supine<>sit mod-I  Short Term Goal 2: Sit<>stand min A  Short Term Goal 3: Transfers with RW , CGA  Short Term Goal 4: Gait with 
topically 3 times daily Apply topically 3 times daily.   tiZANidine (ZANAFLEX) 4 MG tablet Take 1 tablet by mouth 3 times daily as needed   DULoxetine (CYMBALTA) 30 MG extended release capsule Take 1 capsule by mouth daily   gabapentin (NEURONTIN) 600 MG tablet Take 1 tablet by mouth 3 times daily.   albuterol sulfate HFA (VENTOLIN HFA) 108 (90 Base) MCG/ACT inhaler Inhale 2 puffs into the lungs 4 times daily as needed for Wheezing   buprenorphine-naloxone (SUBOXONE) 8-2 MG FILM SL film Place 2.5 Film under the tongue daily.         Please let me know if you have any questions about this encounter. Thank you!    Electronically signed by Nayla Chou RPH on 5/29/2024 at 11:57 AM     
05/26/2024 10  9 - 17 mmol/L Final    Glucose 05/26/2024 101 (H)  70 - 99 mg/dL Final    BUN 05/26/2024 10  6 - 20 mg/dL Final    Creatinine 05/26/2024 0.6  0.5 - 0.9 mg/dL Final    Est, Glom Filt Rate 05/26/2024 >90  >60 mL/min/1.73m2 Final    Comment:       These results are not intended for use in patients <18 years of age.        eGFR results are calculated without a race factor using the 2021 CKD-EPI equation.  Careful clinical correlation is recommended, particularly when comparing to results   calculated using previous equations.  The CKD-EPI equation is less accurate in patients with extremes of muscle mass, extra-renal   metabolism of creatine, excessive creatine ingestion, or following therapy that affects   renal tubular secretion.      Calcium 05/26/2024 10.1  8.6 - 10.4 mg/dL Final    Total Protein 05/26/2024 7.6  6.4 - 8.3 g/dL Final    Albumin 05/26/2024 4.4  3.5 - 5.2 g/dL Final    Total Bilirubin 05/26/2024 0.4  0.3 - 1.2 mg/dL Final    Alkaline Phosphatase 05/26/2024 116 (H)  35 - 104 U/L Final    ALT 05/26/2024 11  5 - 33 U/L Final    AST 05/26/2024 13  <32 U/L Final    Salicylate Lvl 05/26/2024 <1.0 (L)  3 - 10 mg/dL Final    Acetaminophen Level 05/26/2024 <5 (L)  10 - 30 ug/mL Final    Ethanol Lvl 05/26/2024 <10  <10 mg/dL Final    Ethanol percent 05/26/2024 <0.010  % Final    Magnesium 05/26/2024 2.3  1.6 - 2.6 mg/dL Final    Amphetamine Screen, Ur 05/26/2024 NEGATIVE  NEGATIVE Final    Comment:       (Positive cutoff 1000 ng/mL)                  Barbiturate Screen, Ur 05/26/2024 NEGATIVE  NEGATIVE Final    Comment:       (Positive cutoff 200 ng/mL)                  Benzodiazepine Screen, Urine 05/26/2024 NEGATIVE  NEGATIVE Final    Comment:       (Positive cutoff 200 ng/mL)                  Cocaine Metabolite, Urine 05/26/2024 NEGATIVE  NEGATIVE Final    Comment:       (Positive cutoff 300 ng/mL)                  Methadone Screen, Urine 05/26/2024 NEGATIVE  NEGATIVE Final    Comment:     
°F (36.7 °C)    No results for input(s): \"POCGLU\" in the last 72 hours.  No intake or output data in the 24 hours ending 05/30/24 1549    General Appearance:  alert, well appearing, and in no acute distress  Head:  normocephalic, atraumatic.  Eye: no icterus, redness, pupils equal and reactive, extraocular eye movements intact, conjunctiva clear  Ear: normal external ear, no discharge, hearing intact  Nose:  no drainage noted  Mouth: mucous membranes moist  Neck: supple, no carotid bruits, thyroid not palpable  Lungs: Bilateral equal air entry, clear to ausculation, no wheezing, rales or rhonchi, normal effort  Cardiovascular: normal rate, regular rhythm, no murmur, gallop, rub.  Abdomen: Soft, nontender, nondistended, normal bowel sounds, no hepatomegaly or splenomegaly  Neurologic: There are no new focal motor or sensory deficits, normal muscle tone and bulk, no abnormal sensation, normal speech, cranial nerves II through XII grossly intact  Skin: No gross lesions, rashes, bruising or bleeding on exposed skin area  Extremities:  No joint swelling, no pedal edema or calf pain with palpation      Investigations:      Laboratory Testing:  No results found for this or any previous visit (from the past 24 hour(s)).    Imaging/Diagonstics:  Recent data reviewed    Assessment :      Primary Problem  Acute psychosis (HCC)    Active Hospital Problems    Diagnosis Date Noted    Acute psychosis (HCC) [F23] 05/25/2021       Plan:     Reason for consult: General medical management     Depression with psychotic features-management per psychiatry  Hypertension, controlled  COPD on 3 L oxygen-currently controlled resume as needed albuterol and Spiriva, patient on home dose of oxygen currently  Labs and vitals reviewed and satisfactory including CBC CMP.  TSH within normal limits  Substance abuse patient advised to quit cannabis use.  EEG results reviewed    Patient seen, examined.  No acute concerns, stable.  Internal medicine 
50 mg, 50 mg, IntraMUSCular, Q6H PRN  acetaminophen (TYLENOL) tablet 650 mg, 650 mg, Oral, Q6H PRN  ibuprofen (ADVIL;MOTRIN) tablet 400 mg, 400 mg, Oral, Q6H PRN  hydrOXYzine HCl (ATARAX) tablet 50 mg, 50 mg, Oral, TID PRN  traZODone (DESYREL) tablet 50 mg, 50 mg, Oral, Nightly PRN  polyethylene glycol (GLYCOLAX) packet 17 g, 17 g, Oral, Daily PRN  aluminum & magnesium hydroxide-simethicone (MAALOX) 200-200-20 MG/5ML suspension 30 mL, 30 mL, Oral, Q6H PRN    ASSESSMENT  Acute psychosis (HCC)         PATIENT HANDOFF  Patient symptoms are: Improving  Per attending restarting Suboxone  Monitor need and frequency of PRN medications.  Encourage participation in groups and milieu.  Medication changes and discharge planning per attending  Follow-up daily while inpatient.     Patient continues to be monitored in the inpatient psychiatric facility at [facility] for safety and stabilization. Patient continues to need, on a daily basis, active treatment furnished directly by or requiring the supervision of inpatient psychiatric personnel.    Electronically signed by GUIDO Kennedy CNP on 5/30/2024 at 2:42 PM    **This report has been created using voice recognition software. It may contain minor errors which are inherent in voice recognition technology.**                                          Psychiatry Attending Attestation     I independently saw and evaluated the patient.  I reviewed the Advance Practice Provider's documentation above.  Any additional comments or changes to the Advance Practice Provider's documentation are stated below otherwise agree with assessment.    Patient mentions since switching from Risperdal to Invega her voices have been well-controlled.  Reports that she continues to hear some voices at nighttime but very minimal now.  Tolerating medication adjustments well and denies any side effects.  Hopeful about her recovery now.  Reviewed her Suboxone from [facility] and will restart it today.  
Urobilinogen, Urine 05/31/2024 Normal  0.0 - 1.0 EU/dL Final    Nitrite, Urine 05/31/2024 NEGATIVE  NEGATIVE Final    Leukocyte Esterase, Urine 05/31/2024 SMALL (A)  NEGATIVE Final    WBC, UA 05/31/2024 6 TO 9 (A)  0 TO 5 /HPF Final    RBC, UA 05/31/2024 3 to 5 (A)  0 TO 2 /HPF Final    Casts UA 05/31/2024 0 TO 2 (A)  None /LPF Final    Epithelial Cells, UA 05/31/2024 21 TO 50  /HPF Final    Bacteria, UA 05/31/2024 MODERATE (A)  None Final    Specimen Description 06/01/2024 .CLEAN CATCH URINE   Final    Culture 06/01/2024 NO SIGNIFICANT GROWTH   Final         Reviewed patient's current plan of care and vital signs with nursing staff.    Labs reviewed: [x] Yes    Medications  Current Facility-Administered Medications: cephALEXin (KEFLEX) capsule 500 mg, 500 mg, Oral, 3 times per day  paliperidone (INVEGA) extended release tablet 6 mg, 6 mg, Oral, Daily  buprenorphine-naloxone (SUBOXONE) 8-2 MG SL film 1 Film, 1 Film, SubLINGual, BID  guaiFENesin (MUCINEX) extended release tablet 600 mg, 600 mg, Oral, BID  amLODIPine (NORVASC) tablet 5 mg, 5 mg, Oral, Daily  nicotine (NICODERM CQ) 21 MG/24HR 1 patch, 1 patch, TransDERmal, Daily  albuterol sulfate HFA (PROVENTIL;VENTOLIN;PROAIR) 108 (90 Base) MCG/ACT inhaler 2 puff, 2 puff, Inhalation, 4x Daily PRN  DULoxetine (CYMBALTA) extended release capsule 30 mg, 30 mg, Oral, Daily  gabapentin (NEURONTIN) tablet 600 mg, 600 mg, Oral, TID  tiotropium (SPIRIVA RESPIMAT) 2.5 MCG/ACT inhaler 2 puff, 2 puff, Inhalation, Daily RT  Vitamin D (CHOLECALCIFEROL) tablet 1,000 Units, 1,000 Units, Oral, Daily  haloperidol (HALDOL) tablet 5 mg, 5 mg, Oral, Q6H PRN **AND** [DISCONTINUED] LORazepam (ATIVAN) tablet 2 mg, 2 mg, Oral, Q6H PRN  haloperidol lactate (HALDOL) injection 5 mg, 5 mg, IntraMUSCular, Q6H PRN **AND** [DISCONTINUED] LORazepam (ATIVAN) injection 2 mg, 2 mg, IntraMUSCular, Q6H PRN **AND** diphenhydrAMINE (BENADRYL) injection 50 mg, 50 mg, IntraMUSCular, Q6H PRN  acetaminophen 
Patient also on Suboxone which she reports at 20 mg daily from [facility].  Patient requesting from this author benzodiazepines, I did redirect her and indicated that would not be a medication we would consider adding given the concerns that I had just expressed.  PLAN  Patient s symptoms   show modest signs of improvement manage unstable base  EEG and will ask pharmacy to please verify patient's Suboxone dosing  Attempt to develop insight  Psycho-education conducted.  Supportive Therapy conducted.  Probable discharge is undetermined at this time  Follow-up daily while on inpatient unit      
was adamant about going on Invega.  Discussed with her about switching Risperdal to Invega today.  Was requesting her Suboxone however OARRS report did not indicate any recent refill of Suboxone.  Mentions that she has been getting it from [facility].  Discussed with her that we will call [facility] and confirm that before going back on Suboxone.  Continues to report hearing negative voices saying that her family will be killed.  Mentions that she was having thoughts about killing herself that seeing her family being killed.     ASSESSMENT  Acute psychosis (HCC)    PLAN  Patient's symptoms show no change  Will switch risperdal to invega  Medications risks, benefits and alternatives were discussed with the patient  Attempt to develop insight  Psycho-education conducted.  Supportive Therapy conducted.  Probable discharge is tbd  Follow-up TBD    Electronically signed by Partha Acuna MD on 5/29/24 at 8:31 PM EDT

## 2024-06-04 NOTE — PLAN OF CARE
Problem: Psychosis  Goal: Will report no hallucinations or delusions  Description: INTERVENTIONS:  1. Administer medication as  ordered  2. Assist with reality testing to support increasing orientation  3. Assess if patient's hallucinations or delusions are encouraging self harm or harm to others and intervene as appropriate  Outcome: Not Progressing     Problem: Anxiety  Goal: Will report anxiety at manageable levels  Description: INTERVENTIONS:  1. Administer medication as ordered  2. Teach and rehearse alternative coping skills  3. Provide emotional support with 1:1 interaction with staff  Outcome: Not Progressing     Problem: Safety - Adult  Goal: Free from fall injury  Outcome: Progressing     Problem: Self Harm/Suicidality  Goal: Will have no self-injury during hospital stay  Description: INTERVENTIONS:  1.  Ensure constant observer at bedside with Q15M safety checks  2.  Maintain a safe environment  3.  Secure patient belongings  4.  Ensure family/visitors adhere to safety recommendations  5.  Ensure safety tray has been added to patient's diet order  6.  Every shift and PRN: Re-assess suicidal risk via Frequent Screener  Outcome: Progressing  Patient accepting of assessment. During assessment patient appeared to be drowsy/sluggish and sleepy but arousal enough to participate with answering a few questions. Patient denied suicidal ideations and visual hallucinations, admits to command auditory hallucinations. Patient requested ear plugs to help drown out commands. Stated she doesn't want to hear the voices anymore. Patient reports an increase in anxiety. Medication was given as prescribed for increased anxiety and sleep. Patient encouraged to report intrusive and distressing thoughts. Patient has remained free of falls this shift. Safety checks maintained every 15 minutes and 1:1 continued for patient safety.  
  Problem: Psychosis  Goal: Will report no hallucinations or delusions  Description: INTERVENTIONS:  1. Administer medication as  ordered  2. Assist with reality testing to support increasing orientation  3. Assess if patient's hallucinations or delusions are encouraging self harm or harm to others and intervene as appropriate  Outcome: Progressing     Problem: Anxiety  Goal: Will report anxiety at manageable levels  Description: INTERVENTIONS:  1. Administer medication as ordered  2. Teach and rehearse alternative coping skills  3. Provide emotional support with 1:1 interaction with staff  Outcome: Progressing       Patient denies any suicidal or homicidal ideations, patient reports she continues to hear and see people who are telling her to \"fall\" and negative things. Patient was up and in the wheelchair today, out with peers, participated in group and played a game. She was more alert and awake today. She still reports anxiety and fear related to the voiced. Patient is on a 1:1 for safety. 15 minute checks and safe environment maintained.   
  Problem: Psychosis  Goal: Will report no hallucinations or delusions  Description: INTERVENTIONS:  1. Administer medication as  ordered  2. Assist with reality testing to support increasing orientation  3. Assess if patient's hallucinations or delusions are encouraging self harm or harm to others and intervene as appropriate  Outcome: Progressing     Problem: Safety - Adult  Goal: Free from fall injury  Outcome: Progressing  Note: Pt remains free of falls; is wearing nonskid footwear; fall risk is indicated on arm band and room door. Educated on \"Stay with Me\" practices to reduce likelihood of falls; agreeable to requesting staff assistance as needed. Q15min checks maintained.      Problem: Self Harm/Suicidality  Goal: Will have no self-injury during hospital stay  Description: INTERVENTIONS:  1.  Ensure constant observer at bedside with Q15M safety checks  2.  Maintain a safe environment  3.  Secure patient belongings  4.  Ensure family/visitors adhere to safety recommendations  5.  Ensure safety tray has been added to patient's diet order  6.  Every shift and PRN: Re-assess suicidal risk via Frequent Screener    Outcome: Progressing  Note: Patient denies suicidal/homicidal ideation. Patient agreeable to seek out staff should thoughts of self harm/harming others arise. Patient reports auditory hallucinations. Patient states that they are getting better. Patient is positive for anxiety/depression but does not rate. Patient is pleasant and cooperative, aloof of peers, did attend groups and spent time in the dayroom this shift. Patient was able to transfer from bed to wheelchair and wheelchair to bed and ambulate to the bathroom with minimal assistance x1 staff.  Patient is medication compliant and behavior controlled. Comfort and reassurance provided. Safety checks maintained every 15 minutes and as needed. 1:1 continues for patient safety      
  Problem: Safety - Adult  Goal: Free from fall injury  5/29/2024 2055 by Marielos Zamora LPN  Outcome: Progressing     Problem: Self Harm/Suicidality  Goal: Will have no self-injury during hospital stay  Description: INTERVENTIONS:  1.  Ensure constant observer at bedside with Q15M safety checks  2.  Maintain a safe environment  3.  Secure patient belongings  4.  Ensure family/visitors adhere to safety recommendations  5.  Ensure safety tray has been added to patient's diet order  6.  Every shift and PRN: Re-assess suicidal risk via Frequent Screener    Outcome: Progressing     Problem: Psychosis  Goal: Will report no hallucinations or delusions  Description: INTERVENTIONS:  1. Administer medication as  ordered  2. Assist with reality testing to support increasing orientation  3. Assess if patient's hallucinations or delusions are encouraging self harm or harm to others and intervene as appropriate  5/29/2024 2055 by Marielos Zamora LPN  Outcome: Progressing     Problem: Anxiety  Goal: Will report anxiety at manageable levels  Description: INTERVENTIONS:  1. Administer medication as ordered  2. Teach and rehearse alternative coping skills  3. Provide emotional support with 1:1 interaction with staff  Outcome: Progressing   Upon assessment patient observed to be lying in bed, resting. Patient acceptable of 1:1 talk with staff. Patient is flat and withdrawn during assessment. Patient states still having auditory hallucinations and an increase in anxiety at this time. Medication was given as prescribed for increased anxiety and sleep. Patient encouraged to report intrusive and distressing thoughts. Patient encouraged to attend unit therapy to explore relaxation techniques when feeling anxious. Patient denies any thoughts of self-harm. Patient has remained free of falls this shift. 1:1 continued for patient safety. Safety checks maintained every 15 minutes.  
  Problem: Safety - Adult  Goal: Free from fall injury  5/30/2024 2148 by Sadaf Castaneda  Outcome: Progressing     Problem: Self Harm/Suicidality  Goal: Will have no self-injury during hospital stay  Description: INTERVENTIONS:  1.  Ensure constant observer at bedside with Q15M safety checks  2.  Maintain a safe environment  3.  Secure patient belongings  4.  Ensure family/visitors adhere to safety recommendations  5.  Ensure safety tray has been added to patient's diet order  6.  Every shift and PRN: Re-assess suicidal risk via Frequent Screener    5/30/2024 2148 by Sadaf Castaneda  Outcome: Progressing   Patient free from fall, requires two person assist with walker per therapy or trisha steady. Patient denies any thoughts to harm self or other. Isolative to room, calm and cooperative. Patient 1:1 continues Patient provided safe environment within [facility] milieu. Will continue to monitor and provide q15 min safety checks.    
  Problem: Safety - Adult  Goal: Free from fall injury  5/31/2024 2107 by Yoli Gutiérrez  Outcome: Progressing   Patient and this writer discuss safety and fall. Patient remain free from falls. 1:1 maintained for safety per order.   
  Problem: Safety - Adult  Goal: Free from fall injury  6/1/2024 2126 by Yoli Gutiérrez  Outcome: Progressing     Problem: Anxiety  Goal: Will report anxiety at manageable levels  Description: INTERVENTIONS:  1. Administer medication as ordered  2. Teach and rehearse alternative coping skills  3. Provide emotional support with 1:1 interaction with staff  6/1/2024 2126 by Yoli Gutiérrez  Outcome: Progressing   Patient and this writer discuss safety and fall. Patient remain free from falls. 1:1 maintained for safety per order. Patient reported having some anxiety during 1:1 talk time and admits to hallucinations. Patient have been out in dayroom, tearful at times. 1:1 maintained for safety and per order.    
  Problem: Safety - Adult  Goal: Free from fall injury  6/3/2024 2203 by Lorena Easton LPN  Outcome: Progressing     Problem: Self Harm/Suicidality  Goal: Will have no self-injury during hospital stay  Description: INTERVENTIONS:  1.  Ensure constant observer at bedside with Q15M safety checks  2.  Maintain a safe environment  3.  Secure patient belongings  4.  Ensure family/visitors adhere to safety recommendations  5.  Ensure safety tray has been added to patient's diet order  6.  Every shift and PRN: Re-assess suicidal risk via Frequent Screener    6/3/2024 2203 by Lorena Easton LPN  Outcome: Progressing     Problem: Psychosis  Goal: Will report no hallucinations or delusions  Description: INTERVENTIONS:  1. Administer medication as  ordered  2. Assist with reality testing to support increasing orientation  3. Assess if patient's hallucinations or delusions are encouraging self harm or harm to others and intervene as appropriate  6/3/2024 2203 by Lorena Easton LPN  Outcome: Progressing      Patient is isolative to her room throughout evening, resting. Patient denies suicidal ideations at this time. Patient reports auditory hallucinations of voices she encouraging her to walk. Patient remains free of falls and verbalizes understanding of individual fall risks. Patient is wearing non-skid footwear and encouraged to seek out staff for any assistance needed. 1:1 continued as ordered for patient safety. Safe environment maintained. ?     
  Problem: Safety - Adult  Goal: Free from fall injury  Note: Ms. Valladares has spent most of the day resting in bed. She did get up with 2 staff assist and ambulate to the bathroom. Ms. Valladares reports she ambulates at home with a walker. Writer notified NP and put ordered a consult for PT/OT to eval and treat. She had a weak gait, and required 2 staff assist. No walker was present. Slip resistant socks are worn. Ms. Valladares remains on a 1:1 observation for safety.      Problem: Skin/Tissue Integrity  Goal: Absence of new skin breakdown  Description: 1.  Monitor for areas of redness and/or skin breakdown  2.  Assess vascular access sites hourly  3.  Every 4-6 hours minimum:  Change oxygen saturation probe site  4.  Every 4-6 hours:  If on nasal continuous positive airway pressure, respiratory therapy assess nares and determine need for appliance change or resting period.  Note: Ms. Valladares declines to roll side to side and notes it is uncomfortable for her to lay on her other side due to her spine curvature. She does get up to use the bathroom with help, and was up for dinner and in the milieu in the evening.      Problem: Self Harm/Suicidality  Goal: Will have no self-injury during hospital stay  Description: INTERVENTIONS:  1.  Ensure constant observer at bedside with Q15M safety checks  2.  Maintain a safe environment  3.  Secure patient belongings  4.  Ensure family/visitors adhere to safety recommendations  5.  Ensure safety tray has been added to patient's diet order  6.  Every shift and PRN: Re-assess suicidal risk via Frequent Screener    Note: Ms. Valladares denies suicidal ideation and thoughts of harm to self and others.      Problem: Psychosis  Goal: Will report no hallucinations or delusions  Description: INTERVENTIONS:  1. Administer medication as  ordered  2. Assist with reality testing to support increasing orientation  3. Assess if patient's hallucinations or delusions are encouraging self harm or harm to 
  Problem: Safety - Adult  Goal: Free from fall injury  Outcome: Progressing     Problem: Psychosis  Goal: Will report no hallucinations or delusions  Description: INTERVENTIONS:  1. Administer medication as  ordered  2. Assist with reality testing to support increasing orientation  3. Assess if patient's hallucinations or delusions are encouraging self harm or harm to others and intervene as appropriate  Outcome: Progressing     Patient denies any suicidal or homicidal ideations, patient reports she continues to hear and see things. EEG completed, patient spoke on the phone with mom. Stayed in bed most of the day, ate minimal of her meals. She still reports anxiety and fear related to the voiced. Patient is on a 1:1 for safety. 15 minute checks and safe environment maintained.   
  Problem: Self Harm/Suicidality  Goal: Will have no self-injury during hospital stay  Description: INTERVENTIONS:  1.  Ensure constant observer at bedside with Q15M safety checks  2.  Maintain a safe environment  3.  Secure patient belongings  4.  Ensure family/visitors adhere to safety recommendations  5.  Ensure safety tray has been added to patient's diet order  6.  Every shift and PRN: Re-assess suicidal risk via Frequent Screener    6/2/2024 2138 by Corine Lazar LPN  Note: Patient is free from self related injuries at this time and she denies any ideas of harm to herself or others. Patient is agreeable to seek out staff should these type of thoughts occur. Patient continues with 1:1 staff for safety.     Problem: Psychosis  Goal: Will report no hallucinations or delusions  Description: INTERVENTIONS:  1. Administer medication as  ordered  2. Assist with reality testing to support increasing orientation  3. Assess if patient's hallucinations or delusions are encouraging self harm or harm to others and intervene as appropriate  6/2/2024 2138 by Corine Lazar LPN  Note: Patient reports auditory hallucinations have improved and she has only heard them a couple times today and she is finding it easier to ignore them when they are present.     Problem: Anxiety  Goal: Will report anxiety at manageable levels  Description: INTERVENTIONS:  1. Administer medication as ordered  2. Teach and rehearse alternative coping skills  3. Provide emotional support with 1:1 interaction with staff  6/2/2024 2138 by Corine Lazar LPN  Note: Patient reports anxiety has been minimal today and has improved since admission.     Problem: Self Care Deficit  Goal: Return ADL status to a safe level of function  Description: INTERVENTIONS:  1. Administer medication as ordered  2. Assess ADL deficits and provide assistive devices as needed  3. Obtain PT/OT consults as needed  4. Assist and instruct patient to 
  Problem: Self Harm/Suicidality  Goal: Will have no self-injury during hospital stay  Description: INTERVENTIONS:  1.  Ensure constant observer at bedside with Q15M safety checks  2.  Maintain a safe environment  3.  Secure patient belongings  4.  Ensure family/visitors adhere to safety recommendations  5.  Ensure safety tray has been added to patient's diet order  6.  Every shift and PRN: Re-assess suicidal risk via Frequent Screener    Patient denies self harm and suicidal ideations this evening. Patient have been isolative to self and have been calm, cooperative. 1:1 maintained for safety and per order.       Outcome: Progressing   Patient denies self harm and admits to   Problem: Anxiety  Goal: Will report anxiety at manageable levels  Description: INTERVENTIONS:  1. Administer medication as ordered  2. Teach and rehearse alternative coping skills  3. Provide emotional support with 1:1 interaction with staff  5/28/2024 2023 by Yoli Gutiérrez  Outcome: Progressing   Patient admits to having some anxiety this evening. Patient have been isolative to self and have been calm, cooperative. 1:1 maintained for safety and per order.   
Problem: Safety - Adult  Goal: Free from fall injury  Outcome: Progressing     Problem: Psychosis  Goal: Will report no hallucinations or delusions  Description: INTERVENTIONS:  1. Administer medication as  ordered  2. Assist with reality testing to support increasing orientation  3. Assess if patient's hallucinations or delusions are encouraging self harm or harm to others and intervene as appropriate  5/28/2024 2251 by Kimberly Collazo RN  Outcome: Progressing       The patient has been isolative to self for the evening. She remains free from falls and injuries. She reports experiencing some auditory hallucinations, she denies visual hallucinations. She also reports some anxiousness and depression. She stated \"my nerves are shot, I need Haldol and Ativan.\" Writer attempted to encourage the patient to try self calming techniques and diversionary activities. She declined and requested emergent medications. PRN Ativan 2 mg and Haldol 5 mg administered as prescribed per request. Writer will continue to offer emotional support. Q15 minute checks and 1:1 precautions to continue for safety.   
Problem: Safety - Adult  Goal: Free from fall injury  Outcome: Progressing   Patient free from falls. Patient uses a wheelchair to ambulate. Due to patient mobility, patient needs help with transfers.     Problem: Self Harm/Suicidality  Goal: Will have no self-injury during hospital stay  Description: INTERVENTIONS:  1.  Ensure constant observer at bedside with Q15M safety checks  2.  Maintain a safe environment  3.  Secure patient belongings  4.  Ensure family/visitors adhere to safety recommendations  5.  Ensure safety tray has been added to patient's diet order  6.  Every shift and PRN: Re-assess suicidal risk via Frequent Screener  Outcome: Progressing   Patient denied suicidal ideations. Safe environment maintained. Safety checks every 15 minutes continued per facility policy.     Problem: Psychosis  Goal: Will report no hallucinations or delusions  Description: INTERVENTIONS:  1. Administer medication as  ordered  2. Assist with reality testing to support increasing orientation  3. Assess if patient's hallucinations or delusions are encouraging self harm or harm to others and intervene as appropriate  Outcome: Progressing   Patient continues to report auditory hallucinations, reports that the voices are negative in nature and call patients name. Patient also states that an individual is tazering the floor to make her and other residents sick. Patient does state she feels like the [place] has helped decreased voices.    Problem: Anxiety  Goal: Will report anxiety at manageable levels  Description: INTERVENTIONS:  1. Administer medication as ordered  2. Teach and rehearse alternative coping skills  3. Provide emotional support with 1:1 interaction with staff  Outcome: Progressing   Patient reports high anxiety related to medication changes to home dosage. Patient is social with 1:1 and emotional support provided. Patient towards end of shift became tearful and overwhelmed with voices, oral PRNS provided. Patient 
Problem: Safety - Adult  Goal: Free from fall injury  Outcome: Progressing   Patient remains free from falls. Patient is transfer to wheelchair with sarasteady, uses wheelchair to ambulate.     Problem: Self Harm/Suicidality  Goal: Will have no self-injury during hospital stay  Description: INTERVENTIONS:  1.  Ensure constant observer at bedside with Q15M safety checks  2.  Maintain a safe environment  3.  Secure patient belongings  4.  Ensure family/visitors adhere to safety recommendations  5.  Ensure safety tray has been added to patient's diet order  6.  Every shift and PRN: Re-assess suicidal risk via Frequent Screener  Outcome: Progressing   Patient denied suicidal ideations. Safe environment maintained. Safety Checks every 15 minutes. Patient a 1:1 for continuous oxygen use.     Problem: Psychosis  Goal: Will report no hallucinations or delusions  Description: INTERVENTIONS:  1. Administer medication as  ordered  2. Assist with reality testing to support increasing orientation  3. Assess if patient's hallucinations or delusions are encouraging self harm or harm to others and intervene as appropriate  Outcome: Progressing   Patient reports auditory hallucinations of voices. Patient states the voices call her \"a bitch\". Patient is delusional. States that another patient on a different unit below her is taking a cell phone and hitting her bed through the floor. Patient goes on to states that this same individual also placed a tazer on her window.     Problem: Anxiety  Goal: Will report anxiety at manageable levels  Description: INTERVENTIONS:  1. Administer medication as ordered  2. Teach and rehearse alternative coping skills  3. Provide emotional support with 1:1 interaction with staff  Outcome: Progressing   Patient reports high anxiety, related to not restarting her suboxone. Patient states \"I can feel it starting to affect me\"    Problem: Self Care Deficit  Goal: Return ADL status to a safe level of 
Problem: Safety - Adult  Goal: Free from fall injury  Outcome: Progressing   Patient remains free from falls. Patient transfers with 2 assist and walker, or uses otoniel steady with transfers.     Problem: Self Harm/Suicidality  Goal: Will have no self-injury during hospital stay  Description: INTERVENTIONS:  1.  Ensure constant observer at bedside with Q15M safety checks  2.  Maintain a safe environment  3.  Secure patient belongings  4.  Ensure family/visitors adhere to safety recommendations  5.  Ensure safety tray has been added to patient's diet order  6.  Every shift and PRN: Re-assess suicidal risk via Frequent Screener  Outcome: Progressing   Patient denied suicidal ideations. Safe environment maintained. Safety checks every 15 minutes continued per facility policy.    Problem: Psychosis  Goal: Will report no hallucinations or delusions  Description: INTERVENTIONS:  1. Administer medication as  ordered  2. Assist with reality testing to support increasing orientation  3. Assess if patient's hallucinations or delusions are encouraging self harm or harm to others and intervene as appropriate  Outcome: Progressing   Patient continues to report auditory hallucinations, states \"They are mostly negative, but sometimes the voices say nice things about me\". Patient does report that her voices are more frequent towards later of day into night. Patient continues to have tactile hallucinations that she is being \"shocked through the floor\".     Problem: Anxiety  Goal: Will report anxiety at manageable levels  Description: INTERVENTIONS:  1. Administer medication as ordered  2. Teach and rehearse alternative coping skills  3. Provide emotional support with 1:1 interaction with staff  Outcome: Progressing   Patient admits to anxiety related to the voices and tactile hallucinations. Patient and writer discussed other coping skills to lower anxiety.     Problem: Self Care Deficit  Goal: Return ADL status to a safe level of 
Behavioral Exam  Normal: No  Level of Assistance: Partial assist  Facial Expression: Flat, Expressionless  Affect: Constricted  Level of Consciousness: Lethargic  Frequency of Checks: 4 times per hour, close  Mood:Normal: No  Mood: Anxious, Sad, Empty  Motor Activity:Normal: No  Motor Activity: Unusual posture/gait, Decreased  Eye Contact: Fair  Observed Behavior: Guarded, Withdrawn, Preoccupied  Sexual Misconduct History: Current - no  Preception: [place] to person, [place] to time, [place] to place, [place] to situation  Attention:Normal: No  Attention: Distractible, Unable to concentrate  Thought Processes: Loose association, Blocking  Thought Content:Normal: No  Thought Content: Delusions, Preoccupations, Paranoia  Depression Symptoms: Impaired concentration, Change in energy level  Anxiety Symptoms: Generalized  Debi Symptoms: No problems reported or observed.  Hallucinations: Auditory (comment), Command (comment)  Delusions: Yes  Delusions: Paranoid  Memory:Normal: No  Memory: Poor recent  Insight and Judgment: No  Insight and Judgment: Poor judgment, Poor insight    EDUCATION:   Learner Progress Toward Treatment Goals: reviewed group plans and strategies for care    Method:group therapy, medication compliance, individualized assessments and care planning    Outcome: needs reinforcement    PATIENT GOALS: to be discussed with patient within 72 hours    PLAN/TREATMENT RECOMMENDATIONS:     continue group therapy , medications compliance, goal setting, individualized assessments and care, continue to monitor pt on unit      SHORT-TERM GOALS:   Time frame for Short-Term Goals: 5-7 days    LONG-TERM GOALS:  Time frame for Long-Term Goals: 6 months  Members Present in Team Meeting: See Signature Sheet    Linda Collins RN

## 2024-06-04 NOTE — GROUP NOTE
Group Therapy Note    Date: 6/4/2024    Group Start Time: 1100  Group End Time: 1135  Group Topic: Cognitive Skills    [facility]      Cognitive Skills Group Note        Date: June 4, 2024 Start Time: 11am  End Time:  1135am      Number of Participants in Group & Unit Census:  1/12    Topic: cognitive skills     Goal of Group: pt will demonstrate improved coping skills and improved concentration      Comments:     Patient did not participate in Cognitive Skills group, despite staff encouragement and explanation of benefits.  Patient remain seclusive to self.  Q15 minute safety checks maintained for patient safety and will continue to encourage patient to attend unit programming.              Signature:  KARINA DANIEL

## 2024-06-04 NOTE — DISCHARGE SUMMARY
Provider Discharge Summary     Patient ID:  Opal Valladares  [id number]  44 y.o.  1980    Admit date: 5/26/2024    Discharge date and time: 6/4/2024  7:51 PM     Admitting Physician: Samy Tillman MD     Discharge Physician: Samy Tillman MD    Admission Diagnoses: Severe recurrent major depression with psychotic features (HCC) [F33.3]  Acute psychosis (HCC) [F23]    Discharge Diagnoses:      Acute psychosis (HCC)     Patient Active Problem List   Diagnosis Code    Marfan syndrome Q87.40    Opioid abuse ([facility]) F11.10    Scoliosis M41.9    H/O long-term treatment with high-risk medication Z79.899    Chronic pain syndrome G89.4    Marfan's syndrome Q87.40    Instability of both shoulder joints M25.311, M25.312    Hip instability M25.359    Benign essential HTN I10    Severe major depression without psychotic features (HCC) F32.2    Depression with suicidal ideation F32.A, R45.851    Bipolar disorder with moderate depression (HCC) F31.32    Opiate withdrawal (HCC) F11.93    Severe recurrent major depression without psychotic features (HCC) F33.2    Severe malnutrition ([facility]) E43    Acute psychosis (HCC) F23    Opioid dependence on agonist therapy ([facility]) F11.20    Cocaine abuse ([facility]) F14.10    Acute hypoxemic respiratory failure due to COVID-19 ([facility]) U07.1, J96.01    COVID-19 virus infection U07.1    Sleep-related breathing disorder G47.30    Hypercapnia R06.89    Myopathy G72.9    Acute on chronic respiratory failure with hypoxia and hypercapnia (HCC) J96.21, J96.22    Anxiety disorder F41.9    Hypercapnic respiratory failure ([facility]) J96.92    Central core myopathy ([facility]) G71.29    COPD exacerbation ([facility]) J44.1    Severe recurrent major depression with psychotic features (HCC) F33.3        Admission Condition: poor    Discharged Condition: stable    Indication for Admission: threat to self    History of Present Illnes (present tense wording is of findings from admission exam and are not necessarily indicative of current

## 2024-06-04 NOTE — GROUP NOTE
Group Therapy Note    Date: 6/4/2024    Group Start Time: 1000  Group End Time: 1040  Group Topic: Psychotherapy    [facility] Janelle Villanueva MSW        Group Therapy Note    Attendees: 4/12       Patient's Goal:  Discuss self-compassion: how to practice it and reflect on areas where more self-compassion is needed    Notes:     Status After Intervention:  Improved    Participation Level: Active Listener and Interactive    Participation Quality: Appropriate, Attentive, and Sharing      Speech:  normal      Thought Process/Content: Logical  Linear      Affective Functioning: Congruent      Mood: anxious and euthymic      Level of consciousness:  Alert and Oriented x4      Response to Learning: Able to verbalize current knowledge/experience and Able to verbalize/acknowledge new learning      Endings: None Reported    Modes of Intervention: Education and Support      Discipline Responsible: /Counselor      Signature:  NANDA Lyles

## 2024-06-05 NOTE — CARE COORDINATION
Name: Opal Valladares    : 1980    Auth number: [id number]     Discharge Date: 2024    Destination: home    *If you have any specific discharge questions, please contact the assigned /discharge planner: Sabi ([phone]) or Edita ([phone])      Discharge Medications:      Medication List        START taking these medications      guaiFENesin 600 MG extended release tablet  Commonly known as: MUCINEX  Take 1 tablet by mouth 2 times daily  Notes to patient: Sinus congestion     nicotine 21 MG/24HR  Commonly known as: NICODERM CQ  Place 1 patch onto the skin daily  Notes to patient: Smoking cessation     paliperidone 9 MG extended release tablet  Commonly known as: INVEGA  Take 1 tablet by mouth daily  Notes to patient: Clear thoughts     polyethylene glycol 17 g packet  Commonly known as: GLYCOLAX  Take 1 packet by mouth daily as needed for Constipation  Notes to patient: constipation     tiotropium 2.5 MCG/ACT Aers inhaler  Commonly known as: SPIRIVA RESPIMAT  Inhale 2 puffs into the lungs daily  Replaces: tiotropium 18 MCG inhalation capsule  Notes to patient: Improve lung fumction     traZODone 50 MG tablet  Commonly known as: DESYREL  Take 1 tablet by mouth nightly as needed for Sleep  Notes to patient: sleep            CHANGE how you take these medications      amLODIPine 5 MG tablet  Commonly known as: NORVASC  Take 1 tablet by mouth daily  What changed:   medication strength  how much to take  Notes to patient: Blood pressure     buprenorphine-naloxone 8-2 MG Film SL film  Commonly known as: SUBOXONE  Place 1 Film under the tongue 2 times daily for 30 days. Patient has home supply Max Daily Amount: 2 Film  What changed:   how much to take  when to take this  additional instructions  Notes to patient: pain     DULoxetine HCl 40 MG Cpep  Take 40 mg by mouth daily  What changed:   medication strength  how much to take  Notes to patient: Clear thoughts            CONTINUE  ADMIT

## 2024-06-20 NOTE — PROGRESS NOTES
[facility] -   Infectious diseases evaluation  admission date 9/24/2021    reason for consultation:   covid    Impression :   Current:  Covid, pneumonia    Other:  ·   Discussion / summary of stay / plan of care   ·   Recommendations   · Remdesivir 5 days  · Decadron 6 mg 5 days  · Anticoagulation  · Discharge planning, she has responded in general.  And is stable    Infection Control Recommendations   · [place] Precautions  · Contact Isolation   · Airborne isolation  · Droplet Isolation      Antimicrobial Stewardship Recommendations   · Simplification of therapy  · off AB    Coordination ofOutpatient Care:   · Estimated Length of IV antimicrobials:  · Patient will need Midline / picc Catheter Insertion:   · Patient will need SNF:  · Patient will need outpatient wound care:     History of Present Illness:   Initial history:  Ciaran Felton is a 46y.o.-year-old female     Interval changes  9/28/2021   Patient Vitals for the past 8 hrs:   BP Temp Temp src Pulse Resp SpO2   09/28/21 2000    90     09/28/21 1959 121/77 98.6 °F (37 °C) Oral 93 14 95 %   09/28/21 1559 (!) 119/91 98.1 °F (36.7 °C) Oral 97 18 97 %   No fever alert eating very well, she is on room air, saturating 97%. Continues to have some psych issues, she also has placement issues. No new CRP today, the last was 22 on 9/27      I have personally reviewed the past medical history, past surgical history, medications, social history, and family history, and I haveupdated the database accordingly. Allergies:   Ketamine and Morphine     Review of Systems:     Review of Systems   Constitutional: Negative for activity change and appetite change. HENT: Negative for congestion. Eyes: Negative for discharge and itching. Respiratory: Negative for apnea. Cardiovascular: Negative for chest pain. Gastrointestinal: Negative for abdominal distention. Endocrine: Negative for cold intolerance. Maricruz De La Rosa is a 67 year old female  Chief Complaint   Patient presents with    New Patient     NP-Non-recurrent unilateral inguinal hernia without obstruction or gangrene         REFERRED BY    Patient presents with lower abdominal discomfort.  Patient states she has had this for an extended period of time this prompted CT scan of the abdomen performed at [facility] which demonstrates umbilical hernia small hiatal hernia and 2 ventral abdominal wall hernias 7 cm from the midline with bowel contents.       .           Past Medical History:    Abdominal distention    Abdominal hernia    Hiatel hernia    Abdominal pain    From intermitent constipation    Anxiety    After pacemaker    Arthritis    Had MRI of spine due to pain    Back pain    Did yoga to help and improved posture    Belching    After new medicaions with pacemaker    Bloating    Occasional    Bradycardia    Cardiac defibrillator in place    Congestive heart disease (HCC)    Constipation    More frequent with new medications    Diarrhea, unspecified    On and off less frequent now    Dizziness    Med increase caused dizzyness at cardio workout    Ejection fraction < 50%    15-20% 6/2021      Esophageal reflux    Flatulence/gas pain/belching    Occational    Food intolerance    Pay attention to what I eat    Frequent urination    Frequent use of laxatives    Senna soft gels and recently metamucil    Heart palpitations    Heartburn    Use  omeprazole or tums as needed    Hemorrhoids    Occationally    High blood pressure    High cholesterol    History of 2019 novel coronavirus disease (COVID-19)    No Hospitalizations. Symptoms: cough,loss of taste and smell,fatigue headache    Hx of motion sickness    Hyperlipidemia    Indigestion    Iccationally    Irregular bowel habits    Since new medications after surgery    Pacemaker    Pain with bowel movements    Sometimes when constipated    Sleep apnea    Sleep disturbance    Diagnosed with severe  Genitourinary: Negative for dysuria. Musculoskeletal: Negative for arthralgias. Skin: Negative for color change. Allergic/Immunologic: Negative for environmental allergies and immunocompromised state. Neurological: Negative for dizziness. Hematological: Negative for adenopathy. Psychiatric/Behavioral: Negative for agitation. Physical Examination :       Physical Exam  Constitutional:       Appearance: Normal appearance. She is not ill-appearing. HENT:      Head: Normocephalic and atraumatic. Nose: Nose normal.      Mouth/Throat:      Mouth: Mucous membranes are moist.   Eyes:      General: No scleral icterus. Conjunctiva/sclera: Conjunctivae normal.      Pupils: Pupils are equal, round, and reactive to light. Cardiovascular:      Rate and Rhythm: Normal rate and regular rhythm. Heart sounds: Normal heart sounds. No murmur heard. Pulmonary:      Breath sounds: No stridor. Rhonchi present. Abdominal:      General: There is no distension. Palpations: Abdomen is soft. Genitourinary:     Comments: Urine hong  Musculoskeletal:         General: No swelling or deformity. Cervical back: Neck supple. No rigidity. Skin:     General: Skin is dry. Coloration: Skin is not jaundiced. Neurological:      General: No focal deficit present. Mental Status: She is alert and oriented to person, place, and time. Psychiatric:         Mood and Affect: Mood normal.         Thought Content: Thought content normal.         Past Medical History:     Past Medical History:   Diagnosis Date    Anxiety     Arthritis     O. A.    [facility] depression [facility] [address]     Dermatitis     Dysuria     Exposure to STD     Folliculitis     Hx of acute bronchitis     Hypertension     Marfan syndrome     MVP (mitral valve prolapse)     Nausea & vomiting     Polysubstance abuse ([address]     drug abuse includes cocaine, fentnyl, cannabis    PONV (postoperative nausea and vomiting)     sleep apnea 8/18/21    Stool incontinence    Stress     diagnosed with cancer    Thyroid disease    Visual impairment    glasses,contacts    Wears glasses    Glasses since I entered 2nd grade    Weight loss    Doing cardio rehab lost 8 pounds     Past Surgical History:   Procedure Laterality Date    Cardiac defibrillator placement      [employer]    Cardiac pacemaker placement  6/21/21    Colonoscopy N/A 4/8/2022    Procedure: COLONOSCOPY;  Surgeon: Cash Cristobal DO;  Location:  ENDOSCOPY     Current Outpatient Medications on File Prior to Visit   Medication Sig Dispense Refill    levothyroxine 50 MCG Oral Tab TAKE 1 TABLET(50 MCG) BY MOUTH DAILY 90 tablet 0    FARXIGA 10 MG Oral Tab       Multiple Vitamin (MULTI-VITAMIN) Oral Tab multivitamin tablet, [RxNorm: 0] (Patient not taking: Reported on 5/30/2024)      clonazePAM 0.5 MG Oral Tab Take 0.5 tablets (0.25 mg total) by mouth 2 (two) times daily as needed for Anxiety. 20 tablet 0    rosuvastatin 20 MG Oral Tab Take 1 tablet (20 mg total) by mouth daily. 90 tablet 1    metoprolol succinate ER 50 MG Oral Tablet 24 Hr Take 1 tablet (50 mg total) by mouth daily. 90 tablet 1    albuterol 108 (90 Base) MCG/ACT Inhalation Aero Soln Inhale 1-2 puffs into the lungs every 4 (four) hours as needed for Wheezing or Shortness of Breath. (Patient not taking: Reported on 4/29/2024) 1 each 0    sacubitril-valsartan (ENTRESTO) 49-51 MG Oral Tab Take 1 tablet by mouth every morning. 90 tablet 0    sacubitril-valsartan (ENTRESTO)  MG Oral Tab Take 1 tablet by mouth every evening. 90 tablet 0    calcium carbonate 500 MG Oral Chew Tab Chew 1 tablet (500 mg total) by mouth daily as needed for Reflux.       No current facility-administered medications on file prior to visit.     @ALL@  Family History   Problem Relation Age of Onset    Heart Disorder Father         AAA    Diabetes Mother 85        Passed away in 2019    Hypertension Mother     Other (Other)  Mother         thyroid    Cancer Maternal Grandfather         throat    Other (Other) Sister         thyroid    Diabetes Brother         Got taken off medication/improved his diet     Social History     Socioeconomic History    Marital status:    Tobacco Use    Smoking status: Never    Smokeless tobacco: Never   Vaping Use    Vaping status: Never Used   Substance and Sexual Activity    Alcohol use: Yes     Alcohol/week: 4.0 standard drinks of alcohol     Types: 4 Cans of beer per week     Comment: weekly    Drug use: No     Social Determinants of Health      [facility]    Social Connections    [facility]    Housing Stability       ROS     GENERAL HEALTH: otherwise feels well, no weight loss, no fever or chills  SKIN: denies any unusual skin rashes or jaundice  HEENT: denies nasal congestion, sinus pain or sore throat; hearing loss negative,   EYES , no diplopia or vision changes  RESPIRATORY: denies shortness of breath, wheezing or cough   CARDIOVASCULAR: denies chest pain or MARKS; no palpitations   GI: denies nausea, vomiting, constipation, diarrhea; no rectal bleeding; no heartburn  GENITAL/: no dysuria, urgency or frequency, no tea colored urine  MUSCULOSKELETAL: no joint complaints upper or lower extremities  HEMATOLOGY: denies hx anemia; denies bruising or excessive bleeding  Endocrine: no weight gain or loss no hot or cold intolerance    EXAM     Pulse 73, temperature 97.2 °F (36.2 °C), SpO2 95%, not currently breastfeeding.  GENERAL: well developed, well nourished female, in no apparent distress.    MENTAL STATUS :Alert, oriented x 3  PSYCH: normal mood and affect  SKIN: anicteric, no rashes, no bruising  EYES: PERRLA, EOMI, sclera anicteric,  conjunctiva without pallor  HEENT: normocephalic, atraumatic, TMs clear, nares patent, mouth moist, pharynx w/o erythema  NECK:  Scoliosis     Seasonal allergies     Staph infection     HX OF ALSO BACTEREMIA    UTI (lower urinary tract infection)        Past Surgical  History:     Past Surgical History:   Procedure Laterality Date    BREAST REDUCTION SURGERY Bilateral     2012    FOOT CAPSULOTOMY Left 14    1st mppj    FOOT SURGERY Bilateral     RT HAS 2 SCREWS, LT HAD FUSION IN DEC WITH 2 SCREWS    HIP ARTHROSCOPY Left     KNEE ARTHROSCOPY Right     X 2    CT EGD TRANSORAL BIOPSY SINGLE/MULTIPLE N/A 2017    EGD BIOPSY WITH DILATATION WITH MALONEYS performed by Dileep Rosenthal MD at [facility] [address] Bilateral     harware in place bilat    TUBAL LIGATION      clips placed on fallopian tubes    UPPER GASTROINTESTINAL ENDOSCOPY  2017     EXTENSIVE SPASMATIC CONTRACTIONS; NO LUMINAL OBSTRUCTIONS; CLOSER TO THE DISTAL ESOPHAGUS AND DEVELOPING DIVERTICULUM IS NOTED AND BELIEVED TO BE SEC TO DSYSMOTILITY;  DALEY DIATIONS WERE DONE EMPIRICALLY WITH 56 FR       Medications:      dexamethasone  6 mg Oral Daily    pregabalin  100 mg Oral Daily    remdesivir IVPB  100 mg IntraVENous Q24H    paliperidone  6 mg Oral Daily    buprenorphine-naloxone  1 tablet SubLINGual Daily    nicotine  1 patch TransDERmal Daily    DULoxetine  60 mg Oral BID    sodium chloride flush  5-40 mL IntraVENous 2 times per day    enoxaparin  30 mg SubCUTAneous BID    Vitamin D  2,000 Units Oral Daily       Social History:     Social History     Socioeconomic History    Marital status:      Spouse name: Not on file    Number of children: Not on file    Years of education: Not on file    Highest education level: Not on file   Occupational History    Not on file   Tobacco Use    Smoking status: Current Every Day Smoker     Years: 1.50     Types: Cigarettes     Last attempt to quit: 3/26/2015     Years since quittin.5    Smokeless tobacco: Never Used   Vaping Use    Vaping Use: Never used   Substance and Sexual Activity    Alcohol use: No    Drug use: Not Currently     Types: Opiates , Marijuana, Cocaine     Comment: drug abuse includes cocaine, fentnyl, cannabis    Sexual activity: Yes     Partners: Male   Other Topics Concern    Not on file   Social History Narrative    Not on file     Social Determinants of Health     Financial Resource Strain:     Difficulty of Paying Living Expenses:    Food Insecurity:     Worried About Running Out of Food in the Last Year:     [address] in the Last Year:    Transportation Needs:     Lack of Transportation (Medical):      Lack of Transportation (Non-Medical):    Physical Activity:     Days of Exercise per Week:     Minutes of Exercise per Session:    Stress:     Feeling of Stress :    Social Connections:     Frequency of Communication with Friends and Family:     Frequency of Social Gatherings with Friends and Family:     Attends Hindu Services:     Active Member of Clubs or Organizations:     Attends Club or Organization Meetings:     Marital Status:    Intimate Partner Violence:     Fear of Current or Ex-Partner:     Emotionally Abused:     Physically Abused:     Sexually Abused:        Family History:     Family History   Problem Relation Age of Onset    Cancer Brother         testicular    Breast Cancer Maternal Grandmother     Hypertension Mother     Thyroid Disease Mother       Medical Decision Making:   I have independently reviewed/ordered the following labs:    CBC with Differential:   Recent Labs     09/27/21 0052 09/28/21  0440   WBC 3.7 6.0   HGB 11.5* 11.7*   HCT 36.0* 37.8    213     BMP:  Recent Labs     09/27/21 0052 09/28/21  0440    138   K 4.4 4.7    100   CO2 29 31   BUN 12 10   CREATININE 0.34* 0.35*   MG 2.2 2.1     Hepatic Function Panel:   Recent Labs     09/27/21 0052 09/28/21  0440   PROT 5.7* 5.7*   LABALBU 3.2*  3.2* 3.4*  3.4*   BILIDIR 0.11 0.10   IBILI 0.13 0.10   BILITOT 0.24* 0.20*   ALKPHOS supple, no JVD, no adenopathy, no thyromegaly  RESPIRATORY: clear to auscultation, no rales , rhonchi or wheezing  CARDIOVASCULAR: RRR, murmur negative  ABDOMEN: Standing Valsalva examination performed with Lori YOUNG demonstrates evidence of tiny umbilical hernia and bilateral inguinal hernias present at the internal ring or bilateral spigelian hernias both are reducible  LYMPHATIC: no axillary , supraclavicular or inguinal lymphadenopathy  EXTREMITIES: no cyanosis, clubbing or edema, no atrophy, full ROM    STUDIES:     Refill on 05/04/2024   Component Date Value Ref Range Status    TSH 04/08/2024 1.970  mIU/mL Final       ASSESSMENT   Imp: Bilateral inguinal hernias at the internal ring with intestinal contents versus spigelian hernias.  Small umbilical hernia  PLan: Laparoscopic bilateral inguinal herniorrhaphy with mesh umbilical herniorrhaphy with possible mesh will examine and shahab patient at time of surgery to be clear on location of hernias in the lower abdomen.  Patient aware of risk to include bleeding infection pneumonia DVT and recurrence of the hernias.  All questions answered patient does have a pacemaker we will get cardiac clearance prior to surgery      Meds & Refills for this Visit:  Requested Prescriptions      No prescriptions requested or ordered in this encounter       Imaging & Consults:  None   70 72   ALT 26 24   AST 14 15     No results for input(s): RPR in the last 72 hours. No results for input(s): HIV in the last 72 hours. No results for input(s): BC in the last 72 hours. Lab Results   Component Value Date    CREATININE 0.35 09/28/2021    GLUCOSE 135 09/28/2021       Detailed results: Thank you for allowing us to participate in the care of this patient. Please call with questions. This note is created with the assistance of a speech recognition program.  While intending to generate adocument that actually reflects the content of the visit, the document can still have some errors including those of syntax and sound a like substitutions which may escape proof reading. It such instances, actual meaningcan be extrapolated by contextual diversion.     Xu Kapadia MD  Office: [phone]  Perfect serve / office [phone]

## 2025-03-20 NOTE — CONSULTS
[facility]          Patient  EP Instructions    Patient’s Name:  Ricardo Hui    You are scheduled to have a dual-chamber AICD placement  on  04/14/2025 , at 1:15pm.    Please check in at 1200n.    Please go to [facility] and park in the outpatient parking lot that is located around to the back of the hospital and enter through the [facility] [place].   Once you enter through the [facility] check in with the  there.  The  will either give you directions or assist you in getting to the cath holding area.          3.  You are not to eat or drink anything after midnight the night before your procedure.     Please continue to take your medications with a small sip of water on the morning of the procedure with the following exceptions:  hold xarelto for 48hours prior to procedure.      If you are diabetic, do not take your insulin/sugar pill the morning of the procedure.    We encourage families to wait in the waiting room on the first floor while the procedure is being done.  The Doctor will come out and talk with you as soon as the procedure is over.    There is the possibility that you may spend the night in the hospital, depending on the results of the procedure.  This will be determined after the procedure is done.     8.   If you or your family have any questions, please call our office Monday-Friday 9:00am         -4:30 pm , at [phone], and ask to speak to one of the nurses.        OBTAIN LABWORK ORDERED SEVEN (7)  DAYS PRIOR TO PROCEDURE     PULMONARY & CRITICAL CARE MEDICINE PROGRESS NOTE     Patient:  Opal Valladares  MRN: [id number]  Admit date: 1/7/2024  Primary Care Physician: Jazzy Ma APRN - CNP  CODE Status: Full Code  LOS: 3  Consults   SUBJECTIVE     CHIEF COMPLAINT/REASON FOR CONSULT: Shortness of Breath    HISTORY OF PRESENT ILLNESS:  The patient is a 43 y.o. 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  Her sister has also been central core myopathy with a RYR1 (Ryanodine receptor) mutation at the [facility].  She was evaluated in May 2023 by neurology team here and was deemed to have same disease manifestations.    She presents with worsening shortness of breath for last several days.  Lab evaluation in the ED showed pH of 7.27, pCO2 92.3, bicarb 41.8.  Patient started on BiPAP with some improvement in her respiratory acidosis.  Chest x-ray shows reviewed his opacity in the right hemithorax which is reported to be unchanged or slightly increased.  WBC count is 7.2 patient has been started on Solu-Medrol and Rocephin/azithromycin.  Respiratory viral panel is negative.    Interval history:  1/11/2024      Patient on 2 L oxygen by nasal cannula  Patient claims to have used the BiPAP last night  Afebrile and hemodynamic stable  Fluid balance is almost even  Denied any chest pain  Shortness of breath is improving  Cough is improving  No orthopnea, PND or increasing pedal edema  No hemoptysis    PAST MEDICAL HISTORY:        Diagnosis Date    Acute on chronic respiratory failure with hypoxia and hypercapnia (HCC) 5/18/2023    Anxiety     Arthritis     O.A.    Bipolar depression (HCC)     Dermatitis     Dysuria     Exposure to STD     Folliculitis     Hx of acute bronchitis     Hypertension     Marfan syndrome     MVP (mitral valve prolapse)     Nausea & vomiting     Polysubstance abuse (HCC)     drug abuse includes cocaine, fentnyl, cannabis    PONV (postoperative nausea and vomiting)   multivitamin  1 tablet Oral Daily    enoxaparin  30 mg SubCUTAneous BID    buprenorphine-naloxone  2 tablet SubLINGual Daily    levoFLOXacin  500 mg Oral Daily    gabapentin  600 mg Oral TID    carvedilol  12.5 mg Oral BID WC    sodium chloride flush  5-40 mL IntraVENous 2 times per day    ipratropium 0.5 mg-albuterol 2.5 mg  1 Dose Inhalation Q4H WA RT    DULoxetine  20 mg Oral Daily    Vitamin D  1,000 Units Oral Daily    nicotine  1 patch TransDERmal Daily     Continuous Infusions:   sodium chloride         INPUT/OUTPUT:  In: 250 [P.O.:250]  Out: 300 [Urine:300]       LABORATORY RESULTS:  BLOOD GASES:   No results for input(s): \"POCPH\", \"POCPCO2\", \"POCPO2\", \"POCHCO3\", \"THGY4QVR\" in the last 72 hours.  COMPLETE BLOOD COUNTS:   Recent Labs     01/09/24  1146 01/10/24  0935   WBC 11.7* 5.7   HGB 11.6* 11.0*   HCT 36.2* 34.9*   MCV 92.6 93.6    189   LYMPHOPCT 7* 29   RBC 3.91* 3.73*   MCH 29.7 29.5   MCHC 32.0 31.5   RDW 12.0 12.1       C-REACTIVE PROTEIN:   No results for input(s): \"CRP\" in the last 72 hours.  LACTATE DEHYDROGENASE:   No results for input(s): \"LDH\" in the last 72 hours.  BASIC METABOLIC PROFILE:   Recent Labs     01/09/24  0623 01/10/24  0935    137   K 4.2 3.8   CL 99 96*   CO2 36* 32*   BUN 22* 17   CREATININE 0.3* 0.3*   GLUCOSE 100* 154*       LIVER FUNCTION TESTS:   No results for input(s): \"PROT\", \"LABALBU\", \"ALT\", \"AST\", \"GGT\", \"ALKPHOS\", \"BILITOT\" in the last 72 hours.  COAGULATION PROFILE:   No results for input(s): \"INR\", \"PROTIME\", \"APTT\" in the last 72 hours.  D-DIMER:  Recent Labs     01/08/24  0946   DDIMER 0.28       LACTIC ACID:  No results for input(s): \"LACTA\" in the last 72 hours.  CARDIAC ENZYMES:  No results for input(s): \"CKTOTAL\", \"CKMB\", \"CKMBINDEX\", \"TROPONINI\" in the last 72 hours.    Invalid input(s): \"TROPONIN\", \"HSTROP\"  BRAIN NATRIURETIC PEPTIDE/PRO-BRAIN NATRURETIC PEPTIDE:   No results for input(s): \"BNP\", \"PROBNP\" in the last 72

## 2025-05-22 ENCOUNTER — OFFICE VISIT (OUTPATIENT)
Age: 45
End: 2025-05-22
Payer: COMMERCIAL

## 2025-05-22 VITALS
BODY MASS INDEX: 25.05 KG/M2 | DIASTOLIC BLOOD PRESSURE: 75 MMHG | HEIGHT: 70 IN | SYSTOLIC BLOOD PRESSURE: 108 MMHG | HEART RATE: 77 BPM | WEIGHT: 175 LBS

## 2025-05-22 DIAGNOSIS — N39.41 URGE INCONTINENCE: Primary | ICD-10-CM

## 2025-05-22 DIAGNOSIS — R35.0 FREQUENCY OF MICTURITION: ICD-10-CM

## 2025-05-22 PROCEDURE — G8427 DOCREV CUR MEDS BY ELIG CLIN: HCPCS | Performed by: SPECIALIST

## 2025-05-22 PROCEDURE — 3074F SYST BP LT 130 MM HG: CPT | Performed by: SPECIALIST

## 2025-05-22 PROCEDURE — 1036F TOBACCO NON-USER: CPT | Performed by: SPECIALIST

## 2025-05-22 PROCEDURE — 3078F DIAST BP <80 MM HG: CPT | Performed by: SPECIALIST

## 2025-05-22 PROCEDURE — 99204 OFFICE O/P NEW MOD 45 MIN: CPT | Performed by: SPECIALIST

## 2025-05-22 PROCEDURE — G8419 CALC BMI OUT NRM PARAM NOF/U: HCPCS | Performed by: SPECIALIST

## 2025-05-22 NOTE — PROGRESS NOTES
Nimesh Elizabeth MD, FACS  [facility] Consultation/New Patient Office Visit  Patient:  Opal Valladares  YOB: 1980  Date: 5/22/2025  Consult requested from Jazzy Ma APRN - CNP  for purpose of evaluation of Urge urinary incontinence.    HISTORY OF PRESENT ILLNESS:   The patient is a 45 y.o. female who presents today for evaluation of the following problems:   The patient presents with Urge urinary incontinence requiring several pads per day.  Patient currently using Oxybutynin ER 10 mg po qd for OAB symptoms and this has helped.   However, this medication causes constipation.  Will switch to Gemtesa (vibegron) 75 mg po qd for OAB if her insurance allows and it is not cost prohibitive.  Patient drinks 2 Pepsi's a day and the caffeine and carbonation both make her overactive bladder symptoms worse and she was told to eliminate this.  Urine for C&S each and every time patient thinks she has a UTI.     (Patient's old records have been requested, reviewed and summarized in today's note.)    Summary of old records:   Marfan's syndrome  Urge urinary incontinence: Oxybutynin ER 10 mg qd (constipation); switch to Gemtesa (vibegron) 75 mg po qd 5/22/25; reduce/eliminate Pepsi consumption  Recurrent UTI    Procedures Today: N/A    Urinalysis today:  No results found for this visit on 05/22/25.    Today's AUA Symptom Score (QOL): 17 (5)    Last BUN and creatinine:  Lab Results   Component Value Date    BUN 10 05/26/2024     Lab Results   Component Value Date    CREATININE 0.6 05/26/2024       Additional Lab/Culture results:   6/1/24, 8/16/23, 8/10/23 urine C&S neg  7/25/23 urine C&S: Enterococcus faecalis    Imaging Reviewed during this Office Visit: none  (results were independently reviewed by physician and radiology report verified)    PAST MEDICAL, FAMILY AND SOCIAL HISTORY:  Past Medical History:   Diagnosis Date    Acute on chronic respiratory failure with hypoxia and hypercapnia

## (undated) DEVICE — BITEBLOCK 54FR W/ DENT RIM BLOX

## (undated) DEVICE — AIRLIFE™ NASAL OXYGEN CANNULA CURVED, FLARED TIP, WITH 7 FEET (2.1 M) CRUSH RESISTANT TUBING, OVER-THE-EAR STYLE: Brand: AIRLIFE™

## (undated) DEVICE — JELLY,LUBE,STERILE,FLIP TOP,TUBE,2-OZ: Brand: MEDLINE

## (undated) DEVICE — DEFENDO AIR WATER SUCTION AND BIOPSY VALVE KIT FOR  OLYMPUS: Brand: DEFENDO AIR/WATER/SUCTION AND BIOPSY VALVE

## (undated) DEVICE — FORCEPS BX L240CM JAW DIA2.4MM ORNG L CAP W/ NDL DISP RAD